# Patient Record
Sex: FEMALE | Race: WHITE | Employment: OTHER | URBAN - METROPOLITAN AREA
[De-identification: names, ages, dates, MRNs, and addresses within clinical notes are randomized per-mention and may not be internally consistent; named-entity substitution may affect disease eponyms.]

---

## 2017-02-21 ENCOUNTER — GENERIC CONVERSION - ENCOUNTER (OUTPATIENT)
Dept: OTHER | Facility: OTHER | Age: 57
End: 2017-02-21

## 2017-03-31 DIAGNOSIS — Z12.31 ENCOUNTER FOR SCREENING MAMMOGRAM FOR MALIGNANT NEOPLASM OF BREAST: ICD-10-CM

## 2017-03-31 DIAGNOSIS — R92.8 OTHER ABNORMAL AND INCONCLUSIVE FINDINGS ON DIAGNOSTIC IMAGING OF BREAST: ICD-10-CM

## 2017-04-06 ENCOUNTER — GENERIC CONVERSION - ENCOUNTER (OUTPATIENT)
Dept: OTHER | Facility: OTHER | Age: 57
End: 2017-04-06

## 2017-04-14 ENCOUNTER — HOSPITAL ENCOUNTER (OUTPATIENT)
Dept: RADIOLOGY | Facility: HOSPITAL | Age: 57
Discharge: HOME | End: 2017-04-14
Payer: MEDICARE

## 2017-04-14 DIAGNOSIS — R92.8 OTHER ABNORMAL AND INCONCLUSIVE FINDINGS ON DIAGNOSTIC IMAGING OF BREAST: ICD-10-CM

## 2017-04-14 PROCEDURE — G0206 DX MAMMO INCL CAD UNI: HCPCS

## 2017-04-14 PROCEDURE — 76642 ULTRASOUND BREAST LIMITED: CPT

## 2017-04-20 ENCOUNTER — HOSPITAL ENCOUNTER (OUTPATIENT)
Dept: RADIOLOGY | Facility: HOSPITAL | Age: 57
Discharge: HOME | End: 2017-04-20
Admitting: RADIOLOGY
Payer: MEDICARE

## 2017-04-20 ENCOUNTER — HOSPITAL ENCOUNTER (OUTPATIENT)
Dept: RADIOLOGY | Facility: HOSPITAL | Age: 57
Discharge: HOME | End: 2017-04-20
Payer: MEDICARE

## 2017-04-20 VITALS
OXYGEN SATURATION: 98 % | HEART RATE: 83 BPM | SYSTOLIC BLOOD PRESSURE: 112 MMHG | RESPIRATION RATE: 16 BRPM | DIASTOLIC BLOOD PRESSURE: 57 MMHG

## 2017-04-20 DIAGNOSIS — N63.10 BREAST MASS, RIGHT: ICD-10-CM

## 2017-04-20 PROCEDURE — 19084 BX BREAST ADD LESION US IMAG: CPT

## 2017-04-20 PROCEDURE — 88341 IMHCHEM/IMCYTCHM EA ADD ANTB: CPT | Performed by: RADIOLOGY

## 2017-04-20 PROCEDURE — 88342 IMHCHEM/IMCYTCHM 1ST ANTB: CPT | Performed by: RADIOLOGY

## 2017-04-20 PROCEDURE — 19083 BX BREAST 1ST LESION US IMAG: CPT

## 2017-04-20 PROCEDURE — 88361 TUMOR IMMUNOHISTOCHEM/COMPUT: CPT | Performed by: RADIOLOGY

## 2017-04-20 PROCEDURE — 88305 TISSUE EXAM BY PATHOLOGIST: CPT | Performed by: RADIOLOGY

## 2017-04-20 RX ORDER — LEVOTHYROXINE SODIUM 0.1 MG/1
100 TABLET ORAL EVERY MORNING
COMMUNITY
End: 2019-01-07 | Stop reason: SDUPTHER

## 2017-04-20 RX ORDER — CELECOXIB 200 MG/1
200 CAPSULE ORAL DAILY PRN
COMMUNITY
End: 2017-05-19

## 2017-04-20 RX ORDER — TRAMADOL HYDROCHLORIDE 50 MG/1
50 TABLET ORAL EVERY 6 HOURS PRN
COMMUNITY
End: 2018-03-12 | Stop reason: ALTCHOICE

## 2017-04-20 RX ORDER — ROPINIROLE 0.25 MG/1
0.25 TABLET, FILM COATED ORAL
COMMUNITY
End: 2017-05-19

## 2017-04-20 RX ORDER — DULOXETIN HYDROCHLORIDE 60 MG/1
60 CAPSULE, DELAYED RELEASE ORAL EVERY MORNING
COMMUNITY
End: 2018-07-13 | Stop reason: SDUPTHER

## 2017-04-20 RX ORDER — ZOLPIDEM TARTRATE 6.25 MG/1
5 TABLET, FILM COATED, EXTENDED RELEASE ORAL
COMMUNITY
End: 2018-09-06 | Stop reason: SDUPTHER

## 2017-04-25 ENCOUNTER — GENERIC CONVERSION - ENCOUNTER (OUTPATIENT)
Dept: OTHER | Facility: OTHER | Age: 57
End: 2017-04-25

## 2017-05-09 ENCOUNTER — ALLSCRIPTS OFFICE VISIT (OUTPATIENT)
Dept: OTHER | Facility: OTHER | Age: 57
End: 2017-05-09

## 2017-05-09 DIAGNOSIS — C50.911 MALIGNANT NEOPLASM OF RIGHT FEMALE BREAST (HCC): ICD-10-CM

## 2017-05-15 ENCOUNTER — GENERIC CONVERSION - ENCOUNTER (OUTPATIENT)
Dept: OTHER | Facility: OTHER | Age: 57
End: 2017-05-15

## 2017-05-15 ENCOUNTER — ALLSCRIPTS OFFICE VISIT (OUTPATIENT)
Dept: OTHER | Facility: OTHER | Age: 57
End: 2017-05-15

## 2017-05-16 ENCOUNTER — GENERIC CONVERSION - ENCOUNTER (OUTPATIENT)
Dept: OTHER | Facility: OTHER | Age: 57
End: 2017-05-16

## 2017-05-16 ENCOUNTER — APPOINTMENT (OUTPATIENT)
Dept: RADIATION ONCOLOGY | Facility: HOSPITAL | Age: 57
End: 2017-05-16
Attending: RADIOLOGY
Payer: MEDICARE

## 2017-05-16 PROCEDURE — 99215 OFFICE O/P EST HI 40 MIN: CPT | Performed by: RADIOLOGY

## 2017-05-19 ENCOUNTER — HOSPITAL ENCOUNTER (OUTPATIENT)
Dept: RADIOLOGY | Facility: HOSPITAL | Age: 57
Discharge: HOME | End: 2017-05-19
Attending: SURGERY
Payer: MEDICARE

## 2017-05-19 DIAGNOSIS — C50.911 MALIGNANT NEOPLASM OF RIGHT FEMALE BREAST (HCC): ICD-10-CM

## 2017-05-19 PROCEDURE — C8908 MRI W/O FOL W/CONT, BREAST,: HCPCS

## 2017-05-19 PROCEDURE — A9585 GADOBUTROL INJECTION: HCPCS | Performed by: SURGERY

## 2017-05-19 RX ORDER — GABAPENTIN 100 MG/1
100 CAPSULE ORAL 3 TIMES DAILY
COMMUNITY
End: 2018-03-12 | Stop reason: ALTCHOICE

## 2017-05-19 RX ADMIN — GADOBUTROL 7 ML: 604.72 INJECTION INTRAVENOUS at 09:57

## 2017-05-23 ENCOUNTER — ALLSCRIPTS OFFICE VISIT (OUTPATIENT)
Dept: OTHER | Facility: OTHER | Age: 57
End: 2017-05-23

## 2017-05-23 ENCOUNTER — ANESTHESIA EVENT (OUTPATIENT)
Dept: PERIOP | Facility: HOSPITAL | Age: 57
End: 2017-05-23
Payer: MEDICARE

## 2017-05-24 ENCOUNTER — HOSPITAL ENCOUNTER (OUTPATIENT)
Dept: RADIOLOGY | Facility: HOSPITAL | Age: 57
Discharge: HOME | End: 2017-05-24
Payer: MEDICARE

## 2017-05-24 ENCOUNTER — CONVERSION ENCOUNTER (OUTPATIENT)
Dept: MAMMOGRAPHY | Facility: HOSPITAL | Age: 57
End: 2017-05-24

## 2017-05-24 ENCOUNTER — ANESTHESIA (OUTPATIENT)
Dept: PERIOP | Facility: HOSPITAL | Age: 57
End: 2017-05-24
Payer: MEDICARE

## 2017-05-24 ENCOUNTER — HOSPITAL ENCOUNTER (OUTPATIENT)
Facility: HOSPITAL | Age: 57
Setting detail: OUTPATIENT SURGERY
Discharge: HOME | End: 2017-05-24
Attending: SURGERY | Admitting: SURGERY
Payer: MEDICARE

## 2017-05-24 ENCOUNTER — HOSPITAL ENCOUNTER (OUTPATIENT)
Dept: RADIOLOGY | Facility: HOSPITAL | Age: 57
Discharge: HOME | End: 2017-05-24
Attending: SURGERY | Admitting: SURGERY
Payer: MEDICARE

## 2017-05-24 ENCOUNTER — APPOINTMENT (OUTPATIENT)
Dept: RADIOLOGY | Facility: HOSPITAL | Age: 57
End: 2017-05-24
Payer: MEDICARE

## 2017-05-24 VITALS
HEIGHT: 68 IN | WEIGHT: 150 LBS | OXYGEN SATURATION: 93 % | SYSTOLIC BLOOD PRESSURE: 102 MMHG | DIASTOLIC BLOOD PRESSURE: 52 MMHG | BODY MASS INDEX: 22.73 KG/M2 | RESPIRATION RATE: 18 BRPM | TEMPERATURE: 97 F | HEART RATE: 86 BPM

## 2017-05-24 DIAGNOSIS — N63.10 BREAST MASS, RIGHT: ICD-10-CM

## 2017-05-24 DIAGNOSIS — C50.911 MALIGNANT NEOPLASM OF RIGHT FEMALE BREAST (HCC): ICD-10-CM

## 2017-05-24 DIAGNOSIS — Z85.3 HISTORY OF RIGHT BREAST CANCER: ICD-10-CM

## 2017-05-24 PROBLEM — D05.11 INTRADUCTAL CARCINOMA OF RIGHT BREAST: Status: ACTIVE | Noted: 2017-05-24

## 2017-05-24 PROCEDURE — 76098 X-RAY EXAM SURGICAL SPECIMEN: CPT

## 2017-05-24 PROCEDURE — 78195 LYMPH SYSTEM IMAGING: CPT

## 2017-05-24 PROCEDURE — 19285 PERQ DEV BREAST 1ST US IMAG: CPT

## 2017-05-24 PROCEDURE — 88307 TISSUE EXAM BY PATHOLOGIST: CPT | Performed by: SURGERY

## 2017-05-24 PROCEDURE — A9541 TC99M SULFUR COLLOID: HCPCS

## 2017-05-24 RX ORDER — FENTANYL CITRATE 50 UG/ML
INJECTION, SOLUTION INTRAMUSCULAR; INTRAVENOUS AS NEEDED
Status: DISCONTINUED | OUTPATIENT
Start: 2017-05-24 | End: 2017-05-24 | Stop reason: SURG

## 2017-05-24 RX ORDER — ONDANSETRON 2 MG/ML
4 INJECTION INTRAMUSCULAR; INTRAVENOUS ONCE
Status: DISCONTINUED | OUTPATIENT
Start: 2017-05-24 | End: 2017-05-24 | Stop reason: HOSPADM

## 2017-05-24 RX ORDER — OXYCODONE HYDROCHLORIDE AND ACETAMINOPHEN 5; 325 MG/1; MG/1
1 TABLET ORAL EVERY 4 HOURS PRN
Qty: 30 TABLET | Refills: 0 | Status: SHIPPED | OUTPATIENT
Start: 2017-05-24 | End: 2017-06-03

## 2017-05-24 RX ORDER — ONDANSETRON 2 MG/ML
INJECTION INTRAMUSCULAR; INTRAVENOUS AS NEEDED
Status: DISCONTINUED | OUTPATIENT
Start: 2017-05-24 | End: 2017-05-24 | Stop reason: SURG

## 2017-05-24 RX ORDER — ISOSULFAN BLUE 50 MG/5ML
INJECTION, SOLUTION SUBCUTANEOUS AS NEEDED
Status: DISCONTINUED | OUTPATIENT
Start: 2017-05-24 | End: 2017-05-24 | Stop reason: HOSPADM

## 2017-05-24 RX ORDER — KETOROLAC TROMETHAMINE 30 MG/ML
INJECTION, SOLUTION INTRAMUSCULAR; INTRAVENOUS AS NEEDED
Status: DISCONTINUED | OUTPATIENT
Start: 2017-05-24 | End: 2017-05-24 | Stop reason: SURG

## 2017-05-24 RX ORDER — OXYCODONE HYDROCHLORIDE AND ACETAMINOPHEN 5; 325 MG/1; MG/1
1 TABLET ORAL EVERY 4 HOURS PRN
Qty: 30 TABLET | Refills: 0 | Status: SHIPPED | OUTPATIENT
Start: 2017-05-24 | End: 2017-05-24

## 2017-05-24 RX ORDER — MAGNESIUM HYDROXIDE 1200 MG/15ML
LIQUID ORAL AS NEEDED
Status: DISCONTINUED | OUTPATIENT
Start: 2017-05-24 | End: 2017-05-24 | Stop reason: HOSPADM

## 2017-05-24 RX ORDER — PROPOFOL 10 MG/ML
INJECTION, EMULSION INTRAVENOUS AS NEEDED
Status: DISCONTINUED | OUTPATIENT
Start: 2017-05-24 | End: 2017-05-24 | Stop reason: SURG

## 2017-05-24 RX ORDER — GLYCOPYRROLATE 0.2 MG/ML
INJECTION INTRAMUSCULAR; INTRAVENOUS AS NEEDED
Status: DISCONTINUED | OUTPATIENT
Start: 2017-05-24 | End: 2017-05-24 | Stop reason: SURG

## 2017-05-24 RX ORDER — FENTANYL CITRATE/PF 50 MCG/ML
25 SYRINGE (ML) INJECTION
Status: DISCONTINUED | OUTPATIENT
Start: 2017-05-24 | End: 2017-05-24 | Stop reason: HOSPADM

## 2017-05-24 RX ORDER — SODIUM CHLORIDE, SODIUM LACTATE, POTASSIUM CHLORIDE, CALCIUM CHLORIDE 600; 310; 30; 20 MG/100ML; MG/100ML; MG/100ML; MG/100ML
100 INJECTION, SOLUTION INTRAVENOUS CONTINUOUS
Status: DISCONTINUED | OUTPATIENT
Start: 2017-05-24 | End: 2017-05-24 | Stop reason: HOSPADM

## 2017-05-24 RX ORDER — MIDAZOLAM HYDROCHLORIDE 1 MG/ML
INJECTION INTRAMUSCULAR; INTRAVENOUS AS NEEDED
Status: DISCONTINUED | OUTPATIENT
Start: 2017-05-24 | End: 2017-05-24 | Stop reason: SURG

## 2017-05-24 RX ORDER — EPHEDRINE SULFATE 50 MG/ML
INJECTION, SOLUTION INTRAVENOUS AS NEEDED
Status: DISCONTINUED | OUTPATIENT
Start: 2017-05-24 | End: 2017-05-24 | Stop reason: SURG

## 2017-05-24 RX ORDER — LIDOCAINE HYDROCHLORIDE 10 MG/ML
INJECTION, SOLUTION INFILTRATION; PERINEURAL CODE/TRAUMA/SEDATION MEDICATION
Status: COMPLETED | OUTPATIENT
Start: 2017-05-24 | End: 2017-05-24

## 2017-05-24 RX ADMIN — CEFAZOLIN SODIUM 2000 MG: 2 SOLUTION INTRAVENOUS at 09:52

## 2017-05-24 RX ADMIN — KETOROLAC TROMETHAMINE 30 MG: 30 INJECTION, SOLUTION INTRAMUSCULAR at 11:16

## 2017-05-24 RX ADMIN — MIDAZOLAM HYDROCHLORIDE 1 MG: 1 INJECTION, SOLUTION INTRAMUSCULAR; INTRAVENOUS at 09:57

## 2017-05-24 RX ADMIN — SODIUM CHLORIDE, SODIUM LACTATE, POTASSIUM CHLORIDE, AND CALCIUM CHLORIDE 100 ML/HR: .6; .31; .03; .02 INJECTION, SOLUTION INTRAVENOUS at 09:26

## 2017-05-24 RX ADMIN — GLYCOPYRROLATE 0.2 MG: 0.2 INJECTION, SOLUTION INTRAMUSCULAR; INTRAVENOUS at 10:23

## 2017-05-24 RX ADMIN — EPHEDRINE SULFATE 10 MG: 50 INJECTION, SOLUTION INTRAMUSCULAR; INTRAVENOUS; SUBCUTANEOUS at 10:26

## 2017-05-24 RX ADMIN — DEXAMETHASONE SODIUM PHOSPHATE 4 MG: 10 INJECTION INTRAMUSCULAR; INTRAVENOUS at 11:13

## 2017-05-24 RX ADMIN — SODIUM CHLORIDE, SODIUM LACTATE, POTASSIUM CHLORIDE, AND CALCIUM CHLORIDE: .6; .31; .03; .02 INJECTION, SOLUTION INTRAVENOUS at 09:49

## 2017-05-24 RX ADMIN — EPHEDRINE SULFATE 10 MG: 50 INJECTION, SOLUTION INTRAMUSCULAR; INTRAVENOUS; SUBCUTANEOUS at 10:25

## 2017-05-24 RX ADMIN — FENTANYL CITRATE 50 MCG: 50 INJECTION, SOLUTION INTRAMUSCULAR; INTRAVENOUS at 10:01

## 2017-05-24 RX ADMIN — PROPOFOL 150 MG: 10 INJECTION, EMULSION INTRAVENOUS at 09:55

## 2017-05-24 RX ADMIN — LIDOCAINE HYDROCHLORIDE 7 ML: 10 INJECTION, SOLUTION INFILTRATION; PERINEURAL at 09:12

## 2017-05-24 RX ADMIN — MIDAZOLAM HYDROCHLORIDE 1 MG: 1 INJECTION, SOLUTION INTRAMUSCULAR; INTRAVENOUS at 09:52

## 2017-05-24 RX ADMIN — FENTANYL CITRATE 50 MCG: 50 INJECTION, SOLUTION INTRAMUSCULAR; INTRAVENOUS at 09:57

## 2017-05-24 RX ADMIN — ONDANSETRON 4 MG: 2 INJECTION INTRAMUSCULAR; INTRAVENOUS at 11:13

## 2017-06-06 ENCOUNTER — ALLSCRIPTS OFFICE VISIT (OUTPATIENT)
Dept: OTHER | Facility: OTHER | Age: 57
End: 2017-06-06

## 2017-06-13 ENCOUNTER — ALLSCRIPTS OFFICE VISIT (OUTPATIENT)
Dept: OTHER | Facility: OTHER | Age: 57
End: 2017-06-13

## 2017-07-05 ENCOUNTER — ALLSCRIPTS OFFICE VISIT (OUTPATIENT)
Dept: OTHER | Facility: OTHER | Age: 57
End: 2017-07-05

## 2017-07-05 LAB — SCAN RESULT: NORMAL

## 2017-07-06 ENCOUNTER — APPOINTMENT (OUTPATIENT)
Dept: RADIATION ONCOLOGY | Facility: HOSPITAL | Age: 57
End: 2017-07-06
Attending: RADIOLOGY
Payer: MEDICARE

## 2017-07-06 ENCOUNTER — GENERIC CONVERSION - ENCOUNTER (OUTPATIENT)
Dept: OTHER | Facility: OTHER | Age: 57
End: 2017-07-06

## 2017-07-06 PROCEDURE — 99215 OFFICE O/P EST HI 40 MIN: CPT | Performed by: RADIOLOGY

## 2017-07-06 PROCEDURE — 77332 RADIATION TREATMENT AID(S): CPT | Performed by: RADIOLOGY

## 2017-07-06 PROCEDURE — 77290 THER RAD SIMULAJ FIELD CPLX: CPT | Performed by: RADIOLOGY

## 2017-07-12 PROCEDURE — 77300 RADIATION THERAPY DOSE PLAN: CPT | Performed by: RADIOLOGY

## 2017-07-12 PROCEDURE — 77295 3-D RADIOTHERAPY PLAN: CPT | Performed by: RADIOLOGY

## 2017-07-12 PROCEDURE — 77334 RADIATION TREATMENT AID(S): CPT | Performed by: RADIOLOGY

## 2017-07-18 PROCEDURE — 77280 THER RAD SIMULAJ FIELD SMPL: CPT | Performed by: RADIOLOGY

## 2017-07-20 PROCEDURE — 77331 SPECIAL RADIATION DOSIMETRY: CPT | Performed by: RADIOLOGY

## 2017-07-20 PROCEDURE — 77412 RADIATION TX DELIVERY LVL 3: CPT | Performed by: RADIOLOGY

## 2017-07-21 PROCEDURE — 77412 RADIATION TX DELIVERY LVL 3: CPT | Performed by: RADIOLOGY

## 2017-07-24 PROCEDURE — 77412 RADIATION TX DELIVERY LVL 3: CPT | Performed by: RADIOLOGY

## 2017-07-25 LAB — SCAN RESULT: NORMAL

## 2017-07-25 PROCEDURE — 77412 RADIATION TX DELIVERY LVL 3: CPT | Performed by: RADIOLOGY

## 2017-07-26 PROCEDURE — 77412 RADIATION TX DELIVERY LVL 3: CPT | Performed by: RADIOLOGY

## 2017-07-26 PROCEDURE — 77336 RADIATION PHYSICS CONSULT: CPT | Performed by: RADIOLOGY

## 2017-07-26 PROCEDURE — 77417 THER RADIOLOGY PORT IMAGE(S): CPT | Performed by: RADIOLOGY

## 2017-07-27 DIAGNOSIS — C50.911 MALIGNANT NEOPLASM OF RIGHT FEMALE BREAST (HCC): ICD-10-CM

## 2017-07-27 PROCEDURE — 77412 RADIATION TX DELIVERY LVL 3: CPT | Performed by: RADIOLOGY

## 2017-07-28 PROCEDURE — 77412 RADIATION TX DELIVERY LVL 3: CPT | Performed by: RADIOLOGY

## 2017-07-31 PROCEDURE — 77412 RADIATION TX DELIVERY LVL 3: CPT | Performed by: RADIOLOGY

## 2017-08-01 ENCOUNTER — APPOINTMENT (OUTPATIENT)
Dept: RADIATION ONCOLOGY | Facility: HOSPITAL | Age: 57
End: 2017-08-01
Attending: RADIOLOGY
Payer: MEDICARE

## 2017-08-01 PROCEDURE — 77412 RADIATION TX DELIVERY LVL 3: CPT | Performed by: RADIOLOGY

## 2017-08-02 PROCEDURE — 77336 RADIATION PHYSICS CONSULT: CPT | Performed by: RADIOLOGY

## 2017-08-02 PROCEDURE — 77412 RADIATION TX DELIVERY LVL 3: CPT | Performed by: RADIOLOGY

## 2017-08-02 PROCEDURE — 77321 SPECIAL TELETX PORT PLAN: CPT | Performed by: RADIOLOGY

## 2017-08-02 PROCEDURE — 77334 RADIATION TREATMENT AID(S): CPT | Performed by: RADIOLOGY

## 2017-08-02 PROCEDURE — 77417 THER RADIOLOGY PORT IMAGE(S): CPT | Performed by: RADIOLOGY

## 2017-08-03 PROCEDURE — 77412 RADIATION TX DELIVERY LVL 3: CPT | Performed by: RADIOLOGY

## 2017-08-04 PROCEDURE — 77412 RADIATION TX DELIVERY LVL 3: CPT | Performed by: RADIOLOGY

## 2017-08-07 PROCEDURE — 77412 RADIATION TX DELIVERY LVL 3: CPT | Performed by: RADIOLOGY

## 2017-08-08 PROCEDURE — 77412 RADIATION TX DELIVERY LVL 3: CPT | Performed by: RADIOLOGY

## 2017-08-09 ENCOUNTER — ALLSCRIPTS OFFICE VISIT (OUTPATIENT)
Dept: OTHER | Facility: OTHER | Age: 57
End: 2017-08-09

## 2017-08-09 PROCEDURE — 77417 THER RADIOLOGY PORT IMAGE(S): CPT | Performed by: RADIOLOGY

## 2017-08-09 PROCEDURE — 77336 RADIATION PHYSICS CONSULT: CPT | Performed by: RADIOLOGY

## 2017-08-09 PROCEDURE — 77412 RADIATION TX DELIVERY LVL 3: CPT | Performed by: RADIOLOGY

## 2017-08-10 PROCEDURE — 77412 RADIATION TX DELIVERY LVL 3: CPT | Performed by: RADIOLOGY

## 2017-08-11 PROCEDURE — 77412 RADIATION TX DELIVERY LVL 3: CPT | Performed by: RADIOLOGY

## 2017-08-14 PROCEDURE — 77412 RADIATION TX DELIVERY LVL 3: CPT | Performed by: RADIOLOGY

## 2017-08-15 PROCEDURE — 77412 RADIATION TX DELIVERY LVL 3: CPT | Performed by: RADIOLOGY

## 2017-08-16 PROCEDURE — 77336 RADIATION PHYSICS CONSULT: CPT | Performed by: RADIOLOGY

## 2017-08-16 PROCEDURE — 77412 RADIATION TX DELIVERY LVL 3: CPT | Performed by: RADIOLOGY

## 2017-08-17 PROCEDURE — 77412 RADIATION TX DELIVERY LVL 3: CPT | Performed by: RADIOLOGY

## 2017-08-17 PROCEDURE — 77417 THER RADIOLOGY PORT IMAGE(S): CPT | Performed by: RADIOLOGY

## 2017-08-18 PROCEDURE — 77412 RADIATION TX DELIVERY LVL 3: CPT | Performed by: RADIOLOGY

## 2017-08-21 PROCEDURE — 77412 RADIATION TX DELIVERY LVL 3: CPT | Performed by: RADIOLOGY

## 2017-08-22 PROCEDURE — 77412 RADIATION TX DELIVERY LVL 3: CPT | Performed by: RADIOLOGY

## 2017-08-23 PROCEDURE — 77412 RADIATION TX DELIVERY LVL 3: CPT | Performed by: RADIOLOGY

## 2017-08-23 PROCEDURE — 77336 RADIATION PHYSICS CONSULT: CPT | Performed by: RADIOLOGY

## 2017-08-24 PROCEDURE — 77417 THER RADIOLOGY PORT IMAGE(S): CPT | Performed by: RADIOLOGY

## 2017-08-24 PROCEDURE — 77412 RADIATION TX DELIVERY LVL 3: CPT | Performed by: RADIOLOGY

## 2017-08-25 PROCEDURE — 77412 RADIATION TX DELIVERY LVL 3: CPT | Performed by: RADIOLOGY

## 2017-08-28 PROCEDURE — 77412 RADIATION TX DELIVERY LVL 3: CPT | Performed by: RADIOLOGY

## 2017-08-29 PROCEDURE — 77412 RADIATION TX DELIVERY LVL 3: CPT | Performed by: RADIOLOGY

## 2017-08-29 PROCEDURE — 77331 SPECIAL RADIATION DOSIMETRY: CPT | Performed by: RADIOLOGY

## 2017-08-29 PROCEDURE — 77280 THER RAD SIMULAJ FIELD SMPL: CPT | Performed by: RADIOLOGY

## 2017-08-30 PROCEDURE — 77412 RADIATION TX DELIVERY LVL 3: CPT | Performed by: RADIOLOGY

## 2017-08-30 PROCEDURE — 77336 RADIATION PHYSICS CONSULT: CPT | Performed by: RADIOLOGY

## 2017-08-31 PROCEDURE — 77412 RADIATION TX DELIVERY LVL 3: CPT | Performed by: RADIOLOGY

## 2017-09-01 ENCOUNTER — APPOINTMENT (OUTPATIENT)
Dept: RADIATION ONCOLOGY | Facility: HOSPITAL | Age: 57
End: 2017-09-01
Attending: RADIOLOGY
Payer: MEDICARE

## 2017-09-01 PROCEDURE — 77412 RADIATION TX DELIVERY LVL 3: CPT | Performed by: RADIOLOGY

## 2017-09-05 ENCOUNTER — ALLSCRIPTS OFFICE VISIT (OUTPATIENT)
Dept: OTHER | Facility: OTHER | Age: 57
End: 2017-09-05

## 2017-09-05 PROCEDURE — 77336 RADIATION PHYSICS CONSULT: CPT | Performed by: RADIOLOGY

## 2017-09-05 PROCEDURE — 77412 RADIATION TX DELIVERY LVL 3: CPT | Performed by: RADIOLOGY

## 2017-10-03 ENCOUNTER — APPOINTMENT (OUTPATIENT)
Dept: RADIATION ONCOLOGY | Facility: HOSPITAL | Age: 57
End: 2017-10-03
Attending: RADIOLOGY
Payer: MEDICARE

## 2017-10-03 PROCEDURE — 99214 OFFICE O/P EST MOD 30 MIN: CPT | Performed by: RADIOLOGY

## 2017-10-09 ENCOUNTER — ALLSCRIPTS OFFICE VISIT (OUTPATIENT)
Dept: OTHER | Facility: OTHER | Age: 57
End: 2017-10-09

## 2017-11-08 ENCOUNTER — APPOINTMENT (OUTPATIENT)
Dept: LAB | Facility: CLINIC | Age: 57
End: 2017-11-08
Payer: MEDICARE

## 2017-11-08 ENCOUNTER — ALLSCRIPTS OFFICE VISIT (OUTPATIENT)
Dept: OTHER | Facility: OTHER | Age: 57
End: 2017-11-08

## 2017-11-08 ENCOUNTER — TRANSCRIBE ORDERS (OUTPATIENT)
Dept: MAMMOGRAPHY | Facility: CLINIC | Age: 57
End: 2017-11-08

## 2017-11-08 DIAGNOSIS — D47.2 MONOCLONAL GAMMOPATHY: ICD-10-CM

## 2017-11-08 DIAGNOSIS — F32.9 MAJOR DEPRESSIVE DISORDER, SINGLE EPISODE: ICD-10-CM

## 2017-11-08 LAB
ALBUMIN SERPL BCP-MCNC: 3.4 G/DL (ref 3.5–5)
ALP SERPL-CCNC: 50 U/L (ref 46–116)
ALT SERPL W P-5'-P-CCNC: 16 U/L (ref 12–78)
ANION GAP SERPL CALCULATED.3IONS-SCNC: 8 MMOL/L (ref 4–13)
AST SERPL W P-5'-P-CCNC: 14 U/L (ref 5–45)
BASOPHILS # BLD AUTO: 0.03 THOUSANDS/ΜL (ref 0–0.1)
BASOPHILS NFR BLD AUTO: 1 % (ref 0–1)
BILIRUB SERPL-MCNC: 0.3 MG/DL (ref 0.2–1)
BUN SERPL-MCNC: 15 MG/DL (ref 5–25)
CALCIUM SERPL-MCNC: 8.3 MG/DL (ref 8.3–10.1)
CHLORIDE SERPL-SCNC: 109 MMOL/L (ref 100–108)
CO2 SERPL-SCNC: 26 MMOL/L (ref 21–32)
CORTIS SERPL-MCNC: 10.2 UG/DL
CREAT SERPL-MCNC: 0.71 MG/DL (ref 0.6–1.3)
EOSINOPHIL # BLD AUTO: 0.14 THOUSAND/ΜL (ref 0–0.61)
EOSINOPHIL NFR BLD AUTO: 3 % (ref 0–6)
ERYTHROCYTE [DISTWIDTH] IN BLOOD BY AUTOMATED COUNT: 12.4 % (ref 11.6–15.1)
ERYTHROCYTE [SEDIMENTATION RATE] IN BLOOD: 18 MM/HOUR (ref 0–20)
GFR SERPL CREATININE-BSD FRML MDRD: 94 ML/MIN/1.73SQ M
GLUCOSE SERPL-MCNC: 90 MG/DL (ref 65–140)
HCT VFR BLD AUTO: 39 % (ref 34.8–46.1)
HGB BLD-MCNC: 13.6 G/DL (ref 11.5–15.4)
IGA SERPL-MCNC: 1000 MG/DL (ref 70–400)
IGG SERPL-MCNC: 1260 MG/DL (ref 700–1600)
IGM SERPL-MCNC: 36 MG/DL (ref 40–230)
LYMPHOCYTES # BLD AUTO: 1.15 THOUSANDS/ΜL (ref 0.6–4.47)
LYMPHOCYTES NFR BLD AUTO: 21 % (ref 14–44)
MCH RBC QN AUTO: 31.3 PG (ref 26.8–34.3)
MCHC RBC AUTO-ENTMCNC: 34.9 G/DL (ref 31.4–37.4)
MCV RBC AUTO: 90 FL (ref 82–98)
MONOCYTES # BLD AUTO: 0.56 THOUSAND/ΜL (ref 0.17–1.22)
MONOCYTES NFR BLD AUTO: 10 % (ref 4–12)
NEUTROPHILS # BLD AUTO: 3.57 THOUSANDS/ΜL (ref 1.85–7.62)
NEUTS SEG NFR BLD AUTO: 65 % (ref 43–75)
NRBC BLD AUTO-RTO: 0 /100 WBCS
PLATELET # BLD AUTO: 246 THOUSANDS/UL (ref 149–390)
PMV BLD AUTO: 9.7 FL (ref 8.9–12.7)
POTASSIUM SERPL-SCNC: 4 MMOL/L (ref 3.5–5.3)
PROT SERPL-MCNC: 7.6 G/DL (ref 6.4–8.2)
RBC # BLD AUTO: 4.35 MILLION/UL (ref 3.81–5.12)
SODIUM SERPL-SCNC: 143 MMOL/L (ref 136–145)
T4 FREE SERPL-MCNC: 1.24 NG/DL (ref 0.76–1.46)
TSH SERPL DL<=0.05 MIU/L-ACNC: 0.46 UIU/ML (ref 0.36–3.74)
WBC # BLD AUTO: 5.46 THOUSAND/UL (ref 4.31–10.16)

## 2017-11-08 PROCEDURE — 85025 COMPLETE CBC W/AUTO DIFF WBC: CPT

## 2017-11-08 PROCEDURE — 85652 RBC SED RATE AUTOMATED: CPT

## 2017-11-08 PROCEDURE — 80053 COMPREHEN METABOLIC PANEL: CPT

## 2017-11-08 PROCEDURE — 82533 TOTAL CORTISOL: CPT

## 2017-11-08 PROCEDURE — 84443 ASSAY THYROID STIM HORMONE: CPT

## 2017-11-08 PROCEDURE — 84439 ASSAY OF FREE THYROXINE: CPT

## 2017-11-08 PROCEDURE — 86146 BETA-2 GLYCOPROTEIN ANTIBODY: CPT

## 2017-11-08 PROCEDURE — 36415 COLL VENOUS BLD VENIPUNCTURE: CPT

## 2017-11-08 PROCEDURE — 82784 ASSAY IGA/IGD/IGG/IGM EACH: CPT

## 2017-11-10 LAB
B2 GLYCOPROT1 IGA SER-ACNC: <9 GPI IGA UNITS (ref 0–25)
B2 GLYCOPROT1 IGG SER-ACNC: <9 GPI IGG UNITS (ref 0–20)
B2 GLYCOPROT1 IGM SER-ACNC: <9 GPI IGM UNITS (ref 0–32)

## 2017-11-10 NOTE — PROGRESS NOTES
Assessment    1  Hypothyroidism (244 9) (E03 9)   2  Inappropriate sinus tachycardia (427 89) (R00 0)   3  Monoclonal gammopathy of undetermined significance (273 1) (D47 2)   4  Fibromyalgia (729 1) (M79 7)   5  Hypotension, unspecified hypotension type (458 9) (I95 9)    Plan  Depressed mood, Monoclonal gammopathy of undetermined significance    · (1) T4, FREE; Status:Active; Requested for:08Nov2017;    · (1) TSH; Status:Active; Requested for:08Nov2017;   Monoclonal gammopathy of undetermined significance    · (1) BETA-2 GLYCPRO I AB, IGG,IGA,IGM; Status:Active; Requested for:08Nov2017;    · (1) CBC/PLT/DIFF; Status:Active; Requested for:08Nov2017;    · (1) COMPREHENSIVE METABOLIC PANEL; Status:Active; Requested for:08Nov2017;    · (1) CORTISOL RANDOM; Status:Active; Requested for:08Nov2017;    · (1) IgG,IgA,IgM QUANTITATIVE, BLOOD; Status:Active; Requested for:08Nov2017;    · (1) SED RATE; Status:Active; Requested MYT:04XJV6767;     Discussion/Summary  Discussion Summary:   As we have discussed, you have this illness of unknown cause which has persisted for 20 years  Unfortunately, I do not think we will find an answer  blood pressure is too low  Cut metoprolol in half some laboratory testing today  should be here in about 6 weeks to review blood pressure and labs  However, you should consider a consultation with a top level tertiary care center such as [facility]  History of Present Illness  HPI: A 58-year-old woman greater than 21year-old history of an illness who is principal symptom is chronic pain involving her entire body  As has been noted in the past, she has seen numerous physicians without a firm diagnosis  She has been given numerous treatment programs which have failed completely these include nonsteroidal anti-inflammatory drugs, and also neuroleptics such as Cymbalta, and Lyrica  They do not work    year ago, as part of a lab screen, she was found to have MGUS with no evidence of myeloma  April of this year she went for screening mammogram  Right breast abnormality was noted which led to a diagnosis of breast cancer  She has had treatment with lumpectomy and radiation therapy  all of this, her symptoms of chronic pain fatigue weakness and all the ancillary symptoms have persisted unchanged  was given metoprolol 25 twice a day for sinus tachycardia of unknown cause  her complaints are exactly the same  Additionally, she complains of multiple syncopal episodes which sound orthostatic in nature  pressure 90/60 with pulse of about 80  Review of Systems  Complete-Female:  Constitutional: feeling poorly-- and-- feeling tired  Cardiovascular: as noted in HPI  Genitourinary: no dysuria  Integumentary: no rashes  Neurological: dizziness-- and-- fainting, but-- as noted in HPI  Psychiatric: anxiety-- and-- depression  Endocrine: muscle weakness  Hematologic/Lymphatic: no tendency for easy bleeding-- and-- no tendency for easy bruising  Active Problems  1  Abnormal blood sugar (790 29) (R73 09)   2  Anxiety (300 00) (F41 9)   3  Depressed mood (311) (F32 9)   4  Edema of right foot (782 3) (R60 0)   5  Esophageal reflux (530 81) (K21 9)   6  Fatigue (780 79) (R53 83)   7  Fibromyalgia (729 1) (M79 7)   8  Hypothyroidism (244 9) (E03 9)   9  Inappropriate sinus tachycardia (427 89) (R00 0)   10  Insomnia (780 52) (G47 00)   11  Malignant neoplasm of unspecified site of right female breast (174 9) (C50 911)   12  Monoclonal gammopathy of undetermined significance (273 1) (D47 2)   13  Neck Stiffness (719 58)   14  Otalgia, unspecified laterality (388 70) (H92 09)   15  Pain syndrome, chronic (338 4) (G89 4)   16  Palpitations (785 1) (R00 2)   17  Paresthesia (782 0) (R20 2)   18  Peripheral neuropathy (356 9) (G62 9)   19  Preoperative examination (V72 84) (Z01 818)   20  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)   21  RLS (restless legs syndrome) (333 94) (G25 81)   22   Shortness of breath (786 05) (R06 02)   23  Visit for screening mammogram (V76 12) (Z12 31)   24  Vitamin D deficiency (268 9) (E55 9)    Past Medical History  1  History of  (637 90)   2  History of Cervical radiculopathy (723 4) (M54 12)   3  History of Cervicalgia (723 1) (M54 2)   4  History of Cognitive impairment, mild, so stated (331 83) (G31 84)   5  History of Depression (311) (F32 9)   6  History of Disc degeneration, lumbar (722 52) (M51 36)   7  History of Disorders Of The Cervical Region (723 9)   8  History of Encounter for screening mammogram for malignant neoplasm of breast (V76 12) (Z12 31)   9  History Of 3  Previous Pregnancies (V61 5)   10  History of diplopia (V12 49) (Z86 69)   11  History of fibromyalgia (V13 59) (Z87 39)   12  History of low back pain (V13 59) (Z87 39)   13  History of polyneuropathy (V12 49) (Z86 69)   14  History of radiation therapy (V15 3) (Z92 3)   15  History of radiculopathy (V12 49) (Z86 69)   16  History of systemic lupus erythematosus (SLE) (V12 29) (Z87 39)   17  History of viral warts (V12 09) (Z86 19)   18  History of Major depression, recurrent (296 30) (F33 9)   19  History of Memory Lapses Or Loss (780 93)   20  History of Multiple joint pain (719 49) (M25 50)   21  History of Myalgia and myositis (729 1)   22  History of Neuralgia (729 2) (M79 2)   23  History of Neuritis (729 2) (M79 2)   24  History of Rectal/anal hemorrhage (569 3) (K62 5)   25  History of Sacroiliitis (720 2) (M46 1)   26  History of Screening for malignant neoplasm of breast (V76 10) (Z12 31)   27  History of Shoulder bursitis (726 10) (M75 50)   28  History of Tendinitis of shoulder, unspecified laterality (726 10) (M75 80)   29  History of Thoracic neuritis (724 4) (M54 14)   30  History of Vitamin B deficiency (266 9) (E53 9)   31  History of Vitamin D deficiency (268 9) (E55 9)    Surgical History  1  History of Bladder Surgery   2  History of Breast Surgery Lumpectomy   3   History of Colonoscopy (Fiberoptic) Screening   4  History of Hysterectomy   5  History of Near-Total Thyroidectomy   6  History of Rectocele Repair  Surgical History Reviewed: The surgical history was reviewed and updated today  Family History  Mother    1  Family history of Congestive Heart Failure   2  Family history of Mother  At Age ___  Father    1  Family history of Alcoholic (Laennec's) Cirrhosis   4  Family history of Family Health Status Of Father -   Family History Reviewed: The family history was reviewed and updated today  Social History     · Denied: History of Alcohol Use (History)   · Disabled   · Denied: History of Drug Use   · Former smoker (V15 82) (R86 189)   · Denied: History of Marijuana   · Marital History - Currently    ·    · Occupation:  Social History Reviewed: The social history was reviewed and updated today  Current Meds   1  DULoxetine HCl - 60 MG Oral Capsule Delayed Release Particles; take 1 capsule daily; Therapy: 92JEX3850 to ([facility])  Requested for: 2017 Ordered   2  Levothyroxine Sodium 100 MCG Oral Tablet; Take 1 tablet daily; Therapy: 92CAH9049 to (Last AA:24NPO7165)  Requested for: 08WWH7734 Ordered   3  Metoprolol Succinate ER 25 MG Oral Tablet Extended Release 24 Hour; TAKE 1 TABLET DAILY; Therapy: 64YKE1728 to (Evaluate:04Brs5281)  Requested for: 18Czw5256; Last Rx:35Ypa6247 Ordered   4  Tamoxifen Citrate 20 MG Oral Tablet; TAKE 1 TABLET DAILY; Therapy: 24HSR1212 to (Evaluate:81Jcd8571)  Requested for: 41Ikg5675; Last Rx:69Khv0662; Status: ACTIVE - Retrospective By Protocol Authorization Ordered   5  Zolpidem Tartrate 5 MG Oral Tablet; 1 tab qhs prn; Therapy: 08CXZ7824 to (Evaluate:2018); Last Rx:45Ufi0090 Ordered  Medication List Reviewed: The medication list was reviewed and updated today  Allergies  1  No Known Drug Allergies  2  No Known Environmental Allergies   3   No Known Food Allergies    Vitals  Vital Signs    Recorded: 85HBC3453 03:22PM Recorded: 87GFH2132 02:59PM   Heart Rate  85   Systolic 90    Diastolic 60    Weight  573 lb 8 oz   BMI Calculated  23 42   BSA Calculated  1 79   O2 Saturation  95       Physical Exam   Constitutional  General appearance: Abnormal   chronically ill,-- malnourished,-- underweight-- and-- appearance reflects stated age  Pulmonary  Respiratory effort: No increased work of breathing or signs of respiratory distress  Cardiovascular  Auscultation of heart: Normal rate and rhythm, normal S1 and S2, without murmurs  Examination of extremities for edema and/or varicosities: Normal    Musculoskeletal  Gait and station: Normal    Neurologic  Reflexes: 2+ and symmetric  Psychiatric  Orientation to person, place, and time: Normal    Mood and affect: Normal          Health Management  Health Maintenance   * MAMMO SCREENING BILATERAL W CAD; every 1 year; Last 81GCT1563; Next Due: 25IHT4278; Active  Digital Bilateral Screening Mammogram With CAD; every 1 year; Last 25Oct2013; Next UGE:78XZS9930;  Overdue    Future Appointments    Date/Time Provider Specialty Site   12/21/2017 10:40 AM Maxi Alvarado MD Neurology [facility] [address]   12/07/2017 11:00 AM Nilda Douglass MD General Surgery [facility]   01/08/2018 10:45 AM Waldemar Brown MD Hematology [facility]       Signatures   Electronically signed by : MICHELLE Alva ; Nov 8 2017  3:36PM EST                       (Author)

## 2017-12-07 ENCOUNTER — GENERIC CONVERSION - ENCOUNTER (OUTPATIENT)
Dept: OTHER | Facility: OTHER | Age: 57
End: 2017-12-07

## 2017-12-21 ENCOUNTER — ALLSCRIPTS OFFICE VISIT (OUTPATIENT)
Dept: OTHER | Facility: OTHER | Age: 57
End: 2017-12-21

## 2017-12-22 NOTE — PROGRESS NOTES
Assessment   1  RLS (restless legs syndrome) (333 94) (G25 81)   2  Depressed mood (311) (F32 9)   3  Fibromyalgia (729 1) (M79 7)    Plan   Anxiety    · DULoxetine HCl - 60 MG Oral Capsule Delayed Release Particles (Cymbalta);    take 1 capsule daily   Rx By: Yadira Gaitan; Dispense: 90 Days ; #:90 Capsule Delayed Release Particles; Refill: 2;For: Anxiety; VIVI = N; Verified Transmission to [facility]; Last Updated By: System, Secure64; 12/21/2017 11:08:23 AM  Fibromyalgia    · Continue with our present treatment plan ; Status:Complete;   Done: 86AKY8398   Ordered; For:Fibromyalgia; Ordered By:Piotr Garza;   · Follow-up visit in 4 Months Evaluation and Treatment  Follow-up  Status: Complete     Done: 80NFI2336   Ordered; For: Fibromyalgia; Ordered By: Yadira Gaitan Performed:  Due: 83GDQ4102; Last Updated By: Paz Lemus; 12/21/2017 11:10:06 AM  RLS (restless legs syndrome)    · TraMADol HCl - 50 MG Oral Tablet; TAKE 1 TABLET Bedtime   Rx By: Yadira Gaitan; Dispense: 30 Days ; #:30 Tablet; Refill: 3;For: RLS (restless legs syndrome); VIVI = N; Print Rx    Discussion/Summary   Discussion Summary:    Patient with a history of chronic fibromyalgia, restless leg syndrome has been experiencing worsening symptoms of restless legs since tramadol was discontinued  She has been taking Ambien with no significant relief  Patient was advised to take tramadol only at bedtime for the restless leg syndrome and was given a prescription for 30 tablets with 3 refills  Show we will continue with Cymbalta 60 mg at bedtime is advised to return back to see me in 3-4 months  Chief Complaint   Chief Complaint Free Text Note Form: Patient is here for a follow up visit for her history of fatigue, fibromyalgia, and RLS        History of Present Illness   HPI: Patient is here for a follow-up visit with a history of restless leg syndrome, chronic fibromyalgia, chronic fatigue and recently underwent treatment for breast cancer, surgery followed by radiation and has been on tamoxifen at this time  She has been experiencing episodes of dizziness which has improved ever since the metoprolol dosage as been reduced to half a tablet and has not had any further falls  Patient continues to have restless leg symptoms since her tramadol was discontinued recently and also has been experiencing neck pain  Patient remains on Cymbalta 60 mg at bedtime for the fibromyalgia symptoms  Review of Systems   Neurological ROS:      Constitutional: fatigue  HEENT: blurred vision  Cardiovascular: rapid or irregular heart rate  Respiratory:  no unusual or persistant cough, no shortness of breath with or without exertion  Gastrointestinal:  no nausea, no vomiting, no diarrhea, no abdominal pain, no changes in bowel habits, no melena, no loss of bowel control  Genitourinary: urinary hesitancy  Musculoskeletal: arthralgias,-- myalgias,-- head/neck/back pain-- and-- pain while walking  Integumentary  no masses, no rash, no skin lesions, no livedo reticularis  Psychiatric: depression  Endocrine loss of sexual ability or drive   Hematologic/Lymphatic:  no unusual bleeding, no tendency for easy bruising, no clotting skin or lumps  Neurological General: headache-- and-- trouble falling asleep  Neurological Mental Status: alteration or loss of consciousness  Neurological Cranial Nerves: blurry or double vision,-- vertigo or dizziness-- and-- slurred speech  Neurological Motor findings include: tremor,-- twitching-- and-- cramping(pre/post exercise)  Neurological Coordination: balance difficulties  Neurological Sensory: numbness-- and-- tingling  Neurological Gait: difficulty walking-- and-- has had falls  ROS Reviewed:    ROS reviewed  Active Problems   1  Abnormal blood sugar (790 29) (R73 09)   2  Anxiety (300 00) (F41 9)   3   Depressed mood (311) (F32 9)   4  Edema of right foot (782 3) (R60 0)   5  Esophageal reflux (530 81) (K21 9)   6  Fatigue (780 79) (R53 83)   7  Fibromyalgia (729 1) (M79 7)   8  Flu vaccine need (V04 81) (Z23)   9  Hypotension, unspecified hypotension type (458 9) (I95 9)   10  Hypothyroidism (244 9) (E03 9)   11  Inappropriate sinus tachycardia (427 89) (R00 0)   12  Insomnia (780 52) (G47 00)   13  Malignant neoplasm of unspecified site of right female breast (174 9) (C50 911)   14  Monoclonal gammopathy of undetermined significance (273 1) (D47 2)   15  Neck Stiffness (719 58)   16  Otalgia, unspecified laterality (388 70) (H92 09)   17  Pain syndrome, chronic (338 4) (G89 4)   18  Palpitations (785 1) (R00 2)   19  Paresthesia (782 0) (R20 2)   20  Peripheral neuropathy (356 9) (G62 9)   21  Preoperative examination (V72 84) (Z01 818)   22  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)   23  RLS (restless legs syndrome) (333 94) (G25 81)   24  Shortness of breath (786 05) (R06 02)   25  Visit for screening mammogram (V76 12) (Z12 31)   26  Vitamin D deficiency (268 9) (E55 9)    Past Medical History   1  History of  (637 90)   2  History of Cervical radiculopathy (723 4) (M54 12)   3  History of Cervicalgia (723 1) (M54 2)   4  History of Cognitive impairment, mild, so stated (331 83) (G31 84)   5  History of Depression (311) (F32 9)   6  History of Disc degeneration, lumbar (722 52) (M51 36)   7  History of Disorders Of The Cervical Region (723 9)   8  History of Encounter for screening mammogram for malignant neoplasm of breast     (V76 12) (Z12 31)   9  History Of 3  Previous Pregnancies (V61 5)   10  History of diplopia (V12 49) (Z86 69)   11  History of fibromyalgia (V13 59) (Z87 39)   12  History of low back pain (V13 59) (Z87 39)   13  History of polyneuropathy (V12 49) (Z86 69)   14  History of radiation therapy (V15 3) (Z92 3)   15  History of radiculopathy (V12 49) (Z86 69)   16   History of systemic lupus erythematosus (SLE) (V12 29) (Z87 39)   17  History of viral warts (V12 09) (Z86 19)   18  History of Major depression, recurrent (296 30) (F33 9)   19  History of Memory Lapses Or Loss (780 93)   20  History of Multiple joint pain (719 49) (M25 50)   21  History of Myalgia and myositis (729 1)   22  History of Neuralgia (729 2) (M79 2)   23  History of Neuritis (729 2) (M79 2)   24  History of Rectal/anal hemorrhage (569 3) (K62 5)   25  History of Sacroiliitis (720 2) (M46 1)   26  History of Screening for malignant neoplasm of breast (V76 10) (Z12 31)   27  History of Shoulder bursitis (726 10) (M75 50)   28  History of Tendinitis of shoulder, unspecified laterality (726 10) (M75 80)   29  History of Thoracic neuritis (724 4) (M54 14)   30  History of Vitamin B deficiency (266 9) (E53 9)   31  History of Vitamin D deficiency (268 9) (E55 9)    Surgical History   1  History of Bladder Surgery   2  History of Breast Surgery Lumpectomy   3  History of Colonoscopy (Fiberoptic) Screening   4  History of Hysterectomy   5  History of Near-Total Thyroidectomy   6  History of Rectocele Repair    Family History   Mother    1  Family history of Congestive Heart Failure   2  Family history of Mother  At Age ___  Father    1  Family history of Alcoholic (Laennec's) Cirrhosis   4  Family history of Family Health Status Of Father -     Social History    · Denied: History of Alcohol Use (History)   · Disabled   · Denied: History of Drug Use   · Former smoker (V15 82) (N26 163)   · Denied: History of Marijuana   · Marital History - Currently    ·    · Occupation:  Social History Reviewed: The social history was reviewed and updated today  Current Meds    1  DULoxetine HCl - 60 MG Oral Capsule Delayed Release Particles; take 1 capsule daily; Therapy: 87WOO2729 to (Last Jesus Chavarria)  Requested for: 2017 Ordered   2  Levothyroxine Sodium 100 MCG Oral Tablet; Take 1 tablet daily;      Therapy: 69Bje0182 to (Last GH:18QAU4734)  Requested for: 43YNF6375 Ordered   3  Metoprolol Succinate ER 25 MG Oral Tablet Extended Release 24 Hour; TAKE 1 TABLET     DAILY; Therapy: 56OOJ9187 to (Evaluate:93Qhy8067)  Requested for: 29Loj0779; Last     Rx:56Gtx3898 Ordered   4  Tamoxifen Citrate 20 MG Oral Tablet; TAKE 1 TABLET DAILY; Therapy: 90YRS0889 to (Evaluate:20Daz6766)  Requested for: 07Asy2534; Last     Rx:14Gmj4567; Status: ACTIVE - Retrospective By Protocol Authorization Ordered   5  Zolpidem Tartrate 5 MG Oral Tablet; 1 tab qhs prn; Therapy: 88MBN2151 to (Evaluate:04Mar2018); Last Rx:27Ifs5394 Ordered  Medication List Reviewed: The medication list was reviewed and updated today  Allergies   1  No Known Drug Allergies  2  No Known Environmental Allergies   3  No Known Food Allergies    Vitals   Signs   Recorded: 62Dtk2735 10:44AM   Heart Rate: 88  Systolic: 96  Diastolic: 70  Height: 5 ft 8 in  Weight: 148 lb   BMI Calculated: 22 5  BSA Calculated: 1 8    Physical Exam        Constitutional      General appearance: No acute distress, well appearing and well nourished  Musculoskeletal      Gait and station: Normal gait, stance and balance  Muscle strength: Normal strength throughout  Muscle tone: No atrophy, abnormal movements, flaccidity, cogwheeling or spasticity  Neurologic      Orientation to person, place, and time: Normal        Language: Names objects, able to repeat phrases and speaks spontaneously  7th cranial nerve: Normal        Sensation: Normal        Reflexes: Normal        Coordination: Normal   Patient has diffuse trigger point tenderness throughout the spine  She also has tenderness in the joints predominantly in knees bilaterally        Future Appointments      Date/Time Provider Specialty Site   01/08/2018 10:45 AM Juan F Singer MD Hematology [facility]cology [facility]   03/05/2018 10:00 AM Tameka Bashir MD General Surgery [facility]     Signatures    Electronically signed by :  Bernie Reyna MD; Dec 21 2017 11:15AM EST                       (Author)

## 2018-01-08 ENCOUNTER — ALLSCRIPTS OFFICE VISIT (OUTPATIENT)
Dept: OTHER | Facility: OTHER | Age: 58
End: 2018-01-08

## 2018-01-10 NOTE — PROGRESS NOTES
Assessment    1  Fibromyalgia (729 1) (M79 7)   2  Fatigue (780 79) (R53 83)    Plan  Fatigue    · Cyclobenzaprine HCl - 10 MG Oral Tablet; TAKE 1 TABLET EVERY DAY AT BEDTIME   Rx By: Fatmata De La Rosa; Dispense: 30 Days ; #:30 Tablet; Refill: 1; For: Fatigue; VIVI = N; Verified Transmission to [facility]; Last Updated By: System, [employer]; 2/3/2016 11:27:58 AM   · Follow-up visit in 3 months Evaluation and Treatment  Follow-up  Status: Complete   Done: [id number]   Ordered; For: Fatigue; Ordered By: Fatmata De La Rosa Performed:  Due: 81ZYA7910; Last Updated By: Itzel La; 2/3/2016 11:32:44 AM  Fibromyalgia    · TiZANidine HCl - 4 MG Oral Tablet   Rx By: Fatmata De La Rosa; Dispense: 30 Days ; #:30 Tablet; Refill: 4; For: Fibromyalgia; VIVI = N; [facility]  Insomnia    · Zolpidem Tartrate 5 MG Oral Tablet   Rx By: Tad Palma; Dispense: 0 Days ; #:30 Tablet; Refill: 5; For: Insomnia; VIVI = N; Print Rx; Last Updated By: Fatmata De La Rosa; 2/3/2016 11:27:09 AM  PMH: Multiple joint pain    · Hydroxychloroquine Sulfate 200 MG Oral Tablet   Rx By: Warden Cagle; Dispense: 0 Days ; #:180 Tablet; Refill: 2; For: PMH: Multiple joint pain; VIVI = N; Sent To: [facility]; Last Updated By: Fatmata De La Rosa; 2/3/2016 11:27:09 AM    Discussion/Summary  Discussion Summary:   Patient with a history of chronic cervical strain and fibromyalgia with chronic fatigue is now advised to discontinue Zanaflex and will give her a trial of Flexeril 10 mg at bedtime  She will also follow up with orthopedics in the near future and will continue with Cymbalta at this time  Home exercise program and is encouraged  She will follow-up with us in 2-3 months ,    Medication Side Effects Reviewed: Possible side effects of new medications were reviewed with the patient/guardian today  Patient Guardian understands agrees: The treatment plan was reviewed with the patient/guardian   The patient/guardian understands and agrees with the treatment plan      Chief Complaint  Chief Complaint Free Text Note Form: Terese Lundberg is a right handed 63year old lady who returns today in f/u with hx of fibromyalgia and peripheral neuropathy  History of Present Illness  HPI: Patient is here for a follow-up visit with a history of chronic neck pain, fibromyalgia and continues to experience pain throughout her body, paresthesias, fatigue, has been having difficulty sleeping in spite of using Zanaflex 4 mg at bedtime  She complains of intermittent symptoms of restless legs and uses Requip for the same  In the past she has tried several medications and the patient has been on celebrex, Zanaflex Cymbalta, with no significant relief of symptoms  She also describes symptoms of tendinitis in the right hand and the elbow and has been referred by Dr Caity Goodman to an orthopedist in the near future  Patient was seen by pain management and no cervical injections were offered  Her past test results were all reviewed again she has had EMG studies MRI of the cervical spine as well as blood work repeatedly which has been unremarkable  Review of Systems  Neurological ROS:   Constitutional: no fever, no chills, no recent weight gain, no recent weight loss, no complaints of feeling tired, no changes in appetite  HEENT:  no sinus problems, not feeling congested, no blurred vision, no dryness of the eyes, no eye pain, no hearing loss, no tinnitus, no mouth sores, no sore throat, no hoarseness, no dysphagia, no masses, no bleeding  Cardiovascular:  no chest pain or pressure, no palpitations present, the heart rate was not rapid or irregular, no swelling in the arms or legs, no poor circulation  Respiratory:  no unusual or persistant cough, no shortness of breath with or without exertion  Gastrointestinal:  no nausea, no vomiting, no diarrhea, no abdominal pain, no changes in bowel habits, no melena, no loss of bowel control  Genitourinary:  no incontinence, no feelings of urinary urgency, no increase in frequency, no urinary hesitancy, no dysuria, no hematuria  Musculoskeletal: arthralgias, myalgias, head/neck/back pain and pain while walking  Integumentary  no masses, no rash, no skin lesions, no livedo reticularis  Psychiatric: sleep problems  Endocrine hot or cold intolerance  Hematologic/Lymphatic: a tendency for easy bruising  Neurological General: waking up at night  Neurological Mental Status:  no confusion, no mood swings, no alteration or loss of consciousness, no difficulty expressing/understanding speech, no memory problems  Neurological Cranial Nerves:  no blurry or double vision, no loss of vision, no face drooping, no facial numbness or weakness, no taste or smell loss/changes, no hearing loss or ringing, no vertigo or dizziness, no dysphagia, no slurred speech  Neurological Motor findings include: twitching  Neurological Coordination:  no unsteadiness, no vertigo or dizziness, no clumsiness, no problems reaching for objects  Neurological Sensory: numbness, tingling and hands and fingers  Neurological Gait:  no difficulty walking, not falling to one side, no sensation of being pushed, has not had falls  ROS Reviewed:   ROS reviewed  Active Problems    1  Anxiety (300 00) (F41 9)   2  Breast pain (611 71) (N64 4)   3  Chronic pain syndrome (338 4) (G89 4)   4  Esophageal reflux (530 81) (K21 9)   5  Fatigue (780 79) (R53 83)   6  Fibromyalgia (729 1) (M79 7)   7  Hypothyroidism (244 9) (E03 9)   8  Insomnia (780 52) (G47 00)   9  Myalgia And Myositis (729 1)   10  Neck Stiffness (719 58)   11  Otalgia, unspecified laterality (388 70) (H92 09)   12  Palpitations (785 1) (R00 2)   13  Paresthesia (782 0) (R20 2)   14  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)   15  Shortness of breath (786 05) (R06 02)   16  Visit for screening mammogram (V76 12) (Z12 31)   17   Vitamin D deficiency (268 9) (E55 9)    Past Medical History    1  History of  (637 90)   2  History of Cervical radiculopathy (723 4) (M54 12)   3  History of Cervicalgia (723 1) (M54 2)   4  History of Cognitive impairment, mild, so stated (331 83) (G31 84)   5  History of Depression (311) (F32 9)   6  History of Disc degeneration, lumbar (722 52) (M51 36)   7  History of Disorders Of The Cervical Region (723 9)   8  History of Encounter for screening mammogram for malignant neoplasm of breast   (V76 12) (Z12 31)   9  History Of 3  Previous Pregnancies (V61 5)   10  History of diplopia (V12 49) (Z86 69)   11  History of fibromyalgia (V13 59) (Z87 39)   12  History of low back pain (V13 59) (Z87 39)   13  History of polyneuropathy (V12 49) (Z86 69)   14  History of radiculopathy (V12 49) (Z86 69)   15  History of systemic lupus erythematosus (SLE) (V12 29) (Z87 39)   16  History of viral warts (V12 09) (Z86 19)   17  History of Major depression, recurrent (296 30) (F33 9)   18  History of Memory Lapses Or Loss (780 93)   19  History of Multiple joint pain (719 49) (M25 50)   20  History of Neuralgia (729 2) (M79 2)   21  History of Neuritis (729 2) (M79 2)   22  History of Rectal/anal hemorrhage (569 3) (K62 5)   23  History of Sacroiliitis (720 2) (M46 1)   24  History of Screening for malignant neoplasm of breast (V76 10) (Z12 39)   25  History of Shoulder bursitis (726 10) (M75 50)   26  History of Tendinitis of shoulder, unspecified laterality (726 10) (M75 80)   27  History of Thoracic neuritis (724 4) (M54 14)   28  History of Vitamin B deficiency (266 9) (E53 9)   29  History of Vitamin D deficiency (268 9) (E55 9)    Surgical History    1  History of Bladder Surgery   2  History of Colonoscopy (Fiberoptic) Screening   3  History of Near-Total Thyroidectomy   4  History of Rectocele Repair    Family History    1  Family history of Congestive Heart Failure   2  Family history of Mother  At Age ___    3   Family history of Alcoholic (Laennec's) Cirrhosis   4  Family history of Family Health Status Of Father -     Social History    · Denied: History of Alcohol Use (History)   · Disabled   · Denied: History of Drug Use   · Former smoker (V15 82) (R04 550)   · Denied: History of Marijuana   · Marital History - Currently    ·    · Occupation:  Social History Reviewed: The social history was reviewed and updated today  Current Meds   1  Calcium 600+D 600-400 MG-UNIT Oral Tablet; TAB BY MOUTH TWICE A DAY; Therapy: 14Dux1445 to Recorded   2  Celecoxib 200 MG Oral Capsule; take 1 capsule by mouth once daily if needed; Therapy: 77PRB8956 to (Evaluate:27Bkz5987)  Requested for: 66OWB2796; Last   Rx:2016 Ordered   3  DULoxetine HCl - 60 MG Oral Capsule Delayed Release Particles; TAKE 1 CAPSULE   DAILY; Therapy: 97URH2890 to (Evaluate:11Whi4442)  Requested for: 97IXX3012; Last   Rx:90Ocm5847 Ordered   4  Hydroxychloroquine Sulfate 200 MG Oral Tablet; take 1 tablet twice a day; Therapy: 73SFC4892 to (Last Rx:82Lzg1036)  Requested for: 02Vbx3052 Ordered   5  Levothyroxine Sodium 100 MCG Oral Tablet; Take 1 tablet daily; Therapy: 77Cem5581 to (Last Cecily Greenhouse)  Requested for: 80Anv1873; Status:   ACTIVE - Renewal Denied Ordered   6  TiZANidine HCl - 4 MG Oral Tablet; Take 1 tablet daily  Requested for: 71CTG9846; Last   Rx:36Pdk9074 Ordered   7  Zolpidem Tartrate 5 MG Oral Tablet; 1 tab qhs prn; Therapy: 19TSK9560 to (Last JM:75GSR7701) Ordered  Medication List Reviewed: The medication list was reviewed and updated today  Allergies    1   No Known Drug Allergies    Vitals  Signs [Data Includes: Current Encounter]   Recorded: 69ZDN4098 11:05AM   Heart Rate: 87  Systolic: 94, LUE, Sitting  Diastolic: 65, LUE, Sitting  Height: 5 ft 8 in  Weight: 150 lb   BMI Calculated: 22 81  BSA Calculated: 1 81    Physical Exam   Patient has diffuse trigger point tenderness throughout the spine with no new deficits noted on motor and sensory exam         Future Appointments    Date/Time Provider Specialty Site   05/03/2016 11:45 AM Marina Sheets MD [facility] [address] [place] [address]     Signatures   Electronically signed by :  Jerone Sandhoff, MD; Feb  3 2016 12:02PM EST                       (Author)

## 2018-01-12 VITALS
BODY MASS INDEX: 23.54 KG/M2 | HEIGHT: 67 IN | TEMPERATURE: 99.1 F | SYSTOLIC BLOOD PRESSURE: 106 MMHG | WEIGHT: 150 LBS | DIASTOLIC BLOOD PRESSURE: 78 MMHG

## 2018-01-13 VITALS
WEIGHT: 152 LBS | DIASTOLIC BLOOD PRESSURE: 80 MMHG | RESPIRATION RATE: 16 BRPM | OXYGEN SATURATION: 98 % | HEART RATE: 72 BPM | BODY MASS INDEX: 23.86 KG/M2 | SYSTOLIC BLOOD PRESSURE: 124 MMHG | HEIGHT: 67 IN | TEMPERATURE: 98 F

## 2018-01-13 VITALS
RESPIRATION RATE: 18 BRPM | BODY MASS INDEX: 23.86 KG/M2 | WEIGHT: 152 LBS | TEMPERATURE: 99 F | DIASTOLIC BLOOD PRESSURE: 56 MMHG | SYSTOLIC BLOOD PRESSURE: 96 MMHG | HEIGHT: 67 IN

## 2018-01-13 VITALS
RESPIRATION RATE: 12 BRPM | SYSTOLIC BLOOD PRESSURE: 116 MMHG | HEART RATE: 80 BPM | TEMPERATURE: 98.5 F | WEIGHT: 154 LBS | BODY MASS INDEX: 24.17 KG/M2 | HEIGHT: 67 IN | DIASTOLIC BLOOD PRESSURE: 64 MMHG

## 2018-01-13 VITALS
DIASTOLIC BLOOD PRESSURE: 60 MMHG | TEMPERATURE: 98.5 F | RESPIRATION RATE: 16 BRPM | HEART RATE: 81 BPM | OXYGEN SATURATION: 95 % | HEIGHT: 67 IN | SYSTOLIC BLOOD PRESSURE: 90 MMHG | BODY MASS INDEX: 23.76 KG/M2 | WEIGHT: 151.38 LBS

## 2018-01-14 VITALS
TEMPERATURE: 98.5 F | WEIGHT: 150.38 LBS | OXYGEN SATURATION: 95 % | RESPIRATION RATE: 20 BRPM | SYSTOLIC BLOOD PRESSURE: 122 MMHG | BODY MASS INDEX: 23.6 KG/M2 | DIASTOLIC BLOOD PRESSURE: 68 MMHG | HEART RATE: 74 BPM | HEIGHT: 67 IN

## 2018-01-14 VITALS
WEIGHT: 149.5 LBS | HEART RATE: 83 BPM | DIASTOLIC BLOOD PRESSURE: 60 MMHG | HEIGHT: 67 IN | OXYGEN SATURATION: 98 % | TEMPERATURE: 98.9 F | RESPIRATION RATE: 18 BRPM | SYSTOLIC BLOOD PRESSURE: 110 MMHG | BODY MASS INDEX: 23.46 KG/M2

## 2018-01-14 VITALS
WEIGHT: 150 LBS | HEART RATE: 92 BPM | RESPIRATION RATE: 16 BRPM | DIASTOLIC BLOOD PRESSURE: 76 MMHG | HEIGHT: 67 IN | SYSTOLIC BLOOD PRESSURE: 102 MMHG | BODY MASS INDEX: 23.54 KG/M2

## 2018-01-14 VITALS
SYSTOLIC BLOOD PRESSURE: 92 MMHG | WEIGHT: 152 LBS | HEIGHT: 67 IN | TEMPERATURE: 99.1 F | BODY MASS INDEX: 23.86 KG/M2 | DIASTOLIC BLOOD PRESSURE: 70 MMHG

## 2018-01-14 VITALS
DIASTOLIC BLOOD PRESSURE: 60 MMHG | HEART RATE: 85 BPM | SYSTOLIC BLOOD PRESSURE: 90 MMHG | OXYGEN SATURATION: 95 % | BODY MASS INDEX: 23.41 KG/M2 | WEIGHT: 149.5 LBS

## 2018-01-15 VITALS
RESPIRATION RATE: 16 BRPM | BODY MASS INDEX: 23.72 KG/M2 | HEART RATE: 72 BPM | DIASTOLIC BLOOD PRESSURE: 80 MMHG | TEMPERATURE: 98.9 F | OXYGEN SATURATION: 98 % | WEIGHT: 151.13 LBS | SYSTOLIC BLOOD PRESSURE: 128 MMHG | HEIGHT: 67 IN

## 2018-01-18 NOTE — CONSULTS
I had the pleasure of evaluating your patient, Kelli Sherwood  My full evaluation follows:      Chief Complaint  Chief Complaint:   The patient presents to the office today with Breast cancer, follow-up  History of Present Illness  54-year-old female recently referred for the above  Routine mammogram demonstrated a right-sided abnormality  Patient was sent for a biopsy which demonstrated carcinoma  Patient was subsequently referred to Dr Alexis Taylor and underwent lumpectomy  Patient was also seen by Dr Marisa Linder and completed RT  Presently Mrs Dos Santos's biggest complaint is fibromyalgia/body aches  No specific problems with the tamoxifen  No specific breast complaints  Appetite is good, weight is stable  Activities are baseline  Review of Systems    Constitutional: feeling tired, but as noted in HPI  Eyes: No complaints of eye pain, no red eyes, no eyesight problems, no discharge, no dry eyes, no itching of eyes  ENT: no complaints of earache, no loss of hearing, no nose bleeds, no nasal discharge, no sore throat, no hoarseness  Cardiovascular: No complaints of slow heart rate, no fast heart rate, no chest pain, no palpitations, no leg claudication, no lower extremity edema  Respiratory: No complaints of shortness of breath, no wheezing, no cough, no SOB on exertion, no orthopnea, no PND  Gastrointestinal: No complaints of abdominal pain, no constipation, no nausea or vomiting, no diarrhea, no bloody stools  Genitourinary: No complaints of dysuria, no incontinence, no pelvic pain, no dysmenorrhea, no vaginal discharge or bleeding  Musculoskeletal: arthralgias and myalgias, but as noted in HPI and no limb pain  Integumentary: No complaints of skin rash or lesions, no itching, no skin wounds, no breast pain or lump  Neurological: No complaints of headache, no confusion, no convulsions, no numbness, no dizziness or fainting, no tingling, no limb weakness, no difficulty walking  Psychiatric: Not suicidal, no sleep disturbance, no anxiety or depression, no change in personality, no emotional problems  Endocrine: No complaints of proptosis, no hot flashes, no muscle weakness, no deepening of the voice, no feelings of weakness  Hematologic/Lymphatic: No complaints of swollen glands, no swollen glands in the neck, does not bleed easily, does not bruise easily  Active Problems    1  Abnormal blood sugar (790 29) (R73 09)   2  Abnormal mammogram of right breast (793 80) (R92 8)   3  Anxiety (300 00) (F41 9)   4  Depressed mood (311) (F32 9)   5  Edema of right foot (782 3) (R60 0)   6  Esophageal reflux (530 81) (K21 9)   7  Fatigue (780 79) (R53 83)   8  Fibromyalgia (729 1) (M79 7)   9  Hypothyroidism (244 9) (E03 9)   10  Inappropriate sinus tachycardia (427 89) (R00 0)   11  Insomnia (780 52) (G47 00)   12  Malignant neoplasm of unspecified site of right female breast (174 9) (C50 911)   13  Monoclonal gammopathy of undetermined significance (273 1) (D47 2)   14  Myalgia and myositis (729 1)   15  Neck Stiffness (719 58)   16  Otalgia, unspecified laterality (388 70) (H92 09)   17  Pain syndrome, chronic (338 4) (G89 4)   18  Palpitations (785 1) (R00 2)   19  Paresthesia (782 0) (R20 2)   20  Peripheral neuropathy (356 9) (G62 9)   21  Preoperative examination (V72 84) (Z01 818)   22  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)   23  RLS (restless legs syndrome) (333 94) (G25 81)   24  Shortness of breath (786 05) (R06 02)   25  Visit for screening mammogram (V76 12) (Z12 31)   26   Vitamin D deficiency (268 9) (E55 9)    Past Medical History    · History of  (637 90)   · History of Cervical radiculopathy (723 4) (M54 12)   · History of Cervicalgia (723 1) (M54 2)   · History of Cognitive impairment, mild, so stated (331 83) (G31 84)   · History of Depression (311) (F32 9)   · History of Disc degeneration, lumbar (562 52) (M51 36)   · History of Disorders Of The Cervical Region (723 9)   · History of Encounter for screening mammogram for malignant neoplasm of breast  (V76 12) (Z12 31)   · History Of 3  Previous Pregnancies (V61 5)   · History of diplopia (V12 49) (Z86 69)   · History of fibromyalgia (V13 59) (Z87 39)   · History of low back pain (V13 59) (Z87 39)   · History of polyneuropathy (V12 49) (Z86 69)   · History of radiation therapy (V15 3) (Z92 3)   · History of radiculopathy (V12 49) (Z86 69)   · History of systemic lupus erythematosus (SLE) (V12 29) (Z87 39)   · History of viral warts (V12 09) (Z86 19)   · History of Major depression, recurrent (296 30) (F33 9)   · History of Memory Lapses Or Loss (780 93)   · History of Multiple joint pain (719 49) (M25 50)   · History of Neuralgia (729 2) (M79 2)   · History of Neuritis (729 2) (M79 2)   · History of Rectal/anal hemorrhage (569 3) (K62 5)   · History of Sacroiliitis (720 2) (M46 1)   · History of Screening for malignant neoplasm of breast (V76 10) (Z12 31)   · History of Shoulder bursitis (726 10) (M75 50)   · History of Tendinitis of shoulder, unspecified laterality (726 10) (M75 80)   · History of Thoracic neuritis (724 4) (M54 14)   · History of Vitamin B deficiency (266 9) (E53 9)   · History of Vitamin D deficiency (268 9) (E55 9)    Surgical History    · History of Bladder Surgery   · History of Breast Surgery Lumpectomy   · History of Colonoscopy (Fiberoptic) Screening   · History of Hysterectomy   · History of Near-Total Thyroidectomy   · History of Rectocele Repair    Family History    · Family history of Congestive Heart Failure   · Family history of Mother  At Age ___    · Family history of Alcoholic (Laennec's) Cirrhosis   · Family history of Family Health Status Of Father -     Social History    · Denied: History of Alcohol Use (History)   · Disabled   · Denied: History of Drug Use   · Former smoker (V15 82) (M90 122)   · Denied: History of Marijuana   · Marital History - Currently    ·    · Occupation:    Current Meds   1  Celecoxib 200 MG Oral Capsule; take 1 capsule daily; Therapy: 36EAO2082 to (Last Rx:10Aug2017)  Requested for: 10Aug2017 Ordered   2  DULoxetine HCl - 60 MG Oral Capsule Delayed Release Particles; take 1 capsule daily; Therapy: 10ELD9691 to (Last Viniciuslissymaximus Sophia)  Requested for: 21Jun2017 Ordered   3  Levothyroxine Sodium 100 MCG Oral Tablet; Take 1 tablet daily; Therapy: 17WZJ6123 to (Last Rx:17Mln5092)  Requested for: 81Kow9264 Ordered   4  Metoprolol Succinate ER 25 MG Oral Tablet Extended Release 24 Hour; TAKE 1 TABLET   DAILY; Therapy: 65MGM9145 to (Evaluate:47Nae6200)  Requested for: 59Lpp7547; Last   Rx:67Uqu5916 Ordered   5  Tamoxifen Citrate 20 MG Oral Tablet; TAKE 1 TABLET DAILY; Therapy: 56YIL1963 to (Evaluate:00Gjz8982)  Requested for: 39Ruy9898; Last   Rx:48Stv6859; Status: ACTIVE - Retrospective By Protocol Authorization Ordered   6  Zolpidem Tartrate 5 MG Oral Tablet; 1 tab qhs prn; Therapy: 37QFB3296 to (Evaluate:04Mar2018); Last Rx:48Zvb1542 Ordered    Allergies    1  No Known Drug Allergies    2  No Known Environmental Allergies   3  No Known Food Allergies    Vitals   Recorded: 09Oct2017 11:09AM   Temperature 98 1 F   Heart Rate 81   Respiration 18   Systolic 90   Diastolic 60   Height 5 ft 7 in   Weight 154 lb 2 oz   BMI Calculated 24 14   BSA Calculated 1 81   O2 Saturation 96     Physical Exam    Constitutional Well-nourished middle-aged female, no apparent distress  Eyes   Conjunctiva and lids: No swelling, erythema or discharge  Pupils and irises: Equal, round and reactive to light  Ears, Nose, Mouth, and Throat   External inspection of ears and nose: Normal     Nasal mucosa, septum, and turbinates: Normal without edema or erythema  Oropharynx: Normal with no erythema, edema, exudate or lesions  Pulmonary   Respiratory effort: No increased work of breathing or signs of respiratory distress      Auscultation of lungs: Clear to auscultation  Cardiovascular   Palpation of heart: Normal PMI, no thrills  Auscultation of heart: Normal rate and rhythm, normal S1 and S2, without murmurs  Examination of extremities for edema and/or varicosities: Normal     Carotid pulses: Normal     Abdomen + Bowel sounds, nontender, no hepatosplenomegaly, no rigidity or rebound  Lymphatic No adenopathy in the neck, supraclavicular region, axilla and groin bilaterally  Musculoskeletal   Gait and station: Normal     Digits and nails: Normal without clubbing or cyanosis  Inspection/palpation of joints, bones, and muscles: Normal     Skin Warm, moist, good color, no petechiae or ecchymoses  Neurologic   Cranial nerves: Cranial nerves 2-12 intact  Reflexes: 2+ and symmetric  Sensation: No sensory loss  Psychiatric   Orientation to person, place, and time: Normal     Mood and affect: Normal     Additional Exam:  Breasts ( present) left breast without masses, retraction, redness or different, right breast with well-healed suture line, resolving radiation changes, no masses, no axillary adenopathy bilaterally  ECOG 1       Results/Data    Results   Pathology 7/12/17 BCI prognostic = 3 2 risk of late recurrence = low risk BCI predictive = low likelihood of benefit    6/28/17 recurrence score = 7  10 year risk of distant recurrence with tamoxifen alone was 6%  RT-PCR ER and MT were positive; HER-2/mike was negative  5/24/17 right lumpectomy + sentinel lymph node  Location = 3 o'clock position, histologic type = invasive mammary carcinoma of type,size=6 4mm (prior biopsy)grade=G1,unifocal Lymphovascular invasion was not identified, dermal lymphovascular invasion not identified, DCIS was present but not an extensive component, LCIS was identified  All margins were uninvolved by invasive and in situ carcinoma  4 lymph nodes were negative for metastases      Prior biopsy demonstrated ER/MT = 90-95% = positive and HER-2/mike by IHC = 1+ = negative (4/20/17)    AJCC = pT1b pN0 (4 LN -) Mx G1 R0 ER/NE+ HER-2/mike negative = IA    Radiology 5/19/17 bilateral breast MRI  Impression stated biopsy-proven carcinoma the right breast at 3 o'clock position measuring 1 1 cm in greatest dimension  There was a non-mass enhancement extending anteriorly and medially from the biopsy-proven carcinoma for approximately 1 1 cm corresponding in distance, orientation and location 2 calcifications  Radiologist felt this area should be included in the resection specimen  There was no evidence of adenopathy or contralateral left breast cancer  4/14/17 diagnostic right mammogram was category 4, suspicious  4 cm from the nipple, there was a 1 4 x 0 6 x 0 7 lesion at the 3 o'clock position  Lab Results 8/2/17 WBC = 4 98 hemoglobin = 13 1 hematocrit = 30 9 platelet = 048  4/72/69 LH = 58 74 FSH = 98 7  7/8/16 BUN = 11 creatinine = 0 99 alkaline phosphatase = 84 AST = 17 ALT = 21 total protein = 9 1 = high albumin = 3 9 calcium = 8 8 WBC = 5 37 hemoglobin = 15 hematocrit = 43 4 platelet = 149  Assessment    1  Anxiety (300 00) (F41 9)   2  Fibromyalgia (729 1) (M79 7)   3  Malignant neoplasm of unspecified site of right female breast (174 9) (C50 911)    Plan  Malignant neoplasm of unspecified site of right female breast    · Follow-up visit in 3 months Evaluation and Treatment  Follow-up  Status: Hold For -  Scheduling  Requested for: 08EQY4353   Ordered; For: Malignant neoplasm of unspecified site of right female breast; Ordered By: Judith Castillo Performed:  Due: 48UTT5213    Discussion/Summary    60-year-old female recently diagnosed with pT1b pN0 (4 LN -) Mx G1 R0 ER/NE+ HER-2/mike negative RS = 7 = low = IA right breast cancer status post lumpectomy  Issues:    1 Postop  Patient has healed well and will follow-up with Dr Gianluca Shelton as directed  2 Stage IA right breast cancer status post lumpectomy   As discussed previously, the tumor size was >0 5 cm - Oncotype RS = 7 = low  NCCN Guidelines 2 2017 state that for patients with pT1 pN0 invasive carcinoma (no special type), tumors > 0 5 cm should be sent for the 21 gene RT PCR assay  Patients with a low recurrence score (<18) should receive adjuvant endocrine therapy  Patient had a hysterectomy a few years ago but still has both ovaries  LH and FSH are consistent with patient being in a postmenopausal state  Patient is to continue with the tamoxifen - Mrs Becky Foss is tolerating this medication fairly well -not clear if the tamoxifen is exacerbating the fibromyalgia  Patient will follow up with Radiation Oncology as directed  Patient was also encouraged to return to her rheumatologist  Patient is to return to this office in 3 months  BCI demonstrated a low risk for late recurrence and low benefit for extended treatment  Patient will be treated for 5 years  3 Fibromyalgia  Patient will follow-up with her PCP and rheumatologist as advised  Carefully review your medication list and verify that the list is accurate and up-to-date  Please call the hematology/oncology office if there are medications missing from the list, medications on the list that you are not currently taking or if there is a dosage or instruction that is different from how you're taking a medication  Health Management   * MAMMO SCREENING BILATERAL W CAD; every 1 year; Last 91BUS2737; Next Due: 59LUE9567; Active  Digital Bilateral Screening Mammogram With CAD; every 1 year; Last 25Oct2013; Next Due:  92MSW0681; Overdue    Thank you very much for allowing me to participate in the care of this patient  If you have any questions, please do not hesitate to contact me        Signatures   Electronically signed by : Sobeida Kamara MD; Oct  9 2017 11:39AM EST                       (Author)

## 2018-01-22 VITALS
RESPIRATION RATE: 16 BRPM | DIASTOLIC BLOOD PRESSURE: 62 MMHG | WEIGHT: 150.38 LBS | HEART RATE: 81 BPM | SYSTOLIC BLOOD PRESSURE: 102 MMHG | OXYGEN SATURATION: 98 % | TEMPERATURE: 98.8 F | BODY MASS INDEX: 22.79 KG/M2 | HEIGHT: 68 IN

## 2018-01-22 VITALS
BODY MASS INDEX: 23.39 KG/M2 | DIASTOLIC BLOOD PRESSURE: 70 MMHG | WEIGHT: 149 LBS | OXYGEN SATURATION: 98 % | SYSTOLIC BLOOD PRESSURE: 110 MMHG | HEIGHT: 67 IN | TEMPERATURE: 98.5 F | HEART RATE: 83 BPM | RESPIRATION RATE: 18 BRPM

## 2018-01-22 VITALS
TEMPERATURE: 98.1 F | DIASTOLIC BLOOD PRESSURE: 60 MMHG | BODY MASS INDEX: 24.19 KG/M2 | HEART RATE: 81 BPM | RESPIRATION RATE: 18 BRPM | OXYGEN SATURATION: 96 % | SYSTOLIC BLOOD PRESSURE: 90 MMHG | HEIGHT: 67 IN | WEIGHT: 154.13 LBS

## 2018-01-23 VITALS
WEIGHT: 148 LBS | HEART RATE: 88 BPM | BODY MASS INDEX: 22.43 KG/M2 | DIASTOLIC BLOOD PRESSURE: 70 MMHG | HEIGHT: 68 IN | SYSTOLIC BLOOD PRESSURE: 96 MMHG

## 2018-01-23 NOTE — CONSULTS
I had the pleasure of evaluating your patient, Meryle Croft  My full evaluation follows:      Chief Complaint  Chief Complaint:   The patient presents to the office today with Breast cancer, follow-up  History of Present Illness  69-year-old female recently referred for the above  Routine mammogram demonstrated a right-sided abnormality  Patient was sent for a biopsy which demonstrated carcinoma  Patient was subsequently referred to Dr Tao Orr and underwent lumpectomy  Patient was also seen by Dr Renu Gibbs and completed RT  Mrs Damaris Ramires is on tamoxifen  Presently Mrs Dos Santos's biggest complaint is fibromyalgia/body aches - similar to before  Patient has neck pain, right shoulder pain and right axillary pain  Activities are otherwise very close to normal  No GI,  or GYN issues  Appetite is good, weight is stable  No specific problems with the tamoxifen  No specific breast complaints  Review of Systems    Constitutional: feeling tired, but as noted in HPI  Eyes: No complaints of eye pain, no red eyes, no eyesight problems, no discharge, no dry eyes, no itching of eyes  ENT: no complaints of earache, no loss of hearing, no nose bleeds, no nasal discharge, no sore throat, no hoarseness  Cardiovascular: No complaints of slow heart rate, no fast heart rate, no chest pain, no palpitations, no leg claudication, no lower extremity edema  Respiratory: No complaints of shortness of breath, no wheezing, no cough, no SOB on exertion, no orthopnea, no PND  Gastrointestinal: No complaints of abdominal pain, no constipation, no nausea or vomiting, no diarrhea, no bloody stools  Genitourinary: No complaints of dysuria, no incontinence, no pelvic pain, no dysmenorrhea, no vaginal discharge or bleeding  Musculoskeletal: arthralgias and myalgias, but as noted in HPI and no limb pain  Integumentary: No complaints of skin rash or lesions, no itching, no skin wounds, no breast pain or lump  Neurological: No complaints of headache, no confusion, no convulsions, no numbness, no dizziness or fainting, no tingling, no limb weakness, no difficulty walking  Psychiatric: Not suicidal, no sleep disturbance, no anxiety or depression, no change in personality, no emotional problems  Endocrine: No complaints of proptosis, no hot flashes, no muscle weakness, no deepening of the voice, no feelings of weakness  Hematologic/Lymphatic: No complaints of swollen glands, no swollen glands in the neck, does not bleed easily, does not bruise easily  Active Problems    1  Abnormal blood sugar (790 29) (R73 09)   2  Anxiety (300 00) (F41 9)   3  Depressed mood (311) (F32 9)   4  Edema of right foot (782 3) (R60 0)   5  Esophageal reflux (530 81) (K21 9)   6  Fatigue (780 79) (R53 83)   7  Fibromyalgia (729 1) (M79 7)   8  Flu vaccine need (V04 81) (Z23)   9  Hypotension, unspecified hypotension type (458 9) (I95 9)   10  Hypothyroidism (244 9) (E03 9)   11  Inappropriate sinus tachycardia (427 89) (R00 0)   12  Insomnia (780 52) (G47 00)   13  Malignant neoplasm of unspecified site of right female breast (174 9) (C50 911)   14  Monoclonal gammopathy of undetermined significance (273 1) (D47 2)   15  Neck Stiffness (719 58)   16  Otalgia, unspecified laterality (388 70) (H92 09)   17  Pain syndrome, chronic (338 4) (G89 4)   18  Palpitations (785 1) (R00 2)   19  Paresthesia (782 0) (R20 2)   20  Peripheral neuropathy (356 9) (G62 9)   21  Preoperative examination (V72 84) (Z01 818)   22  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)   23  RLS (restless legs syndrome) (333 94) (G25 81)   24  Shortness of breath (786 05) (R06 02)   25  Visit for screening mammogram (V76 12) (Z12 31)   26   Vitamin D deficiency (268 9) (E55 9)    Past Medical History    · History of  (637 90)   · History of Cervical radiculopathy (723 4) (M54 12)   · History of Cervicalgia (723 1) (M54 2)   · History of Cognitive impairment, mild, so stated (331 83) (G31 84)   · History of Depression (311) (F32 9)   · History of Disc degeneration, lumbar (722 52) (M51 36)   · History of Disorders Of The Cervical Region (723 9)   · History of Encounter for screening mammogram for malignant neoplasm of breast  (V76 12) (Z12 31)   · History Of 3  Previous Pregnancies (V61 5)   · History of diplopia (V12 49) (Z86 69)   · History of fibromyalgia (V13 59) (Z87 39)   · History of low back pain (V13 59) (Z87 39)   · History of polyneuropathy (V12 49) (Z86 69)   · History of radiation therapy (V15 3) (Z92 3)   · History of radiculopathy (V12 49) (Z86 69)   · History of systemic lupus erythematosus (SLE) (V12 29) (Z87 39)   · History of viral warts (V12 09) (Z86 19)   · History of Major depression, recurrent (296 30) (F33 9)   · History of Memory Lapses Or Loss (780 93)   · History of Multiple joint pain (719 49) (M25 50)   · History of Myalgia and myositis (729 1)   · History of Neuralgia (729 2) (M79 2)   · History of Neuritis (729 2) (M79 2)   · History of Rectal/anal hemorrhage (569 3) (K62 5)   · History of Sacroiliitis (720 2) (M46 1)   · History of Screening for malignant neoplasm of breast (V76 10) (Z12 31)   · History of Shoulder bursitis (726 10) (M75 50)   · History of Tendinitis of shoulder, unspecified laterality (726 10) (M75 80)   · History of Thoracic neuritis (724 4) (M54 14)   · History of Vitamin B deficiency (266 9) (E53 9)   · History of Vitamin D deficiency (268 9) (E55 9)    Surgical History    · History of Bladder Surgery   · History of Breast Surgery Lumpectomy   · History of Colonoscopy (Fiberoptic) Screening   · History of Hysterectomy   · History of Near-Total Thyroidectomy   · History of Rectocele Repair    Family History    · Family history of Congestive Heart Failure   · Family history of Mother  At Age ___    · Family history of Alcoholic (Danielaec's) Cirrhosis   · Family history of Family Health Status Of Father -     Social History    · Denied: History of Alcohol Use (History)   · Disabled   · Denied: History of Drug Use   · Former smoker (I70 63) (S38 843)   · Denied: History of Marijuana   · Marital History - Currently    ·    · Occupation:    Current Meds   1  DULoxetine HCl - 60 MG Oral Capsule Delayed Release Particles; take 1 capsule daily; Therapy: 42Cyh6036 to (Evaluate:93Hxh4953)  Requested for: 58Zft1501; Last   Rx:37Qcn7848 Ordered   2  Levothyroxine Sodium 100 MCG Oral Tablet; Take 1 tablet daily; Therapy: 55TXL6363 to (Last GN:69ENK5037)  Requested for: 21TWN3475 Ordered   3  Metoprolol Tartrate 25 MG Oral Tablet; take 0 5 tablet daily; Therapy: (([phone]) to Recorded   4  Tamoxifen Citrate 20 MG Oral Tablet; TAKE 1 TABLET DAILY; Therapy: 32UEU7546 to (Evaluate:20Jhy4342)  Requested for: 45Cpf7543; Last   Rx:37Dpl9958; Status: ACTIVE - Retrospective By Protocol Authorization Ordered   5  TraMADol HCl - 50 MG Oral Tablet; TAKE 1 TABLET Bedtime; Therapy: 52Fdu9978 to (Evaluate:78Mui3672); Last Rx:23Bmv2643 Ordered   6  Zolpidem Tartrate 5 MG Oral Tablet; 1 tab qhs prn; Therapy: 22XBU5693 to (Evaluate:04Mar2018); Last Rx:16Cel0646 Ordered    Allergies    1  No Known Drug Allergies    2  No Known Environmental Allergies   3  No Known Food Allergies    Vitals   Recorded: 62OMP0889 11:00AM   Temperature 98 8 F   Heart Rate 81   Respiration 16   Systolic 962   Diastolic 62   Height 5 ft 8 in   Weight 150 lb 6 oz   BMI Calculated 22 86   BSA Calculated 1 81   O2 Saturation 98   Pain Scale 5     Physical Exam    Constitutional Well-nourished middle-aged female, no apparent distress  Eyes   Conjunctiva and lids: No swelling, erythema or discharge  Pupils and irises: Equal, round and reactive to light  Ears, Nose, Mouth, and Throat   External inspection of ears and nose: Normal     Nasal mucosa, septum, and turbinates: Normal without edema or erythema      Oropharynx: Normal with no erythema, edema, exudate or lesions  Pulmonary   Respiratory effort: No increased work of breathing or signs of respiratory distress  Auscultation of lungs: Clear to auscultation  Cardiovascular   Palpation of heart: Normal PMI, no thrills  Auscultation of heart: Normal rate and rhythm, normal S1 and S2, without murmurs  Examination of extremities for edema and/or varicosities: Normal     Carotid pulses: Normal     Abdomen + Bowel sounds, nontender, no hepatosplenomegaly, no rigidity or rebound  Lymphatic No adenopathy in the neck, supraclavicular region, axilla and groin bilaterally  Musculoskeletal   Gait and station: Normal     Digits and nails: Normal without clubbing or cyanosis  Inspection/palpation of joints, bones, and muscles: Normal     Skin Warm, moist, good color, no petechiae or ecchymoses  Neurologic   Cranial nerves: Cranial nerves 2-12 intact  Reflexes: 2+ and symmetric  Sensation: No sensory loss  Psychiatric   Orientation to person, place, and time: Normal     Mood and affect: Normal     Additional Exam:  Breasts ( present) left breast deferred, right breast with well-healed suture line, resolving radiation changes virtually gone, no masses, no axillary adenopathy bilaterally  ECOG 1       Results/Data    Results   Pathology 7/12/17 BCI prognostic = 3 2 risk of late recurrence = low risk BCI predictive = low likelihood of benefit    6/28/17 recurrence score = 7  10 year risk of distant recurrence with tamoxifen alone was 6%  RT-PCR ER and ME were positive; HER-2/mike was negative  5/24/17 right lumpectomy + sentinel lymph node  Location = 3 o'clock position, histologic type = invasive mammary carcinoma of type,size=6 4mm (prior biopsy)grade=G1,unifocal Lymphovascular invasion was not identified, dermal lymphovascular invasion not identified, DCIS was present but not an extensive component, LCIS was identified   All margins were uninvolved by invasive and in situ carcinoma  4 lymph nodes were negative for metastases  Prior biopsy demonstrated ER/KS = 90-95% = positive and HER-2/mike by IHC = 1+ = negative (4/20/17)    AJCC = pT1b pN0 (4 LN -) Mx G1 R0 ER/KS+ HER-2/mike negative = IA    Radiology 5/19/17 bilateral breast MRI  Impression stated biopsy-proven carcinoma the right breast at 3 o'clock position measuring 1 1 cm in greatest dimension  There was a non-mass enhancement extending anteriorly and medially from the biopsy-proven carcinoma for approximately 1 1 cm corresponding in distance, orientation and location 2 calcifications  Radiologist felt this area should be included in the resection specimen  There was no evidence of adenopathy or contralateral left breast cancer  4/14/17 diagnostic right mammogram was category 4, suspicious  4 cm from the nipple, there was a 1 4 x 0 6 x 0 7 lesion at the 3 o'clock position  Lab Results 11/8/17 IgA = 1000 = 9 IgG = 1260 IgM = 36 = low BUN = 15 creatinine = 0 71 calcium = 8 3 LFTs WNL WBC = 5 46 hemoglobin = 13 6 hematocrit = 39 platelet = 584    3/1/65 WBC = 4 98 hemoglobin = 13 1 hematocrit = 30 9 platelet = 443  8/16/18 LH = 58 74 FSH = 98 7  7/8/16 BUN = 11 creatinine = 0 99 alkaline phosphatase = 84 AST = 17 ALT = 21 total protein = 9 1 = high albumin = 3 9 calcium = 8 8 WBC = 5 37 hemoglobin = 15 hematocrit = 43 4 platelet = 022  Assessment    1  Anxiety (300 00) (F41 9)   2  Fibromyalgia (729 1) (M79 7)   3  Malignant neoplasm of unspecified site of right female breast (174 9) (C50 911)    Plan  Malignant neoplasm of unspecified site of right female breast    · Follow-up visit in 3 months Evaluation and Treatment  Follow-up  Status: Hold For -  Scheduling  Requested for: 08Apr2018   Ordered;  For: Malignant neoplasm of unspecified site of right female breast; Ordered By: Yolanda Tobias Performed:  Due: 58YPM6763    Discussion/Summary    59-year-old female recently diagnosed with pT1b pN0 (4 LN -) Mx G1 R0 ER/GA+ HER-2/mike negative RS = 7 = low = IA right breast cancer status post lumpectomy  Issues:    1 Stage IA right breast cancer status post lumpectomy  As discussed previously, the tumor size was >0 5 cm - Oncotype RS = 7 = low  NCCN Guidelines 2 2017 state that for patients with pT1 pN0 invasive carcinoma (no special type), tumors > 0 5 cm should be sent for the 21 gene RT PCR assay  Patients with a low recurrence score (<18) should receive adjuvant endocrine therapy  Patient had a hysterectomy a few years ago but still has both ovaries  LH and FSH are consistent with patient being in a postmenopausal state  Patient is to continue with the tamoxifen - Mrs Tremaine Burgos is tolerating this medication fairly well (tamoxifen was picked over AI because of the chronic arthritic issues)  Patient will follow up with Radiation Oncology as directed  BCI demonstrated a low risk for late recurrence and low benefit for extended treatment  Patient will be treated for 5 years  Previously Mrs Tremaine Burgos inquired about changing her care to the [address]  Patient will be referred to Dr Elena morgan at the [facility]  3 Fibromyalgia  Patient will follow-up with her PCP and rheumatologist as advised  4 Elevated IgA level  Patient was not clear why this was drawn  Patient may have MGUS  The CBC, renal function and liver function tests are within normal limits  There is no B2 microglobulin Level to comment on  This will need to be monitored  Additional hematology workup such as a bone marrow biopsy is not presently indicated  Carefully review your medication list and verify that the list is accurate and up-to-date  Please call the hematology/oncology office if there are medications missing from the list, medications on the list that you are not currently taking or if there is a dosage or instruction that is different from how you're taking a medication         Health Management   * MAMMO SCREENING BILATERAL W CAD; every 1 year; Last 13EXZ2446; Next Due: 11QME4704; Active  Digital Bilateral Screening Mammogram With CAD; every 1 year; Last 25Oct2013; Next Due:  54VJR2088; Overdue    Thank you very much for allowing me to participate in the care of this patient  If you have any questions, please do not hesitate to contact me        Signatures   Electronically signed by : Luann Adhikari MD; Jan 8 2018 11:41AM EST                       (Author)

## 2018-01-24 VITALS
SYSTOLIC BLOOD PRESSURE: 118 MMHG | HEART RATE: 80 BPM | BODY MASS INDEX: 24.04 KG/M2 | DIASTOLIC BLOOD PRESSURE: 72 MMHG | WEIGHT: 153.2 LBS | TEMPERATURE: 98.9 F | HEIGHT: 67 IN | RESPIRATION RATE: 16 BRPM

## 2018-02-06 DIAGNOSIS — M79.7 FIBROMYALGIA: Primary | ICD-10-CM

## 2018-02-06 RX ORDER — CELECOXIB 200 MG/1
CAPSULE ORAL
Qty: 90 CAPSULE | Refills: 1 | Status: SHIPPED | OUTPATIENT
Start: 2018-02-06 | End: 2018-03-12 | Stop reason: ALTCHOICE

## 2018-03-05 ENCOUNTER — OFFICE VISIT (OUTPATIENT)
Dept: SURGERY | Facility: CLINIC | Age: 58
End: 2018-03-05
Payer: MEDICARE

## 2018-03-05 VITALS
HEIGHT: 68 IN | SYSTOLIC BLOOD PRESSURE: 100 MMHG | HEART RATE: 88 BPM | WEIGHT: 150.2 LBS | DIASTOLIC BLOOD PRESSURE: 70 MMHG | BODY MASS INDEX: 22.76 KG/M2

## 2018-03-05 DIAGNOSIS — Z85.3 HISTORY OF RIGHT BREAST CANCER: Primary | ICD-10-CM

## 2018-03-05 PROCEDURE — 99214 OFFICE O/P EST MOD 30 MIN: CPT | Performed by: SURGERY

## 2018-03-05 NOTE — PROGRESS NOTES
[facility] History and Physical Note:    Assessment:  Routine follow-up for right breast cancer treated with breast conservation therapy  No evidence of disease    Plan:  Mammogram in 3 months  Follow up after mammogram    Chief Complaint:  Here for routine follow-up    HPI  Patient is a 53-year-old woman status post breast conservation therapy in May 2017 for her ER + NE + HER2 - T1B N0 (0/4) G1 IDC; STAGE IA  She completed radiation therapy and is taking tamoxifen and following up with Dr Isai Harley of Oncology  Patient continues to complain of some heaviness to the right breast and some skin irritation the axilla    This is largely unchanged from her last visit      PMH:  Past Medical History:   Diagnosis Date    Cancer [facility])     right breast  dx 04/2017    Depression     Disease of thyroid gland     hx of goiter, surgery done 1978    Hypertension     Insomnia     Restless leg syndrome     Tachycardia     Wears glasses        PSH:  Past Surgical History:   Procedure Laterality Date    BREAST SURGERY Right     breast bx    HYSTERECTOMY  2009    vaginal hysterectomy, rectocele repair    NE MASTECTOMY, PARTIAL Right 5/24/2017    Procedure:  (NUCLEAR MED  8 AM , NEEDLE LOCAL TO FOLLOW 8:30 AM ) SEGMENTAL MASTECTOMY BREAST NEEDLE PLACEMENT,  SENTINAL LYMPH NODE BIOPSY;  Surgeon: Anne Estrada MD;  Location: [address] [place] [address];  Service: General    THYROID LOBECTOMY Left 1978    TONSILLECTOMY         Home Meds:  Current Outpatient Prescriptions on File Prior to Visit   Medication Sig Dispense Refill    celecoxib (CeleBREX) 200 mg capsule TAKE 1 CAPSULE DAILY 90 capsule 1    DULoxetine (CYMBALTA) 60 mg delayed release capsule Take 60 mg by mouth every morning        gabapentin (NEURONTIN) 100 mg capsule Take 100 mg by mouth 3 (three) times a day      levothyroxine 100 mcg tablet Take 100 mcg by mouth every morning        metoprolol tartrate (LOPRESSOR) 25 mg tablet Take 25 mg by mouth every morning        traMADol (ULTRAM) 50 mg tablet Take 50 mg by mouth every 6 (six) hours as needed for moderate pain      zolpidem (AMBIEN CR) 6 25 MG CR tablet Take 5 mg by mouth daily at bedtime as needed for sleep       No current facility-administered medications on file prior to visit  Allergies:  No Known Allergies    Social Hx:  Social History     Social History    Marital status: /Civil Union     Spouse name: N/A    Number of children: N/A    Years of education: N/A     Occupational History    Not on file  Social History Main Topics    Smoking status: Never Smoker    Smokeless tobacco: Never Used    Alcohol use Yes      Comment: socially    Drug use: No    Sexual activity: Not on file     Other Topics Concern    Not on file     Social History Narrative    No narrative on file        Family Hx:    Family History   Problem Relation Age of Onset    Heart disease Mother      CHF         Review of Systems   Constitutional: Negative  HENT: Negative  Eyes: Negative  Respiratory: Negative  Cardiovascular: Negative  Gastrointestinal: Negative  Endocrine: Negative  Genitourinary: Negative  Musculoskeletal: Negative  Skin: Negative  Allergic/Immunologic: Negative  Neurological: Negative  Hematological: Negative  Psychiatric/Behavioral: Negative  There were no vitals taken for this visit  Physical Exam   Constitutional: She is oriented to person, place, and time  She appears well-developed and well-nourished  HENT:   Head: Normocephalic and atraumatic  Eyes: Pupils are equal, round, and reactive to light  Neck: Normal range of motion  Neck supple  Pulmonary/Chest:   Well-healed scar in right breast and right axilla  No discrete masses in either breast   Axillae negative  No obvious skin changes  Musculoskeletal: Normal range of motion  Neurological: She is alert and oriented to person, place, and time     Skin: Skin is warm and dry  Psychiatric: She has a normal mood and affect         Pertinent labs reviewed    Pertinent images and available reads personally reviewed    Pertinent notes reviewed       Jessica Mukherjee MD [address] [facility]  [phone]

## 2018-03-08 RX ORDER — TAMOXIFEN CITRATE 20 MG/1
1 TABLET ORAL DAILY
COMMUNITY
Start: 2017-07-10 | End: 2018-04-09 | Stop reason: SDUPTHER

## 2018-03-12 ENCOUNTER — RADIATION ONCOLOGY FOLLOW-UP (OUTPATIENT)
Dept: RADIATION ONCOLOGY | Facility: HOSPITAL | Age: 58
End: 2018-03-12

## 2018-03-12 ENCOUNTER — OFFICE VISIT (OUTPATIENT)
Dept: RADIATION ONCOLOGY | Facility: HOSPITAL | Age: 58
End: 2018-03-12
Attending: RADIOLOGY
Payer: MEDICARE

## 2018-03-12 VITALS
SYSTOLIC BLOOD PRESSURE: 110 MMHG | BODY MASS INDEX: 22.67 KG/M2 | WEIGHT: 149.6 LBS | TEMPERATURE: 97.9 F | HEART RATE: 84 BPM | DIASTOLIC BLOOD PRESSURE: 70 MMHG | OXYGEN SATURATION: 98 % | RESPIRATION RATE: 16 BRPM | HEIGHT: 68 IN

## 2018-03-12 DIAGNOSIS — Z85.3 HISTORY OF RIGHT BREAST CANCER: Primary | ICD-10-CM

## 2018-03-12 PROCEDURE — 99214 OFFICE O/P EST MOD 30 MIN: CPT | Performed by: RADIOLOGY

## 2018-03-12 NOTE — PROGRESS NOTES
Follow-Up - Radiation Oncology   Carola Ennis 1960 61 y o  female [id number]      History of Present Illness   No matching staging information was found for the patient  HPI: Carola Ennis returns today for a routine scheduled follow-up visit  Interval History: Last seen in follow up 10/3/17    1/8/18 Saw Dr Andria Leal  Transferring care to Dr Seble Morales in [facility]  Continue Tamoxifen    3/5/18 Follow up with Dr Moreland Picking  Mammogram ordered    Today:  Since completion of treatment of a approximately 6 months ago, the patient has yet to undergo surveillance mammography  She lost the prescription that was given to her by Surgical Oncology  She remains on tamoxifen  She notes discomfort in the right axillary and lateral chest wall region specifically when wearing a bra for a long period of time  She also reports heaviness in the right breast   Otherwise without complaints  No swelling of the upper extremities  Historical Information   Previous Oncology History:      Intraductal carcinoma of right breast    4/20/2017 Biopsy     Right breast biopsy:  Invasive breast carcinoma/invasive ductal carcinoma  Grade 1  ER and OR 90-95% positive, Her2 negative         5/24/2017 Initial Diagnosis     Intraductal carcinoma of right breast        Surgery     Segmental mastectomy with sentinel node biopsy  Invasive mammary  Grade 1           5/24/2017 -  Cancer Staged     Pathologic  Stage IA - pT1b, pN0 (sn), G1         7/20/2017 - 9/5/2017 Radiation     dose of 5040 cGy in 20 daily fractions to the right breast with an additional 1000 cGy to the lumpectomy cavity           7/2017 -  Hormone Therapy     Tamoxifen              Past Medical History:   Diagnosis Date    [facility] [address] )     Cancer ([facility] [address] )     right breast  dx 04/2017    Depression     Disease of thyroid gland     hx of goiter, surgery done 1978    History of radiation therapy     Hypertension     Insomnia     Restless leg syndrome     Tachycardia     Wears glasses      Past Surgical History:   Procedure Laterality Date    BREAST SURGERY Right     breast bx    HYSTERECTOMY  2009    vaginal hysterectomy, rectocele repair    WI MASTECTOMY, PARTIAL Right 5/24/2017    Procedure:  (NUCLEAR MED  8 AM , NEEDLE LOCAL TO FOLLOW 8:30 AM ) SEGMENTAL MASTECTOMY BREAST NEEDLE PLACEMENT,  SENTINAL LYMPH NODE BIOPSY;  Surgeon: Rahat Humphreys MD;  Location: [address] [place] [address];  Service: General    THYROID LOBECTOMY Left 1978    TONSILLECTOMY         Social History   History   Alcohol Use    Yes     Comment: socially     History   Drug Use No     History   Smoking Status    Never Smoker   Smokeless Tobacco    Never Used         Meds/Allergies     Current Outpatient Prescriptions:     DULoxetine (CYMBALTA) 60 mg delayed release capsule, Take 60 mg by mouth every morning  , Disp: , Rfl:     levothyroxine 100 mcg tablet, Take 100 mcg by mouth every morning  , Disp: , Rfl:     metoprolol tartrate (LOPRESSOR) 25 mg tablet, Take 25 mg by mouth every morning  , Disp: , Rfl:     tamoxifen (NOLVADEX) 20 mg tablet, Take 1 tablet by mouth daily, Disp: , Rfl:     zolpidem (AMBIEN CR) 6 25 MG CR tablet, Take 5 mg by mouth daily at bedtime as needed for sleep, Disp: , Rfl:   No Known Allergies      Review of Systems  Review of Systems   Constitutional: Fatigue: 8/10  HENT: Negative  Eyes: Negative  Respiratory: Negative  Cardiovascular: Leg swelling: Swelling in bilateral lower legs at times  Gastrointestinal: Negative  Endocrine: Negative  Genitourinary: Negative  Musculoskeletal:        Generalized pain from fibromyalgia  Right axilla pain  Skin: Negative  Allergic/Immunologic: Negative  Neurological: Dizziness: If rises too fast    Hematological: Negative  Psychiatric/Behavioral: Positive for agitation, dysphoric mood and sleep disturbance  The patient is nervous/anxious          Objective   /70 (BP Location: Left arm, Patient Position: Sitting, Cuff Size: Standard)   Pulse 84   Temp 97 9 °F (36 6 °C) (Temporal)   Resp 16   Ht 5' 8" (1 727 m)   Wt 67 9 kg (149 lb 9 6 oz)   SpO2 98%   BMI 22 75 kg/m²      Karnofsky: 90 - Able to carry on normal activity; minor signs or symptoms of disease      Physical Exam  The patient presents today no apparent distress  Sclera anicteric  No cervical supraclavicular lymphadenopathy  Normal S1-S2 regular rate and rhythm  Lungs clear to auscultation bilaterally  The right breast is noticeably smaller than the left  There is mild dependent skin thickening  No visible or palpable suspicious findings within the right breast   No axillary lymphadenopathy  The left breast is within normal limits  No swelling of the upper extremities  Normal range of motion of the shoulders bilaterally  Normal speech  Normal affect  No results found for this or any previous visit (from the past 672 hour(s))  No results found  Assessment/Plan      Rickey Fagan overall has done well in follow-up but does have some post treatment discomfort in the right axillary and lateral chest wall region likely secondary to treatment related scar tissue  We have recommended physical therapy evaluation and treatment to help with the dependent edema in the breast and axillary/chest wall discomfort  We will also provide her with a another script for surveillance mammography  She was recommended to continue with daily moisturizer application and light breast massage and return to our department in 6 months for routine follow-up

## 2018-03-12 NOTE — PROGRESS NOTES
Tamica Cho  1960   Ms Orestes Reid is a 61 y o  female       Chief Complaint   Patient presents with    Follow-up       No matching staging information was found for the patient  Intraductal carcinoma of right breast    4/20/2017 Biopsy     Right breast biopsy:  Invasive breast carcinoma/invasive ductal carcinoma  Grade 1  ER and [place] 90-95% positive, Her2 negative         5/24/2017 Initial Diagnosis     Intraductal carcinoma of right breast        Surgery     Segmental mastectomy with sentinel node biopsy  Invasive mammary  Grade 1           5/24/2017 -  Cancer Staged     Pathologic  Stage IA - pT1b, pN0 (sn), G1         7/20/2017 - 9/5/2017 Radiation     dose of 5040 cGy in 20 daily fractions to the right breast with an additional 1000 cGy to the lumpectomy cavity  7/2017 -  Hormone Therapy     Tamoxifen            Clinical Trial: no        Interval History: Last seen in follow up 10/3/17    1/8/18 Saw Dr Mak Renae  Transferring care to Dr Garcia Doctor in [facility]  Continue Tamoxifen    3/5/18 Follow up with Dr Bradley Villeda    Mammogram ordered      Health Maintenance   Topic Date Due    HIV SCREENING  1960    Hepatitis C Screening  1960    PNEUMOCOCCAL POLYSACCHARIDE VACCINE X 2 AGE 11-64 YEARS IMMUNOCOMPROMISED (1) 08/23/1970    Depression Screening PHQ-9  08/23/1971    MAMMOGRAM  04/14/2018    DTaP,Tdap,and Td Vaccines (2 - Td) 09/10/2018    INFLUENZA VACCINE  Completed       Patient Active Problem List   Diagnosis    Intraductal carcinoma of right breast    History of right breast cancer     Past Medical History:   Diagnosis Date    [facility] [address]     Cancer [facility] [address]     right breast  dx 04/2017    Depression     Disease of thyroid gland     hx of goiter, surgery done 1978    History of radiation therapy     Hypertension     Insomnia     Restless leg syndrome     Tachycardia     Wears glasses      Past Surgical History:   Procedure Laterality Date    BREAST SURGERY Right breast bx    HYSTERECTOMY  2009    vaginal hysterectomy, rectocele repair    [place] MASTECTOMY, PARTIAL Right 5/24/2017    Procedure:  (NUCLEAR MED  8 AM , NEEDLE LOCAL TO FOLLOW 8:30 AM ) SEGMENTAL MASTECTOMY BREAST NEEDLE PLACEMENT,  SENTINAL LYMPH NODE BIOPSY;  Surgeon: Anne Estrada MD;  Location: [facility];  Service: General    THYROID LOBECTOMY Left 1978    TONSILLECTOMY       Family History   Problem Relation Age of Onset    Heart disease Mother      CHF    Cancer Maternal Aunt 72     unknown type    Cancer Cousin      bladder     Social History     Social History    Marital status: /Civil Union     Spouse name: N/A    Number of children: N/A    Years of education: N/A     Occupational History    Not on file  Social History Main Topics    Smoking status: Never Smoker    Smokeless tobacco: Never Used    Alcohol use Yes      Comment: socially    Drug use: No    Sexual activity: Not on file     Other Topics Concern    Not on file     Social History Narrative    No narrative on file       Current Outpatient Prescriptions:     DULoxetine (CYMBALTA) 60 mg delayed release capsule, Take 60 mg by mouth every morning  , Disp: , Rfl:     levothyroxine 100 mcg tablet, Take 100 mcg by mouth every morning  , Disp: , Rfl:     metoprolol tartrate (LOPRESSOR) 25 mg tablet, Take 25 mg by mouth every morning  , Disp: , Rfl:     tamoxifen (NOLVADEX) 20 mg tablet, Take 1 tablet by mouth daily, Disp: , Rfl:     zolpidem (AMBIEN CR) 6 25 MG CR tablet, Take 5 mg by mouth daily at bedtime as needed for sleep, Disp: , Rfl:   No Known Allergies    Review of Systems:  Review of Systems   Constitutional: Fatigue: 8/10  HENT: Negative  Eyes: Negative  Respiratory: Negative  Cardiovascular: Leg swelling: Swelling in bilateral lower legs at times  Gastrointestinal: Negative  Endocrine: Negative  Genitourinary: Negative  Musculoskeletal:        Generalized pain from fibromyalgia  Right axilla pain  Skin: Negative  Allergic/Immunologic: Negative  Neurological: Dizziness: If rises too fast    Hematological: Negative  Psychiatric/Behavioral: Positive for agitation, dysphoric mood and sleep disturbance  The patient is nervous/anxious  Vitals:    03/12/18 1334   BP: 110/70   BP Location: Left arm   Patient Position: Sitting   Cuff Size: Standard   Pulse: 84   Resp: 16   Temp: 97 9 °F (36 6 °C)   TempSrc: Temporal   SpO2: 98%   Weight: 67 9 kg (149 lb 9 6 oz)   Height: 5' 8" (1 727 m)       Imaging:No results found

## 2018-04-05 ENCOUNTER — TELEPHONE (OUTPATIENT)
Dept: SURGERY | Facility: CLINIC | Age: 58
End: 2018-04-05

## 2018-04-05 ENCOUNTER — TELEPHONE (OUTPATIENT)
Dept: INTERNAL MEDICINE CLINIC | Facility: CLINIC | Age: 58
End: 2018-04-05

## 2018-04-05 NOTE — TELEPHONE ENCOUNTER
Patient had mammogram at [facility] on 3/30/18  She received a letter from the facility stating that the mammogram "showed something" and she needs to have another one in 6 months  Obtained mammogram results through Care Everywhere  Please review the results and call patient with recommendations  Patient is aware that response may take 1-2 business days

## 2018-04-06 NOTE — TELEPHONE ENCOUNTER
I called and left message with her , Cassy Coelho, regarding mammogram and reassured that this is likely not of great concern  Mammo should be repeated in about 6 months just to make sure

## 2018-04-09 ENCOUNTER — OFFICE VISIT (OUTPATIENT)
Dept: HEMATOLOGY ONCOLOGY | Facility: CLINIC | Age: 58
End: 2018-04-09
Payer: MEDICARE

## 2018-04-09 VITALS
DIASTOLIC BLOOD PRESSURE: 72 MMHG | SYSTOLIC BLOOD PRESSURE: 100 MMHG | TEMPERATURE: 98.3 F | WEIGHT: 150 LBS | BODY MASS INDEX: 22.73 KG/M2 | HEIGHT: 68 IN | HEART RATE: 77 BPM | OXYGEN SATURATION: 99 %

## 2018-04-09 DIAGNOSIS — R76.8 HIGH TOTAL SERUM IGA: ICD-10-CM

## 2018-04-09 DIAGNOSIS — D05.11 INTRADUCTAL CARCINOMA OF RIGHT BREAST: Primary | ICD-10-CM

## 2018-04-09 DIAGNOSIS — G62.9 NEUROPATHY: ICD-10-CM

## 2018-04-09 PROCEDURE — 99214 OFFICE O/P EST MOD 30 MIN: CPT | Performed by: INTERNAL MEDICINE

## 2018-04-09 RX ORDER — TAMOXIFEN CITRATE 20 MG/1
20 TABLET ORAL DAILY
Qty: 90 TABLET | Refills: 3 | Status: SHIPPED | OUTPATIENT
Start: 2018-04-09 | End: 2018-04-16 | Stop reason: SDUPTHER

## 2018-04-09 RX ORDER — TRAMADOL HYDROCHLORIDE 50 MG/1
50 TABLET ORAL ONCE AS NEEDED
COMMUNITY
End: 2018-08-12 | Stop reason: SDUPTHER

## 2018-04-09 NOTE — PROGRESS NOTES
HEMATOLOGY / ONCOLOGY CLINIC NOTE    Primary Care Provider: Tonia Briones MD  Referring Provider:    MRN: [id number]  : 1960    Reason for Encounter:  Chief Complaint   Patient presents with    Follow-up     breast cancer follow up from Dr Ronnie Del Real)         History of Hematology / Oncology Illness:     Neal Escobedo is a 61 y o  female who came in for follow up  1  stage Ia  Right breast Cancer,    Postmenopausal;  pT1b pN0 (4 LN -) Mx G1 R0 ER/TN+ HER-2/mike negative RS = 7 = low     D/X: 2017:  Diagnosed on mammogram   - Status post lumpectomy;  Breast radiation, no indication for chemotherapy, on tamoxifen (  Tamoxifen over AI, Due to underlying fibromyalgia and arthritis )  -  Previously under the care of my colleagues, now transferring care to me  -   Status post hysterectomy  2,   Postop Neuropathy     3,   Elevated Ig A  -  Unclear etiology    Interval History:     :  Reported overall doing well  No lumps bumps  No constitutional symptoms  Following surgeon, for breast examination and mammogram, per patient no new findings  Tolerating tamoxifen well, no hot flash symptoms, no new bone pains, status post hysterectomy  Problem list:     Patient Active Problem List   Diagnosis    Intraductal carcinoma of right breast    History of right breast cancer       Assessment / Plan:       1  Intraductal carcinoma of right breast  -  Postmenopausal, stage I breast cancer, status post lumpectomy, radiation, no indication for chemotherapy, currently taking tamoxifen ongoing for a year  Overall doing well, no evidence for cancer recurrence, no major side effects from tamoxifen  - She will continue to follow with breast surgeon, following physical therapy for postop neuropathy     - Reviewed the CT allowing for breast cancer follow-up, there is no indication for CT scans    She will continue have CBC CMP, yearly mammogram, will see patient in 6 months       - CBC and differential; Future  - Comprehensive metabolic panel; Future  - TSH baseline; Future  - tamoxifen (NOLVADEX) 20 mg tablet; Take 1 tablet (20 mg total) by mouth daily  Dispense: 90 tablet; Refill: 3    2  High total serum IgA  -   Need SPEP, immunofixation in 6 months  - Protein electrophoresis, serum; Future               25   minutes were spent on this visit  All questions answered to satisfaction; Advised pt to call if there is any further questions  PHYSICIAL EXAMINATION:     Vital Signs:   [unfilled]  Body mass index is 22 81 kg/m²  Body surface area is 1 81 meters squared  GEN: Alert, awake oriented x3, in no acute distress  HEENT- No pallor, icterus, cyanosis, no oral mucosal lesions,   LAD - no palpable cervical, clavicle, axillary, inguinal LAD  Heart- normal S1 S2, regular rate and rhythm, No murmur, rubs  Lungs- decreased breathing sound bilateral    Abdomen- soft, Non tender, bowel sounds present  Extremities- No cyanosis, clubbing, edema  Neuro- No focal neurological deficit           PAST MEDICAL HISTORY:   has a past medical history of Breast cancer ([facility] [address] ); Cancer ([facility] [address] ); Depression; Disease of thyroid gland; History of radiation therapy; Hypertension; Insomnia; Restless leg syndrome; Tachycardia; and Wears glasses  PAST SURGICAL HISTORY:   has a past surgical history that includes Thyroid lobectomy (Left, 1978); Hysterectomy (2009); Tonsillectomy; Breast surgery (Right); and pr mastectomy, partial (Right, 5/24/2017)      CURRENT MEDICATIONS:   Current Outpatient Prescriptions   Medication Sig Dispense Refill    DULoxetine (CYMBALTA) 60 mg delayed release capsule Take 60 mg by mouth every morning        levothyroxine 100 mcg tablet Take 100 mcg by mouth every morning        metoprolol tartrate (LOPRESSOR) 25 mg tablet Take 25 mg by mouth every morning        tamoxifen (NOLVADEX) 20 mg tablet Take 1 tablet (20 mg total) by mouth daily 90 tablet 3    traMADol (ULTRAM) 50 mg tablet Take 50 mg by mouth once as needed for moderate pain      zolpidem (AMBIEN CR) 6 25 MG CR tablet Take 5 mg by mouth daily at bedtime as needed for sleep       No current facility-administered medications for this visit  [unfilled]    SOCIAL HISTORY:   reports that she has never smoked  She has never used smokeless tobacco  She reports that she drinks alcohol  She reports that she does not use drugs  FAMILY HISTORY:  family history includes Cancer in her cousin; Cancer (age of onset: 72) in her maternal aunt; Heart disease in her mother  ALLERGIES:  has No Known Allergies  REVIEW OF SYSTEMS:  Please note that a 14-point review of systems was performed to include Constitutional, HEENT, Respiratory, CVS, GI, , Musculoskeletal, Integumentary, Neurologic, Rheumatologic, Endocrinologic, Psychiatric, Lymphatic, and Hematologic/Oncologic systems were reviewed and are negative unless otherwise stated in HPI  Positive and negative findings pertinent to this evaluation are incorporated into the history of present illness  LAB:  Lab Results   Component Value Date    WBC 5 46 11/08/2017    HGB 13 6 11/08/2017    HCT 39 0 11/08/2017    MCV 90 11/08/2017     11/08/2017     Lab Results   Component Value Date     11/08/2017    K 4 0 11/08/2017     (H) 11/08/2017    CO2 26 11/08/2017    ANIONGAP 8 11/08/2017    BUN 15 11/08/2017    CREATININE 0 71 11/08/2017    GLUCOSE 90 11/08/2017    CALCIUM 8 3 11/08/2017    AST 14 11/08/2017    ALT 16 11/08/2017    ALKPHOS 50 11/08/2017    PROT 7 6 11/08/2017    BILITOT 0 30 11/08/2017    EGFR 94 11/08/2017       IMAGING:  No orders to display     No results found

## 2018-04-16 DIAGNOSIS — D05.11 INTRADUCTAL CARCINOMA OF RIGHT BREAST: ICD-10-CM

## 2018-04-16 RX ORDER — TAMOXIFEN CITRATE 20 MG/1
20 TABLET ORAL DAILY
Qty: 90 TABLET | Refills: 0 | Status: SHIPPED | OUTPATIENT
Start: 2018-04-16 | End: 2019-05-21 | Stop reason: SDUPTHER

## 2018-07-09 RX ORDER — ERGOCALCIFEROL 1.25 MG/1
CAPSULE ORAL
COMMUNITY
Start: 2011-05-27 | End: 2018-07-13

## 2018-07-13 ENCOUNTER — HOSPITAL ENCOUNTER (OUTPATIENT)
Dept: MRI IMAGING | Facility: CLINIC | Age: 58
Discharge: HOME | End: 2018-07-13
Payer: MEDICARE

## 2018-07-13 ENCOUNTER — APPOINTMENT (OUTPATIENT)
Dept: LAB | Facility: CLINIC | Age: 58
End: 2018-07-13
Payer: MEDICARE

## 2018-07-13 ENCOUNTER — OFFICE VISIT (OUTPATIENT)
Dept: NEUROLOGY | Facility: CLINIC | Age: 58
End: 2018-07-13
Payer: MEDICARE

## 2018-07-13 VITALS
SYSTOLIC BLOOD PRESSURE: 90 MMHG | HEIGHT: 69 IN | BODY MASS INDEX: 22.07 KG/M2 | HEART RATE: 67 BPM | DIASTOLIC BLOOD PRESSURE: 68 MMHG | WEIGHT: 149 LBS

## 2018-07-13 DIAGNOSIS — G25.81 RESTLESS LEG SYNDROME: Primary | ICD-10-CM

## 2018-07-13 DIAGNOSIS — M79.7 FIBROMYALGIA: ICD-10-CM

## 2018-07-13 DIAGNOSIS — G45.0 TIA INVOLVING BASILAR ARTERY: ICD-10-CM

## 2018-07-13 LAB
BUN SERPL-MCNC: 13 MG/DL (ref 5–25)
CREAT SERPL-MCNC: 0.82 MG/DL (ref 0.6–1.3)
GFR SERPL CREATININE-BSD FRML MDRD: 79 ML/MIN/1.73SQ M

## 2018-07-13 PROCEDURE — 36415 COLL VENOUS BLD VENIPUNCTURE: CPT

## 2018-07-13 PROCEDURE — 82565 ASSAY OF CREATININE: CPT

## 2018-07-13 PROCEDURE — 99214 OFFICE O/P EST MOD 30 MIN: CPT | Performed by: PSYCHIATRY & NEUROLOGY

## 2018-07-13 PROCEDURE — 84520 ASSAY OF UREA NITROGEN: CPT

## 2018-07-13 PROCEDURE — A9585 GADOBUTROL INJECTION: HCPCS | Performed by: PSYCHIATRY & NEUROLOGY

## 2018-07-13 PROCEDURE — 70553 MRI BRAIN STEM W/O & W/DYE: CPT

## 2018-07-13 RX ORDER — DULOXETIN HYDROCHLORIDE 60 MG/1
60 CAPSULE, DELAYED RELEASE ORAL EVERY MORNING
Qty: 90 CAPSULE | Refills: 3 | Status: SHIPPED | OUTPATIENT
Start: 2018-07-13 | End: 2019-04-15 | Stop reason: SDUPTHER

## 2018-07-13 RX ADMIN — GADOBUTROL 7 ML: 604.72 INJECTION INTRAVENOUS at 12:40

## 2018-07-13 NOTE — PROGRESS NOTES
Progress Note - Neurology   Charlette Garza 61 y o  female MRN: [id number]  Unit/Bed#:  Encounter: [id number]      Subjective:   Patient is here for a follow-up visit with a history of chronic fibromyalgia, depression and anxiety, restless leg syndrome for which she has been using tramadol on a p r n  basis as well as Cymbalta on a regular basis which has helped with the fibromyalgia  She was recently detected to have breast cancer and underwent lumpectomy followed by radiation   patient also has been on tamoxifen  Patient describes episode of intense dizziness and vertigo, confusion, speech difficulty, associated with tingling numbness in her hands and her feet occurring yesterday lasted for a couple of hours and spontaneously resolved  She has not had any recurrent episodes and did not go to the emergency room  She also describes episodes of visual hallucinations at times, and complains of chronic fatigue  ROS:   Review of Systems   Constitutional: Negative  HENT: Negative  Eyes: Negative  Respiratory: Negative  Cardiovascular: Negative  Gastrointestinal: Negative  Endocrine: Negative  Genitourinary: Negative  Musculoskeletal: Negative  Skin: Negative  Allergic/Immunologic: Negative  Neurological: Positive for dizziness, speech difficulty and numbness  Hematological: Bruises/bleeds easily  Psychiatric/Behavioral: Positive for hallucinations  Vitals:   Vitals:    07/13/18 0914   BP: 90/68   Pulse: 67   ,Body mass index is 22 33 kg/m²      MEDS:      Current Outpatient Prescriptions:     DULoxetine (CYMBALTA) 60 mg delayed release capsule, Take 60 mg by mouth every morning  , Disp: , Rfl:     levothyroxine 100 mcg tablet, Take 100 mcg by mouth every morning  , Disp: , Rfl:     metoprolol tartrate (LOPRESSOR) 25 mg tablet, Take 25 mg by mouth every morning  , Disp: , Rfl:     tamoxifen (NOLVADEX) 20 mg tablet, Take 1 tablet (20 mg total) by mouth daily, Disp: 90 tablet, Rfl: 0    traMADol (ULTRAM) 50 mg tablet, Take 50 mg by mouth once as needed for moderate pain, Disp: , Rfl:     zolpidem (AMBIEN CR) 6 25 MG CR tablet, Take 5 mg by mouth daily at bedtime as needed for sleep, Disp: , Rfl:   :    Physical Exam:  General appearance: alert, appears stated age and cooperative  Head: Normocephalic, without obvious abnormality, atraumatic    Neurologic:  Patient is alert awake oriented, high functions are intact, speech is fluent  No evidence of any aphasia or dysarthria  Cranial nerve examination reveals visual fields are full to threat, pupils equal and reactive, extraocular movements intact, fundi showed sharp disc margins, sensation in the V1 V2 V3 distribution is symmetric, no obvious facial asymmetry noted,Hearing is preserved, tongue is midline and gag is adequate, shoulder shrug is symmetric bilaterally  Motor examination reveals normal tone and bulk, no evidence of any drift to the outstretched extremities, strength is 5/5 preserved bilaterally in both upper and lower extremities, deep tendon reflexes are intact, toes are downgoing  Sensory examination to pinprick light touch proprioception and vibration is preserved bilaterally, patient does not extinguish double simultaneous stimuli  Coordination no evidence of any finger-to-nose dysmetria  Gait is normal based Romberg sign is negative  No bruits were appreciable in the neck  Lab Results: I have personally reviewed pertinent reports  Imaging Studies: I have personally reviewed pertinent reports  Assessment:  1  Posterior circulation TIA  2  Chronic fibromyalgia  3  Restless leg syndrome      Plan:  Patient is advised if she experiences any recurrent symptoms similar to the type she had yesterday to go to the nearest emergency room she is also advised to take a baby aspirin daily, patient was noted to be hypotensive is advised to discuss with her primary physician since she has been on beta blockers, an MRI of the brain will be useful to rule out the possibility of any other etiology such as a mass lesion especially with a history of recent breast cancer and patient will continue with Cymbalta and tramadol  She will return back to see me in 2-3 months unless the MRI shows abnormalities  7/13/2018,9:21 AM    Dictation voice to text software has been used in the creation of this document  Please consider this in light of any contextual or grammatical errors

## 2018-08-05 DIAGNOSIS — M79.7 FIBROMYALGIA: ICD-10-CM

## 2018-08-06 RX ORDER — CELECOXIB 200 MG/1
CAPSULE ORAL
Qty: 90 CAPSULE | Refills: 1 | OUTPATIENT
Start: 2018-08-06

## 2018-08-06 RX ORDER — CELECOXIB 200 MG/1
200 CAPSULE ORAL DAILY
Qty: 30 CAPSULE | Refills: 2 | Status: SHIPPED | OUTPATIENT
Start: 2018-08-06 | End: 2018-09-11 | Stop reason: ALTCHOICE

## 2018-08-12 DIAGNOSIS — M79.7 FIBROMYALGIA: Primary | ICD-10-CM

## 2018-08-13 RX ORDER — TRAMADOL HYDROCHLORIDE 50 MG/1
TABLET ORAL
Qty: 30 TABLET | Refills: 0 | Status: SHIPPED | OUTPATIENT
Start: 2018-08-13 | End: 2019-04-26

## 2018-09-06 RX ORDER — ROPINIROLE 0.25 MG/1
TABLET, FILM COATED ORAL
COMMUNITY
End: 2018-09-11 | Stop reason: ALTCHOICE

## 2018-09-06 RX ORDER — ERGOCALCIFEROL 1.25 MG/1
CAPSULE ORAL
COMMUNITY
End: 2018-09-11 | Stop reason: ALTCHOICE

## 2018-09-06 RX ORDER — FLUTICASONE PROPIONATE 50 MCG
SPRAY, SUSPENSION (ML) NASAL
COMMUNITY
End: 2018-09-11 | Stop reason: ALTCHOICE

## 2018-09-06 RX ORDER — MELOXICAM 15 MG/1
TABLET ORAL
COMMUNITY
End: 2018-09-11 | Stop reason: ALTCHOICE

## 2018-09-06 RX ORDER — LEVOCETIRIZINE DIHYDROCHLORIDE 5 MG/1
TABLET, FILM COATED ORAL
COMMUNITY
End: 2018-09-11 | Stop reason: ALTCHOICE

## 2018-09-06 RX ORDER — TIZANIDINE 4 MG/1
TABLET ORAL
COMMUNITY
End: 2018-09-11 | Stop reason: ALTCHOICE

## 2018-09-06 RX ORDER — ZOLPIDEM TARTRATE 5 MG/1
1 TABLET ORAL AS NEEDED
COMMUNITY
End: 2018-12-12 | Stop reason: SDUPTHER

## 2018-09-11 ENCOUNTER — CLINICAL SUPPORT (OUTPATIENT)
Dept: RADIATION ONCOLOGY | Facility: CLINIC | Age: 58
End: 2018-09-11
Payer: MEDICARE

## 2018-09-11 ENCOUNTER — RADIATION ONCOLOGY FOLLOW-UP (OUTPATIENT)
Dept: RADIATION ONCOLOGY | Facility: CLINIC | Age: 58
End: 2018-09-11
Attending: RADIOLOGY
Payer: MEDICARE

## 2018-09-11 VITALS
HEIGHT: 69 IN | RESPIRATION RATE: 16 BRPM | HEART RATE: 83 BPM | WEIGHT: 150.5 LBS | BODY MASS INDEX: 22.29 KG/M2 | SYSTOLIC BLOOD PRESSURE: 104 MMHG | DIASTOLIC BLOOD PRESSURE: 70 MMHG

## 2018-09-11 DIAGNOSIS — D05.11 INTRADUCTAL CARCINOMA OF RIGHT BREAST: Primary | ICD-10-CM

## 2018-09-11 PROCEDURE — 99214 OFFICE O/P EST MOD 30 MIN: CPT | Performed by: RADIOLOGY

## 2018-09-11 NOTE — PROGRESS NOTES
Follow-up - Radiation Oncology   Lucero Armijo 1960 60 y o  female [id number]      History of Present Illness   Cancer Staging  No matching staging information was found for the patient  Lucero Armijo returns today for routine scheduled follow-up visit  Interval History Last seen in follow up on 3/12/18    3/5/18 Saw surgeon, Dr Joao Uribe  No FU seen in [facility]    3/30/18 Mammogram at [address]:  RIGHT BREAST:  S/P previous radiation therapy and lumpectomy  Skin thickening  Punctate and round calcifications  Findings are probably benign  A short interval follow-up is recommended in 6 months  A diagnostic right mammogram in 6 months is recommended  LEFT BREAST:  Lucent-centered calcifications  Benign, no evidence of malignancy  Normal interval follow-up is   recommended in 12 months  The patient was informed of the findings and recommendations at the conclusion of the examination  OVERALL ASSESSMENT - CATEGORY 3 - PROBABLY BENIGN    4/9/18 Dr Dean Beach and returns 10/8/18  Continue Tamoxifen    Today:  The patient presents today overall feeling well  She does continue to experience various arthralgias and myalgias likely related to fibromyalgia  She is tolerating tamoxifen well  She is following closely with Neurology for issues with dizziness  She denies any persistent headache  Historical Information      Intraductal carcinoma of right breast    4/20/2017 Biopsy     Right breast biopsy:  Invasive breast carcinoma/invasive ductal carcinoma  Grade 1  ER and MI 90-95% positive, Her2 negative         5/24/2017 Initial Diagnosis     Intraductal carcinoma of right breast        Surgery     Segmental mastectomy with sentinel node biopsy  Invasive mammary    Grade 1           5/24/2017 -  Cancer Staged     Pathologic  Stage IA - pT1b, pN0 (sn), G1         7/20/2017 - 9/5/2017 Radiation     dose of 5040 cGy in 28 daily fractions to the right breast with an additional 1000 cGy to the lumpectomy cavity           7/2017 -  Hormone Therapy     Tamoxifen            Past Medical History:   Diagnosis Date    Anxiety     [facility] [address]     Cancer ([facility] [address]     right breast  dx 04/2017    Depression     Disease of thyroid gland     hx of goiter, surgery done 1978    Edema of foot     Esophageal reflux     Fatigue     Fibromyalgia     History of radiation therapy     Hypertension     Hypotension     Hypothyroidism     Insomnia     Monoclonal gammopathy of undetermined significance     Neck stiffness     Pain syndrome, chronic     Palpitations     Paresthesia     Peripheral neuropathy     Pseudodementia     Restless leg syndrome     Tachycardia     Vitamin D deficiency     Wears glasses      Past Surgical History:   Procedure Laterality Date    BLADDER SURGERY      BREAST SURGERY Right     breast bx    COLONOSCOPY      HYSTERECTOMY  2009    vaginal hysterectomy, rectocele repair    [place] MASTECTOMY, PARTIAL Right 5/24/2017    Procedure:  (NUCLEAR MED  8 AM , NEEDLE LOCAL TO FOLLOW 8:30 AM ) SEGMENTAL MASTECTOMY BREAST NEEDLE PLACEMENT,  SENTINAL LYMPH NODE BIOPSY;  Surgeon: Radha David MD;  [facility];  Service: General    THYROID LOBECTOMY Left 1978    TONSILLECTOMY         Social History   History   Alcohol Use    Yes     Comment: socially     History   Drug Use No     History   Smoking Status    Never Smoker   Smokeless Tobacco    Never Used         Meds/Allergies     Current Outpatient Prescriptions:     DULoxetine (CYMBALTA) 60 mg delayed release capsule, Take 1 capsule (60 mg total) by mouth every morning, Disp: 90 capsule, Rfl: 3    levothyroxine 100 mcg tablet, Take 100 mcg by mouth every morning  , Disp: , Rfl:     metoprolol tartrate (LOPRESSOR) 25 mg tablet, Take 25 mg by mouth every morning  , Disp: , Rfl:     tamoxifen (NOLVADEX) 20 mg tablet, Take 1 tablet (20 mg total) by mouth daily, Disp: 90 tablet, Rfl: 0    traMADol (ULTRAM) 50 mg tablet, take 1 tablet by mouth at bedtime, Disp: 30 tablet, Rfl: 0    zolpidem (AMBIEN) 5 mg tablet, Take 1 tablet by mouth as needed, Disp: , Rfl:   No Known Allergies      Review of Systems   Review of Systems   Constitutional: Positive for fatigue  HENT: Negative  Eyes: Negative  Respiratory: Positive for shortness of breath  CORTES   Cardiovascular: Negative  Gastrointestinal: Positive for abdominal pain (for a month) and nausea  Bloating   Endocrine: Negative  Genitourinary: Positive for difficulty urinating (trouble starting stream)  Musculoskeletal: Positive for arthralgias (left leg, toe, shoulders ) and myalgias  Skin: Negative  Neurological: Positive for dizziness and light-headedness  Lopressor dose decreased but did not help the dizziness   Hematological: Negative  Psychiatric/Behavioral: Negative  Screening  Tobacco  Current tobacco user: no  If yes, brief counseling provided: NA    Hypertension  Hypertension screening performed: yes  Normotensive:  yes  If no, referred to PCP: n/a    Depression Screening  Screened for depression using PHQ-2: yes    Screened for depression using PHQ-9:  no  Screening positive or negative:  negative  If score >4, was any of the following actions taken? Additional evaluation for depression, suicide risk assesment, referral to PCP or psychiatry, medication started:  no    Advanced Care Planning for Patients >65 years  Advanced Care Planning Discussed:  n/a  Patient named surrogate decision maker or care plan in chart: n/a          OBJECTIVE:   /70 (BP Location: Right arm, Patient Position: Sitting, Cuff Size: Standard)   Pulse 83   Resp 16   Ht 5' 8 5" (1 74 m)   Wt 68 3 kg (150 lb 8 oz)   BMI 22 55 kg/m²   Pain Assessment:  0  Karnofsky: 90 - Able to carry on normal activity; minor signs or symptoms of disease     Physical Exam   Patient presents today no apparent distress  Sclera anicteric    No cervical supraclavicular lymphadenopathy  Lungs clear to auscultation bilaterally  Normal S1-S2 regular rate and rhythm  Left breast is within normal limits  Right breast is soft nontender without suspicious visible or palpable findings  No axillary lymphadenopathy  No swelling of the upper extremities  Normal speech  Normal affect  RESULTS    Lab Results: No results found for this or any previous visit (from the past 672 hour(s))  Imaging Studies:No results found  Assessment/Plan:  No orders of the defined types were placed in this encounter  Lucero Armijo has done well in follow-up, now approximately 1 year status post completion of treatment  She underwent her 1st surveillance mammography in March and a six-month follow-up was recommended for the treated right breast   This has yet to be scheduled so we will schedule this for her today  She will continue to follow with both Medical and Surgical Oncology and will return to our department in 1 year for routine follow-up  Arianna Davies MD  3/57/0812,38:25 PM    Portions of the record may have been created with voice recognition software   Occasional wrong word or "sound a like" substitutions may have occurred due to the inherent limitations of voice recognition software   Read the chart carefully and recognize, using context, where substitutions have occurred

## 2018-09-11 NOTE — PROGRESS NOTES
Darien Crisostomo  1960   Ms Thelma Castrejon is a 60 y o  female       Chief Complaint   Patient presents with    Breast Cancer    Follow-up       Cancer Staging  No matching staging information was found for the patient  Oncology History    Last seen in follow up on 3/12/18    3/5/18 Saw surgeon, Dr Lisa Zambrano  No FU seen in [facility]    3/30/18 Mammogram at [address]:  RIGHT BREAST:  S/P previous radiation therapy and lumpectomy  Skin thickening  Punctate and round calcifications  Findings are probably benign  A short interval follow-up is recommended in 6 months  A diagnostic right mammogram in 6 months is recommended  LEFT BREAST:  Lucent-centered calcifications  Benign, no evidence of malignancy  Normal interval follow-up is   recommended in 12 months  The patient was informed of the findings and recommendations at the conclusion of the examination  OVERALL ASSESSMENT - CATEGORY 3 - PROBABLY BENIGN    4/9/18 Dr Denney Reveal and returns 10/8/18  Continue Tamoxifen          Intraductal carcinoma of right breast    4/20/2017 Biopsy     Right breast biopsy:  Invasive breast carcinoma/invasive ductal carcinoma  Grade 1  ER and ME 90-95% positive, Her2 negative         5/24/2017 Initial Diagnosis     Intraductal carcinoma of right breast        Surgery     Segmental mastectomy with sentinel node biopsy  Invasive mammary  Grade 1           5/24/2017 -  Cancer Staged     Pathologic  Stage IA - pT1b, pN0 (sn), G1         7/20/2017 - 9/5/2017 Radiation     dose of 5040 cGy in 20 daily fractions to the right breast with an additional 1000 cGy to the lumpectomy cavity  7/2017 -  Hormone Therapy     Tamoxifen            Clinical Trial: no        Interval History Last seen in follow up on 3/12/18    3/5/18 Saw surgeon, Dr Lisa Zambrano  No FU seen in [facility]    3/30/18 Mammogram at [address]:  RIGHT BREAST:  S/P previous radiation therapy and lumpectomy  Skin thickening  Punctate and round calcifications  Findings are probably benign  A short interval follow-up is recommended in 6 months  A diagnostic right mammogram in 6 months is recommended  LEFT BREAST:  Lucent-centered calcifications  Benign, no evidence of malignancy  Normal interval follow-up is   recommended in 12 months  The patient was informed of the findings and recommendations at the conclusion of the examination  OVERALL ASSESSMENT - CATEGORY 3 - PROBABLY BENIGN    4/9/18 Dr Mary Vazquez and returns 10/8/18  Continue Tamoxifen        Screening  Tobacco  Current tobacco user: no  If yes, brief counseling provided: NA    Hypertension  Hypertension screening performed: yes  Normotensive:  yes  If no, referred to PCP: n/a    Depression Screening  Screened for depression using PHQ-2: yes    Screened for depression using PHQ-9:  no  Screening positive or negative:  negative  If score >4, was any of the following actions taken?    Additional evaluation for depression, suicide risk assesment, referral to PCP or psychiatry, medication started:  no    Advanced Care Planning for Patients >65 years  Advanced Care Planning Discussed:  n/a  Patient named surrogate decision maker or care plan in chart: n/a      Health Maintenance   Topic Date Due    Medicare Annual Wellness Visit (AWV)  1960    CRC Screening: Colonoscopy  1960    Pneumococcal PPSV23 Highest Risk Adult (1 of 3 - PCV13) 08/23/1978    MAMMOGRAM  04/14/2018    INFLUENZA VACCINE  09/01/2018    DTaP,Tdap,and Td Vaccines (2 - Td) 09/10/2018       Patient Active Problem List   Diagnosis    Intraductal carcinoma of right breast    History of right breast cancer    Restless leg syndrome    Fibromyalgia    TIA involving basilar artery     Past Medical History:   Diagnosis Date    Anxiety     [facility] [address] [facility]     Cancer ([facility] [address] [facility]     right breast  dx 04/2017    Depression     Disease of thyroid gland     hx of goiter, surgery done 1978    Edema of foot     Esophageal reflux     Fatigue  Fibromyalgia     History of radiation therapy     Hypertension     Hypotension     Hypothyroidism     Insomnia     Monoclonal gammopathy of undetermined significance     Neck stiffness     Pain syndrome, chronic     Palpitations     Paresthesia     Peripheral neuropathy     Pseudodementia     Restless leg syndrome     Tachycardia     Vitamin D deficiency     Wears glasses      Past Surgical History:   Procedure Laterality Date    BLADDER SURGERY      BREAST SURGERY Right     breast bx    COLONOSCOPY      HYSTERECTOMY  2009    vaginal hysterectomy, rectocele repair    IA MASTECTOMY, PARTIAL Right 5/24/2017    Procedure:  (NUCLEAR MED  8 AM , NEEDLE LOCAL TO FOLLOW 8:30 AM ) SEGMENTAL MASTECTOMY BREAST NEEDLE PLACEMENT,  SENTINAL LYMPH NODE BIOPSY;  Surgeon: Sona Carreon MD;  Location: [facility];  Service: General    THYROID LOBECTOMY Left 1978    TONSILLECTOMY       Family History   Problem Relation Age of Onset    Heart disease Mother         CHF    Cirrhosis Father     Cancer Maternal Aunt 72        unknown type    Cancer Cousin         bladder     Social History     Social History    Marital status: /Civil Union     Spouse name: N/A    Number of children: N/A    Years of education: N/A     Occupational History    Not on file       Social History Main Topics    Smoking status: Never Smoker    Smokeless tobacco: Never Used    Alcohol use Yes      Comment: socially    Drug use: No    Sexual activity: Not on file     Other Topics Concern    Not on file     Social History Narrative    No narrative on file       Current Outpatient Prescriptions:     DULoxetine (CYMBALTA) 60 mg delayed release capsule, Take 1 capsule (60 mg total) by mouth every morning, Disp: 90 capsule, Rfl: 3    levothyroxine 100 mcg tablet, Take 100 mcg by mouth every morning  , Disp: , Rfl:     metoprolol tartrate (LOPRESSOR) 25 mg tablet, Take 25 mg by mouth every morning  , Disp: , Rfl:    tamoxifen (NOLVADEX) 20 mg tablet, Take 1 tablet (20 mg total) by mouth daily, Disp: 90 tablet, Rfl: 0    traMADol (ULTRAM) 50 mg tablet, take 1 tablet by mouth at bedtime, Disp: 30 tablet, Rfl: 0    zolpidem (AMBIEN) 5 mg tablet, Take 1 tablet by mouth as needed, Disp: , Rfl:   No Known Allergies    Review of Systems:  Review of Systems   Constitutional: Positive for fatigue  HENT: Negative  Eyes: Negative  Respiratory: Positive for shortness of breath  CORTES   Cardiovascular: Negative  Gastrointestinal: Positive for abdominal pain (for a month) and nausea  Bloating   Endocrine: Negative  Genitourinary: Positive for difficulty urinating (trouble starting stream)  Musculoskeletal: Positive for arthralgias (left leg, toe, shoulders ) and myalgias  Skin: Negative  Neurological: Positive for dizziness and light-headedness  Lopressor dose decreased but did not help the dizziness   Hematological: Negative  Psychiatric/Behavioral: Negative  Vitals:    09/11/18 1105   BP: 104/70   BP Location: Right arm   Patient Position: Sitting   Cuff Size: Standard   Pulse: 83   Resp: 16   Weight: 68 3 kg (150 lb 8 oz)   Height: 5' 8 5" (1 74 m)   Oxygen 99%    Pain Score:   2    Imaging:No results found

## 2018-09-13 ENCOUNTER — HOSPITAL ENCOUNTER (OUTPATIENT)
Dept: MAMMOGRAPHY | Facility: CLINIC | Age: 58
Discharge: HOME | End: 2018-09-13
Payer: MEDICARE

## 2018-09-13 DIAGNOSIS — Z85.3 HISTORY OF RIGHT BREAST CANCER: ICD-10-CM

## 2018-09-13 PROCEDURE — G0279 TOMOSYNTHESIS, MAMMO: HCPCS

## 2018-09-13 PROCEDURE — 77066 DX MAMMO INCL CAD BI: CPT

## 2018-10-08 ENCOUNTER — OFFICE VISIT (OUTPATIENT)
Dept: HEMATOLOGY ONCOLOGY | Facility: CLINIC | Age: 58
End: 2018-10-08
Payer: MEDICARE

## 2018-10-08 VITALS
WEIGHT: 151 LBS | DIASTOLIC BLOOD PRESSURE: 66 MMHG | BODY MASS INDEX: 22.36 KG/M2 | OXYGEN SATURATION: 96 % | TEMPERATURE: 97.6 F | RESPIRATION RATE: 16 BRPM | SYSTOLIC BLOOD PRESSURE: 88 MMHG | HEART RATE: 77 BPM | HEIGHT: 69 IN

## 2018-10-08 DIAGNOSIS — Z85.3 HISTORY OF RIGHT BREAST CANCER: Primary | ICD-10-CM

## 2018-10-08 PROCEDURE — 99214 OFFICE O/P EST MOD 30 MIN: CPT | Performed by: INTERNAL MEDICINE

## 2018-10-08 NOTE — PROGRESS NOTES
HEMATOLOGY / ONCOLOGY CLINIC NOTE    Primary Care Provider: Severo Sidhu MD  Referring Provider:    MRN: [id number]  : 1960    Reason for Encounter:    Chief Complaint   Patient presents with    Follow-up         History of Hematology / Oncology Illness:     José Luis Weiss is a 60 y o  female who came in for follow up  1  stage Ia  Right breast Cancer,    Postmenopausal;  pT1b pN0 (4 LN -) Mx G1 R0 ER/WI+ HER-2/mike negative RS = 7 = low     D/X: 2017:  Diagnosed on mammogram   - Status post lumpectomy;  Breast radiation, no indication for chemotherapy, on tamoxifen (  Tamoxifen over AI, Due to underlying fibromyalgia and arthritis )  -  Previously under the care of my colleagues, now transferring care to me  -   Status post hysterectomy  2,   Postop Neuropathy     3,   Elevated Ig A  -  Unclear etiology    Interval History:     :  Reported overall doing well  No lumps bumps  No constitutional symptoms  Following surgeon, for breast examination and mammogram, per patient no new findings  Tolerating tamoxifen well, no hot flash symptoms, no new bone pains, status post hysterectomy  10/2018 : Came in for follow-up, doing well  Reported still having breast pain, otherwise no new lumps bumps  No bone pain  No other constitutional symptoms  Problem list:       Patient Active Problem List   Diagnosis    Intraductal carcinoma of right breast    History of right breast cancer    Restless leg syndrome    Fibromyalgia    TIA involving basilar artery       Assessment / Plan:       1  Intraductal carcinoma of right breast  -  Postmenopausal, stage I breast cancer, status post lumpectomy, radiation, no indication for chemotherapy, currently taking tamoxifen ongoing for  1 5 years now  -    Overall doing well, no evidence for cancer recurrence, no major side effects from tamoxifen  -     Last mammogram in 2018, within normal limit    Repeat in 1 year,  Usually being ordered by surgeon  plan  -  Continue  Tamoxifen, follow-up in 1 year        2  High total serum IgA  -   Previous lab was not done  Need SPEP, immunofixation in  1 year           25   minutes were spent on this visit  All questions answered to satisfaction; Advised pt to call if there is any further questions  PHYSICIAL EXAMINATION:     Vital Signs:   [unfilled]  Body mass index is 22 63 kg/m²  Body surface area is 1 82 meters squared  GEN: Alert, awake oriented x3, in no acute distress  HEENT- No pallor, icterus, cyanosis, no oral mucosal lesions,   LAD - no palpable cervical, clavicle, axillary, inguinal LAD  Heart- normal S1 S2, regular rate and rhythm, No murmur, rubs  Lungs- decreased breathing sound bilateral    Abdomen- soft, Non tender, bowel sounds present  Extremities- No cyanosis, clubbing, edema  Neuro- No focal neurological deficit           PAST MEDICAL HISTORY:   has a past medical history of Anxiety; Breast cancer ([facility] [address] ); Cancer ([facility] [address] ); Depression; Disease of thyroid gland; Edema of foot; Esophageal reflux; Fatigue; Fibromyalgia; History of radiation therapy; Hypertension; Hypotension; Hypothyroidism; Insomnia; Monoclonal gammopathy of undetermined significance; Neck stiffness; Pain syndrome, chronic; Palpitations; Paresthesia; Peripheral neuropathy; Pseudodementia; Restless leg syndrome; Tachycardia; Vitamin D deficiency; and Wears glasses  PAST SURGICAL HISTORY:   has a past surgical history that includes Thyroid lobectomy (Left, 1978); Hysterectomy (2009); Tonsillectomy; Breast surgery (Right); pr mastectomy, partial (Right, 5/24/2017); Bladder surgery; and Colonoscopy      CURRENT MEDICATIONS:     Current Outpatient Prescriptions   Medication Sig Dispense Refill    DULoxetine (CYMBALTA) 60 mg delayed release capsule Take 1 capsule (60 mg total) by mouth every morning 90 capsule 3    levothyroxine 100 mcg tablet Take 100 mcg by mouth every morning        metoprolol tartrate (LOPRESSOR) 25 mg tablet Take 25 mg by mouth every morning        tamoxifen (NOLVADEX) 20 mg tablet Take 1 tablet (20 mg total) by mouth daily 90 tablet 0    traMADol (ULTRAM) 50 mg tablet take 1 tablet by mouth at bedtime 30 tablet 0    zolpidem (AMBIEN) 5 mg tablet Take 1 tablet by mouth as needed       No current facility-administered medications for this visit  [unfilled]    SOCIAL HISTORY:   reports that she has never smoked  She has never used smokeless tobacco  She reports that she drinks alcohol  She reports that she does not use drugs  FAMILY HISTORY:  family history includes Cancer in her cousin; Cancer (age of onset: 72) in her maternal aunt; Cirrhosis in her father; Heart disease in her mother  ALLERGIES:  has No Known Allergies  REVIEW OF SYSTEMS:  Please note that a 14-point review of systems was performed to include Constitutional, HEENT, Respiratory, CVS, GI, , Musculoskeletal, Integumentary, Neurologic, Rheumatologic, Endocrinologic, Psychiatric, Lymphatic, and Hematologic/Oncologic systems were reviewed and are negative unless otherwise stated in HPI  Positive and negative findings pertinent to this evaluation are incorporated into the history of present illness  LAB:    Lab Results   Component Value Date    WBC 5 46 11/08/2017    HGB 13 6 11/08/2017    HCT 39 0 11/08/2017    MCV 90 11/08/2017     11/08/2017       Lab Results   Component Value Date     11/08/2017    K 4 0 11/08/2017     (H) 11/08/2017    CO2 26 11/08/2017    ANIONGAP 6 6 (L) 02/12/2014    BUN 13 07/13/2018    CREATININE 0 82 07/13/2018    GLUCOSE 82 02/12/2014    CALCIUM 8 3 11/08/2017    AST 14 11/08/2017    ALT 16 11/08/2017    ALKPHOS 50 11/08/2017    PROT 7 5 02/12/2014    BILITOT 0 4 02/12/2014    EGFR 79 07/13/2018       IMAGING:    No orders to display     No results found

## 2018-10-15 ENCOUNTER — OFFICE VISIT (OUTPATIENT)
Dept: NEUROLOGY | Facility: CLINIC | Age: 58
End: 2018-10-15
Payer: MEDICARE

## 2018-10-15 VITALS
HEIGHT: 69 IN | SYSTOLIC BLOOD PRESSURE: 94 MMHG | WEIGHT: 149 LBS | HEART RATE: 70 BPM | DIASTOLIC BLOOD PRESSURE: 76 MMHG | BODY MASS INDEX: 22.07 KG/M2

## 2018-10-15 DIAGNOSIS — M79.7 FIBROMYALGIA: Primary | ICD-10-CM

## 2018-10-15 DIAGNOSIS — G25.81 RESTLESS LEG SYNDROME: ICD-10-CM

## 2018-10-15 DIAGNOSIS — M70.52 POPLITEAL BURSITIS OF LEFT KNEE: ICD-10-CM

## 2018-10-15 PROCEDURE — 99214 OFFICE O/P EST MOD 30 MIN: CPT | Performed by: PSYCHIATRY & NEUROLOGY

## 2018-10-15 NOTE — PROGRESS NOTES
Progress Note - Neurology   Ritchie Setrling 60 y o  female MRN: [id number]  Unit/Bed#:  Encounter: [id number]      Subjective:   Patient is here for follow-up visit with a history of chronic fibromyalgia, restless leg syndrome, and in the recent past has been experiencing worsening pain in the posterior aspect of the knee as well as anteriorly with difficulty ambulating  She was seen by her physiatrist and had an x-ray done and has been using tramadol and another pain medication which she is not familiar with the name, and sees minimal relief  She denies any traumatic injuries to the knee  Patient also recently has been started on tamoxifen for breast cancer and remains on Cymbalta for the fibromyalgia symptoms which are under better control  ROS:   Review of Systems   Constitutional: Positive for fatigue  Negative for appetite change and fever  HENT: Negative  Negative for hearing loss, tinnitus, trouble swallowing and voice change  Eyes: Positive for pain and visual disturbance  Negative for photophobia  Respiratory: Negative  Negative for shortness of breath  Cardiovascular: Negative  Negative for palpitations  Gastrointestinal: Negative  Negative for nausea and vomiting  Endocrine: Negative  Negative for cold intolerance and heat intolerance  Genitourinary: Positive for enuresis  Negative for dysuria, frequency and urgency  Musculoskeletal: Positive for back pain, gait problem and neck pain  Negative for myalgias  Left knee pain  Left leg cramping   Skin: Negative  Negative for rash  Neurological: Positive for tremors and numbness  Negative for dizziness, seizures, syncope, facial asymmetry, speech difficulty, weakness, light-headedness and headaches  Hematological: Negative  Does not bruise/bleed easily  Psychiatric/Behavioral: Positive for sleep disturbance  Negative for confusion and hallucinations  The patient is nervous/anxious        Vitals:    10/15/18 5819 BP: 94/76   BP Location: Left arm   Patient Position: Sitting   Cuff Size: Adult   Pulse: 70   Weight: 67 6 kg (149 lb)   Height: 5' 8 5" (1 74 m)     MEDS:      Current Outpatient Prescriptions:     DULoxetine (CYMBALTA) 60 mg delayed release capsule, Take 1 capsule (60 mg total) by mouth every morning, Disp: 90 capsule, Rfl: 3    levothyroxine 100 mcg tablet, Take 100 mcg by mouth every morning  , Disp: , Rfl:     metoprolol tartrate (LOPRESSOR) 25 mg tablet, Take 25 mg by mouth every morning  , Disp: , Rfl:     tamoxifen (NOLVADEX) 20 mg tablet, Take 1 tablet (20 mg total) by mouth daily, Disp: 90 tablet, Rfl: 0    traMADol (ULTRAM) 50 mg tablet, take 1 tablet by mouth at bedtime (Patient taking differently: take 1 tablet by mouth at bedtime PRN), Disp: 30 tablet, Rfl: 0    zolpidem (AMBIEN) 5 mg tablet, Take 1 tablet by mouth as needed, Disp: , Rfl:   :    Physical Exam:  General appearance: alert, appears stated age and cooperative  Head: Normocephalic, without obvious abnormality, atraumatic    Neurologic:  On examination she has evidence of swelling of the popliteal bursa with tenderness, tenderness along the gamal patellar region, of the left knee there is no evidence of any proximal arm and leg muscle tenderness, no other new focal deficits were noted on motor and sensory exam   Flexion of the left knees associated with discomfort  She ambulates with a slight limp on the left side  Lab Results: I have personally reviewed pertinent reports  Imaging Studies: I have personally reviewed pertinent reports  Assessment:  1  Left popliteal bursitis  2  Fibromyalgia  3  Restless leg syndrome  Plan:  Patient is advised orthopedic evaluation at this time for the popliteal bursitis, she had an x-ray done the reports of which are not available to us, and in the meanwhile for the fibromyalgia she will continue with Cymbalta 60 mg daily    She is also prescribed tramadol and another medication by her physiatrist for pain relief  Patient will return back to see me in 6 months  10/15/2018,9:56 AM    Dictation voice to text software has been used in the creation of this document  Please consider this in light of any contextual or grammatical errors

## 2018-10-31 ENCOUNTER — OFFICE VISIT (OUTPATIENT)
Dept: OBGYN CLINIC | Facility: CLINIC | Age: 58
End: 2018-10-31
Payer: MEDICARE

## 2018-10-31 ENCOUNTER — APPOINTMENT (OUTPATIENT)
Dept: RADIOLOGY | Facility: CLINIC | Age: 58
End: 2018-10-31
Payer: MEDICARE

## 2018-10-31 VITALS
SYSTOLIC BLOOD PRESSURE: 91 MMHG | HEIGHT: 69 IN | WEIGHT: 149.6 LBS | HEART RATE: 86 BPM | RESPIRATION RATE: 22 BRPM | BODY MASS INDEX: 22.16 KG/M2 | DIASTOLIC BLOOD PRESSURE: 86 MMHG

## 2018-10-31 DIAGNOSIS — M65.9 SYNOVITIS OF LEFT KNEE: Primary | ICD-10-CM

## 2018-10-31 DIAGNOSIS — M25.562 LEFT KNEE PAIN, UNSPECIFIED CHRONICITY: ICD-10-CM

## 2018-10-31 PROCEDURE — 20610 DRAIN/INJ JOINT/BURSA W/O US: CPT | Performed by: ORTHOPAEDIC SURGERY

## 2018-10-31 PROCEDURE — 73562 X-RAY EXAM OF KNEE 3: CPT

## 2018-10-31 PROCEDURE — 99203 OFFICE O/P NEW LOW 30 MIN: CPT | Performed by: ORTHOPAEDIC SURGERY

## 2018-10-31 RX ORDER — METHYLPREDNISOLONE ACETATE 40 MG/ML
2 INJECTION, SUSPENSION INTRA-ARTICULAR; INTRALESIONAL; INTRAMUSCULAR; SOFT TISSUE
Status: COMPLETED | OUTPATIENT
Start: 2018-10-31 | End: 2018-10-31

## 2018-10-31 RX ORDER — LIDOCAINE HYDROCHLORIDE 5 MG/ML
2 INJECTION, SOLUTION INFILTRATION; PERINEURAL
Status: COMPLETED | OUTPATIENT
Start: 2018-10-31 | End: 2018-10-31

## 2018-10-31 RX ORDER — BUPIVACAINE HYDROCHLORIDE 2.5 MG/ML
2 INJECTION, SOLUTION INFILTRATION; PERINEURAL
Status: COMPLETED | OUTPATIENT
Start: 2018-10-31 | End: 2018-10-31

## 2018-10-31 RX ADMIN — METHYLPREDNISOLONE ACETATE 2 ML: 40 INJECTION, SUSPENSION INTRA-ARTICULAR; INTRALESIONAL; INTRAMUSCULAR; SOFT TISSUE at 17:43

## 2018-10-31 RX ADMIN — BUPIVACAINE HYDROCHLORIDE 2 ML: 2.5 INJECTION, SOLUTION INFILTRATION; PERINEURAL at 17:43

## 2018-10-31 RX ADMIN — LIDOCAINE HYDROCHLORIDE 2 ML: 5 INJECTION, SOLUTION INFILTRATION; PERINEURAL at 17:43

## 2018-10-31 NOTE — PROGRESS NOTES
HPI:  Patient is a 60y o  year old RHD female  who presents with chief complaint of diffuse left knee pain  Pt states that it hurts over her entire knee bit most of the pain is on the lateral aspect of her knee  Pt states that she has pain that shoots from her knee to her ankle at night   Pt states that she has increased pain when she goes from sitting to standing  Pt states that she had swelling in her thigh that causes pain  Ruben Burke sates that she has has pain when she tries to kneel  Pt states that she takes tramadol as needed for pain  Pt states that she has tried physical therapy  Pt has a hx of breast cancer, fibromyalgia and restless leg syndrome          ROS:   General: No fever, no chills, no weight loss, no weight gain  HEENT:  No loss of hearing, no nose bleeds, no sore throat  Eyes:  No eye pain, no red eyes, no visual disturbance  Respiratory:  No cough, no shortness of breath, no wheezing  Cardiovascular:  No chest pain, no palpitations, +leg swelling  GI: No abdominal pain, no nausea, no vomiting  Endocrine: No frequent urination, no excessive thirst  Urinary:  No dysuria, no hematuria, no incontinence  Musculoskeletal: see HPI and PE  Skin:  No rash, no wounds  Neurological:  + dizziness, no headache, no numbness  Psychiatric:  No difficulty concentrating, + depression, no suicide thoughts, no anxiety  Review of all other systems is negative    PMH:  Past Medical History:   Diagnosis Date    Anxiety     Breast cancer ([facility] )     Cancer ([facility] )     right breast  dx 04/2017    Depression     Disease of thyroid gland     hx of goiter, surgery done 1978    Edema of foot     Esophageal reflux     Fatigue     Fibromyalgia     History of radiation therapy     Hypertension     Hypotension     Hypothyroidism     Insomnia     Monoclonal gammopathy of undetermined significance     Neck stiffness     Pain syndrome, chronic     Palpitations     Paresthesia     Peripheral neuropathy     Pseudodementia  Restless leg syndrome     Tachycardia     Vitamin D deficiency     Wears glasses        PSH:  Past Surgical History:   Procedure Laterality Date    BLADDER SURGERY      BREAST SURGERY Right     breast bx    COLONOSCOPY      HYSTERECTOMY  2009    vaginal hysterectomy, rectocele repair    DC MASTECTOMY, PARTIAL Right 5/24/2017    Procedure:  (NUCLEAR MED  8 AM , NEEDLE LOCAL TO FOLLOW 8:30 AM ) SEGMENTAL MASTECTOMY BREAST NEEDLE PLACEMENT,  SENTINAL LYMPH NODE BIOPSY;  Surgeon: Mortimer Keeling, MD;  Location: [address] [place] [address];  Service: General    THYROID LOBECTOMY Left 1978    TONSILLECTOMY         Medications:  Current Outpatient Prescriptions   Medication Sig Dispense Refill    DULoxetine (CYMBALTA) 60 mg delayed release capsule Take 1 capsule (60 mg total) by mouth every morning 90 capsule 3    levothyroxine 100 mcg tablet Take 100 mcg by mouth every morning        metoprolol tartrate (LOPRESSOR) 25 mg tablet Take 25 mg by mouth every morning        tamoxifen (NOLVADEX) 20 mg tablet Take 1 tablet (20 mg total) by mouth daily 90 tablet 0    traMADol (ULTRAM) 50 mg tablet take 1 tablet by mouth at bedtime (Patient taking differently: take 1 tablet by mouth at bedtime PRN) 30 tablet 0    zolpidem (AMBIEN) 5 mg tablet Take 1 tablet by mouth as needed       No current facility-administered medications for this visit  Allergies:  No Known Allergies    Family History:  Family History   Problem Relation Age of Onset    Heart disease Mother         CHF    Cirrhosis Father     Cancer Maternal Aunt 72        unknown type    Cancer Cousin         bladder       Social History:  Social History     Occupational History    Not on file       Social History Main Topics    Smoking status: Never Smoker    Smokeless tobacco: Never Used    Alcohol use Yes      Comment: socially    Drug use: No    Sexual activity: Yes     Partners: Male       Physical Exam:  General :  Alert, cooperative, no distress, appears stated age  Blood pressure 91/86, pulse 86, resp  rate 22, height 5' 8 5" (1 74 m), weight 67 9 kg (149 lb 9 6 oz), not currently breastfeeding  Head:  Normocephalic, without obvious abnormality, atraumatic   Eyes:  Conjunctiva/corneas clear, EOM's intact,   Ears: Both ears normal appearance, no hearing deficits  Nose: Nares normal, septum midline, no drainage    Neck: Supple,  trachea midline, no adenopathy, no tenderness, no mass   Back:   Symmetric, no curvature, ROM normal, no tenderness   Lungs:   Respirations unlabored   Chest Wall:  No tenderness or deformity   Extremities: Extremities normal, atraumatic, no cyanosis or edema      Pulses: 2+ and symmetric   Skin: Skin color, texture, turgor normal, no rashes or lesions      Neurologic: Normal           Left Knee Exam     Tenderness   None    Range of Motion   Normal left knee ROM    Muscle Strength   Normal left knee strength    Tests   Lachman:  Anterior - Negative    Posterior - Negative  Drawer:       Anterior - Negative     Posterior - Negative  Varus:  Negative  Valgus: Negative    Comments:  No catching or locking   Posterior medial tenderness and fullness (possible Baker's Cyst)          Imaging Studies: The following imaging studies were reviewed in office today  My findings are noted  3v left knee 10/31/2018: No fracture, dislocation or osseus abnormalities      Assessment  Encounter Diagnoses   Name Primary?     Left knee pain, unspecified chronicity Yes    Synovitis of left knee          Plan:  CSI was administered for synovitis without complications  Pt will continue normal activity  Pt will follow up in the office in 1 month    Large joint arthrocentesis  Date/Time: 10/31/2018 5:43 PM  Consent given by: patient  Site marked: site marked  Timeout: Immediately prior to procedure a time out was called to verify the correct patient, procedure, equipment, support staff and site/side marked as required   Supporting Documentation  Indications: pain   Procedure Details  Location: knee - L knee  Medications administered: 2 mL bupivacaine 0 25 %; 2 mL lidocaine 0 5 %; 2 mL methylPREDNISolone acetate 40 mg/mL          Scribe Attestation    I,:   Katherine Escobar am acting as a scribe while in the presence of the attending physician :        I,:   José Morales MD personally performed the services described in this documentation    as scribed in my presence :

## 2018-11-14 ENCOUNTER — TELEPHONE (OUTPATIENT)
Dept: INTERNAL MEDICINE CLINIC | Facility: CLINIC | Age: 58
End: 2018-11-14

## 2018-11-27 ENCOUNTER — OFFICE VISIT (OUTPATIENT)
Dept: INTERNAL MEDICINE CLINIC | Facility: CLINIC | Age: 58
End: 2018-11-27
Payer: MEDICARE

## 2018-11-27 VITALS
DIASTOLIC BLOOD PRESSURE: 74 MMHG | BODY MASS INDEX: 21.77 KG/M2 | SYSTOLIC BLOOD PRESSURE: 100 MMHG | HEIGHT: 69 IN | HEART RATE: 91 BPM | OXYGEN SATURATION: 97 % | WEIGHT: 147 LBS

## 2018-11-27 DIAGNOSIS — E53.9 VITAMIN B DEFICIENCY: ICD-10-CM

## 2018-11-27 DIAGNOSIS — F32.2 CURRENT SEVERE EPISODE OF MAJOR DEPRESSIVE DISORDER WITHOUT PSYCHOTIC FEATURES, UNSPECIFIED WHETHER RECURRENT (HCC): ICD-10-CM

## 2018-11-27 DIAGNOSIS — E55.9 VITAMIN D DEFICIENCY: ICD-10-CM

## 2018-11-27 DIAGNOSIS — M79.7 FIBROMYALGIA: ICD-10-CM

## 2018-11-27 DIAGNOSIS — Z23 ENCOUNTER FOR IMMUNIZATION: ICD-10-CM

## 2018-11-27 DIAGNOSIS — D47.2 MONOCLONAL GAMMOPATHY OF UNDETERMINED SIGNIFICANCE: ICD-10-CM

## 2018-11-27 DIAGNOSIS — I95.0 IDIOPATHIC HYPOTENSION: Primary | ICD-10-CM

## 2018-11-27 DIAGNOSIS — Z85.3 HISTORY OF RIGHT BREAST CANCER: ICD-10-CM

## 2018-11-27 DIAGNOSIS — E04.1 THYROID NODULE: ICD-10-CM

## 2018-11-27 PROBLEM — G31.84 COGNITIVE IMPAIRMENT, MILD, SO STATED: Status: ACTIVE | Noted: 2018-11-27

## 2018-11-27 PROBLEM — G89.4 PAIN SYNDROME, CHRONIC: Status: ACTIVE | Noted: 2018-11-27

## 2018-11-27 PROBLEM — I95.9 HYPOTENSION: Status: ACTIVE | Noted: 2018-11-27

## 2018-11-27 PROBLEM — F32.A DEPRESSION: Status: ACTIVE | Noted: 2018-11-27

## 2018-11-27 PROBLEM — D05.11 INTRADUCTAL CARCINOMA OF RIGHT BREAST: Status: RESOLVED | Noted: 2017-05-24 | Resolved: 2018-11-27

## 2018-11-27 PROBLEM — E07.9 DISEASE OF THYROID GLAND: Status: ACTIVE | Noted: 2018-11-27

## 2018-11-27 PROBLEM — E03.9 HYPOTHYROIDISM: Status: ACTIVE | Noted: 2018-11-27

## 2018-11-27 PROCEDURE — G0008 ADMIN INFLUENZA VIRUS VAC: HCPCS | Performed by: INTERNAL MEDICINE

## 2018-11-27 PROCEDURE — 99213 OFFICE O/P EST LOW 20 MIN: CPT | Performed by: INTERNAL MEDICINE

## 2018-11-27 PROCEDURE — 90682 RIV4 VACC RECOMBINANT DNA IM: CPT | Performed by: INTERNAL MEDICINE

## 2018-11-27 PROCEDURE — G0438 PPPS, INITIAL VISIT: HCPCS | Performed by: INTERNAL MEDICINE

## 2018-11-27 NOTE — PROGRESS NOTES
Assessment and Plan:    Problem List Items Addressed This Visit     None        Health Maintenance Due   Topic Date Due    Hepatitis C Screening  1960   [facility] Medicare Annual Wellness Visit (AWV)  1960    CRC Screening: Colonoscopy  1960    Pneumococcal PPSV23 Highest Risk Adult (1 of 3 - PCV13) 08/23/1978    PAP SMEAR  08/23/1980    INFLUENZA VACCINE  07/01/2018    DTaP,Tdap,and Td Vaccines (2 - Td) 09/10/2018         HPI:  Milla Michaels is a 60 y o  female here for her Subsequent Wellness Visit  Patient Active Problem List   Diagnosis    Intraductal carcinoma of right breast    History of right breast cancer    Restless leg syndrome    Fibromyalgia    TIA involving basilar artery     Past Medical History:   Diagnosis Date    Anxiety     [facility] [address]     Cancer ([facility] [address]     right breast  dx 04/2017    Cervical disc disorder of cervicothoracic region     Cervical radiculopathy     Cervicalgia     Cognitive impairment, mild, so stated     Depression     Major,recurrent    Diplopia     last assessed 12/23/13    Disc degeneration, lumbar     Disease of thyroid gland     hx of goiter, surgery done 1978    Edema of foot     Esophageal reflux     Fatigue     Fibromyalgia     Hemorrhage, anal or rectal     History of radiation therapy     Hx of radiation therapy     Treatments: Course C1, Plan ID Energy Fractions Dose per Fraction (cGy)total Dose delivered(cGy)elapsed days   R Breast 6x 28/28 180 5,040 39, R breast e 12E 5/5 200 1,000 7 Treatment dates: 7/20/17-09/05/17    Hypertension     Hypotension     Hypothyroidism     Insomnia     Memory loss     Monoclonal gammopathy of undetermined significance     Myalgia     last assessed 07/08/16    Myositis     last assessed 07/08/16    Neck stiffness     Neuralgia     Neuritis     Pain syndrome, chronic     Palpitations     Paresthesia     Peripheral neuropathy     Polyneuropathy     Pseudodementia     Radiculopathy     12/23/13    Restless leg syndrome     Sacroiliitis (HCC)     SLE (systemic lupus erythematosus) ([facility] [address]     last assessed 12/23/13    Tachycardia     Viral warts     Vitamin B deficiency     Vitamin D deficiency     Vitamin D deficiency     Wears glasses      Past Surgical History:   Procedure Laterality Date    BLADDER SURGERY      bladder prolapse    BREAST SURGERY Right     breast bx    COLONOSCOPY      age 45 normal    HYSTERECTOMY  2009    vaginal hysterectomy, rectocele repair    UT MASTECTOMY, PARTIAL Right 5/24/2017    Procedure:  (NUCLEAR MED  8 AM , NEEDLE LOCAL TO FOLLOW 8:30 AM ) SEGMENTAL MASTECTOMY BREAST NEEDLE PLACEMENT,  SENTINAL LYMPH NODE BIOPSY;  Surgeon: Anton Brown MD;  Location: [facility];  Service: General    REPAIR RECTOCELE      THYROID LOBECTOMY Left 1978    TONSILLECTOMY      US BREAST NEEDLE LOC RIGHT Right 5/24/2017    US GUIDED BREAST BIOPSY RIGHT COMPLETE Right 4/20/2017     Family History   Problem Relation Age of Onset    Heart disease Mother         CHF    Heart failure Mother     Cirrhosis Father     Cancer Maternal Aunt 72        unknown type    Cancer Cousin         bladder     History   Smoking Status    Never Smoker   Smokeless Tobacco    Never Used     Comment: Former quit 30 years ago     History   Alcohol Use    Yes     Comment: socially      History   Drug Use No       Current Outpatient Prescriptions   Medication Sig Dispense Refill    DULoxetine (CYMBALTA) 60 mg delayed release capsule Take 1 capsule (60 mg total) by mouth every morning 90 capsule 3    levothyroxine 100 mcg tablet Take 100 mcg by mouth every morning        metoprolol tartrate (LOPRESSOR) 25 mg tablet Take 25 mg by mouth every morning        tamoxifen (NOLVADEX) 20 mg tablet Take 1 tablet (20 mg total) by mouth daily 90 tablet 0    traMADol (ULTRAM) 50 mg tablet take 1 tablet by mouth at bedtime (Patient taking differently: take 1 tablet by mouth at bedtime PRN) 30 tablet 0    zolpidem (AMBIEN) 5 mg tablet Take 1 tablet by mouth as needed       No current facility-administered medications for this visit  No Known Allergies  Immunization History   Administered Date(s) Administered    Influenza Quadrivalent, 6-35 Months IM 2017       Patient Care Team:  Bryce Johnson MD as PCP - Vaishnavi Maldonado MD (Neurology)  Antonio Soria MD (General Surgery)  Corrine Sharif MD (Radiation Oncology)  Shayla Powers MD PhD (Hematology and Oncology)  Chucky Cadena DO (Physical Medicine and Rehabilitation)    Medicare Screening Tests and Risk Assessments:  Rachael Baker is here for her Subsequent Wellness visit  Health Risk Assessment:  Patient rates overall health as poor  Patient feels that their physical health rating is Slightly worse  Hearing was rated as Slightly worse  Patient feels that their emotional and mental health rating is Slightly worse  Pain experienced by patient in the last 7 days has been A lot  Patient's pain rating has been 7/10  Patient states that she has experienced no weight loss or gain in last 6 months  Emotional/Mental Health:  Patient has been feeling nervous/anxious  PHQ-9 Depression Screening:    Frequency of the following problems over the past two weeks:      1  Little interest or pleasure in doing things: 3 - nearly every day      2  Feeling down, depressed, or hopeless: 3 - nearly every day      3  Trouble falling or staying asleep, or sleeping too much: 3 - nearly every day      4  Feeling tired or having little energy: 3 - nearly every day      5  Poor appetite or overeatin - more than half the days      6  Feeling bad about yourself - or that you are a failure or have let yourself or your family down: 3 - nearly every day      7  Trouble concentrating on things, such as reading the newspaper or watching television: 1 - several days      8   Moving or speaking so slowly that other people could have noticed  Or the opposite - being so fidgety or restless that you have been moving around a lot more than usual: 0 - not at all      9  Thoughts that you would be better off dead, or of hurting yourself in some way: 0 - not at all  PHQ-2 Score: 6  PHQ-9 Score: 18    Broken Bones/Falls: Fall Risk Assessment:    In the past year, patient has experienced: No history of falling in past year          Bladder/Bowel:  Patient has not leaked urine accidently in the last six months  Patient reports no loss of bowel control  Immunizations:  Patient has not had a flu vaccination within the last year  Patient has not received a pneumonia shot  Patient has not received a shingles shot  Patient has not received tetanus/diphtheria shot  Home Safety:  Patient has trouble with stairs inside or outside of their home  Patient currently reports that there are no safety hazards present in home, working smoke alarms, no working carbon monoxide detectors  Preventative Screenings:   Breast cancer screening performed, colon cancer screen completed, cholesterol screen completed, glaucoma eye exam completed,     Nutrition:  Current diet: Regular with servings of the following:    Medications:  Patient is not currently taking any over-the-counter supplements  Patient is able to manage medications  Lifestyle Choices:  Patient reports no tobacco use  Patient has smoked or used tobacco in the past   Patient has stopped her tobacco use  Patient reports alcohol use  Patient drives a vehicle  Patient wears seat belt  Activities of Daily Living:  Can get out of bed by his or her self, able to dress self, able to make own meals, able to do own shopping, able to bathe self, can do own laundry/housekeeping, can manage own money, pay bills and track expenses    Previous Hospitalizations:  No hospitalization or ED visit in past 12 months        Advanced Directives:  Patient has decided on a power of     Patient has spoken to designated power of   Patient has not completed advanced directive  Preventative Screening/Counseling:      Cardiovascular:     Due for Labs/Analytes/Optional EKG: Lipid Panel          Diabetes:      Due for labs: Blood Glucose          Breast Cancer:      General: Screening Current          Cervical Cancer:      General: Screening Not Indicated          Osteoporosis:      Counseling: Calcium and Vitamin D Intake          AAA:      General: Screening Not Indicated          Glaucoma:      Referrals: Ophthalmology          HIV:      General: Screening Not Indicated          Hepatitis C:      General: Risks and Benefits Discussed        Advanced Directives:   Patient has no living will for healthcare, does not have durable POA for healthcare, patient does not have an advanced directive  5 wishes given

## 2018-11-27 NOTE — PROGRESS NOTES
Assessment/Plan:       Diagnoses and all orders for this visit:    Idiopathic hypotension  -     CBC and differential; Future  -     Lipid Panel with Direct LDL reflex; Future  -     Comprehensive metabolic panel; Future  -     TSH, 3rd generation with Free T4 reflex; Future  -     Urinalysis with reflex to microscopic  -     Protein electrophoresis, serum; Future  -     IgG, IgA, IgM; Future  -     Vitamin B12; Future  -     Folate; Future  -     Lyme Antibody Profile with reflex to WB; Future  -     Vitamin D 25 hydroxy; Future    Fibromyalgia  -     CBC and differential; Future  -     Lipid Panel with Direct LDL reflex; Future  -     Comprehensive metabolic panel; Future  -     TSH, 3rd generation with Free T4 reflex; Future  -     Urinalysis with reflex to microscopic  -     Protein electrophoresis, serum; Future  -     IgG, IgA, IgM; Future  -     Vitamin B12; Future  -     Folate; Future  -     Lyme Antibody Profile with reflex to WB; Future  -     Vitamin D 25 hydroxy; Future    History of right breast cancer    Current severe episode of major depressive disorder without psychotic features, unspecified whether recurrent (HCC)    Monoclonal gammopathy of undetermined significance  -     CBC and differential; Future  -     Lipid Panel with Direct LDL reflex; Future  -     Comprehensive metabolic panel; Future  -     TSH, 3rd generation with Free T4 reflex; Future  -     Urinalysis with reflex to microscopic  -     Protein electrophoresis, serum; Future  -     IgG, IgA, IgM; Future  -     Vitamin B12; Future  -     Folate; Future  -     Lyme Antibody Profile with reflex to WB; Future  -     Vitamin D 25 hydroxy;  Future    Vitamin B deficiency  -     Vitamin B12; Future    Encounter for immunization  -     Cancel: influenza vaccine, [id number], high-dose, PF 0 5 mL, for patients 65 yr+ (FLUZONE HIGH-DOSE)  -     influenza vaccine, [id number], quadrivalent, recombinant, PF, 0 5 mL, for patients 18 yr+ (FLUBLOK)    Vitamin D deficiency  -     Vitamin D 25 hydroxy; Future    Thyroid nodule  -     US thyroid; Future          Patient Instructions   Chronic diagnoses are noted  Screening laboratory assessments  Follow with primary care yearly  Subjective:      Patient ID: Emy Christian is a 60 y o  female  A 23-year-old female with a near lifelong chronic illness of unknown cause with multiple symptoms was major symptom is chronic musculoskeletal pain  Associated features include neuropathy, radiculopathy, low blood pressure, inappropriate tachycardia,    he has seen numerous physicians without a firm diagnosis  She has been given numerous treatment programs which have failed completely these include nonsteroidal anti-inflammatory drugs, and also neuroleptics such as Cymbalta, and Lyrica  They do not work  MGUS: several years ago, as part of a lab screen, she was found to have MGUS with no evidence of myeloma  April of 2017 she went for screening mammogram  Right breast abnormality was noted which led to a diagnosis of breast cancer  She has had treatment with lumpectomy and radiation therapy  Through all of this, her symptoms of chronic pain fatigue weakness and all the ancillary symptoms have persisted unchanged  Metoprolol 25 twice a day for sinus tachycardia of unknown cause        The following portions of the patient's history were reviewed and updated as appropriate:   She has a past medical history of Anxiety; Breast cancer ([facility] [address] ); Cancer ([facility] [address] ); Cervical disc disorder of cervicothoracic region; Cervical radiculopathy; Cervicalgia; Cognitive impairment, mild, so stated; Depression; Diplopia; Disc degeneration, lumbar; Disease of thyroid gland; Edema of foot; Esophageal reflux; Fatigue; Fibromyalgia; Hemorrhage, anal or rectal; History of radiation therapy; radiation therapy; Hypotension; Hypothyroidism;  Insomnia; Memory loss; Monoclonal gammopathy of undetermined significance; Myalgia; Myositis; Neck stiffness; Neuralgia; Neuritis; Pain syndrome, chronic; Palpitations; Paresthesia; Peripheral neuropathy; Polyneuropathy; Pseudodementia; Radiculopathy; Restless leg syndrome; Sacroiliitis ([facility] [address] SLE (systemic lupus erythematosus) ([facility] [address] ); Tachycardia; Viral warts; Vitamin B deficiency; Vitamin D deficiency; Vitamin D deficiency; and Wears glasses  ,   does not have any pertinent problems on file  ,   has a past surgical history that includes Thyroid lobectomy (Left, 1978); Hysterectomy (2009); Tonsillectomy; Breast surgery (Right); pr mastectomy, partial (Right, 5/24/2017); Bladder surgery; Colonoscopy; US guided breast biopsy right complete (Right, 4/20/2017); US breast needle loc right (Right, 5/24/2017); and Repair rectocele ,  family history includes Cancer in her cousin; Cancer (age of onset: 72) in her maternal aunt; Cirrhosis in her father; Heart disease in her mother; Heart failure in her mother  ,   reports that she has never smoked  She has never used smokeless tobacco  She reports that she drinks alcohol  She reports that she does not use drugs  ,  has No Known Allergies     Current Outpatient Prescriptions   Medication Sig Dispense Refill    DULoxetine (CYMBALTA) 60 mg delayed release capsule Take 1 capsule (60 mg total) by mouth every morning 90 capsule 3    levothyroxine 100 mcg tablet Take 100 mcg by mouth every morning        metoprolol tartrate (LOPRESSOR) 25 mg tablet Take 25 mg by mouth every morning        tamoxifen (NOLVADEX) 20 mg tablet Take 1 tablet (20 mg total) by mouth daily 90 tablet 0    traMADol (ULTRAM) 50 mg tablet take 1 tablet by mouth at bedtime (Patient taking differently: take 1 tablet by mouth at bedtime PRN) 30 tablet 0    zolpidem (AMBIEN) 5 mg tablet Take 1 tablet by mouth as needed       No current facility-administered medications for this visit  Review of Systems   Constitutional: Positive for chills, diaphoresis and fatigue     Respiratory: Negative for shortness of breath  Cardiovascular: Negative for chest pain and leg swelling  Gastrointestinal: Negative for abdominal pain  Endocrine: Positive for cold intolerance and heat intolerance  Genitourinary: Negative for difficulty urinating  Musculoskeletal: Positive for arthralgias, back pain, myalgias, neck pain and neck stiffness  Neurological: Positive for headaches  Psychiatric/Behavioral: Positive for dysphoric mood  Objective:  Vitals:    11/27/18 1407   BP: 100/74   Pulse: 91   SpO2: 97%      Physical Exam   Constitutional:   Thin patient who appears stated age  She looks depressed  HENT:   Head: Normocephalic  Cardiovascular: Normal rate  Pulmonary/Chest: Effort normal and breath sounds normal  No respiratory distress  She has no wheezes  She has no rales  Musculoskeletal: Normal range of motion  She exhibits no deformity  Neurological: She is alert  Psychiatric: Judgment and thought content normal  Her affect is blunt  Cognition and memory are normal  She exhibits a depressed mood

## 2018-11-28 ENCOUNTER — OFFICE VISIT (OUTPATIENT)
Dept: OBGYN CLINIC | Facility: CLINIC | Age: 58
End: 2018-11-28
Payer: MEDICARE

## 2018-11-28 VITALS
HEIGHT: 69 IN | SYSTOLIC BLOOD PRESSURE: 102 MMHG | WEIGHT: 148.6 LBS | DIASTOLIC BLOOD PRESSURE: 72 MMHG | HEART RATE: 111 BPM | BODY MASS INDEX: 22.01 KG/M2

## 2018-11-28 DIAGNOSIS — M25.562 LEFT KNEE PAIN, UNSPECIFIED CHRONICITY: ICD-10-CM

## 2018-11-28 DIAGNOSIS — M70.52 POPLITEAL BURSITIS OF LEFT KNEE: ICD-10-CM

## 2018-11-28 DIAGNOSIS — M65.9 SYNOVITIS OF LEFT KNEE: Primary | ICD-10-CM

## 2018-11-28 PROCEDURE — 99213 OFFICE O/P EST LOW 20 MIN: CPT | Performed by: ORTHOPAEDIC SURGERY

## 2018-11-28 NOTE — PROGRESS NOTES
_____________________________________________________  CHIEF COMPLAINT:  Chief Complaint   Patient presents with    Left Knee - Follow-up         SUBJECTIVE:  Milla Michaels is a 60y o  year old female who presents follow-up left knee pain  Patient states the cortisone injection worked well for her but she still notes occasional pain when going down into the squatting position  When she goes to get she also notes some pain over the top of her patella  She states that she moves her leg at home in a flexion-extension motion and has minimal discomfort but has not had any form formal physical therapy at this point  She denies any numbness or tingling  She denies any constitutional signs or symptoms  PAST MEDICAL HISTORY:  Past Medical History:   Diagnosis Date    Anxiety     Breast cancer ([facility] [address]     Cancer ([facility] [address]     right breast  dx 04/2017    Cervical disc disorder of cervicothoracic region     Cervical radiculopathy     Cervicalgia     Cognitive impairment, mild, so stated     Depression     Major,recurrent    Diplopia     last assessed 12/23/13    Disc degeneration, lumbar     Disease of thyroid gland     hx of goiter, surgery done 1978    Edema of foot     Esophageal reflux     Fatigue     Fibromyalgia     Hemorrhage, anal or rectal     History of radiation therapy     Hx of radiation therapy     Treatments: Course C1, Plan ID Energy Fractions Dose per Fraction (cGy)total Dose delivered(cGy)elapsed days   R Breast 6x 28/28 180 5,040 39, R breast e 12E 5/5 200 1,000 7 Treatment dates: 7/20/17-09/05/17    Hypotension     Hypothyroidism     Insomnia     Memory loss     Monoclonal gammopathy of undetermined significance     Myalgia     last assessed 07/08/16    Myositis     last assessed 07/08/16    Neck stiffness     Neuralgia     Neuritis     Pain syndrome, chronic     Palpitations     Paresthesia     Peripheral neuropathy     Polyneuropathy     Pseudodementia     Radiculopathy     12/23/13    Restless leg syndrome     Sacroiliitis (HCC)     SLE (systemic lupus erythematosus) ([facility] [address]     last assessed 12/23/13    Tachycardia     Viral warts     Vitamin B deficiency     Vitamin D deficiency     Vitamin D deficiency     Wears glasses        PAST SURGICAL HISTORY:  Past Surgical History:   Procedure Laterality Date    BLADDER SURGERY      bladder prolapse    BREAST SURGERY Right     breast bx    COLONOSCOPY      age 45 normal    HYSTERECTOMY  2009    vaginal hysterectomy, rectocele repair    [place] MASTECTOMY, PARTIAL Right 5/24/2017    Procedure:  (NUCLEAR MED  8 AM , NEEDLE LOCAL TO FOLLOW 8:30 AM ) SEGMENTAL MASTECTOMY BREAST NEEDLE PLACEMENT,  SENTINAL LYMPH NODE BIOPSY;  Surgeon: Emerita Hopper MD;  Location: [facility] OR;  Service: General    REPAIR RECTOCELE      THYROID LOBECTOMY Left 1978    TONSILLECTOMY      US BREAST NEEDLE LOC RIGHT Right 5/24/2017    US GUIDED BREAST BIOPSY RIGHT COMPLETE Right 4/20/2017       FAMILY HISTORY:  Family History   Problem Relation Age of Onset    Heart disease Mother         CHF    Heart failure Mother     Cirrhosis Father     Cancer Maternal Aunt 72        unknown type    Cancer Cousin         bladder       SOCIAL HISTORY:  Social History   Substance Use Topics    Smoking status: Never Smoker    Smokeless tobacco: Never Used      Comment: Former quit 30 years ago    Alcohol use Yes      Comment: socially       MEDICATIONS:    Current Outpatient Prescriptions:     DULoxetine (CYMBALTA) 60 mg delayed release capsule, Take 1 capsule (60 mg total) by mouth every morning, Disp: 90 capsule, Rfl: 3    levothyroxine 100 mcg tablet, Take 100 mcg by mouth every morning  , Disp: , Rfl:     metoprolol tartrate (LOPRESSOR) 25 mg tablet, Take 25 mg by mouth every morning  , Disp: , Rfl:     tamoxifen (NOLVADEX) 20 mg tablet, Take 1 tablet (20 mg total) by mouth daily, Disp: 90 tablet, Rfl: 0    traMADol (ULTRAM) 50 mg tablet, take 1 tablet by mouth at bedtime (Patient taking differently: take 1 tablet by mouth at bedtime PRN), Disp: 30 tablet, Rfl: 0    zolpidem (AMBIEN) 5 mg tablet, Take 1 tablet by mouth as needed, Disp: , Rfl:     ALLERGIES:  No Known Allergies    REVIEW OF SYSTEMS:  General: no fever, no chills  HEENT:  No loss of hearing or eyesight problems  Eyes:  No red eyes  Respiratory:  No coughing, shortness of breath or wheezing  Cardiovascular:  No chest pain, no palpitations  GI:  Abdomen soft nontender, denies nausea  Endocrine:  No muscle weakness, no frequent urination, no excessive thirst  Urinary:  No dysuria, no incontinence  Musculoskeletal: see HPI and PE  SKIN:  No skin rash, no dry skin  Neurological:  No headaches, no confusion  Psychiatric:  No suicide thoughts, no anxiety, no depression  Review of all other systems is negative    LABS:  HgA1c:   Lab Results   Component Value Date    HGBA1C 5 2 07/08/2016     BMP:   Lab Results   Component Value Date    GLUCOSE 82 02/12/2014    CALCIUM 8 3 11/08/2017     02/12/2014    K 4 0 11/08/2017    CO2 26 11/08/2017     (H) 11/08/2017    BUN 13 07/13/2018    CREATININE 0 82 07/13/2018       _____________________________________________________  PHYSICAL EXAMINATION:  General: well developed and well nourished, alert, oriented times 3 and appears comfortable  Psychiatric: Normal  HEENT: Trachea Midline, No torticollis  Cardiovascular: No discernable arrhythmia  Pulmonary: No wheezing or stridor  Skin: No masses, erthema, lacerations, fluctation, ulcerations  Neurovascular:   L1-S1 motor and sensory intact, pulses were compared bilaterally and were equal, capillary refill less than 3 sec  MUSCULOSKELETAL EXAMINATION:  Left knee:  No erythema edema or ecchymosis noted  Skin is warm to touch  Tender to palpation over the patella  Range of motion 0-110 degrees  Strength is 5/5 for flexion extension   Negative Lachman test, negative anterior drawer, negative posterior drawer, year negative Caitlin's test, knee is stable to valgus and varus stress  Patient is neurovascularly intact     _____________________________________________________  STUDIES REVIEWED:  No additional studies were obtained at today's visit      ASSESSMENT/PLAN:    Diagnoses and all orders for this visit:    Synovitis of left knee  -     Ambulatory referral to Physical Therapy; Future    Left knee pain, unspecified chronicity    Popliteal bursitis of left knee  -     Ambulatory referral to Orthopedic Surgery        Assessment:   Synovitis left knee    Plan:   Patient states the cortisone injection seem to help her with a lot of her knee pain  She was advised to take some anti-inflammatories to help with pain when she was down to the squatting position  We will get her started on a course of formal physical therapy to work on some range of motion, stretching, strengthening and modalities as needed for pain  We will see her back in 6 weeks to re-evaluate her      Follow Up:  Six weeks    PROCEDURES PERFORMED:  None

## 2018-12-12 DIAGNOSIS — F51.05 INSOMNIA DUE TO OTHER MENTAL DISORDER: Primary | ICD-10-CM

## 2018-12-12 DIAGNOSIS — F99 INSOMNIA DUE TO OTHER MENTAL DISORDER: Primary | ICD-10-CM

## 2018-12-12 RX ORDER — ZOLPIDEM TARTRATE 5 MG/1
5 TABLET ORAL AS NEEDED
Qty: 90 TABLET | Refills: 0 | Status: SHIPPED | OUTPATIENT
Start: 2018-12-12 | End: 2019-06-07 | Stop reason: SDUPTHER

## 2019-01-07 DIAGNOSIS — E03.9 ACQUIRED HYPOTHYROIDISM: Primary | ICD-10-CM

## 2019-01-07 RX ORDER — LEVOTHYROXINE SODIUM 0.1 MG/1
TABLET ORAL
Qty: 90 TABLET | Refills: 3 | Status: SHIPPED | OUTPATIENT
Start: 2019-01-07 | End: 2019-04-02 | Stop reason: CLARIF

## 2019-01-09 ENCOUNTER — APPOINTMENT (OUTPATIENT)
Dept: RADIOLOGY | Facility: CLINIC | Age: 59
End: 2019-01-09
Payer: MEDICARE

## 2019-01-09 ENCOUNTER — OFFICE VISIT (OUTPATIENT)
Dept: OBGYN CLINIC | Facility: CLINIC | Age: 59
End: 2019-01-09
Payer: MEDICARE

## 2019-01-09 VITALS
HEIGHT: 69 IN | SYSTOLIC BLOOD PRESSURE: 100 MMHG | BODY MASS INDEX: 21.06 KG/M2 | WEIGHT: 142.2 LBS | DIASTOLIC BLOOD PRESSURE: 69 MMHG | HEART RATE: 99 BPM

## 2019-01-09 DIAGNOSIS — M65.9 SYNOVITIS OF LEFT KNEE: ICD-10-CM

## 2019-01-09 DIAGNOSIS — M70.52 POPLITEAL BURSITIS OF LEFT KNEE: ICD-10-CM

## 2019-01-09 DIAGNOSIS — M25.562 LEFT KNEE PAIN, UNSPECIFIED CHRONICITY: ICD-10-CM

## 2019-01-09 DIAGNOSIS — M25.561 RIGHT KNEE PAIN, UNSPECIFIED CHRONICITY: Primary | ICD-10-CM

## 2019-01-09 PROCEDURE — 73562 X-RAY EXAM OF KNEE 3: CPT

## 2019-01-09 PROCEDURE — 99213 OFFICE O/P EST LOW 20 MIN: CPT | Performed by: ORTHOPAEDIC SURGERY

## 2019-01-09 PROCEDURE — 20610 DRAIN/INJ JOINT/BURSA W/O US: CPT | Performed by: PHYSICIAN ASSISTANT

## 2019-01-09 RX ORDER — LIDOCAINE HYDROCHLORIDE 10 MG/ML
2 INJECTION, SOLUTION EPIDURAL; INFILTRATION; INTRACAUDAL; PERINEURAL
Status: COMPLETED | OUTPATIENT
Start: 2019-01-09 | End: 2019-01-09

## 2019-01-09 RX ORDER — METHYLPREDNISOLONE ACETATE 40 MG/ML
1 INJECTION, SUSPENSION INTRA-ARTICULAR; INTRALESIONAL; INTRAMUSCULAR; SOFT TISSUE
Status: COMPLETED | OUTPATIENT
Start: 2019-01-09 | End: 2019-01-09

## 2019-01-09 RX ORDER — BUPIVACAINE HYDROCHLORIDE 7.5 MG/ML
2 INJECTION, SOLUTION EPIDURAL; RETROBULBAR
Status: COMPLETED | OUTPATIENT
Start: 2019-01-09 | End: 2019-01-09

## 2019-01-09 RX ORDER — LEVOTHYROXINE SODIUM 0.1 MG/1
TABLET ORAL
COMMUNITY
Start: 2018-10-10 | End: 2020-01-06

## 2019-01-09 RX ADMIN — LIDOCAINE HYDROCHLORIDE 2 ML: 10 INJECTION, SOLUTION EPIDURAL; INFILTRATION; INTRACAUDAL; PERINEURAL at 15:57

## 2019-01-09 RX ADMIN — METHYLPREDNISOLONE ACETATE 1 ML: 40 INJECTION, SUSPENSION INTRA-ARTICULAR; INTRALESIONAL; INTRAMUSCULAR; SOFT TISSUE at 15:57

## 2019-01-09 RX ADMIN — BUPIVACAINE HYDROCHLORIDE 2 ML: 7.5 INJECTION, SOLUTION EPIDURAL; RETROBULBAR at 15:57

## 2019-01-09 NOTE — PROGRESS NOTES
_____________________________________________________  CHIEF COMPLAINT:  Chief Complaint   Patient presents with    Left Knee - Follow-up         SUBJECTIVE:  Andie Carrasco is a 60y o  year old female who presents follow-up on left knee pain  Patient states that after doing some physical therapy and a cortisone injection her left knee is doing much better  She does note some tenderness however over her right knee  She denies any injuries or trauma to the area  She has not had any previous treatment for this  She denies any radiating symptoms  She denies any numbness or tingling  She denies any constitutional signs or symptoms  PAST MEDICAL HISTORY:  Past Medical History:   Diagnosis Date    Anxiety     [facility] [address] [facility]     [facility]     right breast  dx 04/2017    Cervical disc disorder of cervicothoracic region     Cervical radiculopathy     Cervicalgia     Cognitive impairment, mild, so stated     Depression     Major,recurrent    Diplopia     last assessed 12/23/13    Disc degeneration, lumbar     Disease of thyroid gland     hx of goiter, surgery done 1978    Edema of foot     Esophageal reflux     Fatigue     Fibromyalgia     Hemorrhage, anal or rectal     History of radiation therapy     Hx of radiation therapy     Treatments: Course C1, Plan ID Energy Fractions Dose per Fraction (cGy)total Dose delivered(cGy)elapsed days   R Breast 6x 28/28 180 5,040 39, R breast e 12E 5/5 200 1,000 7 Treatment dates: 7/20/17-09/05/17    Hypotension     Hypothyroidism     Insomnia     Memory loss     Monoclonal gammopathy of undetermined significance     Myalgia     last assessed 07/08/16    Myositis     last assessed 07/08/16    Neck stiffness     Neuralgia     Neuritis     Pain syndrome, chronic     Palpitations     Paresthesia     Peripheral neuropathy     Polyneuropathy     Pseudodementia     Radiculopathy     12/23/13    Restless leg syndrome     Sacroiliitis (HCC)     SLE [facility]systemic lupus erythematosus) ([facility] [address] )     last assessed 12/23/13    Tachycardia     Viral warts     Vitamin B deficiency     Vitamin D deficiency     Vitamin D deficiency     Wears glasses        PAST SURGICAL HISTORY:  Past Surgical History:   Procedure Laterality Date    BLADDER SURGERY      bladder prolapse    BREAST SURGERY Right     breast bx    COLONOSCOPY      age 45 normal    HYSTERECTOMY  2009    vaginal hysterectomy, rectocele repair    RI MASTECTOMY, PARTIAL Right 5/24/2017    Procedure:  (NUCLEAR MED  8 AM , NEEDLE LOCAL TO FOLLOW 8:30 AM ) SEGMENTAL MASTECTOMY BREAST NEEDLE PLACEMENT,  SENTINAL LYMPH NODE BIOPSY;  Surgeon: Varghese Mcdonnell MD;  Location: [facility];  Service: General    REPAIR RECTOCELE      THYROID LOBECTOMY Left 1978    TONSILLECTOMY      US BREAST NEEDLE LOC RIGHT Right 5/24/2017    US GUIDED BREAST BIOPSY RIGHT COMPLETE Right 4/20/2017       FAMILY HISTORY:  Family History   Problem Relation Age of Onset    Heart disease Mother         CHF    Heart failure Mother     Cirrhosis Father     Cancer Maternal Aunt 72        unknown type    Cancer Cousin         bladder       SOCIAL HISTORY:  Social History   Substance Use Topics    Smoking status: Never Smoker    Smokeless tobacco: Never Used      Comment: Former quit 30 years ago    Alcohol use Yes      Comment: socially       MEDICATIONS:    Current Outpatient Prescriptions:     Carboxymethylcellulose Sodium (THERATEARS) 0 25 % SOLN, Apply one drop both eyes as needed for dry eye symptoms, Disp: , Rfl:     DULoxetine (CYMBALTA) 60 mg delayed release capsule, Take 1 capsule (60 mg total) by mouth every morning, Disp: 90 capsule, Rfl: 3    levothyroxine (SYNTHROID) 100 mcg tablet, , Disp: , Rfl:     levothyroxine 100 mcg tablet, TAKE 1 TABLET DAILY, Disp: 90 tablet, Rfl: 3    metoprolol tartrate (LOPRESSOR) 25 mg tablet, Take 25 mg by mouth every morning  , Disp: , Rfl:   tamoxifen (NOLVADEX) 20 mg tablet, Take 1 tablet (20 mg total) by mouth daily, Disp: 90 tablet, Rfl: 0    traMADol (ULTRAM) 50 mg tablet, take 1 tablet by mouth at bedtime (Patient taking differently: take 1 tablet by mouth at bedtime PRN), Disp: 30 tablet, Rfl: 0    zolpidem (AMBIEN) 5 mg tablet, Take 1 tablet (5 mg total) by mouth as needed for sleep, Disp: 90 tablet, Rfl: 0    ALLERGIES:  No Known Allergies    REVIEW OF SYSTEMS:  General: no fever, no chills  HEENT:  No loss of hearing or eyesight problems  Eyes:  No red eyes  Respiratory:  No coughing, shortness of breath or wheezing  Cardiovascular:  No chest pain, no palpitations  GI:  Abdomen soft nontender, denies nausea  Endocrine:  No muscle weakness, no frequent urination, no excessive thirst  Urinary:  No dysuria, no incontinence  Musculoskeletal: see HPI and PE  SKIN:  No skin rash, no dry skin  Neurological:  No headaches, no confusion  Psychiatric:  No suicide thoughts, no anxiety, no depression  Review of all other systems is negative    LABS:  HgA1c:   Lab Results   Component Value Date    HGBA1C 5 2 07/08/2016     BMP:   Lab Results   Component Value Date    GLUCOSE 82 02/12/2014    CALCIUM 8 3 11/08/2017     02/12/2014    K 4 0 11/08/2017    CO2 26 11/08/2017     (H) 11/08/2017    BUN 13 07/13/2018    CREATININE 0 82 07/13/2018       _____________________________________________________  PHYSICAL EXAMINATION:  General: well developed and well nourished, alert, oriented times 3 and appears comfortable  Psychiatric: Normal  HEENT: Trachea Midline, No torticollis  Cardiovascular: No discernable arrhythmia  Pulmonary: No wheezing or stridor  Skin: No masses, erthema, lacerations, fluctation, ulcerations  Neurovascular:   L1-S1 motor and sensory intact, pulses were compared bilaterally and were equal, capillary refill less than 3 sec      MUSCULOSKELETAL EXAMINATION:  Left knee examination shows skin intact with no evidence of erythema or ecchymosis noted  Range of motion is 0-120 degrees No tenderness noted  Knee is ligamentous stable to varus and valgus stress  Negative Lachman  Negative anterior and posterior drawer  Sensation intact to light touch L1-S1 distributions  5/5 strength with knee flexion extension, ankle flexion extension and flexion extension of all toes    Right knee examination shows skin intact with no evidence of erythema or ecchymosis noted  Range of motion is 0-120 degrees tenderness is noted over the medial aspect of the knee  Knee is ligamentous stable to varus and valgus stress  Negative Lachman  Negative anterior and posterior drawer  Sensation intact to light touch L1-S1 distributions  5/5 strength with knee flexion extension, ankle flexion extension and flexion extension of all toes    _____________________________________________________  STUDIES REVIEWED:  I personally reviewed the x-rays from 01/09/2019  X-rays demonstrate no acute fractures  There is minimal medial joint space narrowing  ASSESSMENT/PLAN:    Diagnoses and all orders for this visit:    Synovitis of left knee    Left knee pain, unspecified chronicity  -     XR knee 3 vw right non injury; Future    Popliteal bursitis of left knee    Other orders  -     levothyroxine (SYNTHROID) 100 mcg tablet;   -     Carboxymethylcellulose Sodium (THERATEARS) 0 25 % SOLN; Apply one drop both eyes as needed for dry eye symptoms        Assessment:   Right knee pain    Plan:   Patient was given a cortisone injection today  She tolerated it well  She was instructed to continue her home exercises not only for her left knee but also her right knee  We will see her back in 3 months to re-evaluate both of her knees  Follow Up:   Three months    PROCEDURES PERFORMED:  Large joint arthrocentesis  Date/Time: 1/9/2019 3:57 PM  Consent given by: patient  Site marked: site marked  Timeout: Immediately prior to procedure a time out was called to verify the correct patient, procedure, equipment, support staff and site/side marked as required   Supporting Documentation  Indications: pain   Procedure Details  Location: knee - R knee  Preparation: Patient was prepped and draped in the usual sterile fashion  Needle size: 22 G  Approach: anterolateral  Medications administered: 2 mL bupivacaine (PF) 0 75 %; 2 mL lidocaine (PF) 1 %; 1 mL methylPREDNISolone acetate 40 mg/mL    Patient tolerance: patient tolerated the procedure well with no immediate complications  Dressing:  Sterile dressing applied

## 2019-01-20 DIAGNOSIS — I10 ESSENTIAL HYPERTENSION: Primary | ICD-10-CM

## 2019-01-21 RX ORDER — METOPROLOL SUCCINATE 25 MG/1
TABLET, EXTENDED RELEASE ORAL
Qty: 90 TABLET | Refills: 3 | Status: SHIPPED | OUTPATIENT
Start: 2019-01-21 | End: 2019-04-15 | Stop reason: ALTCHOICE

## 2019-04-02 ENCOUNTER — APPOINTMENT (OUTPATIENT)
Dept: LAB | Facility: CLINIC | Age: 59
End: 2019-04-02
Payer: MEDICARE

## 2019-04-02 ENCOUNTER — OFFICE VISIT (OUTPATIENT)
Dept: INTERNAL MEDICINE CLINIC | Facility: CLINIC | Age: 59
End: 2019-04-02
Payer: MEDICARE

## 2019-04-02 VITALS
OXYGEN SATURATION: 99 % | HEART RATE: 96 BPM | WEIGHT: 139.6 LBS | BODY MASS INDEX: 20.92 KG/M2 | DIASTOLIC BLOOD PRESSURE: 60 MMHG | SYSTOLIC BLOOD PRESSURE: 94 MMHG

## 2019-04-02 DIAGNOSIS — E03.9 ACQUIRED HYPOTHYROIDISM: ICD-10-CM

## 2019-04-02 DIAGNOSIS — I95.0 IDIOPATHIC HYPOTENSION: ICD-10-CM

## 2019-04-02 DIAGNOSIS — G31.84 COGNITIVE IMPAIRMENT, MILD, SO STATED: ICD-10-CM

## 2019-04-02 DIAGNOSIS — E07.9 DISEASE OF THYROID GLAND: ICD-10-CM

## 2019-04-02 DIAGNOSIS — R00.0 TACHYCARDIA: Primary | ICD-10-CM

## 2019-04-02 DIAGNOSIS — G45.0 TIA INVOLVING BASILAR ARTERY: ICD-10-CM

## 2019-04-02 DIAGNOSIS — M79.7 FIBROMYALGIA: ICD-10-CM

## 2019-04-02 DIAGNOSIS — E55.9 VITAMIN D DEFICIENCY: ICD-10-CM

## 2019-04-02 DIAGNOSIS — E53.9 VITAMIN B DEFICIENCY: ICD-10-CM

## 2019-04-02 DIAGNOSIS — D47.2 MONOCLONAL GAMMOPATHY OF UNDETERMINED SIGNIFICANCE: ICD-10-CM

## 2019-04-02 PROBLEM — F32.2 CURRENT SEVERE EPISODE OF MAJOR DEPRESSIVE DISORDER WITHOUT PSYCHOTIC FEATURES (HCC): Status: ACTIVE | Noted: 2019-04-02

## 2019-04-02 PROBLEM — K62.5 HEMORRHAGE, ANAL OR RECTAL: Status: RESOLVED | Noted: 2019-04-02 | Resolved: 2019-04-02

## 2019-04-02 LAB
25(OH)D3 SERPL-MCNC: 22 NG/ML (ref 30–100)
ALBUMIN SERPL BCP-MCNC: 3.6 G/DL (ref 3.5–5)
ALP SERPL-CCNC: 47 U/L (ref 46–116)
ALT SERPL W P-5'-P-CCNC: 20 U/L (ref 12–78)
ANION GAP SERPL CALCULATED.3IONS-SCNC: 4 MMOL/L (ref 4–13)
AST SERPL W P-5'-P-CCNC: 17 U/L (ref 5–45)
BASOPHILS # BLD AUTO: 0.03 THOUSANDS/ΜL (ref 0–0.1)
BASOPHILS NFR BLD AUTO: 1 % (ref 0–1)
BILIRUB SERPL-MCNC: 0.32 MG/DL (ref 0.2–1)
BILIRUB UR QL STRIP: NEGATIVE
BUN SERPL-MCNC: 12 MG/DL (ref 5–25)
CALCIUM SERPL-MCNC: 8.1 MG/DL (ref 8.3–10.1)
CHLORIDE SERPL-SCNC: 110 MMOL/L (ref 100–108)
CHOLEST SERPL-MCNC: 182 MG/DL (ref 50–200)
CLARITY UR: NORMAL
CO2 SERPL-SCNC: 27 MMOL/L (ref 21–32)
COLOR UR: YELLOW
CORTIS SERPL-MCNC: 12.4 UG/DL
CREAT SERPL-MCNC: 0.8 MG/DL (ref 0.6–1.3)
EOSINOPHIL # BLD AUTO: 0.11 THOUSAND/ΜL (ref 0–0.61)
EOSINOPHIL NFR BLD AUTO: 2 % (ref 0–6)
ERYTHROCYTE [DISTWIDTH] IN BLOOD BY AUTOMATED COUNT: 12.3 % (ref 11.6–15.1)
FOLATE SERPL-MCNC: 14.8 NG/ML (ref 3.1–17.5)
GFR SERPL CREATININE-BSD FRML MDRD: 81 ML/MIN/1.73SQ M
GLUCOSE SERPL-MCNC: 112 MG/DL (ref 65–140)
GLUCOSE UR STRIP-MCNC: NEGATIVE MG/DL
HCT VFR BLD AUTO: 40.6 % (ref 34.8–46.1)
HDLC SERPL-MCNC: 51 MG/DL (ref 40–60)
HGB BLD-MCNC: 13.5 G/DL (ref 11.5–15.4)
HGB UR QL STRIP.AUTO: NEGATIVE
IGA SERPL-MCNC: 901 MG/DL (ref 70–400)
IGG SERPL-MCNC: 1050 MG/DL (ref 700–1600)
IGM SERPL-MCNC: 23 MG/DL (ref 40–230)
IMM GRANULOCYTES # BLD AUTO: 0.01 THOUSAND/UL (ref 0–0.2)
IMM GRANULOCYTES NFR BLD AUTO: 0 % (ref 0–2)
KETONES UR STRIP-MCNC: NEGATIVE MG/DL
LDLC SERPL CALC-MCNC: 90 MG/DL (ref 0–100)
LEUKOCYTE ESTERASE UR QL STRIP: NEGATIVE
LYMPHOCYTES # BLD AUTO: 0.98 THOUSANDS/ΜL (ref 0.6–4.47)
LYMPHOCYTES NFR BLD AUTO: 21 % (ref 14–44)
MCH RBC QN AUTO: 31.9 PG (ref 26.8–34.3)
MCHC RBC AUTO-ENTMCNC: 33.3 G/DL (ref 31.4–37.4)
MCV RBC AUTO: 96 FL (ref 82–98)
MONOCYTES # BLD AUTO: 0.44 THOUSAND/ΜL (ref 0.17–1.22)
MONOCYTES NFR BLD AUTO: 9 % (ref 4–12)
NEUTROPHILS # BLD AUTO: 3.11 THOUSANDS/ΜL (ref 1.85–7.62)
NEUTS SEG NFR BLD AUTO: 67 % (ref 43–75)
NITRITE UR QL STRIP: NEGATIVE
NRBC BLD AUTO-RTO: 0 /100 WBCS
PH UR STRIP.AUTO: 6 [PH]
PLATELET # BLD AUTO: 278 THOUSANDS/UL (ref 149–390)
PMV BLD AUTO: 9.9 FL (ref 8.9–12.7)
POTASSIUM SERPL-SCNC: 4 MMOL/L (ref 3.5–5.3)
PROT SERPL-MCNC: 7.3 G/DL (ref 6.4–8.2)
PROT UR STRIP-MCNC: NEGATIVE MG/DL
RBC # BLD AUTO: 4.23 MILLION/UL (ref 3.81–5.12)
SODIUM SERPL-SCNC: 141 MMOL/L (ref 136–145)
SP GR UR STRIP.AUTO: 1.03 (ref 1–1.03)
TRIGL SERPL-MCNC: 206 MG/DL
TSH SERPL DL<=0.05 MIU/L-ACNC: 0.38 UIU/ML (ref 0.36–3.74)
UROBILINOGEN UR QL STRIP.AUTO: 1 E.U./DL
VIT B12 SERPL-MCNC: 177 PG/ML (ref 100–900)
WBC # BLD AUTO: 4.68 THOUSAND/UL (ref 4.31–10.16)

## 2019-04-02 PROCEDURE — 86334 IMMUNOFIX E-PHORESIS SERUM: CPT | Performed by: PATHOLOGY

## 2019-04-02 PROCEDURE — 82533 TOTAL CORTISOL: CPT

## 2019-04-02 PROCEDURE — 99213 OFFICE O/P EST LOW 20 MIN: CPT | Performed by: INTERNAL MEDICINE

## 2019-04-02 PROCEDURE — 86618 LYME DISEASE ANTIBODY: CPT

## 2019-04-02 PROCEDURE — 80061 LIPID PANEL: CPT

## 2019-04-02 PROCEDURE — 84165 PROTEIN E-PHORESIS SERUM: CPT

## 2019-04-02 PROCEDURE — 36415 COLL VENOUS BLD VENIPUNCTURE: CPT

## 2019-04-02 PROCEDURE — 82607 VITAMIN B-12: CPT

## 2019-04-02 PROCEDURE — 84165 PROTEIN E-PHORESIS SERUM: CPT | Performed by: PATHOLOGY

## 2019-04-02 PROCEDURE — 82306 VITAMIN D 25 HYDROXY: CPT

## 2019-04-02 PROCEDURE — 82746 ASSAY OF FOLIC ACID SERUM: CPT

## 2019-04-02 PROCEDURE — 85025 COMPLETE CBC W/AUTO DIFF WBC: CPT

## 2019-04-02 PROCEDURE — 81003 URINALYSIS AUTO W/O SCOPE: CPT | Performed by: INTERNAL MEDICINE

## 2019-04-02 PROCEDURE — 82784 ASSAY IGA/IGD/IGG/IGM EACH: CPT

## 2019-04-02 PROCEDURE — 86617 LYME DISEASE ANTIBODY: CPT

## 2019-04-02 PROCEDURE — 80053 COMPREHEN METABOLIC PANEL: CPT

## 2019-04-02 PROCEDURE — 86334 IMMUNOFIX E-PHORESIS SERUM: CPT

## 2019-04-02 PROCEDURE — 84443 ASSAY THYROID STIM HORMONE: CPT

## 2019-04-04 LAB
ALBUMIN SERPL ELPH-MCNC: 3.77 G/DL (ref 3.5–5)
ALBUMIN SERPL ELPH-MCNC: 54.6 % (ref 52–65)
ALPHA1 GLOB SERPL ELPH-MCNC: 0.3 G/DL (ref 0.1–0.4)
ALPHA1 GLOB SERPL ELPH-MCNC: 4.3 % (ref 2.5–5)
ALPHA2 GLOB SERPL ELPH-MCNC: 0.61 G/DL (ref 0.4–1.2)
ALPHA2 GLOB SERPL ELPH-MCNC: 8.8 % (ref 7–13)
BETA GLOB ABNORMAL SERPL ELPH-MCNC: 0.43 G/DL (ref 0.4–0.8)
BETA1 GLOB SERPL ELPH-MCNC: 6.3 % (ref 5–13)
BETA2 GLOB SERPL ELPH-MCNC: 6.7 % (ref 2–8)
BETA2+GAMMA GLOB SERPL ELPH-MCNC: 0.46 G/DL (ref 0.2–0.5)
GAMMA GLOB ABNORMAL SERPL ELPH-MCNC: 1.33 G/DL (ref 0.5–1.6)
GAMMA GLOB SERPL ELPH-MCNC: 19.3 % (ref 12–22)
IGG/ALB SER: 1.2 {RATIO} (ref 1.1–1.8)
INTERPRETATION UR IFE-IMP: NORMAL
M PROTEIN 1 MFR SERPL ELPH: 4.9 %
M PROTEIN 1 SERPL ELPH-MCNC: 0.34 G/DL
PROT PATTERN SERPL ELPH-IMP: NORMAL
PROT SERPL-MCNC: 6.9 G/DL (ref 6.4–8.2)

## 2019-04-05 LAB
B BURGDOR IGG SER IA-ACNC: 5.25
B BURGDOR IGM SER IA-ACNC: 4.19

## 2019-04-07 LAB
B BURGDOR IGG PATRN SER IB-IMP: POSITIVE
B BURGDOR IGM PATRN SER IB-IMP: NEGATIVE
B BURGDOR18KD IGG SER QL IB: PRESENT
B BURGDOR23KD IGG SER QL IB: ABNORMAL
B BURGDOR23KD IGM SER QL IB: ABNORMAL
B BURGDOR28KD IGG SER QL IB: ABNORMAL
B BURGDOR30KD IGG SER QL IB: ABNORMAL
B BURGDOR39KD IGG SER QL IB: PRESENT
B BURGDOR39KD IGM SER QL IB: ABNORMAL
B BURGDOR41KD IGG SER QL IB: PRESENT
B BURGDOR41KD IGM SER QL IB: ABNORMAL
B BURGDOR45KD IGG SER QL IB: ABNORMAL
B BURGDOR58KD IGG SER QL IB: PRESENT
B BURGDOR66KD IGG SER QL IB: ABNORMAL
B BURGDOR93KD IGG SER QL IB: PRESENT

## 2019-04-08 ENCOUNTER — TELEPHONE (OUTPATIENT)
Dept: INTERNAL MEDICINE CLINIC | Facility: CLINIC | Age: 59
End: 2019-04-08

## 2019-04-10 ENCOUNTER — HOSPITAL ENCOUNTER (OUTPATIENT)
Dept: ULTRASOUND IMAGING | Facility: HOSPITAL | Age: 59
Discharge: HOME | End: 2019-04-10
Attending: INTERNAL MEDICINE
Payer: MEDICARE

## 2019-04-10 DIAGNOSIS — E04.1 THYROID NODULE: ICD-10-CM

## 2019-04-10 PROCEDURE — 76536 US EXAM OF HEAD AND NECK: CPT

## 2019-04-11 ENCOUNTER — TELEPHONE (OUTPATIENT)
Dept: INTERNAL MEDICINE CLINIC | Facility: CLINIC | Age: 59
End: 2019-04-11

## 2019-04-11 ENCOUNTER — TRANSCRIBE ORDERS (OUTPATIENT)
Dept: ADMINISTRATIVE | Facility: HOSPITAL | Age: 59
End: 2019-04-11

## 2019-04-11 DIAGNOSIS — E04.1 NONTOXIC UNINODULAR GOITER: Primary | ICD-10-CM

## 2019-04-11 DIAGNOSIS — E07.9 DISEASE OF THYROID GLAND: Primary | ICD-10-CM

## 2019-04-15 ENCOUNTER — OFFICE VISIT (OUTPATIENT)
Dept: NEUROLOGY | Facility: CLINIC | Age: 59
End: 2019-04-15
Payer: MEDICARE

## 2019-04-15 VITALS
HEART RATE: 68 BPM | BODY MASS INDEX: 20.44 KG/M2 | WEIGHT: 138 LBS | HEIGHT: 69 IN | SYSTOLIC BLOOD PRESSURE: 90 MMHG | DIASTOLIC BLOOD PRESSURE: 60 MMHG

## 2019-04-15 DIAGNOSIS — M79.7 FIBROMYALGIA: Primary | ICD-10-CM

## 2019-04-15 DIAGNOSIS — F32.2 CURRENT SEVERE EPISODE OF MAJOR DEPRESSIVE DISORDER WITHOUT PSYCHOTIC FEATURES, UNSPECIFIED WHETHER RECURRENT (HCC): ICD-10-CM

## 2019-04-15 PROCEDURE — 99213 OFFICE O/P EST LOW 20 MIN: CPT | Performed by: PSYCHIATRY & NEUROLOGY

## 2019-04-15 RX ORDER — DULOXETIN HYDROCHLORIDE 60 MG/1
60 CAPSULE, DELAYED RELEASE ORAL
Qty: 90 CAPSULE | Refills: 3 | Status: SHIPPED | OUTPATIENT
Start: 2019-04-15 | End: 2019-10-25 | Stop reason: SDUPTHER

## 2019-04-15 RX ORDER — DULOXETIN HYDROCHLORIDE 30 MG/1
30 CAPSULE, DELAYED RELEASE ORAL DAILY
Qty: 90 CAPSULE | Refills: 1 | Status: SHIPPED | OUTPATIENT
Start: 2019-04-15 | End: 2019-10-25 | Stop reason: SDUPTHER

## 2019-04-25 ENCOUNTER — HOSPITAL ENCOUNTER (OUTPATIENT)
Dept: ULTRASOUND IMAGING | Facility: HOSPITAL | Age: 59
Discharge: HOME | End: 2019-04-25
Attending: INTERNAL MEDICINE
Payer: MEDICARE

## 2019-04-25 DIAGNOSIS — E04.1 NONTOXIC UNINODULAR GOITER: ICD-10-CM

## 2019-04-25 PROCEDURE — 88172 CYTP DX EVAL FNA 1ST EA SITE: CPT | Performed by: PATHOLOGY

## 2019-04-25 PROCEDURE — 10005 FNA BX W/US GDN 1ST LES: CPT

## 2019-04-25 PROCEDURE — 88173 CYTOPATH EVAL FNA REPORT: CPT | Performed by: PATHOLOGY

## 2019-04-25 RX ORDER — LIDOCAINE HYDROCHLORIDE 10 MG/ML
4 INJECTION, SOLUTION EPIDURAL; INFILTRATION; INTRACAUDAL; PERINEURAL ONCE
Status: COMPLETED | OUTPATIENT
Start: 2019-04-25 | End: 2019-04-25

## 2019-04-25 RX ADMIN — LIDOCAINE HYDROCHLORIDE 4 ML: 10 INJECTION, SOLUTION EPIDURAL; INFILTRATION; INTRACAUDAL; PERINEURAL at 13:15

## 2019-04-26 ENCOUNTER — OFFICE VISIT (OUTPATIENT)
Dept: OBGYN CLINIC | Facility: CLINIC | Age: 59
End: 2019-04-26
Payer: MEDICARE

## 2019-04-26 VITALS
HEART RATE: 98 BPM | SYSTOLIC BLOOD PRESSURE: 100 MMHG | BODY MASS INDEX: 20.91 KG/M2 | HEIGHT: 69 IN | WEIGHT: 141.2 LBS | DIASTOLIC BLOOD PRESSURE: 70 MMHG

## 2019-04-26 DIAGNOSIS — M25.561 RIGHT KNEE PAIN, UNSPECIFIED CHRONICITY: Primary | ICD-10-CM

## 2019-04-26 DIAGNOSIS — M22.2X2 PATELLOFEMORAL ARTHRALGIA OF BOTH KNEES: ICD-10-CM

## 2019-04-26 DIAGNOSIS — M22.2X1 PATELLOFEMORAL ARTHRALGIA OF BOTH KNEES: ICD-10-CM

## 2019-04-26 DIAGNOSIS — M17.0 PRIMARY OSTEOARTHRITIS OF BOTH KNEES: ICD-10-CM

## 2019-04-26 PROCEDURE — 99213 OFFICE O/P EST LOW 20 MIN: CPT | Performed by: PHYSICIAN ASSISTANT

## 2019-04-26 PROCEDURE — 20610 DRAIN/INJ JOINT/BURSA W/O US: CPT | Performed by: PHYSICIAN ASSISTANT

## 2019-04-26 RX ORDER — TRIAMCINOLONE ACETONIDE 40 MG/ML
40 INJECTION, SUSPENSION INTRA-ARTICULAR; INTRAMUSCULAR
Status: COMPLETED | OUTPATIENT
Start: 2019-04-26 | End: 2019-04-26

## 2019-04-26 RX ORDER — LIDOCAINE HYDROCHLORIDE 10 MG/ML
4 INJECTION, SOLUTION INFILTRATION; PERINEURAL
Status: COMPLETED | OUTPATIENT
Start: 2019-04-26 | End: 2019-04-26

## 2019-04-26 RX ORDER — BUPIVACAINE HYDROCHLORIDE 2.5 MG/ML
4 INJECTION, SOLUTION INFILTRATION; PERINEURAL
Status: COMPLETED | OUTPATIENT
Start: 2019-04-26 | End: 2019-04-26

## 2019-04-26 RX ADMIN — BUPIVACAINE HYDROCHLORIDE 4 ML: 2.5 INJECTION, SOLUTION INFILTRATION; PERINEURAL at 09:44

## 2019-04-26 RX ADMIN — LIDOCAINE HYDROCHLORIDE 4 ML: 10 INJECTION, SOLUTION INFILTRATION; PERINEURAL at 09:44

## 2019-04-26 RX ADMIN — TRIAMCINOLONE ACETONIDE 40 MG: 40 INJECTION, SUSPENSION INTRA-ARTICULAR; INTRAMUSCULAR at 09:44

## 2019-05-02 ENCOUNTER — TELEPHONE (OUTPATIENT)
Dept: INTERNAL MEDICINE CLINIC | Facility: CLINIC | Age: 59
End: 2019-05-02

## 2019-05-06 ENCOUNTER — TELEPHONE (OUTPATIENT)
Dept: INTERNAL MEDICINE CLINIC | Facility: CLINIC | Age: 59
End: 2019-05-06

## 2019-05-07 ENCOUNTER — OFFICE VISIT (OUTPATIENT)
Dept: INTERNAL MEDICINE CLINIC | Facility: CLINIC | Age: 59
End: 2019-05-07
Payer: MEDICARE

## 2019-05-07 VITALS
SYSTOLIC BLOOD PRESSURE: 98 MMHG | HEIGHT: 69 IN | DIASTOLIC BLOOD PRESSURE: 64 MMHG | WEIGHT: 140 LBS | HEART RATE: 84 BPM | BODY MASS INDEX: 20.73 KG/M2 | OXYGEN SATURATION: 98 %

## 2019-05-07 DIAGNOSIS — K62.5: ICD-10-CM

## 2019-05-07 DIAGNOSIS — E55.9 VITAMIN D DEFICIENCY: ICD-10-CM

## 2019-05-07 DIAGNOSIS — C50.911 MALIGNANT NEOPLASM OF RIGHT FEMALE BREAST, UNSPECIFIED ESTROGEN RECEPTOR STATUS, UNSPECIFIED SITE OF BREAST (HCC): ICD-10-CM

## 2019-05-07 DIAGNOSIS — E03.9 ACQUIRED HYPOTHYROIDISM: ICD-10-CM

## 2019-05-07 DIAGNOSIS — G89.4 PAIN SYNDROME, CHRONIC: ICD-10-CM

## 2019-05-07 DIAGNOSIS — D47.2 MONOCLONAL GAMMOPATHY OF UNDETERMINED SIGNIFICANCE: ICD-10-CM

## 2019-05-07 DIAGNOSIS — G31.84 COGNITIVE IMPAIRMENT, MILD, SO STATED: ICD-10-CM

## 2019-05-07 DIAGNOSIS — K64.5 HEMORRHOID THROMBOSIS: Primary | ICD-10-CM

## 2019-05-07 PROCEDURE — 99214 OFFICE O/P EST MOD 30 MIN: CPT | Performed by: PHYSICIAN ASSISTANT

## 2019-05-13 ENCOUNTER — TELEPHONE (OUTPATIENT)
Dept: INTERNAL MEDICINE CLINIC | Facility: CLINIC | Age: 59
End: 2019-05-13

## 2019-05-13 DIAGNOSIS — K64.5 HEMORRHOID THROMBOSIS: Primary | ICD-10-CM

## 2019-05-21 DIAGNOSIS — D05.11 INTRADUCTAL CARCINOMA OF RIGHT BREAST: ICD-10-CM

## 2019-05-21 RX ORDER — TAMOXIFEN CITRATE 20 MG/1
TABLET ORAL
Qty: 90 TABLET | Refills: 3 | Status: SHIPPED | OUTPATIENT
Start: 2019-05-21 | End: 2020-05-15

## 2019-06-07 DIAGNOSIS — F51.05 INSOMNIA DUE TO OTHER MENTAL DISORDER: ICD-10-CM

## 2019-06-07 DIAGNOSIS — F99 INSOMNIA DUE TO OTHER MENTAL DISORDER: ICD-10-CM

## 2019-06-07 RX ORDER — ZOLPIDEM TARTRATE 5 MG/1
5 TABLET ORAL AS NEEDED
Qty: 90 TABLET | Refills: 0 | Status: SHIPPED | OUTPATIENT
Start: 2019-06-07 | End: 2020-02-04 | Stop reason: SDUPTHER

## 2019-07-12 DIAGNOSIS — R00.0 TACHYCARDIA: Primary | ICD-10-CM

## 2019-07-26 ENCOUNTER — OFFICE VISIT (OUTPATIENT)
Dept: OBGYN CLINIC | Facility: CLINIC | Age: 59
End: 2019-07-26
Payer: MEDICARE

## 2019-07-26 VITALS
HEIGHT: 69 IN | SYSTOLIC BLOOD PRESSURE: 98 MMHG | BODY MASS INDEX: 20.41 KG/M2 | WEIGHT: 137.8 LBS | DIASTOLIC BLOOD PRESSURE: 62 MMHG | HEART RATE: 75 BPM

## 2019-07-26 DIAGNOSIS — M17.0 PRIMARY OSTEOARTHRITIS OF BOTH KNEES: Primary | ICD-10-CM

## 2019-07-26 PROCEDURE — 99213 OFFICE O/P EST LOW 20 MIN: CPT | Performed by: ORTHOPAEDIC SURGERY

## 2019-07-26 NOTE — PROGRESS NOTES
Chief Complaint   Patient presents with    Left Knee - Follow-up    Right Knee - Follow-up           Subjective  patient here follow-up bilateral knee pain  She had cortisone injection left knee April 26, 2019 with improvement  She states some pain and returned but it is intermittent and manageable  Her right knee was injected back in January of 2019 and has been doing well since  Left knee seems to be worse than right knee  Most of the pain is on the medial anterior aspect of both knees  Pain is worse getting up from a seated position  Denies any numbness tingling lower extremity  Patient also has a history of fibromyalgia      ROS:  Review of systems form reviewed July 26, 2019  General: no fever  Respiratory:  No coughing, no shortness of breath   Cardiovascular:  No chest pain, no palpitations  Musculoskeletal: see HPI and PE  SKIN:  No skin rash, no dry skin  Neurological:  No headaches, no confusion  Psychiatric:  No suicide thoughts, no anxiety, positive for depression  Review of all other systems is negative    Past Medical History:   Diagnosis Date    Anxiety     [facility]     Cancer ([facility] )     right breast  dx 04/2017    Cervical disc disorder of cervicothoracic region     Cervical radiculopathy     Cervicalgia     Cognitive impairment, mild, so stated     Depression     Major,recurrent    Diplopia     last assessed 12/23/13    Disc degeneration, lumbar     Disease of thyroid gland     hx of goiter, surgery done 1978    Edema of foot     Esophageal reflux     Fatigue     Fibromyalgia     Hemorrhage, anal or rectal     History of radiation therapy     Hx of radiation therapy     Treatments: Course C1, Plan ID Energy Fractions Dose per Fraction (cGy)total Dose delivered(cGy)elapsed days   R Breast 6x 28/28 180 5,040 39, R breast e 12E 5/5 200 1,000 7 Treatment dates: 7/20/17-09/05/17    Hypotension     Hypothyroidism     Insomnia     Memory loss     Monoclonal gammopathy of undetermined significance     Myalgia     last assessed 07/08/16    Myositis     last assessed 07/08/16    Neck stiffness     Neuralgia     Neuritis     Pain syndrome, chronic     Palpitations     Paresthesia     Peripheral neuropathy     Polyneuropathy     Pseudodementia     Radiculopathy     12/23/13    Restless leg syndrome     Sacroiliitis (HCC)     SLE [facility]systemic lupus erythematosus) ([facility] [address] )     last assessed 12/23/13    Tachycardia     Viral warts     Vitamin B deficiency     Vitamin D deficiency     Vitamin D deficiency     Wears glasses        Current Outpatient Medications on File Prior to Visit   Medication Sig Dispense Refill    Carboxymethylcellulose Sodium (THERATEARS) 0 25 % SOLN Apply one drop both eyes as needed for dry eye symptoms      DULoxetine (CYMBALTA) 30 mg delayed release capsule Take 1 capsule (30 mg total) by mouth daily 90 capsule 1    DULoxetine (CYMBALTA) 60 mg delayed release capsule Take 1 capsule (60 mg total) by mouth daily at bedtime 90 capsule 3    levothyroxine (SYNTHROID) 100 mcg tablet       metoprolol tartrate (LOPRESSOR) 25 mg tablet Take 1 tablet (25 mg total) by mouth every morning 90 tablet 1    tamoxifen (NOLVADEX) 20 mg tablet TAKE 1 TABLET DAILY 90 tablet 3    zolpidem (AMBIEN) 5 mg tablet Take 1 tablet (5 mg total) by mouth as needed for sleep 90 tablet 0     No current facility-administered medications on file prior to visit          No Known Allergies      Physical Exam:    Vitals:    07/26/19 0838   BP: 98/62   Pulse: 75   Weight: 62 5 kg (137 lb 12 8 oz)   Height: 5' 8 5" (1 74 m)       General Appearance:  Alert, cooperative, no distress, appears stated age   Lungs:   respirations unlabored   Heart:  Normal heart rate noted   Abdomen:   Soft, non-tender,  no masses   Extremities: Extremities normal, atraumatic, no cyanosis or edema   Pulses: 2+ and symmetric   Skin: Skin color, texture, turgor normal, no rashes or lesions Neurologic: Normal         Ortho Exam   Left knee exam is intact no erythema noted  Active range of motion 0-120 degrees with no effusion  Mild tenderness over the medial joint line and anterior aspect  Positive crepitus with active flexion  Sensation is intact light touch L1-S1 distributions  Right knee exam with skin intact no erythema  Active range of motion is pain-free and 0-120 degrees with no effusion  Mild tenderness over the medial joint line  Positive crepitus with active flexion  Sensation intact light touch L1-S1 distributions    ASSESSMENT:    Moisés White was seen today for follow-up and follow-up  Diagnoses and all orders for this visit:    Primary osteoarthritis of both knees          PLAN:  Bilateral knee mild osteoarthritis and patellofemoral syndrome  Patient also has history of fibromyalgia and often has multiple joint pain  Patient did have improvement in both knees since cortisone injections  She understands she can repeat these every 3-4 months if needed  She does not feel she needs any cortisone injections today as pain is manageable  Patient will continue with her home exercise program working on strengthening  She will follow up with us as needed    At this point patient can repeat the cortisone injections in both knees if she needs

## 2019-08-06 ENCOUNTER — OFFICE VISIT (OUTPATIENT)
Dept: INTERNAL MEDICINE CLINIC | Facility: CLINIC | Age: 59
End: 2019-08-06
Payer: MEDICARE

## 2019-08-06 VITALS
TEMPERATURE: 99.6 F | SYSTOLIC BLOOD PRESSURE: 100 MMHG | DIASTOLIC BLOOD PRESSURE: 60 MMHG | WEIGHT: 136.6 LBS | HEIGHT: 69 IN | BODY MASS INDEX: 20.23 KG/M2 | HEART RATE: 95 BPM | OXYGEN SATURATION: 96 %

## 2019-08-06 DIAGNOSIS — J45.901 EXACERBATION OF ASTHMA, UNSPECIFIED ASTHMA SEVERITY, UNSPECIFIED WHETHER PERSISTENT: ICD-10-CM

## 2019-08-06 DIAGNOSIS — J40 BRONCHITIS: ICD-10-CM

## 2019-08-06 DIAGNOSIS — F32.2 CURRENT SEVERE EPISODE OF MAJOR DEPRESSIVE DISORDER WITHOUT PSYCHOTIC FEATURES, UNSPECIFIED WHETHER RECURRENT (HCC): ICD-10-CM

## 2019-08-06 DIAGNOSIS — G31.84 COGNITIVE IMPAIRMENT, MILD, SO STATED: Primary | ICD-10-CM

## 2019-08-06 DIAGNOSIS — C50.911 MALIGNANT NEOPLASM OF RIGHT FEMALE BREAST, UNSPECIFIED ESTROGEN RECEPTOR STATUS, UNSPECIFIED SITE OF BREAST (HCC): ICD-10-CM

## 2019-08-06 PROCEDURE — 99214 OFFICE O/P EST MOD 30 MIN: CPT | Performed by: PHYSICIAN ASSISTANT

## 2019-08-06 RX ORDER — PREDNISONE 10 MG/1
TABLET ORAL
Qty: 20 TABLET | Refills: 0 | Status: SHIPPED | OUTPATIENT
Start: 2019-08-06 | End: 2019-09-26 | Stop reason: ALTCHOICE

## 2019-08-06 RX ORDER — AZITHROMYCIN 250 MG/1
TABLET, FILM COATED ORAL
Qty: 6 TABLET | Refills: 0 | Status: SHIPPED | OUTPATIENT
Start: 2019-08-06 | End: 2019-08-10

## 2019-08-06 NOTE — PROGRESS NOTES
Assessment/Plan: antibiotic steroids for asthmatic bronchitis also Tylenol return if she worsens       Diagnoses and all orders for this visit:    Cognitive impairment, mild, so stated    Current severe episode of major depressive disorder without psychotic features, unspecified whether recurrent ([facility] )    Malignant neoplasm of right female breast, unspecified estrogen receptor status, unspecified site of breast ([place] [id number] )    Bronchitis  -     azithromycin (ZITHROMAX) 250 mg tablet; Take 2 tablets day 1 then  1 a day for 4 days  -     predniSONE 10 mg tablet; Take 40 milligrams for 2 days, 30 milligrams for 2 days, 20 milligrams for 2 days, 10 milligrams for 2 days, then stop    Exacerbation of asthma, unspecified asthma severity, unspecified whether persistent  -     azithromycin (ZITHROMAX) 250 mg tablet; Take 2 tablets day 1 then  1 a day for 4 days  -     predniSONE 10 mg tablet; Take 40 milligrams for 2 days, 30 milligrams for 2 days, 20 milligrams for 2 days, 10 milligrams for 2 days, then stop        No problem-specific Assessment & Plan notes found for this encounter  Subjective:      Patient ID: Sherice Pope is a 60 y o  female  She has had fatigue achiness coughing and wheezing for 2 days some carotidynia on the right no chest pain or hemoptysis no orthopnea PND not a smoker  History of previous breast cancer cognitive impairment systemic lupus chronic back pain and anxiety fairly stable on medication    She had her hemorrhoids surgically managed by Dr Priscille Seip last month      The following portions of the patient's history were reviewed and updated as appropriate:   She has a past medical history of Anxiety, Breast cancer ([facility] ), Cancer ([facility] ), Cervical disc disorder of cervicothoracic region, Cervical radiculopathy, Cervicalgia, Cognitive impairment, mild, so stated, Depression, Diplopia, Disc degeneration, lumbar, Disease of thyroid gland, Edema of foot, Esophageal reflux, Fatigue, Fibromyalgia, Hemorrhage, anal or rectal, History of radiation therapy, radiation therapy, Hypotension, Hypothyroidism, Insomnia, Memory loss, Monoclonal gammopathy of undetermined significance, Myalgia, Myositis, Neck stiffness, Neuralgia, Neuritis, Pain syndrome, chronic, Palpitations, Paresthesia, Peripheral neuropathy, Polyneuropathy, Pseudodementia, Radiculopathy, Restless leg syndrome, Sacroiliitis (HCC), SLE (systemic lupus erythematosus) (HCC), Tachycardia, Viral warts, Vitamin B deficiency, Vitamin D deficiency, Vitamin D deficiency, and Wears glasses  ,  does not have any pertinent problems on file  ,   has a past surgical history that includes Thyroid lobectomy (Left, 1978); Hysterectomy (2009); Tonsillectomy; Breast surgery (Right); pr mastectomy, partial (Right, 5/24/2017); Bladder surgery; Colonoscopy; US guided breast biopsy right complete (Right, 4/20/2017); US breast needle loc right (Right, 5/24/2017); Repair rectocele; and US guided thyroid biopsy (4/25/2019)  ,  family history includes Cancer in her cousin; Cancer (age of onset: 72) in her maternal aunt; Cirrhosis in her father; Heart disease in her mother; Heart failure in her mother  ,   reports that she quit smoking about 44 years ago  Her smoking use included cigarettes  She has a 7 50 pack-year smoking history  She has never used smokeless tobacco  She reports that she drank alcohol  She reports that she does not use drugs  ,  has No Known Allergies     Current Outpatient Medications   Medication Sig Dispense Refill    DULoxetine (CYMBALTA) 30 mg delayed release capsule Take 1 capsule (30 mg total) by mouth daily 90 capsule 1    DULoxetine (CYMBALTA) 60 mg delayed release capsule Take 1 capsule (60 mg total) by mouth daily at bedtime 90 capsule 3    levothyroxine (SYNTHROID) 100 mcg tablet       metoprolol tartrate (LOPRESSOR) 25 mg tablet Take 1 tablet (25 mg total) by mouth every morning 90 tablet 1    tamoxifen (NOLVADEX) 20 mg tablet TAKE 1 TABLET DAILY 90 tablet 3    zolpidem (AMBIEN) 5 mg tablet Take 1 tablet (5 mg total) by mouth as needed for sleep 90 tablet 0    azithromycin (ZITHROMAX) 250 mg tablet Take 2 tablets day 1 then  1 a day for 4 days 6 tablet 0    Carboxymethylcellulose Sodium (THERATEARS) 0 25 % SOLN Apply one drop both eyes as needed for dry eye symptoms      predniSONE 10 mg tablet Take 40 milligrams for 2 days, 30 milligrams for 2 days, 20 milligrams for 2 days, 10 milligrams for 2 days, then stop 20 tablet 0     No current facility-administered medications for this visit  Review of Systems   Constitutional: Negative for activity change, appetite change, chills, diaphoresis, fatigue, fever and unexpected weight change  HENT: Positive for postnasal drip and rhinorrhea  Negative for congestion, dental problem, drooling, ear discharge, ear pain, facial swelling, hearing loss, nosebleeds, sinus pressure, sinus pain, sneezing, sore throat, tinnitus, trouble swallowing and voice change  Eyes: Negative for photophobia, pain, discharge, redness, itching and visual disturbance  Respiratory: Positive for cough, chest tightness, shortness of breath and wheezing  Negative for apnea, choking and stridor  Cardiovascular: Positive for chest pain, palpitations and leg swelling  Gastrointestinal: Negative for abdominal distention, abdominal pain, anal bleeding, blood in stool, constipation, diarrhea, nausea, rectal pain and vomiting  Endocrine: Negative for cold intolerance, heat intolerance, polydipsia, polyphagia and polyuria  Genitourinary: Negative for decreased urine volume, difficulty urinating, dysuria, enuresis, flank pain, frequency, genital sores, hematuria and urgency  Musculoskeletal: Negative for arthralgias, back pain, gait problem, joint swelling, myalgias, neck pain and neck stiffness  Skin: Negative for color change, pallor, rash and wound     Neurological: Negative for dizziness, tremors, seizures, syncope, facial asymmetry, speech difficulty, weakness, light-headedness, numbness and headaches  Hematological: Negative for adenopathy  Does not bruise/bleed easily  Psychiatric/Behavioral: Negative for agitation, behavioral problems, confusion, decreased concentration, dysphoric mood, hallucinations, self-injury, sleep disturbance and suicidal ideas  The patient is nervous/anxious  The patient is not hyperactive  Objective:  Vitals:    08/06/19 1636   BP: 100/60   BP Location: Left arm   Patient Position: Sitting   Pulse: 95   Temp: 99 6 °F (37 6 °C)   SpO2: 96%   Weight: 62 kg (136 lb 9 6 oz)   Height: 5' 8 5" (1 74 m)     Body mass index is 20 47 kg/m²  Physical Exam   Constitutional: She is oriented to person, place, and time  She appears well-developed  HENT:   Head: Normocephalic  Right Ear: External ear normal    Left Ear: External ear normal    Mouth/Throat: Oropharynx is clear and moist  No oropharyngeal exudate  Eyes: Pupils are equal, round, and reactive to light  Conjunctivae are normal    Neck: Neck supple  No JVD present  No thyromegaly present  Cardiovascular: Normal rate, regular rhythm, normal heart sounds and intact distal pulses  Exam reveals no gallop and no friction rub  No murmur heard  Pulmonary/Chest: Effort normal  No stridor  She has no decreased breath sounds  She has wheezes  She has rhonchi  Abdominal: Soft  Bowel sounds are normal    Musculoskeletal: Normal range of motion  She exhibits no edema  Lymphadenopathy:     She has no cervical adenopathy  Neurological: She is alert and oriented to person, place, and time  She has normal reflexes  Skin: Skin is warm and dry  Psychiatric: Her speech is normal and behavior is normal  Judgment and thought content normal  Her mood appears anxious  Her affect is blunt  Cognition and memory are impaired

## 2019-08-19 ENCOUNTER — TRANSCRIBE ORDERS (OUTPATIENT)
Dept: ADMINISTRATIVE | Facility: HOSPITAL | Age: 59
End: 2019-08-19

## 2019-08-19 DIAGNOSIS — Z12.31 VISIT FOR SCREENING MAMMOGRAM: Primary | ICD-10-CM

## 2019-08-19 DIAGNOSIS — Z85.3 PERSONAL HISTORY OF BREAST CANCER: ICD-10-CM

## 2019-09-16 ENCOUNTER — HOSPITAL ENCOUNTER (OUTPATIENT)
Dept: MAMMOGRAPHY | Facility: CLINIC | Age: 59
Discharge: HOME | End: 2019-09-16
Attending: RADIOLOGY
Payer: MEDICARE

## 2019-09-16 VITALS — BODY MASS INDEX: 20.92 KG/M2 | WEIGHT: 138 LBS | HEIGHT: 68 IN

## 2019-09-16 DIAGNOSIS — Z85.3 PERSONAL HISTORY OF BREAST CANCER: ICD-10-CM

## 2019-09-16 PROCEDURE — G0279 TOMOSYNTHESIS, MAMMO: HCPCS

## 2019-09-16 PROCEDURE — 77066 DX MAMMO INCL CAD BI: CPT

## 2019-09-26 ENCOUNTER — RADIATION ONCOLOGY FOLLOW-UP (OUTPATIENT)
Dept: RADIATION ONCOLOGY | Facility: CLINIC | Age: 59
End: 2019-09-26
Attending: RADIOLOGY
Payer: MEDICARE

## 2019-09-26 VITALS
DIASTOLIC BLOOD PRESSURE: 60 MMHG | BODY MASS INDEX: 20.92 KG/M2 | WEIGHT: 138 LBS | SYSTOLIC BLOOD PRESSURE: 100 MMHG | HEIGHT: 68 IN | RESPIRATION RATE: 18 BRPM | HEART RATE: 82 BPM

## 2019-09-26 DIAGNOSIS — C50.911 MALIGNANT NEOPLASM OF RIGHT FEMALE BREAST, UNSPECIFIED ESTROGEN RECEPTOR STATUS, UNSPECIFIED SITE OF BREAST (HCC): Primary | ICD-10-CM

## 2019-09-26 PROCEDURE — 99211 OFF/OP EST MAY X REQ PHY/QHP: CPT | Performed by: RADIOLOGY

## 2019-09-26 NOTE — PROGRESS NOTES
Cristy Marte 1960 is a 60 y o  female     Follow up visit     Oncology History    Returns for follow up post right breast radiation completed on 9/5/17  She was last seen in follow up on 9/11/18    10/8/18 Dr Barrett Morris  Tamoxifen, follow-up in 1 year    9/16/19 Bilateral diagnostic mammogram  FINDINGS: Bilateral  There are no suspicious masses, grouped microcalcifications or unexplained areas of architectural distortion  The skin and nipple areolar complex are unremarkable  Right breast postsurgical scarring distortion is unchanged       IMPRESSION: Benign findings as above  ASSESSMENT/BI-RADS CATEGORY:Overall: 2 - Benign    10/17/19 Dr Mariely Syed No seeing        Intraductal carcinoma of right breast (Resolved)    4/20/2017 Biopsy     Right breast biopsy:  Invasive breast carcinoma/invasive ductal carcinoma  Grade 1  ER and UT 90-95% positive, Her2 negative      5/24/2017 Initial Diagnosis     Intraductal carcinoma of right breast       Surgery     Segmental mastectomy with sentinel node biopsy  Invasive mammary  Grade 1        5/24/2017 -  Cancer Staged     Pathologic  Stage IA - pT1b, pN0 (sn), G1      7/20/2017 - 9/5/2017 Radiation     dose of 5040 cGy in 20 daily fractions to the right breast with an additional 1000 cGy to the lumpectomy cavity        7/2017 -  Hormone Therapy     Tamoxifen           Health Maintenance   Topic Date Due    Hepatitis C Screening  1960    CRC Screening: Colonoscopy  1960    Pneumococcal Vaccine: Pediatrics (0 to 5 Years) and At-Risk Patients (6 to 59 Years) (1 of 3 - PCV13) 08/23/1965    PAP SMEAR  08/23/1980    DTaP,Tdap,and Td Vaccines (2 - Td) 09/10/2018    INFLUENZA VACCINE  07/01/2019    Medicare Annual Wellness Visit (AWV)  11/27/2019    BMI: Adult  09/16/2020    MAMMOGRAM  09/16/2020    Pneumococcal Vaccine: 65+ Years (1 of 2 - PCV13) 08/23/2024    HEPATITIS B VACCINES  Aged Out       Patient Active Problem List   Diagnosis  History of right breast cancer    Restless leg syndrome    Fibromyalgia    TIA involving basilar artery    Disease of thyroid gland    Cognitive impairment, mild, so stated    Hypotension    Depression    Vitamin B deficiency    Monoclonal gammopathy of undetermined significance    Pain syndrome, chronic    Vitamin D deficiency    Tachycardia    Current severe episode of major depressive disorder without psychotic features ([facility] [address] )    Hypothyroidism    Malignant neoplasm of right female breast ([facility] [address] )     Past Medical History:   Diagnosis Date    Anxiety     Breast cancer ([facility] [address] ) 2017    right    Cancer [facility])     right breast  dx 04/2017    Cervical disc disorder of cervicothoracic region     Cervical radiculopathy     Cervicalgia     Cognitive impairment, mild, so stated     Depression     Major,recurrent    Diplopia     last assessed 12/23/13    Disc degeneration, lumbar     Disease of thyroid gland     hx of goiter, surgery done 1978    Edema of foot     Esophageal reflux     Fatigue     Fibromyalgia     Hemorrhage, anal or rectal     History of radiation therapy     Hx of radiation therapy     Treatments: Course C1, Plan ID Energy Fractions Dose per Fraction (cGy)total Dose delivered(cGy)elapsed days   R Breast 6x 28/28 180 5,040 39, R breast e 12E 5/5 200 1,000 7 Treatment dates: 7/20/17-09/05/17    Hypotension     Hypothyroidism     Insomnia     Memory loss     Monoclonal gammopathy of undetermined significance     Myalgia     last assessed 07/08/16    Myositis     last assessed 07/08/16    Neck stiffness     Neuralgia     Neuritis     Pain syndrome, chronic     Palpitations     Paresthesia     Peripheral neuropathy     Polyneuropathy     Pseudodementia     Radiculopathy     12/23/13    Restless leg syndrome     Sacroiliitis (HCC)     SLE (systemic lupus erythematosus) ([facility] [address] )     last assessed 12/23/13    Tachycardia     Viral warts     Vitamin B deficiency  Vitamin D deficiency     Vitamin D deficiency     Wears glasses      Past Surgical History:   Procedure Laterality Date    BLADDER SURGERY      bladder prolapse    BREAST SURGERY Right     breast bx    COLONOSCOPY      age 45 normal    HYSTERECTOMY  2009    vaginal hysterectomy, rectocele repair    RI MASTECTOMY, PARTIAL Right 2017    Procedure:  (NUCLEAR MED  8 AM , NEEDLE LOCAL TO FOLLOW 8:30 AM ) SEGMENTAL MASTECTOMY BREAST NEEDLE PLACEMENT,  SENTINAL LYMPH NODE BIOPSY;  Surgeon: Leticia Stewart MD;  Location: [facility];  Service: General    REPAIR RECTOCELE      THYROID LOBECTOMY Left 1978    TONSILLECTOMY      US BREAST NEEDLE LOC RIGHT Right 2017    US GUIDED BREAST BIOPSY RIGHT COMPLETE Right 2017    US GUIDED THYROID BIOPSY  2019     Family History   Problem Relation Age of Onset    Heart disease Mother         CHF    Heart failure Mother     Cirrhosis Father      Social History     Socioeconomic History    Marital status: /Civil Union     Spouse name: Not on file    Number of children: Not on file    Years of education: Not on file    Highest education level: Not on file   Occupational History    Occupation: Disabled due to Fibromyalgia    Occupation: Unemployed   Social Needs    Financial resource strain: Not on file    Food insecurity:     Worry: Not on file     Inability: Not on file   [employer] needs:     Medical: Not on file     Non-medical: Not on file   Tobacco Use    Smoking status: Former Smoker     Packs/day: 1 50     Years: 5 00     Pack years: 7 50     Types: Cigarettes     Last attempt to quit: 1975     Years since quittin 7    Smokeless tobacco: Never Used    Tobacco comment: Former quit 30 years ago   Substance and Sexual Activity    Alcohol use: Not Currently     Comment: socially    Drug use: No    Sexual activity: Yes     Partners: Male   Lifestyle    Physical activity:     Days per week: 5 days     Minutes per session: 30 min    Stress: Rather much   Relationships    Social connections:     Talks on phone: Not on file     Gets together: Not on file     Attends [employer] service: Not on file     Active member of club or organization: Not on file     Attends meetings of clubs or organizations: Not on file     Relationship status: Not on file    Intimate partner violence:     Fear of current or ex partner: Not on file     Emotionally abused: Not on file     Physically abused: Not on file     Forced sexual activity: Not on file   Other Topics Concern    Not on file   Social History Narrative    Not on file       Current Outpatient Medications:     DULoxetine (CYMBALTA) 30 mg delayed release capsule, Take 1 capsule (30 mg total) by mouth daily, Disp: 90 capsule, Rfl: 1    DULoxetine (CYMBALTA) 60 mg delayed release capsule, Take 1 capsule (60 mg total) by mouth daily at bedtime, Disp: 90 capsule, Rfl: 3    levothyroxine (SYNTHROID) 100 mcg tablet, , Disp: , Rfl:     metoprolol tartrate (LOPRESSOR) 25 mg tablet, Take 1 tablet (25 mg total) by mouth every morning, Disp: 90 tablet, Rfl: 1    tamoxifen (NOLVADEX) 20 mg tablet, TAKE 1 TABLET DAILY, Disp: 90 tablet, Rfl: 3    zolpidem (AMBIEN) 5 mg tablet, Take 1 tablet (5 mg total) by mouth as needed for sleep, Disp: 90 tablet, Rfl: 0  No Known Allergies    Review of Systems:  Review of Systems   Constitutional: Positive for fatigue  HENT: Negative  Eyes: Negative  Respiratory: Negative  Cardiovascular: Negative  Gastrointestinal: Negative  Endocrine: Negative  Genitourinary: Negative  Musculoskeletal: Positive for arthralgias (lexa knees)  Skin: Negative  Allergic/Immunologic: Negative  Neurological: Positive for dizziness (Frequent and "no one knows why it happens")  Neuropathy in feet   Hematological: Negative  Psychiatric/Behavioral: Negative          Vitals:    09/26/19 0723   BP: 100/60   BP Location: Left arm   Patient Position: Sitting   Cuff Size: Standard   Pulse: 82   Resp: 18   Weight: 62 6 kg (138 lb)   Height: 5' 8" (1 727 m)        Pain assessment:0    Pain Score: 0-No pain    No results found for: PSA:    Imaging:Mammo Diagnostic Bilateral W 3d & Cad    Result Date: 9/16/2019  Narrative: DIAGNOSIS: Personal history of breast cancer RELEVANT HISTORY: Family Breast Cancer History: No known family history of breast cancer  Family Medical History: No known relevant family medical history  Personal History: Hormone history includes tamoxifen  Surgical history includes lumpectomy and hysterectomy  Medical history includes breast cancer  COMPARISONS: Prior breast imaging dated: 09/13/2018, 05/24/2017, 05/24/2017, 05/19/2017, 04/20/2017, 04/20/2017, 04/20/2017, 04/14/2017, and 04/14/2017 INDICATION: Madhav Lozano is a 60 y o  female presenting for annual mammography  History of right breast conservation therapy in 2017 TECHNIQUE: The current study was evaluated with Computer Aided Detection  TISSUE DENSITY: The breasts are heterogeneously dense, which may obscure small masses  FINDINGS: Bilateral There are no suspicious masses, grouped microcalcifications or unexplained areas of architectural distortion  The skin and nipple areolar complex are unremarkable  Right breast postsurgical scarring distortion is unchanged  Impression:  Benign findings as above  I personally discussed these findings and recommendations with the patient  ASSESSMENT/BI-RADS CATEGORY:  Overall: 2 - Benign RECOMMENDATION:      - Diagnostic mammogram in 1 year for both breasts   Workstation ID: [id number]

## 2019-09-26 NOTE — PROGRESS NOTES
Follow-up - Radiation Oncology   Ariela Brandon 1960 60 y o  female [id number]      History of Present Illness   Cancer Staging  No matching staging information was found for the patient  Ariela Brandon returns today for routine scheduled follow-up visit overall feeling quite well  She does have some residual discomfort in the right axillary region but range of motion is within normal limits  Otherwise without complaints  Returns for follow up post right breast radiation completed on 9/5/17  She was last seen in follow up on 9/11/18    10/8/18 Dr Brody Benoit  Tamoxifen, follow-up in 1 year    9/16/19 Bilateral diagnostic mammogram  FINDINGS: Bilateral  There are no suspicious masses, grouped microcalcifications or unexplained areas of architectural distortion  The skin and nipple areolar complex are unremarkable  Right breast postsurgical scarring distortion is unchanged       IMPRESSION: Benign findings as above  ASSESSMENT/BI-RADS CATEGORY:Overall: 2 - Benign    10/17/19 Dr Sreedhar Khalil Post No seeing        Historical Information   Oncology History    Returns for follow up post right breast radiation completed on 9/5/17  She was last seen in follow up on 9/11/18    10/8/18 Dr Brody Benoit  Tamoxifen, follow-up in 1 year    9/16/19 Bilateral diagnostic mammogram  FINDINGS: Bilateral  There are no suspicious masses, grouped microcalcifications or unexplained areas of architectural distortion  The skin and nipple areolar complex are unremarkable  Right breast postsurgical scarring distortion is unchanged       IMPRESSION: Benign findings as above  ASSESSMENT/BI-RADS CATEGORY:Overall: 2 - Benign    10/17/19 Dr Sreedhar Khalil Post No seeing        Intraductal carcinoma of right breast (Resolved)    4/20/2017 Biopsy     Right breast biopsy:  Invasive breast carcinoma/invasive ductal carcinoma  Grade 1    ER and AR 90-95% positive, Her2 negative      5/24/2017 Initial Diagnosis     Intraductal carcinoma of right breast       Surgery     Segmental mastectomy with sentinel node biopsy  Invasive mammary  Grade 1        5/24/2017 -  Cancer Staged     Pathologic  Stage IA - pT1b, pN0 (sn), G1      7/20/2017 - 9/5/2017 Radiation     dose of 5040 cGy in 20 daily fractions to the right breast with an additional 1000 cGy to the lumpectomy cavity  7/2017 -  Hormone Therapy     Tamoxifen         Past Medical History:   Diagnosis Date    Anxiety     Breast cancer ([facility] [address] ) 2017    right    Cancer [facility])     right breast  dx 04/2017    Cervical disc disorder of cervicothoracic region     Cervical radiculopathy     Cervicalgia     Cognitive impairment, mild, so stated     Depression     Major,recurrent    Diplopia     last assessed 12/23/13    Disc degeneration, lumbar     Disease of thyroid gland     hx of goiter, surgery done 1978    Edema of foot     Esophageal reflux     Fatigue     Fibromyalgia     Hemorrhage, anal or rectal     History of radiation therapy     Hx of radiation therapy     Treatments: Course C1, Plan ID Energy Fractions Dose per Fraction (cGy)total Dose delivered(cGy)elapsed days   R Breast 6x 28/28 180 5,040 39, R breast e 12E 5/5 200 1,000 7 Treatment dates: 7/20/17-09/05/17    Hypotension     Hypothyroidism     Insomnia     Memory loss     Monoclonal gammopathy of undetermined significance     Myalgia     last assessed 07/08/16    Myositis     last assessed 07/08/16    Neck stiffness     Neuralgia     Neuritis     Pain syndrome, chronic     Palpitations     Paresthesia     Peripheral neuropathy     Polyneuropathy     Pseudodementia     Radiculopathy     12/23/13    Restless leg syndrome     Sacroiliitis (HCC)     SLE (systemic lupus erythematosus) ([facility] [address] )     last assessed 12/23/13    Tachycardia     Viral warts     Vitamin B deficiency     Vitamin D deficiency     Vitamin D deficiency     Wears glasses      Past Surgical History:   Procedure Laterality Date    BLADDER SURGERY      bladder prolapse    BREAST SURGERY Right     breast bx    COLONOSCOPY      age 45 normal    HYSTERECTOMY  2009    vaginal hysterectomy, rectocele repair    AL MASTECTOMY, PARTIAL Right 2017    Procedure:  (NUCLEAR MED  8 AM , NEEDLE LOCAL TO FOLLOW 8:30 AM ) SEGMENTAL MASTECTOMY BREAST NEEDLE PLACEMENT,  SENTINAL LYMPH NODE BIOPSY;  Surgeon: Benji Kennedy MD;  Location: [facility];  Service: General    REPAIR RECTOCELE      THYROID LOBECTOMY Left     TONSILLECTOMY      US BREAST NEEDLE LOC RIGHT Right 2017    US GUIDED BREAST BIOPSY RIGHT COMPLETE Right 2017    US GUIDED THYROID BIOPSY  2019       Social History   Social History     Substance and Sexual Activity   Alcohol Use Not Currently    Comment: socially     Social History     Substance and Sexual Activity   Drug Use No     Social History     Tobacco Use   Smoking Status Former Smoker    Packs/day: 1 50    Years: 5 00    Pack years: 7 50    Types: Cigarettes    Last attempt to quit: 1975    Years since quittin 7   Smokeless Tobacco Never Used   Tobacco Comment    Former quit 30 years ago         Meds/Allergies     Current Outpatient Medications:     DULoxetine (CYMBALTA) 30 mg delayed release capsule, Take 1 capsule (30 mg total) by mouth daily, Disp: 90 capsule, Rfl: 1    DULoxetine (CYMBALTA) 60 mg delayed release capsule, Take 1 capsule (60 mg total) by mouth daily at bedtime, Disp: 90 capsule, Rfl: 3    levothyroxine (SYNTHROID) 100 mcg tablet, , Disp: , Rfl:     metoprolol tartrate (LOPRESSOR) 25 mg tablet, Take 1 tablet (25 mg total) by mouth every morning, Disp: 90 tablet, Rfl: 1    tamoxifen (NOLVADEX) 20 mg tablet, TAKE 1 TABLET DAILY, Disp: 90 tablet, Rfl: 3    zolpidem (AMBIEN) 5 mg tablet, Take 1 tablet (5 mg total) by mouth as needed for sleep, Disp: 90 tablet, Rfl: 0  No Known Allergies      Review of Systems   Review of Systems   Constitutional: Positive for fatigue  HENT: Negative  Eyes: Negative  Respiratory: Negative  Cardiovascular: Negative  Gastrointestinal: Negative  Endocrine: Negative  Genitourinary: Negative  Musculoskeletal: Positive for arthralgias (lexa knees)  Skin: Negative  Allergic/Immunologic: Negative  Neurological: Positive for dizziness (Frequent and "no one knows why it happens")  Neuropathy in feet   Hematological: Negative  Psychiatric/Behavioral: Negative  OBJECTIVE:   /60 (BP Location: Left arm, Patient Position: Sitting, Cuff Size: Standard)   Pulse 82   Resp 18   Ht 5' 8" (1 727 m)   Wt 62 6 kg (138 lb)   BMI 20 98 kg/m²   Pain Assessment:  0  Karnofsky: 90 - Able to carry on normal activity; minor signs or symptoms of disease     Physical Exam   Patient presents today no apparent distress  Sclera anicteric  No palpable cervical or supraclavicular lymphadenopathy  Lungs clear to auscultation bilaterally  Normal S1-S2 regular rate and rhythm  The left breast within normal limits  The right breast is soft, nontender, without visible or palpable suspicious findings  No axillary lymphadenopathy  No lymphedema  Normal range of motion of the shoulders bilaterally  Normal speech  Normal affect  RESULTS    Lab Results: No results found for this or any previous visit (from the past 672 hour(s))  Imaging Studies:Mammo Diagnostic Bilateral W 3d & Cad    Result Date: 9/16/2019  Narrative: DIAGNOSIS: Personal history of breast cancer RELEVANT HISTORY: Family Breast Cancer History: No known family history of breast cancer  Family Medical History: No known relevant family medical history  Personal History: Hormone history includes tamoxifen  Surgical history includes lumpectomy and hysterectomy  Medical history includes breast cancer   COMPARISONS: Prior breast imaging dated: 09/13/2018, 05/24/2017, 05/24/2017, 05/19/2017, 04/20/2017, 04/20/2017, 04/20/2017, 04/14/2017, and 04/14/2017 INDICATION: Tobi Li is a 60 y o  female presenting for annual mammography  History of right breast conservation therapy in 2017 TECHNIQUE: The current study was evaluated with Computer Aided Detection  TISSUE DENSITY: The breasts are heterogeneously dense, which may obscure small masses  FINDINGS: Bilateral There are no suspicious masses, grouped microcalcifications or unexplained areas of architectural distortion  The skin and nipple areolar complex are unremarkable  Right breast postsurgical scarring distortion is unchanged  Impression:  Benign findings as above  I personally discussed these findings and recommendations with the patient  ASSESSMENT/BI-RADS CATEGORY:  Overall: 2 - Benign RECOMMENDATION:      - Diagnostic mammogram in 1 year for both breasts  Workstation ID: [id number]          Assessment/Plan:  Orders Placed This Encounter   Procedures    Mammo diagnostic bilateral w cad        Tobi Li has done well in follow-up and has no clinical evidence of recurrent disease at this time  She will continue to follow with Medical Oncology and will return to our department in 1 year with surveillance mammography prior  Abelino Kessler MD  9/26/2019,7:57 AM    Portions of the record may have been created with voice recognition software   Occasional wrong word or "sound a like" substitutions may have occurred due to the inherent limitations of voice recognition software   Read the chart carefully and recognize, using context, where substitutions have occurred

## 2019-10-17 ENCOUNTER — OFFICE VISIT (OUTPATIENT)
Dept: HEMATOLOGY ONCOLOGY | Facility: CLINIC | Age: 59
End: 2019-10-17
Payer: MEDICARE

## 2019-10-17 DIAGNOSIS — D47.2 MGUS (MONOCLONAL GAMMOPATHY OF UNKNOWN SIGNIFICANCE): ICD-10-CM

## 2019-10-17 DIAGNOSIS — C50.911 MALIGNANT NEOPLASM OF RIGHT FEMALE BREAST, UNSPECIFIED ESTROGEN RECEPTOR STATUS, UNSPECIFIED SITE OF BREAST (HCC): Primary | ICD-10-CM

## 2019-10-17 PROCEDURE — 99214 OFFICE O/P EST MOD 30 MIN: CPT | Performed by: INTERNAL MEDICINE

## 2019-10-17 NOTE — PROGRESS NOTES
HEMATOLOGY / ONCOLOGY CLINIC NOTE    Primary Care Provider: Shaniqua Gastelum MD  Referring Provider:    MRN: [id number]  : 1960    Reason for Encounter:    No chief complaint on file  History of Hematology / Oncology Illness:     Payal Nieves is a 60 y o  female who came in for follow up  1  stage Ia  Right breast Cancer,    Postmenopausal;  pT1b pN0 (4 LN -) Mx G1 R0 ER/DE+ HER-2/mike negative RS = 7 = low       -  2017:  Diagnosed on mammogram   - 2017 :  Status post lumpectomy;  Breast radiation, no indication for chemotherapy, on tamoxifen (  Tamoxifen over AI, Due to underlying fibromyalgia and arthritis )  -  Previously under the care of my colleagues, now transferring care to me  -   Status post hysterectomy  2,   Postop Neuropathy     3,   Elevated Ig A  -  Unclear etiology    Interval History:     :  Reported overall doing well  No lumps bumps  No constitutional symptoms  Following surgeon, for breast examination and mammogram, per patient no new findings  Tolerating tamoxifen well, no hot flash symptoms, no new bone pains, status post hysterectomy  10/2018 : Came in for follow-up, doing well  Reported still having breast pain, otherwise no new lumps bumps  No bone pain  No other constitutional symptoms  10/17/2019 : came in for follow up  Has been doing well  No leg swelling, no vaginal bleeding  No new lumps / bumps         Problem list:       Patient Active Problem List   Diagnosis    History of right breast cancer    Restless leg syndrome    Fibromyalgia    TIA involving basilar artery    Disease of thyroid gland    Cognitive impairment, mild, so stated    Hypotension    Depression    Vitamin B deficiency    Monoclonal gammopathy of undetermined significance    Pain syndrome, chronic    Vitamin D deficiency    Tachycardia    Current severe episode of major depressive disorder without psychotic features ([facility] [address] )    Hypothyroidism    Malignant neoplasm of right female breast ([facility] [address] )       Assessment / Plan:       1  Intraductal carcinoma of right breast  -  Postmenopausal, stage I breast cancer, status post lumpectomy, radiation, no indication for chemotherapy, currently taking tamoxifen ongoing for  2 5 years now  -    overall doing well no major issues  Will continue current treatment, follow patient in 1 year  plan  -  Continue  Tamoxifen, follow-up in 1 year        2  IgA MGUS  -    continue follow SPEP  25   minutes were spent on this visit  All questions answered to satisfaction; Advised pt to call if there is any further questions  PHYSICIAL EXAMINATION:     Vital Signs:   [unfilled]  There is no height or weight on file to calculate BMI  There is no height or weight on file to calculate BSA  No major findings on physical examination  GEN: Alert, awake oriented x3, in no acute distress  HEENT- No pallor, icterus, cyanosis, no oral mucosal lesions,   LAD - no palpable cervical, clavicle, axillary, inguinal LAD  Heart- normal S1 S2, regular rate and rhythm, No murmur, rubs     Lungs- decreased breathing sound bilateral    Abdomen- soft, Non tender, bowel sounds present  Extremities- No cyanosis, clubbing, edema  Neuro- No focal neurological deficit           PAST MEDICAL HISTORY:   has a past medical history of Anxiety, Breast cancer ([facility] [address] ) (2017), Cancer ([facility] [address] ), Cervical disc disorder of cervicothoracic region, Cervical radiculopathy, Cervicalgia, Cognitive impairment, mild, so stated, Depression, Diplopia, Disc degeneration, lumbar, Disease of thyroid gland, Edema of foot, Esophageal reflux, Fatigue, Fibromyalgia, Hemorrhage, anal or rectal, History of radiation therapy, radiation therapy, Hypotension, Hypothyroidism, Insomnia, Memory loss, Monoclonal gammopathy of undetermined significance, Myalgia, Myositis, Neck stiffness, Neuralgia, Neuritis, Pain syndrome, chronic, Palpitations, Paresthesia, Peripheral neuropathy, Polyneuropathy, Pseudodementia, Radiculopathy, Restless leg syndrome, Sacroiliitis (HCC), SLE (systemic lupus erythematosus) ([facility] [address] ), Tachycardia, Viral warts, Vitamin B deficiency, Vitamin D deficiency, Vitamin D deficiency, and Wears glasses  PAST SURGICAL HISTORY:   has a past surgical history that includes Thyroid lobectomy (Left, 1978); Hysterectomy (2009); Tonsillectomy; Breast surgery (Right); pr mastectomy, partial (Right, 5/24/2017); Bladder surgery; Colonoscopy; US guided breast biopsy right complete (Right, 4/20/2017); US breast needle loc right (Right, 5/24/2017); Repair rectocele; and US guided thyroid biopsy (4/25/2019)  CURRENT MEDICATIONS:     Current Outpatient Medications   Medication Sig Dispense Refill    DULoxetine (CYMBALTA) 30 mg delayed release capsule Take 1 capsule (30 mg total) by mouth daily 90 capsule 1    DULoxetine (CYMBALTA) 60 mg delayed release capsule Take 1 capsule (60 mg total) by mouth daily at bedtime 90 capsule 3    levothyroxine (SYNTHROID) 100 mcg tablet       metoprolol tartrate (LOPRESSOR) 25 mg tablet Take 1 tablet (25 mg total) by mouth every morning 90 tablet 1    tamoxifen (NOLVADEX) 20 mg tablet TAKE 1 TABLET DAILY 90 tablet 3    zolpidem (AMBIEN) 5 mg tablet Take 1 tablet (5 mg total) by mouth as needed for sleep 90 tablet 0     No current facility-administered medications for this visit  [unfilled]    SOCIAL HISTORY:   reports that she quit smoking about 44 years ago  Her smoking use included cigarettes  She has a 7 50 pack-year smoking history  She has never used smokeless tobacco  She reports that she drank alcohol  She reports that she does not use drugs  FAMILY HISTORY:  family history includes Cirrhosis in her father; Heart disease in her mother; Heart failure in her mother  ALLERGIES:  has No Known Allergies      REVIEW OF SYSTEMS:  Please note that a 14-point review of systems was performed to include Constitutional, HEENT, Respiratory, CVS, GI, , Musculoskeletal, Integumentary, Neurologic, Rheumatologic, Endocrinologic, Psychiatric, Lymphatic, and Hematologic/Oncologic systems were reviewed and are negative unless otherwise stated in HPI  Positive and negative findings pertinent to this evaluation are incorporated into the history of present illness  LAB:    Lab Results   Component Value Date    WBC 4 68 04/02/2019    HGB 13 5 04/02/2019    HCT 40 6 04/02/2019    MCV 96 04/02/2019     04/02/2019       Lab Results   Component Value Date     02/12/2014    K 4 0 04/02/2019     (H) 04/02/2019    CO2 27 04/02/2019    ANIONGAP 6 6 (L) 02/12/2014    BUN 12 04/02/2019    CREATININE 0 80 04/02/2019    GLUCOSE 82 02/12/2014    CALCIUM 8 1 (L) 04/02/2019    AST 17 04/02/2019    ALT 20 04/02/2019    ALKPHOS 47 04/02/2019    PROT 7 5 02/12/2014    BILITOT 0 4 02/12/2014    EGFR 81 04/02/2019       IMAGING:    No orders to display     No results found

## 2019-10-25 ENCOUNTER — APPOINTMENT (OUTPATIENT)
Dept: LAB | Facility: CLINIC | Age: 59
End: 2019-10-25
Payer: MEDICARE

## 2019-10-25 ENCOUNTER — OFFICE VISIT (OUTPATIENT)
Dept: NEUROLOGY | Facility: CLINIC | Age: 59
End: 2019-10-25
Payer: MEDICARE

## 2019-10-25 VITALS
WEIGHT: 139 LBS | BODY MASS INDEX: 21.07 KG/M2 | HEIGHT: 68 IN | HEART RATE: 80 BPM | DIASTOLIC BLOOD PRESSURE: 70 MMHG | SYSTOLIC BLOOD PRESSURE: 88 MMHG

## 2019-10-25 DIAGNOSIS — M79.7 FIBROMYALGIA: Primary | ICD-10-CM

## 2019-10-25 DIAGNOSIS — R53.82 CHRONIC FATIGUE: ICD-10-CM

## 2019-10-25 DIAGNOSIS — F32.2 CURRENT SEVERE EPISODE OF MAJOR DEPRESSIVE DISORDER WITHOUT PSYCHOTIC FEATURES, UNSPECIFIED WHETHER RECURRENT (HCC): ICD-10-CM

## 2019-10-25 PROBLEM — G45.0 TIA INVOLVING BASILAR ARTERY: Status: RESOLVED | Noted: 2018-07-13 | Resolved: 2019-10-25

## 2019-10-25 PROCEDURE — 86618 LYME DISEASE ANTIBODY: CPT

## 2019-10-25 PROCEDURE — 36415 COLL VENOUS BLD VENIPUNCTURE: CPT

## 2019-10-25 PROCEDURE — 86617 LYME DISEASE ANTIBODY: CPT

## 2019-10-25 PROCEDURE — 99214 OFFICE O/P EST MOD 30 MIN: CPT | Performed by: PSYCHIATRY & NEUROLOGY

## 2019-10-25 RX ORDER — DULOXETIN HYDROCHLORIDE 60 MG/1
60 CAPSULE, DELAYED RELEASE ORAL
Qty: 90 CAPSULE | Refills: 3 | Status: SHIPPED | OUTPATIENT
Start: 2019-10-25 | End: 2020-01-30 | Stop reason: SDUPTHER

## 2019-10-25 RX ORDER — DULOXETIN HYDROCHLORIDE 30 MG/1
30 CAPSULE, DELAYED RELEASE ORAL EVERY MORNING
Qty: 90 CAPSULE | Refills: 3 | Status: SHIPPED | OUTPATIENT
Start: 2019-10-25 | End: 2019-11-18 | Stop reason: SDUPTHER

## 2019-10-25 NOTE — PROGRESS NOTES
Progress Note - Neurology   Shalom Sales 60 y o  female MRN: [id number]  Unit/Bed#:  Encounter: [id number]      Subjective:   Patient is here for a follow-up visit with chronic pain affecting the neck back upper and lower extremities proximally and has been detected to have fibromyalgia in the past   Ever since her last visit Cymbalta dosage was increased 30 mg in the morning and 60 mg at bedtime which has provided her with moderate relief of symptoms  She continues to experience severe fatigue on a daily basis as well as has several sleep interruptions  Has persistent neck pain, back pain as well as bilateral knee pain  Patient also had blood work done 6 months ago at the request of her PCP which showed a positive Lyme IgG IgM and the Western blot assay showed a positive IgG and patient has never been treated for Lyme disease in the past   She denies any rashes or history of tick bites  ROS:   Review of Systems   Constitutional: Positive for fatigue  Negative for appetite change and fever  HENT: Negative  Negative for hearing loss, tinnitus, trouble swallowing and voice change  Eyes: Negative  Negative for photophobia and pain  Respiratory: Negative  Negative for shortness of breath  Cardiovascular: Negative  Negative for palpitations  Gastrointestinal: Negative  Negative for nausea and vomiting  Endocrine: Negative  Negative for cold intolerance and heat intolerance  Genitourinary: Negative  Negative for dysuria, frequency and urgency  Musculoskeletal: Positive for back pain, myalgias and neck pain  Left knee pain   Skin: Negative  Negative for rash  Neurological: Positive for dizziness and tremors  Negative for seizures, syncope, facial asymmetry, speech difficulty, weakness, light-headedness, numbness and headaches  Hematological: Bruises/bleeds easily  Psychiatric/Behavioral: Positive for sleep disturbance  Negative for confusion and hallucinations  Vitals: There were no vitals filed for this visit  ,There is no height or weight on file to calculate BMI  MEDS:      Current Outpatient Medications:     DULoxetine (CYMBALTA) 30 mg delayed release capsule, Take 1 capsule (30 mg total) by mouth daily, Disp: 90 capsule, Rfl: 1    DULoxetine (CYMBALTA) 60 mg delayed release capsule, Take 1 capsule (60 mg total) by mouth daily at bedtime, Disp: 90 capsule, Rfl: 3    levothyroxine (SYNTHROID) 100 mcg tablet, , Disp: , Rfl:     metoprolol tartrate (LOPRESSOR) 25 mg tablet, Take 1 tablet (25 mg total) by mouth every morning, Disp: 90 tablet, Rfl: 1    tamoxifen (NOLVADEX) 20 mg tablet, TAKE 1 TABLET DAILY, Disp: 90 tablet, Rfl: 3    zolpidem (AMBIEN) 5 mg tablet, Take 1 tablet (5 mg total) by mouth as needed for sleep, Disp: 90 tablet, Rfl: 0  :    Physical Exam:  General appearance: alert, appears stated age and cooperative  Head: Normocephalic, without obvious abnormality, atraumatic    On examination patient has evidence of cervical and lumbosacral tenderness, trigger point tenderness throughout the spine, and no new cranial nerve deficit was noted, on motor and sensory exam her strength is preserved bilaterally in the upper and lower extremities, deep tendon reflexes are 1+ bilaterally, no new sensory loss was noted to pinprick and light touch, no evidence of any dysmetria and her gait is normal based  Lab Results: I have personally reviewed pertinent reports  Imaging Studies: I have personally reviewed pertinent reports  Assessment:  1  Chronic fibromyalgia  2  Chronic fatigue syndrome with a recently positive Lyme titer suspect Lyme disease  Plan: Will repeat Lyme antibody assay at this time, continue Cymbalta at the present dosage, patient uses Ambien 5 mg only on a p r n  Basis, is encouraged home exercise program and will return back to see me in 6 months        10/25/2019,10:50 AM    Dictation voice to text software has been used in the creation of this document  Please consider this in light of any contextual or grammatical errors

## 2019-10-29 DIAGNOSIS — R53.82 CHRONIC FATIGUE: Primary | ICD-10-CM

## 2019-10-29 DIAGNOSIS — A69.20 LYME DISEASE: ICD-10-CM

## 2019-10-29 LAB
B BURGDOR IGG PATRN SER IB-IMP: POSITIVE
B BURGDOR IGG+IGM SER-ACNC: 2.32 ISR (ref 0–0.9)
B BURGDOR IGM PATRN SER IB-IMP: NEGATIVE
B BURGDOR18KD IGG SER QL IB: PRESENT
B BURGDOR23KD IGG SER QL IB: ABNORMAL
B BURGDOR23KD IGM SER QL IB: ABNORMAL
B BURGDOR28KD IGG SER QL IB: ABNORMAL
B BURGDOR30KD IGG SER QL IB: ABNORMAL
B BURGDOR39KD IGG SER QL IB: PRESENT
B BURGDOR39KD IGM SER QL IB: ABNORMAL
B BURGDOR41KD IGG SER QL IB: PRESENT
B BURGDOR41KD IGM SER QL IB: ABNORMAL
B BURGDOR45KD IGG SER QL IB: ABNORMAL
B BURGDOR58KD IGG SER QL IB: PRESENT
B BURGDOR66KD IGG SER QL IB: ABNORMAL
B BURGDOR93KD IGG SER QL IB: PRESENT

## 2019-10-29 RX ORDER — DOXYCYCLINE HYCLATE 100 MG/1
100 CAPSULE ORAL EVERY 12 HOURS SCHEDULED
Qty: 42 CAPSULE | Refills: 0 | Status: SHIPPED | OUTPATIENT
Start: 2019-10-29 | End: 2019-11-19

## 2019-11-11 ENCOUNTER — TELEPHONE (OUTPATIENT)
Dept: INTERNAL MEDICINE CLINIC | Facility: CLINIC | Age: 59
End: 2019-11-11

## 2019-11-11 DIAGNOSIS — K59.04 CHRONIC IDIOPATHIC CONSTIPATION: Primary | ICD-10-CM

## 2019-11-11 RX ORDER — MAGNESIUM CARB/ALUMINUM HYDROX 105-160MG
296 TABLET,CHEWABLE ORAL ONCE
Qty: 296 ML | Refills: 0 | Status: SHIPPED | OUTPATIENT
Start: 2019-11-11 | End: 2020-11-06 | Stop reason: ALTCHOICE

## 2019-11-11 NOTE — TELEPHONE ENCOUNTER
If she is having any abdominal distention or bloating or abdominal pain she needs to go to the ER  Otherwise, 1 bottle of citrate of magnesium by mouth  If no result but tomorrow, call back and I will have to see her , or refer her to GI

## 2019-11-11 NOTE — TELEPHONE ENCOUNTER
Patient is constipated, started yesterday    She took ducolax & miralax    Drinking prune juice    She said today    Still constipated    can't get it out    Hard like a brick & is painful     Is asking what  recommends   Annika Fairchildole  Should she come in to see  ???

## 2019-11-18 DIAGNOSIS — F32.2 CURRENT SEVERE EPISODE OF MAJOR DEPRESSIVE DISORDER WITHOUT PSYCHOTIC FEATURES, UNSPECIFIED WHETHER RECURRENT (HCC): ICD-10-CM

## 2019-11-18 DIAGNOSIS — M79.7 FIBROMYALGIA: ICD-10-CM

## 2019-11-18 RX ORDER — DULOXETIN HYDROCHLORIDE 30 MG/1
30 CAPSULE, DELAYED RELEASE ORAL EVERY MORNING
Qty: 90 CAPSULE | Refills: 3 | Status: SHIPPED | OUTPATIENT
Start: 2019-11-18 | End: 2020-06-15 | Stop reason: SDUPTHER

## 2019-12-19 ENCOUNTER — HOSPITAL ENCOUNTER (OUTPATIENT)
Dept: RADIOLOGY | Facility: HOSPITAL | Age: 59
Discharge: HOME | End: 2019-12-19
Payer: MEDICARE

## 2019-12-19 ENCOUNTER — OFFICE VISIT (OUTPATIENT)
Dept: INTERNAL MEDICINE CLINIC | Facility: CLINIC | Age: 59
End: 2019-12-19
Payer: MEDICARE

## 2019-12-19 ENCOUNTER — TELEPHONE (OUTPATIENT)
Dept: INTERNAL MEDICINE CLINIC | Facility: CLINIC | Age: 59
End: 2019-12-19

## 2019-12-19 VITALS
BODY MASS INDEX: 21.73 KG/M2 | RESPIRATION RATE: 18 BRPM | TEMPERATURE: 98.1 F | HEART RATE: 80 BPM | DIASTOLIC BLOOD PRESSURE: 62 MMHG | SYSTOLIC BLOOD PRESSURE: 102 MMHG | HEIGHT: 68 IN | WEIGHT: 143.4 LBS
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
## 2019-12-19 DIAGNOSIS — R07.89 STERNAL PAIN: ICD-10-CM

## 2019-12-19 DIAGNOSIS — R07.89 STERNAL PAIN: Primary | ICD-10-CM

## 2019-12-19 PROBLEM — R07.81 RIB PAIN: Status: ACTIVE | Noted: 2019-12-19

## 2019-12-19 PROCEDURE — 99213 OFFICE O/P EST LOW 20 MIN: CPT | Performed by: NURSE PRACTITIONER

## 2019-12-19 PROCEDURE — 71046 X-RAY EXAM CHEST 2 VIEWS: CPT

## 2019-12-19 RX ORDER — TRAMADOL HYDROCHLORIDE 50 MG/1
TABLET ORAL
COMMUNITY
Start: 2016-08-03 | End: 2020-11-06 | Stop reason: ALTCHOICE

## 2019-12-19 NOTE — PATIENT INSTRUCTIONS
May take over the counter ibuprofen 600 mg every 6 hours for pain    Ice or heat   Chest x ray today

## 2019-12-19 NOTE — TELEPHONE ENCOUNTER
Tuesday she fell down her icy out side stairs    Something banged her chest  Probably the railing, is getting more painful    Been taking tramadol & muscle relaxer @ night, not helping   Naidamaximus Yvonne When she turns it is very painful    Thinks she needs an x ray    Will  give the order ? ??  pls call asap    So she knows what to do

## 2019-12-19 NOTE — PROGRESS NOTES
INTERNAL MEDICINE FOLLOW-UP OFFICE VISIT  [facility]    NAME: Shamir Guzmán  AGE: 60 y o  SEX: female    DATE OF ENCOUNTER: 12/19/2019   Assessment and Plan:     Problem List Items Addressed This Visit     None      Visit Diagnoses     Sternal pain    -  Primary      Recent fall on ice 2 days ago  Patient with midsternal chest pain at point of impact  Chest x-ray ordered  Relevant Orders    XR chest pa & lateral    Fall, initial encounter          Recent fall on ice and patient fell and hit her sternum on impact  Chest x-ray ordered  Relevant Orders    XR chest pa & lateral          Return if symptoms worsen or fail to improve  Counseling:     · Medication Side Effects - Adverse side effects of medications were reviewed with the patient/guardian today: Yes  · Counseling was given regarding: Intructions for management  · Barriers to treatment include: No identified barriers      Chief Complaint:     Chief Complaint   Patient presents with    Follow-up     fell on ice , rib pain wants xray  History of Present Illness:     FIDENCIO    Bailey Baltazar presents to the office today for a sick visit  The patient states that 2 days ago she fell  Forward on the ice and hit her upper chest and sternum directly on the pavement  Since that time the patient has been experiencing sternal pain  The patient denies any shortness of breath, hemoptysis, or true chest pain  On physical exam there is no ecchymosis to the chest wall  She does have tenderness to the sternum on palpation  A chest x-ray was ordered for the patient to be performed today at the [facility]   I will contact the patient with those results  I did recommend to the patient that she take ibuprofen 600 mg every 6 hours while awake for the pain    The following portions of the patient's history were reviewed and updated as appropriate: allergies, current medications, past family history, past medical history, past social history, past surgical history and problem list      Review of Systems:     Review of Systems   Constitutional: Negative for activity change, fatigue and fever  HENT: Negative for congestion, hearing loss, rhinorrhea, trouble swallowing and voice change  Eyes: Negative for photophobia, pain, discharge and visual disturbance  Respiratory: Negative for cough, chest tightness and shortness of breath  Cardiovascular: Negative for chest pain, palpitations and leg swelling  Gastrointestinal: Negative for abdominal pain, blood in stool, constipation, nausea and vomiting  Endocrine: Negative for cold intolerance and heat intolerance  Genitourinary: Negative for difficulty urinating, frequency, hematuria, urgency, vaginal bleeding and vaginal discharge  Musculoskeletal: Positive for arthralgias (sternal pain)  Negative for myalgias  Skin: Negative  Neurological: Negative for dizziness, weakness, numbness and headaches  Psychiatric/Behavioral: Negative for decreased concentration  The patient is not nervous/anxious           Problem List:     Patient Active Problem List   Diagnosis    History of right breast cancer    Restless leg syndrome    Fibromyalgia    Disease of thyroid gland    Cognitive impairment, mild, so stated    Hypotension    Depression    Vitamin B deficiency    Monoclonal gammopathy of undetermined significance    Pain syndrome, chronic    Vitamin D deficiency    Tachycardia    Current severe episode of major depressive disorder without psychotic features ([address] )    Hypothyroidism    Malignant neoplasm of right female breast ([address] )    Rib pain        Objective:     /62 (BP Location: Left arm, Patient Position: Sitting, Cuff Size: Standard)   Pulse 80   Temp 98 1 °F (36 7 °C) (Oral)   Resp 18   Ht 5' 8" (1 727 m)   Wt 65 kg (143 lb 6 4 oz)   BMI 21 80 kg/m²     Physical Exam   Constitutional: She is oriented to person, place, and time  She appears well-developed and well-nourished  No distress  HENT:   Head: Normocephalic and atraumatic  Eyes: Pupils are equal, round, and reactive to light  EOM are normal    Neck: Normal range of motion  Neck supple  Cardiovascular: Normal rate, regular rhythm and normal heart sounds  Pulmonary/Chest: Effort normal and breath sounds normal  She has no wheezes  She has no rales  Musculoskeletal: She exhibits tenderness (mid sternal tenderness)  Lymphadenopathy:     She has no cervical adenopathy  Neurological: She is alert and oriented to person, place, and time  Skin: Skin is warm and dry  Psychiatric: She has a normal mood and affect  Her behavior is normal  Judgment and thought content normal               Current Medications:     Current Outpatient Medications   Medication Sig Dispense Refill    DULoxetine (CYMBALTA) 30 mg delayed release capsule Take 1 capsule (30 mg total) by mouth every morning 90 capsule 3    DULoxetine (CYMBALTA) 60 mg delayed release capsule Take 1 capsule (60 mg total) by mouth daily at bedtime 90 capsule 3    levothyroxine (SYNTHROID) 100 mcg tablet       magnesium citrate (CITROMA) 1 745 g/30 mL oral solution Take 296 mL by mouth once for 1 dose 296 mL 0    metoprolol tartrate (LOPRESSOR) 25 mg tablet Take 1 tablet (25 mg total) by mouth every morning 90 tablet 1    tamoxifen (NOLVADEX) 20 mg tablet TAKE 1 TABLET DAILY 90 tablet 3    traMADol (ULTRAM) 50 mg tablet Take by mouth      zolpidem (AMBIEN) 5 mg tablet Take 1 tablet (5 mg total) by mouth as needed for sleep 90 tablet 0     No current facility-administered medications for this visit  Patient Instructions   May take over the counter ibuprofen 600 mg every 6 hours for pain    Ice or heat   Chest x ray today      Karan Bourgeois, [facility]

## 2019-12-20 ENCOUNTER — APPOINTMENT (EMERGENCY)
Dept: CT IMAGING | Facility: HOSPITAL | Age: 59
End: 2019-12-20
Payer: MEDICARE

## 2019-12-20 ENCOUNTER — HOSPITAL ENCOUNTER (EMERGENCY)
Facility: HOSPITAL | Age: 59
Discharge: HOME | End: 2019-12-20
Attending: EMERGENCY MEDICINE | Admitting: EMERGENCY MEDICINE
Payer: MEDICARE

## 2019-12-20 VITALS
OXYGEN SATURATION: 98 % | SYSTOLIC BLOOD PRESSURE: 106 MMHG | TEMPERATURE: 98.5 F | HEART RATE: 62 BPM | DIASTOLIC BLOOD PRESSURE: 52 MMHG | WEIGHT: 125.66 LBS | RESPIRATION RATE: 18 BRPM | BODY MASS INDEX: 20.2 KG/M2 | HEIGHT: 66 IN

## 2019-12-20 DIAGNOSIS — S22.20XA STERNAL FRACTURE: Primary | ICD-10-CM

## 2019-12-20 DIAGNOSIS — S22.20XA CLOSED FRACTURE OF STERNUM, UNSPECIFIED PORTION OF STERNUM, INITIAL ENCOUNTER: Primary | ICD-10-CM

## 2019-12-20 LAB
ANION GAP SERPL CALCULATED.3IONS-SCNC: 6 MMOL/L (ref 4–13)
BASOPHILS # BLD AUTO: 0.03 THOUSANDS/ΜL (ref 0–0.1)
BASOPHILS NFR BLD AUTO: 1 % (ref 0–1)
BUN SERPL-MCNC: 16 MG/DL (ref 5–25)
CALCIUM SERPL-MCNC: 8 MG/DL (ref 8.3–10.1)
CHLORIDE SERPL-SCNC: 104 MMOL/L (ref 100–108)
CO2 SERPL-SCNC: 30 MMOL/L (ref 21–32)
CREAT SERPL-MCNC: 0.77 MG/DL (ref 0.6–1.3)
EOSINOPHIL # BLD AUTO: 0.14 THOUSAND/ΜL (ref 0–0.61)
EOSINOPHIL NFR BLD AUTO: 3 % (ref 0–6)
ERYTHROCYTE [DISTWIDTH] IN BLOOD BY AUTOMATED COUNT: 12.1 % (ref 11.6–15.1)
GFR SERPL CREATININE-BSD FRML MDRD: 84 ML/MIN/1.73SQ M
GLUCOSE SERPL-MCNC: 93 MG/DL (ref 65–140)
HCT VFR BLD AUTO: 39.4 % (ref 34.8–46.1)
HGB BLD-MCNC: 12.9 G/DL (ref 11.5–15.4)
IMM GRANULOCYTES # BLD AUTO: 0.02 THOUSAND/UL (ref 0–0.2)
IMM GRANULOCYTES NFR BLD AUTO: 0 % (ref 0–2)
LYMPHOCYTES # BLD AUTO: 1.24 THOUSANDS/ΜL (ref 0.6–4.47)
LYMPHOCYTES NFR BLD AUTO: 22 % (ref 14–44)
MCH RBC QN AUTO: 31 PG (ref 26.8–34.3)
MCHC RBC AUTO-ENTMCNC: 32.7 G/DL (ref 31.4–37.4)
MCV RBC AUTO: 95 FL (ref 82–98)
MONOCYTES # BLD AUTO: 0.6 THOUSAND/ΜL (ref 0.17–1.22)
MONOCYTES NFR BLD AUTO: 11 % (ref 4–12)
NEUTROPHILS # BLD AUTO: 3.67 THOUSANDS/ΜL (ref 1.85–7.62)
NEUTS SEG NFR BLD AUTO: 63 % (ref 43–75)
NRBC BLD AUTO-RTO: 0 /100 WBCS
PLATELET # BLD AUTO: 194 THOUSANDS/UL (ref 149–390)
PMV BLD AUTO: 9.1 FL (ref 8.9–12.7)
POTASSIUM SERPL-SCNC: 4.1 MMOL/L (ref 3.5–5.3)
RBC # BLD AUTO: 4.16 MILLION/UL (ref 3.81–5.12)
SODIUM SERPL-SCNC: 140 MMOL/L (ref 136–145)
WBC # BLD AUTO: 5.7 THOUSAND/UL (ref 4.31–10.16)

## 2019-12-20 PROCEDURE — 80048 BASIC METABOLIC PNL TOTAL CA: CPT | Performed by: EMERGENCY MEDICINE

## 2019-12-20 PROCEDURE — 99284 EMERGENCY DEPT VISIT MOD MDM: CPT | Performed by: EMERGENCY MEDICINE

## 2019-12-20 PROCEDURE — 99284 EMERGENCY DEPT VISIT MOD MDM: CPT

## 2019-12-20 PROCEDURE — 85025 COMPLETE CBC W/AUTO DIFF WBC: CPT | Performed by: EMERGENCY MEDICINE

## 2019-12-20 PROCEDURE — 71260 CT THORAX DX C+: CPT

## 2019-12-20 PROCEDURE — 36415 COLL VENOUS BLD VENIPUNCTURE: CPT | Performed by: EMERGENCY MEDICINE

## 2019-12-20 RX ORDER — IBUPROFEN 800 MG/1
800 TABLET ORAL 3 TIMES DAILY
Qty: 21 TABLET | Refills: 0 | Status: SHIPPED | OUTPATIENT
Start: 2019-12-20 | End: 2019-12-20 | Stop reason: SDUPTHER

## 2019-12-20 RX ORDER — METHOCARBAMOL 500 MG/1
500 TABLET, FILM COATED ORAL 2 TIMES DAILY
Qty: 20 TABLET | Refills: 0 | Status: SHIPPED | OUTPATIENT
Start: 2019-12-20 | End: 2019-12-20 | Stop reason: SDUPTHER

## 2019-12-20 RX ORDER — IBUPROFEN 800 MG/1
800 TABLET ORAL 3 TIMES DAILY
Qty: 21 TABLET | Refills: 0 | Status: SHIPPED | OUTPATIENT
Start: 2019-12-20 | End: 2020-11-06 | Stop reason: ALTCHOICE

## 2019-12-20 RX ORDER — METHOCARBAMOL 500 MG/1
500 TABLET, FILM COATED ORAL 2 TIMES DAILY
Qty: 20 TABLET | Refills: 0 | Status: SHIPPED | OUTPATIENT
Start: 2019-12-20 | End: 2020-11-06 | Stop reason: ALTCHOICE

## 2019-12-20 RX ADMIN — IOHEXOL 85 ML: 350 INJECTION, SOLUTION INTRAVENOUS at 12:00

## 2019-12-20 NOTE — PROGRESS NOTES
Call patient with the results of her chest x-ray which was performed last night  The patient does have a midsternal fracture and a CT scan was performed  Upon questioning the patient she still is exhibiting shortness of breath and inability to take a deep breath  She is also having quite amount of pain  I did recommend the patient be evaluated at [facility] [place] today for further evaluation, CT scan and pain management

## 2019-12-20 NOTE — ED PROVIDER NOTES
History  Chief Complaint   Patient presents with    Chest Injury     Pt presents with known sternal fx after falling down the stairs, pt states she "went unde the banister and when i came up it went into my chest" pt c/o severe intermittent SOB  Sent by PCP  79-year-old female presenting to the emergency department for evaluation of chest wall injury  Patient states that 2 days ago she, she fell down a staircase, got wrap-around a banister and struck her anterior chest wall  She has been having a several chest pain since then  She went to her family doctor with a chest x-ray which suggested sternal fracture  Was referred to the emergency department for further evaluation and management as needed  What she initially described as shortness of breath she upon further questioning flu describes as pain with breathing or moving  She says that at rest she is able to take deep breaths, does not feel that she cannot catch her breath, only that it causes some pain with breathing  It aching sternal pain, without radiation  There is no cough, there is no fever or chills  She denies any other injury  Prior to Admission Medications   Prescriptions Last Dose Informant Patient Reported? Taking?    DULoxetine (CYMBALTA) 30 mg delayed release capsule   No No   Sig: Take 1 capsule (30 mg total) by mouth every morning   DULoxetine (CYMBALTA) 60 mg delayed release capsule   No No   Sig: Take 1 capsule (60 mg total) by mouth daily at bedtime   levothyroxine (SYNTHROID) 100 mcg tablet  Self Yes No   magnesium citrate (CITROMA) 1 745 g/30 mL oral solution   No No   Sig: Take 296 mL by mouth once for 1 dose   metoprolol tartrate (LOPRESSOR) 25 mg tablet  Self No No   Sig: Take 1 tablet (25 mg total) by mouth every morning   tamoxifen (NOLVADEX) 20 mg tablet  Self No No   Sig: TAKE 1 TABLET DAILY   traMADol (ULTRAM) 50 mg tablet   Yes No   Sig: Take by mouth   zolpidem (AMBIEN) 5 mg tablet  Self No No   Sig: Take 1 tablet (5 mg total) by mouth as needed for sleep      Facility-Administered Medications: None       Past Medical History:   Diagnosis Date    Anxiety     Breast cancer ([facility] [address] ) 2017    right    Cancer [facility])     right breast  dx 04/2017    Cervical disc disorder of cervicothoracic region     Cervical radiculopathy     Cervicalgia     Cognitive impairment, mild, so stated     Depression     Major,recurrent    Diplopia     last assessed 12/23/13    Disc degeneration, lumbar     Disease of thyroid gland     hx of goiter, surgery done 1978    Edema of foot     Esophageal reflux     Fatigue     Fibromyalgia     Hemorrhage, anal or rectal     History of radiation therapy     Hx of radiation therapy     Treatments: Course C1, Plan ID Energy Fractions Dose per Fraction (cGy)total Dose delivered(cGy)elapsed days  R Breast 6x 28/28 180 5,040 39, R breast e 12E 5/5 200 1,000 7 Treatment dates: 7/20/17-09/05/17    Hx of radiation therapy     33 treatments    Hypotension     Hypothyroidism     Insomnia     Memory loss     Monoclonal gammopathy of undetermined significance     Myalgia     last assessed 07/08/16    Myositis     last assessed 07/08/16    Neck stiffness     Neuralgia     Neuritis     Pain syndrome, chronic     Palpitations     Paresthesia     Peripheral neuropathy     Polyneuropathy     Pseudodementia     Radiculopathy     12/23/13    Restless leg syndrome     Sacroiliitis (HCC)     SLE (systemic lupus erythematosus) ([facility] [address] )     last assessed 12/23/13    Tachycardia     Viral warts     Vitamin B deficiency     Vitamin D deficiency     Vitamin D deficiency     Wears glasses        Past Surgical History:   Procedure Laterality Date    BLADDER SURGERY      bladder prolapse    BREAST SURGERY Right     breast bx    COLONOSCOPY      age 45 normal    HYSTERECTOMY  2009    vaginal hysterectomy, rectocele repair    NE MASTECTOMY, PARTIAL Right 5/24/2017    Procedure:  (NUCLEAR MED  8 AM , NEEDLE LOCAL TO FOLLOW 8:30 AM ) SEGMENTAL MASTECTOMY BREAST NEEDLE PLACEMENT,  SENTINAL LYMPH NODE BIOPSY;  Surgeon: Susi Zambrano MD;  Location: [address] [place] [address];  Service: General    REPAIR RECTOCELE      THYROID LOBECTOMY Left 1978    TONSILLECTOMY      US BREAST NEEDLE LOC RIGHT Right 2017    US GUIDED BREAST BIOPSY RIGHT COMPLETE Right 2017    US GUIDED THYROID BIOPSY  2019       Family History   Problem Relation Age of Onset    Heart disease Mother         CHF    Heart failure Mother     Cirrhosis Father     No Known Problems Brother     No Known Problems Brother     No Known Problems Brother     No Known Problems Brother      I have reviewed and agree with the history as documented  Social History     Tobacco Use    Smoking status: Former Smoker     Packs/day: 1 50     Years: 5 00     Pack years: 7 50     Types: Cigarettes     Last attempt to quit: 1975     Years since quittin 0    Smokeless tobacco: Never Used    Tobacco comment: Former quit 30 years ago   Substance Use Topics    Alcohol use: Not Currently     Comment: socially    Drug use: No        Review of Systems   Constitutional: Negative for appetite change, chills, fatigue and fever  HENT: Negative for sneezing and sore throat  Eyes: Negative for visual disturbance  Respiratory: Negative for cough, choking, chest tightness, shortness of breath and wheezing  Cardiovascular: Negative for chest pain and palpitations  Gastrointestinal: Negative for abdominal pain, constipation, diarrhea, nausea and vomiting  Genitourinary: Negative for difficulty urinating and dysuria  Neurological: Negative for dizziness, weakness, light-headedness, numbness and headaches  All other systems reviewed and are negative  Physical Exam  Physical Exam   Constitutional: She is oriented to person, place, and time  She appears well-developed and well-nourished  No distress     HENT:   Head: Normocephalic and atraumatic  Mouth/Throat: Oropharynx is clear and moist    Eyes: Pupils are equal, round, and reactive to light  EOM are normal    Neck: No JVD present  No tracheal deviation present  Cardiovascular: Normal rate, regular rhythm, normal heart sounds and intact distal pulses  Exam reveals no gallop and no friction rub  No murmur heard  Pulmonary/Chest: Effort normal and breath sounds normal  No respiratory distress  She has no wheezes  She has no rales  Abdominal: Soft  Bowel sounds are normal  She exhibits no distension  There is no tenderness  There is no rebound and no guarding  Neurological: She is alert and oriented to person, place, and time  No cranial nerve deficit  She exhibits normal muscle tone  Skin: Skin is warm and dry  She is not diaphoretic  No pallor  Psychiatric: She has a normal mood and affect  Her behavior is normal    Nursing note and vitals reviewed        Vital Signs  ED Triage Vitals [12/20/19 1132]   Temperature Pulse Respirations Blood Pressure SpO2   97 8 °F (36 6 °C) 79 18 115/57 96 %      Temp Source Heart Rate Source Patient Position - Orthostatic VS BP Location FiO2 (%)   Oral Monitor Lying Right arm --      Pain Score       Worst Possible Pain           Vitals:    12/20/19 1255 12/20/19 1400 12/20/19 1404 12/20/19 1539   BP: 102/52 108/56 102/56 106/52   Pulse: 67 61 65 62   Patient Position - Orthostatic VS: Lying Sitting Lying Sitting         Visual Acuity  Visual Acuity      Most Recent Value   L Pupil Size (mm)  3   R Pupil Size (mm)  3          ED Medications  Medications   iohexol (OMNIPAQUE) 350 MG/ML injection (MULTI-DOSE) 85 mL (85 mL Intravenous Given 12/20/19 1200)       Diagnostic Studies  Results Reviewed     Procedure Component Value Units Date/Time    Basic metabolic panel [[id number]]  (Abnormal) Collected:  12/20/19 1142    Lab Status:  Final result Specimen:  Blood from Arm, Left Updated:  12/20/19 1159     Sodium 140 mmol/L      Potassium 4 1 mmol/L Chloride 104 mmol/L      CO2 30 mmol/L      ANION GAP 6 mmol/L      BUN 16 mg/dL      Creatinine 0 77 mg/dL      Glucose 93 mg/dL      Calcium 8 0 mg/dL      eGFR 84 ml/min/1 73sq m     Narrative:       [place] guidelines for Chronic Kidney Disease (CKD):     Stage 1 with normal or high GFR (GFR > 90 mL/min/1 73 square meters)    Stage 2 Mild CKD (GFR = 60-89 mL/min/1 73 square meters)    Stage 3A Moderate CKD (GFR = 45-59 mL/min/1 73 square meters)    Stage 3B Moderate CKD (GFR = 30-44 mL/min/1 73 square meters)    Stage 4 Severe CKD (GFR = 15-29 mL/min/1 73 square meters)    Stage 5 End Stage CKD (GFR <15 mL/min/1 73 square meters)  Note: GFR calculation is accurate only with a steady state creatinine    CBC and differential [[id number]] Collected:  12/20/19 1142    Lab Status:  Final result Specimen:  Blood from Arm, Left Updated:  12/20/19 1150     WBC 5 70 Thousand/uL      RBC 4 16 Million/uL      Hemoglobin 12 9 g/dL      Hematocrit 39 4 %      MCV 95 fL      MCH 31 0 pg      MCHC 32 7 g/dL      RDW 12 1 %      MPV 9 1 fL      Platelets 790 Thousands/uL      nRBC 0 /100 WBCs      Neutrophils Relative 63 %      Immat GRANS % 0 %      Lymphocytes Relative 22 %      Monocytes Relative 11 %      Eosinophils Relative 3 %      Basophils Relative 1 %      Neutrophils Absolute 3 67 Thousands/µL      Immature Grans Absolute 0 02 Thousand/uL      Lymphocytes Absolute 1 24 Thousands/µL      Monocytes Absolute 0 60 Thousand/µL      Eosinophils Absolute 0 14 Thousand/µL      Basophils Absolute 0 03 Thousands/µL                  CT chest with contrast   Final Result by Karen Loaiza MD (12/20 1220)   1  Oblique fracture of the upper to mid sternum as seen on x-ray  The upper sternum is displaced 4 to 5 mm posteriorly  Slight associated soft tissue swelling but no retrosternal hematoma              Workstation performed: [id number]                    Procedures  Procedures         ED Course                               MDM  Number of Diagnoses or Management Options  Sternal fracture:   Diagnosis management comments: 26-year-old female sternal fracture  Will check CT, discussed with trauma,  CT reveals no hematoma, pneumothorax, atelectasis or pneumonia  Trauma source of pain is controlled and she is doing well with the incentive spirometer may be discharged home, she has been able to easily draw over 2 L with incentive spirometer, will discharge home with NSAIDs and Robaxin  Disposition  Final diagnoses:   Sternal fracture     Time reflects when diagnosis was documented in both MDM as applicable and the Disposition within this note     Time User Action Codes Description Comment    12/20/2019  2:55 PM Jerry Cedillo Add [S22 20XA] Sternal fracture       ED Disposition     ED Disposition Condition Date/Time Comment    Discharge Stable Fri Dec 20, 2019  2:55 PM Juanetta Dance discharge to home/self care  Follow-up Information     Follow up With Specialties Details Why Marah Herrera MD Internal Medicine   [address] [address]  [phone]            Discharge Medication List as of 12/20/2019  3:00 PM      START taking these medications    Details   ibuprofen (MOTRIN) 800 mg tablet Take 1 tablet (800 mg total) by mouth 3 (three) times a day, Starting Fri 12/20/2019, Normal      methocarbamol (ROBAXIN) 500 mg tablet Take 1 tablet (500 mg total) by mouth 2 (two) times a day, Starting Fri 12/20/2019, Normal         CONTINUE these medications which have NOT CHANGED    Details   !! DULoxetine (CYMBALTA) 30 mg delayed release capsule Take 1 capsule (30 mg total) by mouth every morning, Starting Mon 11/18/2019, Normal      !!  DULoxetine (CYMBALTA) 60 mg delayed release capsule Take 1 capsule (60 mg total) by mouth daily at bedtime, Starting Fri 10/25/2019, Normal      levothyroxine (SYNTHROID) 100 mcg tablet Starting Wed 10/10/2018, Historical Med      magnesium citrate (CITROMA) 1 745 g/30 mL oral solution Take 296 mL by mouth once for 1 dose, Starting Mon 11/11/2019, Normal      metoprolol tartrate (LOPRESSOR) 25 mg tablet Take 1 tablet (25 mg total) by mouth every morning, Starting Fri 7/12/2019, Normal      tamoxifen (NOLVADEX) 20 mg tablet TAKE 1 TABLET DAILY, Normal      traMADol (ULTRAM) 50 mg tablet Take by mouth, Starting Wed 8/3/2016, Historical Med      zolpidem (AMBIEN) 5 mg tablet Take 1 tablet (5 mg total) by mouth as needed for sleep, Starting Fri 6/7/2019, Normal       !! - Potential duplicate medications found  Please discuss with provider  No discharge procedures on file      ED Provider  Electronically Signed by           Niya Medina MD  12/21/19 2019

## 2020-01-05 DIAGNOSIS — E03.9 ACQUIRED HYPOTHYROIDISM: Primary | ICD-10-CM

## 2020-01-06 RX ORDER — LEVOTHYROXINE SODIUM 0.1 MG/1
TABLET ORAL
Qty: 90 TABLET | Refills: 3 | Status: SHIPPED | OUTPATIENT
Start: 2020-01-06 | End: 2020-09-18 | Stop reason: SDUPTHER

## 2020-01-30 DIAGNOSIS — M79.7 FIBROMYALGIA: ICD-10-CM

## 2020-01-30 RX ORDER — DULOXETIN HYDROCHLORIDE 60 MG/1
60 CAPSULE, DELAYED RELEASE ORAL
Qty: 90 CAPSULE | Refills: 3 | Status: SHIPPED | OUTPATIENT
Start: 2020-01-30 | End: 2020-09-09 | Stop reason: SDUPTHER

## 2020-01-30 NOTE — TELEPHONE ENCOUNTER
Patient called the script line asking for a new script - refill  They would like a 90 day supply to be sent to [facility]  Last script was filled on 10/25/19 with 3 refills, but was sent to the [facility]  Patient is scheduled for a follow up on 5/11/20 and was last seen 10/25/19  Please authorize script if appropriate

## 2020-02-04 DIAGNOSIS — F99 INSOMNIA DUE TO OTHER MENTAL DISORDER: ICD-10-CM

## 2020-02-04 DIAGNOSIS — F51.05 INSOMNIA DUE TO OTHER MENTAL DISORDER: ICD-10-CM

## 2020-02-04 RX ORDER — ZOLPIDEM TARTRATE 5 MG/1
5 TABLET ORAL AS NEEDED
Qty: 90 TABLET | Refills: 0 | Status: SHIPPED | OUTPATIENT
Start: 2020-02-04 | End: 2021-05-19 | Stop reason: ALTCHOICE

## 2020-05-11 ENCOUNTER — OFFICE VISIT (OUTPATIENT)
Dept: NEUROLOGY | Facility: CLINIC | Age: 60
End: 2020-05-11
Payer: COMMERCIAL

## 2020-05-11 VITALS
SYSTOLIC BLOOD PRESSURE: 92 MMHG | DIASTOLIC BLOOD PRESSURE: 64 MMHG | HEIGHT: 68 IN | BODY MASS INDEX: 22.28 KG/M2 | WEIGHT: 147 LBS | HEART RATE: 70 BPM

## 2020-05-11 DIAGNOSIS — R56.9 FOCAL SEIZURE (HCC): ICD-10-CM

## 2020-05-11 DIAGNOSIS — M79.7 FIBROMYALGIA: Primary | ICD-10-CM

## 2020-05-11 PROCEDURE — 99214 OFFICE O/P EST MOD 30 MIN: CPT | Performed by: PSYCHIATRY & NEUROLOGY

## 2020-05-11 PROCEDURE — 3074F SYST BP LT 130 MM HG: CPT | Performed by: PSYCHIATRY & NEUROLOGY

## 2020-05-11 PROCEDURE — 3008F BODY MASS INDEX DOCD: CPT | Performed by: PSYCHIATRY & NEUROLOGY

## 2020-05-11 PROCEDURE — 1036F TOBACCO NON-USER: CPT | Performed by: PSYCHIATRY & NEUROLOGY

## 2020-05-11 PROCEDURE — 3078F DIAST BP <80 MM HG: CPT | Performed by: PSYCHIATRY & NEUROLOGY

## 2020-05-15 DIAGNOSIS — D05.11 INTRADUCTAL CARCINOMA OF RIGHT BREAST: ICD-10-CM

## 2020-05-15 RX ORDER — TAMOXIFEN CITRATE 20 MG/1
TABLET ORAL
Qty: 90 TABLET | Refills: 3 | Status: SHIPPED | OUTPATIENT
Start: 2020-05-15 | End: 2020-11-03 | Stop reason: SDUPTHER

## 2020-06-08 ENCOUNTER — HOSPITAL ENCOUNTER (OUTPATIENT)
Dept: MRI IMAGING | Facility: HOSPITAL | Age: 60
Discharge: HOME | End: 2020-06-08
Attending: PSYCHIATRY & NEUROLOGY
Payer: COMMERCIAL

## 2020-06-08 ENCOUNTER — HOSPITAL ENCOUNTER (OUTPATIENT)
Dept: NEUROLOGY | Facility: HOSPITAL | Age: 60
Discharge: HOME | End: 2020-06-08
Attending: PSYCHIATRY & NEUROLOGY
Payer: COMMERCIAL

## 2020-06-08 DIAGNOSIS — R56.9 FOCAL SEIZURE (HCC): ICD-10-CM

## 2020-06-08 PROCEDURE — 95816 EEG AWAKE AND DROWSY: CPT | Performed by: PSYCHIATRY & NEUROLOGY

## 2020-06-08 PROCEDURE — 95816 EEG AWAKE AND DROWSY: CPT

## 2020-06-08 PROCEDURE — 70551 MRI BRAIN STEM W/O DYE: CPT

## 2020-06-11 DIAGNOSIS — I10 ESSENTIAL HYPERTENSION: ICD-10-CM

## 2020-06-12 RX ORDER — METOPROLOL SUCCINATE 25 MG/1
TABLET, EXTENDED RELEASE ORAL
Qty: 90 TABLET | Refills: 3 | OUTPATIENT
Start: 2020-06-12

## 2020-06-15 DIAGNOSIS — F32.2 CURRENT SEVERE EPISODE OF MAJOR DEPRESSIVE DISORDER WITHOUT PSYCHOTIC FEATURES, UNSPECIFIED WHETHER RECURRENT (HCC): ICD-10-CM

## 2020-06-15 DIAGNOSIS — M79.7 FIBROMYALGIA: ICD-10-CM

## 2020-06-15 RX ORDER — DULOXETIN HYDROCHLORIDE 30 MG/1
30 CAPSULE, DELAYED RELEASE ORAL EVERY MORNING
Qty: 90 CAPSULE | Refills: 3 | Status: SHIPPED | OUTPATIENT
Start: 2020-06-15 | End: 2020-11-06 | Stop reason: SDUPTHER

## 2020-07-22 ENCOUNTER — HOSPITAL ENCOUNTER (OUTPATIENT)
Dept: RADIOLOGY | Facility: HOSPITAL | Age: 60
Discharge: HOME | End: 2020-07-22
Attending: PHYSICAL MEDICINE & REHABILITATION
Payer: COMMERCIAL

## 2020-07-22 ENCOUNTER — TRANSCRIBE ORDERS (OUTPATIENT)
Dept: ADMINISTRATIVE | Facility: HOSPITAL | Age: 60
End: 2020-07-22

## 2020-07-22 DIAGNOSIS — M79.7 FIBROMYALGIA: Primary | ICD-10-CM

## 2020-07-22 DIAGNOSIS — M79.7 FIBROMYALGIA: ICD-10-CM

## 2020-07-22 PROCEDURE — 71101 X-RAY EXAM UNILAT RIBS/CHEST: CPT

## 2020-09-09 DIAGNOSIS — M79.7 FIBROMYALGIA: ICD-10-CM

## 2020-09-09 RX ORDER — DULOXETIN HYDROCHLORIDE 60 MG/1
60 CAPSULE, DELAYED RELEASE ORAL
Qty: 90 CAPSULE | Refills: 3 | Status: SHIPPED | OUTPATIENT
Start: 2020-09-09 | End: 2020-11-06

## 2020-09-09 NOTE — TELEPHONE ENCOUNTER
Medication refill check list    Correct patient? yes   Correct medication name, dose, and pill size? yes   Correct provider? yes   Last and Next appt  scheduled? Yes, last date 05/11/20 & next date 11/6/20   Right pharmacy listed? yes   Correct quantity for 30 or 90 days? yes   Is the patient out of refills? When was it last prescribed? Yes, last date 01/30/20   Directions match what the patient says they are taking? yes   Enough refills? (none for controlled substances, 1 year for routine medications) yes     Patient now has a new pharmacy mail away and different insurance   Kristina Pinto

## 2020-09-17 DIAGNOSIS — E03.9 ACQUIRED HYPOTHYROIDISM: ICD-10-CM

## 2020-09-17 DIAGNOSIS — D05.11 INTRADUCTAL CARCINOMA OF RIGHT BREAST: ICD-10-CM

## 2020-09-17 DIAGNOSIS — R00.0 TACHYCARDIA: ICD-10-CM

## 2020-09-17 NOTE — TELEPHONE ENCOUNTER
PLEASE REMOVE [facility]    metoprolol tartrate (LOPRESSOR) 25 mg tablet    CALL BACK # [phone]    ADD PHARMACY AND SEND TO [facility] # [phone]

## 2020-09-18 ENCOUNTER — TELEPHONE (OUTPATIENT)
Dept: INTERNAL MEDICINE CLINIC | Facility: CLINIC | Age: 60
End: 2020-09-18

## 2020-09-18 DIAGNOSIS — E03.9 ACQUIRED HYPOTHYROIDISM: ICD-10-CM

## 2020-09-18 RX ORDER — LEVOTHYROXINE SODIUM 0.1 MG/1
100 TABLET ORAL DAILY
Qty: 30 TABLET | Refills: 0 | Status: SHIPPED | OUTPATIENT
Start: 2020-09-18 | End: 2020-09-18 | Stop reason: SDUPTHER

## 2020-09-18 NOTE — TELEPHONE ENCOUNTER
[facility] pharmacy called in regards to pt's prescription for levothyroxine 100mcg  They would need a 90 day supply sent  Only a 30 day was sent in

## 2020-09-18 NOTE — TELEPHONE ENCOUNTER
The patient is extremely overdue for follow-up visit with Dr Shanae Nieves  She needs to make an appointment to be seen in the next month  At 30 day supply of this medication was sent to her pharmacy

## 2020-09-18 NOTE — TELEPHONE ENCOUNTER
levothyroxine 100 mcg tablet    Patient also needs this med sent to [facility]    she only has 1 pill left

## 2020-09-21 ENCOUNTER — HOSPITAL ENCOUNTER (OUTPATIENT)
Dept: MAMMOGRAPHY | Facility: CLINIC | Age: 60
Discharge: HOME | End: 2020-09-21
Attending: RADIOLOGY
Payer: COMMERCIAL

## 2020-09-21 ENCOUNTER — TRANSCRIBE ORDERS (OUTPATIENT)
Dept: MAMMOGRAPHY | Facility: CLINIC | Age: 60
End: 2020-09-21

## 2020-09-21 VITALS — HEIGHT: 68 IN | WEIGHT: 147 LBS | BODY MASS INDEX: 22.28 KG/M2

## 2020-09-21 DIAGNOSIS — R92.8 ABNORMAL MAMMOGRAPHY: Primary | ICD-10-CM

## 2020-09-21 DIAGNOSIS — C50.911 MALIGNANT NEOPLASM OF RIGHT FEMALE BREAST, UNSPECIFIED ESTROGEN RECEPTOR STATUS, UNSPECIFIED SITE OF BREAST (HCC): ICD-10-CM

## 2020-09-21 PROCEDURE — 77066 DX MAMMO INCL CAD BI: CPT

## 2020-09-21 PROCEDURE — G0279 TOMOSYNTHESIS, MAMMO: HCPCS

## 2020-09-21 RX ORDER — LEVOTHYROXINE SODIUM 0.1 MG/1
100 TABLET ORAL DAILY
Qty: 90 TABLET | Refills: 0 | Status: SHIPPED | OUTPATIENT
Start: 2020-09-21 | End: 2020-11-03

## 2020-10-14 ENCOUNTER — TELEPHONE (OUTPATIENT)
Dept: HEMATOLOGY ONCOLOGY | Facility: CLINIC | Age: 60
End: 2020-10-14

## 2020-10-15 ENCOUNTER — OFFICE VISIT (OUTPATIENT)
Dept: HEMATOLOGY ONCOLOGY | Facility: CLINIC | Age: 60
End: 2020-10-15
Payer: COMMERCIAL

## 2020-10-15 ENCOUNTER — CLINICAL SUPPORT (OUTPATIENT)
Dept: RADIATION ONCOLOGY | Facility: CLINIC | Age: 60
End: 2020-10-15
Attending: RADIOLOGY
Payer: COMMERCIAL

## 2020-10-15 ENCOUNTER — LAB (OUTPATIENT)
Dept: LAB | Facility: HOSPITAL | Age: 60
End: 2020-10-15
Attending: INTERNAL MEDICINE
Payer: COMMERCIAL

## 2020-10-15 VITALS
RESPIRATION RATE: 14 BRPM | BODY MASS INDEX: 22.73 KG/M2 | HEART RATE: 72 BPM | SYSTOLIC BLOOD PRESSURE: 72 MMHG | WEIGHT: 150 LBS | DIASTOLIC BLOOD PRESSURE: 50 MMHG | TEMPERATURE: 98.1 F | OXYGEN SATURATION: 96 % | HEIGHT: 68 IN

## 2020-10-15 VITALS
OXYGEN SATURATION: 98 % | HEART RATE: 67 BPM | TEMPERATURE: 97.9 F | HEIGHT: 68 IN | WEIGHT: 150 LBS | RESPIRATION RATE: 18 BRPM | BODY MASS INDEX: 22.73 KG/M2 | DIASTOLIC BLOOD PRESSURE: 74 MMHG | SYSTOLIC BLOOD PRESSURE: 110 MMHG

## 2020-10-15 DIAGNOSIS — D47.2 MGUS (MONOCLONAL GAMMOPATHY OF UNKNOWN SIGNIFICANCE): ICD-10-CM

## 2020-10-15 DIAGNOSIS — Z17.0 MALIGNANT NEOPLASM OF RIGHT BREAST IN FEMALE, ESTROGEN RECEPTOR POSITIVE, UNSPECIFIED SITE OF BREAST (HCC): Primary | ICD-10-CM

## 2020-10-15 DIAGNOSIS — Z12.11 SCREENING FOR COLON CANCER: ICD-10-CM

## 2020-10-15 DIAGNOSIS — D47.2 MGUS (MONOCLONAL GAMMOPATHY OF UNKNOWN SIGNIFICANCE): Primary | ICD-10-CM

## 2020-10-15 DIAGNOSIS — C50.911 MALIGNANT NEOPLASM OF RIGHT BREAST IN FEMALE, ESTROGEN RECEPTOR POSITIVE, UNSPECIFIED SITE OF BREAST (HCC): Primary | ICD-10-CM

## 2020-10-15 DIAGNOSIS — C50.911 MALIGNANT NEOPLASM OF RIGHT FEMALE BREAST, UNSPECIFIED ESTROGEN RECEPTOR STATUS, UNSPECIFIED SITE OF BREAST (HCC): Primary | ICD-10-CM

## 2020-10-15 LAB
ALBUMIN SERPL BCP-MCNC: 3.4 G/DL (ref 3.5–5)
ALP SERPL-CCNC: 47 U/L (ref 46–116)
ALT SERPL W P-5'-P-CCNC: 20 U/L (ref 12–78)
ANION GAP SERPL CALCULATED.3IONS-SCNC: 7 MMOL/L (ref 4–13)
AST SERPL W P-5'-P-CCNC: 19 U/L (ref 5–45)
BASOPHILS # BLD AUTO: 0.04 THOUSANDS/ΜL (ref 0–0.1)
BASOPHILS NFR BLD AUTO: 1 % (ref 0–1)
BILIRUB SERPL-MCNC: 0.3 MG/DL (ref 0.2–1)
BUN SERPL-MCNC: 13 MG/DL (ref 5–25)
CALCIUM ALBUM COR SERPL-MCNC: 8.8 MG/DL (ref 8.3–10.1)
CALCIUM SERPL-MCNC: 8.3 MG/DL (ref 8.3–10.1)
CHLORIDE SERPL-SCNC: 104 MMOL/L (ref 100–108)
CO2 SERPL-SCNC: 31 MMOL/L (ref 21–32)
CREAT SERPL-MCNC: 0.87 MG/DL (ref 0.6–1.3)
EOSINOPHIL # BLD AUTO: 0.14 THOUSAND/ΜL (ref 0–0.61)
EOSINOPHIL NFR BLD AUTO: 3 % (ref 0–6)
ERYTHROCYTE [DISTWIDTH] IN BLOOD BY AUTOMATED COUNT: 12.7 % (ref 11.6–15.1)
GFR SERPL CREATININE-BSD FRML MDRD: 72 ML/MIN/1.73SQ M
GLUCOSE SERPL-MCNC: 61 MG/DL (ref 65–140)
HCT VFR BLD AUTO: 39.9 % (ref 34.8–46.1)
HGB BLD-MCNC: 13 G/DL (ref 11.5–15.4)
IGA SERPL-MCNC: 942 MG/DL (ref 70–400)
IGG SERPL-MCNC: 1080 MG/DL (ref 700–1600)
IGM SERPL-MCNC: 31 MG/DL (ref 40–230)
IMM GRANULOCYTES # BLD AUTO: 0.02 THOUSAND/UL (ref 0–0.2)
IMM GRANULOCYTES NFR BLD AUTO: 0 % (ref 0–2)
LYMPHOCYTES # BLD AUTO: 1.07 THOUSANDS/ΜL (ref 0.6–4.47)
LYMPHOCYTES NFR BLD AUTO: 21 % (ref 14–44)
MCH RBC QN AUTO: 30.9 PG (ref 26.8–34.3)
MCHC RBC AUTO-ENTMCNC: 32.6 G/DL (ref 31.4–37.4)
MCV RBC AUTO: 95 FL (ref 82–98)
MONOCYTES # BLD AUTO: 0.6 THOUSAND/ΜL (ref 0.17–1.22)
MONOCYTES NFR BLD AUTO: 12 % (ref 4–12)
NEUTROPHILS # BLD AUTO: 3.18 THOUSANDS/ΜL (ref 1.85–7.62)
NEUTS SEG NFR BLD AUTO: 63 % (ref 43–75)
NRBC BLD AUTO-RTO: 0 /100 WBCS
PLATELET # BLD AUTO: 234 THOUSANDS/UL (ref 149–390)
PMV BLD AUTO: 9.6 FL (ref 8.9–12.7)
POTASSIUM SERPL-SCNC: 4.2 MMOL/L (ref 3.5–5.3)
PROT SERPL-MCNC: 7.4 G/DL (ref 6.4–8.2)
RBC # BLD AUTO: 4.21 MILLION/UL (ref 3.81–5.12)
SODIUM SERPL-SCNC: 142 MMOL/L (ref 136–145)
WBC # BLD AUTO: 5.05 THOUSAND/UL (ref 4.31–10.16)

## 2020-10-15 PROCEDURE — 99211 OFF/OP EST MAY X REQ PHY/QHP: CPT | Performed by: RADIOLOGY

## 2020-10-15 PROCEDURE — 84165 PROTEIN E-PHORESIS SERUM: CPT | Performed by: PATHOLOGY

## 2020-10-15 PROCEDURE — 82784 ASSAY IGA/IGD/IGG/IGM EACH: CPT

## 2020-10-15 PROCEDURE — 1036F TOBACCO NON-USER: CPT | Performed by: PHYSICIAN ASSISTANT

## 2020-10-15 PROCEDURE — 80053 COMPREHEN METABOLIC PANEL: CPT

## 2020-10-15 PROCEDURE — 83883 ASSAY NEPHELOMETRY NOT SPEC: CPT

## 2020-10-15 PROCEDURE — 86334 IMMUNOFIX E-PHORESIS SERUM: CPT

## 2020-10-15 PROCEDURE — 86334 IMMUNOFIX E-PHORESIS SERUM: CPT | Performed by: PATHOLOGY

## 2020-10-15 PROCEDURE — 85025 COMPLETE CBC W/AUTO DIFF WBC: CPT

## 2020-10-15 PROCEDURE — 99215 OFFICE O/P EST HI 40 MIN: CPT | Performed by: PHYSICIAN ASSISTANT

## 2020-10-15 PROCEDURE — 36415 COLL VENOUS BLD VENIPUNCTURE: CPT

## 2020-10-15 PROCEDURE — 84165 PROTEIN E-PHORESIS SERUM: CPT

## 2020-10-16 LAB
ALBUMIN SERPL ELPH-MCNC: 3.94 G/DL (ref 3.5–5)
ALBUMIN SERPL ELPH-MCNC: 54.7 % (ref 52–65)
ALPHA1 GLOB SERPL ELPH-MCNC: 0.3 G/DL (ref 0.1–0.4)
ALPHA1 GLOB SERPL ELPH-MCNC: 4.2 % (ref 2.5–5)
ALPHA2 GLOB SERPL ELPH-MCNC: 0.66 G/DL (ref 0.4–1.2)
ALPHA2 GLOB SERPL ELPH-MCNC: 9.1 % (ref 7–13)
BETA GLOB ABNORMAL SERPL ELPH-MCNC: 0.42 G/DL (ref 0.4–0.8)
BETA1 GLOB SERPL ELPH-MCNC: 5.9 % (ref 5–13)
BETA2 GLOB SERPL ELPH-MCNC: 7 % (ref 2–8)
BETA2+GAMMA GLOB SERPL ELPH-MCNC: 0.5 G/DL (ref 0.2–0.5)
GAMMA GLOB ABNORMAL SERPL ELPH-MCNC: 1.38 G/DL (ref 0.5–1.6)
GAMMA GLOB SERPL ELPH-MCNC: 19.1 % (ref 12–22)
IGG/ALB SER: 1.21 {RATIO} (ref 1.1–1.8)
INTERPRETATION UR IFE-IMP: NORMAL
KAPPA LC FREE SER-MCNC: 25.3 MG/L (ref 3.3–19.4)
KAPPA LC FREE/LAMBDA FREE SER: 1.71 {RATIO} (ref 0.26–1.65)
LAMBDA LC FREE SERPL-MCNC: 14.8 MG/L (ref 5.7–26.3)
M PROTEIN 1 MFR SERPL ELPH: 3.9 %
M PROTEIN 1 SERPL ELPH-MCNC: 0.28 G/DL
PROT PATTERN SERPL ELPH-IMP: NORMAL
PROT SERPL-MCNC: 7.2 G/DL (ref 6.4–8.2)

## 2020-10-19 ENCOUNTER — TELEPHONE (OUTPATIENT)
Dept: HEMATOLOGY ONCOLOGY | Facility: CLINIC | Age: 60
End: 2020-10-19

## 2020-11-03 ENCOUNTER — TELEPHONE (OUTPATIENT)
Dept: HEMATOLOGY ONCOLOGY | Facility: CLINIC | Age: 60
End: 2020-11-03

## 2020-11-03 DIAGNOSIS — D05.11 INTRADUCTAL CARCINOMA OF RIGHT BREAST: ICD-10-CM

## 2020-11-03 DIAGNOSIS — E03.9 ACQUIRED HYPOTHYROIDISM: ICD-10-CM

## 2020-11-03 RX ORDER — LEVOTHYROXINE SODIUM 0.1 MG/1
TABLET ORAL
Qty: 90 TABLET | Refills: 0 | Status: SHIPPED | OUTPATIENT
Start: 2020-11-03 | End: 2020-12-24 | Stop reason: SDUPTHER

## 2020-11-04 RX ORDER — TAMOXIFEN CITRATE 20 MG/1
20 TABLET ORAL DAILY
Qty: 90 TABLET | Refills: 3 | Status: SHIPPED | OUTPATIENT
Start: 2020-11-04 | End: 2022-01-17 | Stop reason: SDUPTHER

## 2020-11-06 ENCOUNTER — OFFICE VISIT (OUTPATIENT)
Dept: NEUROLOGY | Facility: CLINIC | Age: 60
End: 2020-11-06
Payer: COMMERCIAL

## 2020-11-06 VITALS
WEIGHT: 149 LBS | DIASTOLIC BLOOD PRESSURE: 70 MMHG | BODY MASS INDEX: 22.58 KG/M2 | SYSTOLIC BLOOD PRESSURE: 100 MMHG | HEIGHT: 68 IN | HEART RATE: 84 BPM

## 2020-11-06 DIAGNOSIS — M79.7 FIBROMYALGIA: ICD-10-CM

## 2020-11-06 PROCEDURE — 3074F SYST BP LT 130 MM HG: CPT | Performed by: PSYCHIATRY & NEUROLOGY

## 2020-11-06 PROCEDURE — 3078F DIAST BP <80 MM HG: CPT | Performed by: PSYCHIATRY & NEUROLOGY

## 2020-11-06 PROCEDURE — 3008F BODY MASS INDEX DOCD: CPT | Performed by: PSYCHIATRY & NEUROLOGY

## 2020-11-06 PROCEDURE — 1036F TOBACCO NON-USER: CPT | Performed by: PSYCHIATRY & NEUROLOGY

## 2020-11-06 PROCEDURE — 99213 OFFICE O/P EST LOW 20 MIN: CPT | Performed by: PSYCHIATRY & NEUROLOGY

## 2020-11-06 RX ORDER — DULOXETIN HYDROCHLORIDE 60 MG/1
60 CAPSULE, DELAYED RELEASE ORAL
Qty: 90 CAPSULE | Refills: 3 | Status: SHIPPED | OUTPATIENT
Start: 2020-11-06 | End: 2021-05-19 | Stop reason: SDUPTHER

## 2020-11-06 RX ORDER — DULOXETIN HYDROCHLORIDE 30 MG/1
30 CAPSULE, DELAYED RELEASE ORAL EVERY MORNING
Qty: 90 CAPSULE | Refills: 3 | Status: SHIPPED | OUTPATIENT
Start: 2020-11-06 | End: 2021-05-19 | Stop reason: SDUPTHER

## 2020-12-24 DIAGNOSIS — E03.9 ACQUIRED HYPOTHYROIDISM: ICD-10-CM

## 2020-12-27 RX ORDER — LEVOTHYROXINE SODIUM 0.1 MG/1
100 TABLET ORAL DAILY
Qty: 90 TABLET | Refills: 1 | Status: SHIPPED | OUTPATIENT
Start: 2020-12-27

## 2021-04-08 DIAGNOSIS — R00.0 TACHYCARDIA: ICD-10-CM

## 2021-04-14 ENCOUNTER — IMMUNIZATIONS (OUTPATIENT)
Dept: FAMILY MEDICINE CLINIC | Facility: HOSPITAL | Age: 61
End: 2021-04-14

## 2021-04-14 DIAGNOSIS — Z23 ENCOUNTER FOR IMMUNIZATION: Primary | ICD-10-CM

## 2021-04-14 PROCEDURE — 91300 SARS-COV-2 / COVID-19 MRNA VACCINE (PFIZER-BIONTECH) 30 MCG: CPT

## 2021-04-14 PROCEDURE — 0001A SARS-COV-2 / COVID-19 MRNA VACCINE (PFIZER-BIONTECH) 30 MCG: CPT

## 2021-05-06 ENCOUNTER — IMMUNIZATIONS (OUTPATIENT)
Dept: FAMILY MEDICINE CLINIC | Facility: HOSPITAL | Age: 61
End: 2021-05-06

## 2021-05-06 DIAGNOSIS — Z23 ENCOUNTER FOR IMMUNIZATION: Primary | ICD-10-CM

## 2021-05-06 PROCEDURE — 0002A SARS-COV-2 / COVID-19 MRNA VACCINE (PFIZER-BIONTECH) 30 MCG: CPT | Performed by: INTERNAL MEDICINE

## 2021-05-06 PROCEDURE — 91300 SARS-COV-2 / COVID-19 MRNA VACCINE (PFIZER-BIONTECH) 30 MCG: CPT | Performed by: INTERNAL MEDICINE

## 2021-05-19 ENCOUNTER — OFFICE VISIT (OUTPATIENT)
Dept: NEUROLOGY | Facility: CLINIC | Age: 61
End: 2021-05-19
Payer: COMMERCIAL

## 2021-05-19 VITALS
DIASTOLIC BLOOD PRESSURE: 64 MMHG | SYSTOLIC BLOOD PRESSURE: 94 MMHG | BODY MASS INDEX: 22.28 KG/M2 | HEART RATE: 82 BPM | HEIGHT: 68 IN | WEIGHT: 147 LBS | RESPIRATION RATE: 14 BRPM

## 2021-05-19 DIAGNOSIS — G89.4 PAIN SYNDROME, CHRONIC: ICD-10-CM

## 2021-05-19 DIAGNOSIS — M79.7 FIBROMYALGIA: Primary | ICD-10-CM

## 2021-05-19 PROBLEM — R56.9 FOCAL SEIZURE (HCC): Status: RESOLVED | Noted: 2020-05-11 | Resolved: 2021-05-19

## 2021-05-19 PROCEDURE — 3008F BODY MASS INDEX DOCD: CPT | Performed by: PSYCHIATRY & NEUROLOGY

## 2021-05-19 PROCEDURE — 1036F TOBACCO NON-USER: CPT | Performed by: PSYCHIATRY & NEUROLOGY

## 2021-05-19 PROCEDURE — 99213 OFFICE O/P EST LOW 20 MIN: CPT | Performed by: PSYCHIATRY & NEUROLOGY

## 2021-05-19 RX ORDER — DULOXETIN HYDROCHLORIDE 60 MG/1
60 CAPSULE, DELAYED RELEASE ORAL
Qty: 90 CAPSULE | Refills: 3 | Status: SHIPPED | OUTPATIENT
Start: 2021-05-19 | End: 2021-11-26 | Stop reason: SDUPTHER

## 2021-05-19 RX ORDER — DULOXETIN HYDROCHLORIDE 30 MG/1
30 CAPSULE, DELAYED RELEASE ORAL EVERY MORNING
Qty: 90 CAPSULE | Refills: 3 | Status: SHIPPED | OUTPATIENT
Start: 2021-05-19 | End: 2021-11-26 | Stop reason: SDUPTHER

## 2021-05-19 RX ORDER — CHOLECALCIFEROL (VITAMIN D3) 125 MCG
CAPSULE ORAL
COMMUNITY
End: 2021-08-19 | Stop reason: ALTCHOICE

## 2021-05-19 RX ORDER — TIZANIDINE 4 MG/1
4 TABLET ORAL
Qty: 90 TABLET | Refills: 1 | Status: SHIPPED | OUTPATIENT
Start: 2021-05-19 | End: 2021-11-26 | Stop reason: ALTCHOICE

## 2021-05-19 NOTE — PROGRESS NOTES
Progress Note - Neurology   Sondra Richards 63 y o  female MRN: [id number]  Unit/Bed#:  Encounter: [id number]      Subjective:     Patient is here for a follow-up visit for chronic fibromyalgia, chronic pain syndrome, remains on Cymbalta 30 mg in a m  60 mg at bedtime which does help but still continues to experience fatigue and pain  She describes neck pain back pain and most recently has been experiencing frequent episodes of left knee pain and is also followed up with her physiatrist on a regular basis  Patient uses melatonin on a regular basis to sleep, and denies any significant symptoms of restless legs at this time  ROS:   Review of Systems   Constitutional: Positive for fatigue  Negative for appetite change and fever  HENT: Negative  Negative for hearing loss, tinnitus, trouble swallowing and voice change  Eyes: Negative  Negative for photophobia and pain  Respiratory: Negative  Negative for shortness of breath  Cardiovascular: Negative  Negative for palpitations  Gastrointestinal: Negative  Negative for nausea and vomiting  Endocrine: Negative  Negative for cold intolerance  Genitourinary: Negative  Negative for dysuria, frequency and urgency  Musculoskeletal: Positive for back pain, gait problem, myalgias and neck pain  Skin: Negative  Negative for rash  Neurological: Negative for dizziness, tremors, seizures, syncope, facial asymmetry, speech difficulty, weakness, light-headedness, numbness and headaches  Hematological: Negative  Does not bruise/bleed easily  Psychiatric/Behavioral: Positive for sleep disturbance  Negative for confusion and hallucinations  The patient is nervous/anxious  MA review of systems was reviewed by myself      Vitals:   Vitals:    05/19/21 1502   BP: 94/64   BP Location: Left arm   Patient Position: Sitting   Cuff Size: Standard   Pulse: 82   Resp: 14   Weight: 66 7 kg (147 lb)   Height: 5' 8" (1 727 m)   ,Body mass index is 22 35 kg/m²     MEDS:      Current Outpatient Medications:     DULoxetine (CYMBALTA) 30 mg delayed release capsule, Take 1 capsule (30 mg total) by mouth every morning, Disp: 90 capsule, Rfl: 3    DULoxetine (CYMBALTA) 60 mg delayed release capsule, Take 1 capsule (60 mg total) by mouth daily at bedtime, Disp: 90 capsule, Rfl: 3    levothyroxine 100 mcg tablet, Take 1 tablet (100 mcg total) by mouth daily, Disp: 90 tablet, Rfl: 1    Melatonin 5 MG TABS, Take by mouth daily at bedtime, Disp: , Rfl:     tamoxifen (NOLVADEX) 20 mg tablet, Take 1 tablet (20 mg total) by mouth daily, Disp: 90 tablet, Rfl: 3  :    Physical Exam:  General appearance: alert, appears stated age and cooperative  Head: Normocephalic, without obvious abnormality, atraumatic      On neurological examination patient is alert awake oriented, speech is fluent, cranial nerves 2-12 intact, and on motor and sensory exam there is no evidence of any focal weakness, she has evidence of diffuse trigger point tenderness throughout the spine, deep tendon reflexes are diminished, no sensory loss was noted to pinprick and light touch, no evidence of any dysmetria was noted and her gait is normal based  Lab Results: I have personally reviewed pertinent reports  Imaging Studies: I have personally reviewed pertinent reports  Assessment:  1  Chronic fibromyalgia  With chronic pain syndrome  Plan:    Patient is advised to continue Cymbalta at the present dosage, will also add tizanidine 4 mg at bedtime and is advised to discontinue melatonin at this time  Home exercise program is encouraged and she will return back to see me in 6 months       5/19/2021,3:09 PM    Dictation voice to text software has been used in the creation of this document  Please consider this in light of any contextual or grammatical errors

## 2021-08-19 ENCOUNTER — OFFICE VISIT (OUTPATIENT)
Dept: INTERNAL MEDICINE CLINIC | Facility: CLINIC | Age: 61
End: 2021-08-19
Payer: COMMERCIAL

## 2021-08-19 VITALS
DIASTOLIC BLOOD PRESSURE: 64 MMHG | WEIGHT: 143 LBS | TEMPERATURE: 97.4 F | SYSTOLIC BLOOD PRESSURE: 116 MMHG | HEIGHT: 68 IN | BODY MASS INDEX: 21.67 KG/M2 | HEART RATE: 97 BPM | OXYGEN SATURATION: 97 %

## 2021-08-19 DIAGNOSIS — F32.2 CURRENT SEVERE EPISODE OF MAJOR DEPRESSIVE DISORDER WITHOUT PSYCHOTIC FEATURES, UNSPECIFIED WHETHER RECURRENT (HCC): ICD-10-CM

## 2021-08-19 DIAGNOSIS — Z17.0 MALIGNANT NEOPLASM OF RIGHT BREAST IN FEMALE, ESTROGEN RECEPTOR POSITIVE, UNSPECIFIED SITE OF BREAST (HCC): ICD-10-CM

## 2021-08-19 DIAGNOSIS — Z00.00 MEDICARE ANNUAL WELLNESS VISIT, SUBSEQUENT: ICD-10-CM

## 2021-08-19 DIAGNOSIS — C50.911 MALIGNANT NEOPLASM OF RIGHT BREAST IN FEMALE, ESTROGEN RECEPTOR POSITIVE, UNSPECIFIED SITE OF BREAST (HCC): ICD-10-CM

## 2021-08-19 DIAGNOSIS — R21 RASH AND NONSPECIFIC SKIN ERUPTION: Primary | ICD-10-CM

## 2021-08-19 PROCEDURE — 1036F TOBACCO NON-USER: CPT | Performed by: NURSE PRACTITIONER

## 2021-08-19 PROCEDURE — 3725F SCREEN DEPRESSION PERFORMED: CPT | Performed by: NURSE PRACTITIONER

## 2021-08-19 PROCEDURE — 3008F BODY MASS INDEX DOCD: CPT | Performed by: NURSE PRACTITIONER

## 2021-08-19 PROCEDURE — G0439 PPPS, SUBSEQ VISIT: HCPCS | Performed by: NURSE PRACTITIONER

## 2021-08-19 PROCEDURE — 99212 OFFICE O/P EST SF 10 MIN: CPT | Performed by: NURSE PRACTITIONER

## 2021-08-19 RX ORDER — PREDNISONE 20 MG/1
TABLET ORAL
Qty: 18 TABLET | Refills: 0 | Status: SHIPPED | OUTPATIENT
Start: 2021-08-19 | End: 2022-05-27 | Stop reason: ALTCHOICE

## 2021-08-19 NOTE — PROGRESS NOTES
INTERNAL MEDICINE FOLLOW-UP OFFICE VISIT  [facility] - [facility]    NAME: Frankie January  AGE: 63 y o  SEX: female    DATE OF ENCOUNTER: 8/19/2021   Assessment and Plan:   Take benadryl today for itch, can continue with hydrocortisone ointment  Cold compresses and cool showers  Will start prednisone taper tomorrow for contact dermatitis  Problem List Items Addressed This Visit        Other    Current severe episode of major depressive disorder without psychotic features ([facility] [address] )    Malignant neoplasm of right female breast ([facility] [address] )      Other Visit Diagnoses     Rash and nonspecific skin eruption    -  Primary    Relevant Medications    predniSONE 20 mg tablet    Medicare annual wellness visit, subsequent              Return if symptoms worsen or fail to improve, for Next scheduled follow up  Counseling:     · Medication Side Effects - Adverse side effects of medications were reviewed with the patient/guardian today: Yes  · Counseling was given regarding: Prognosis, Risks and benefits of tx options, Intructions for management, Patient and family education, Importance of tx compliance, Risk factor reductions and Impressions  · Barriers to treatment include: No identified barriers      Chief Complaint:     Chief Complaint   Patient presents with    Insect Bite        History of Present Illness:     Rash  This is a new problem  The current episode started in the past 7 days  The problem is unchanged  The affected locations include the torso, left arm, left upper leg, left lower leg, right arm, right upper leg and right lower leg  The rash is characterized by itchiness and blistering  It is unknown if there was an exposure to a precipitant  Pertinent negatives include no congestion, facial edema, fatigue, fever, joint pain, rhinorrhea, shortness of breath, sore throat or vomiting  Past treatments include anti-itch cream  The treatment provided moderate relief         The following portions of the patient's history were reviewed and updated as appropriate: allergies, current medications, past family history, past medical history, past social history, past surgical history and problem list      Review of Systems:     Review of Systems   Constitutional: Negative for fatigue and fever  HENT: Negative for congestion, rhinorrhea and sore throat  Respiratory: Negative for shortness of breath  Gastrointestinal: Negative for vomiting  Musculoskeletal: Negative for joint pain  Skin: Positive for rash  Neurological: Negative for dizziness and light-headedness  Problem List:     Patient Active Problem List   Diagnosis    History of right breast cancer    Restless leg syndrome    Fibromyalgia    Disease of thyroid gland    Cognitive impairment, mild, so stated    Hypotension    Depression    Vitamin B deficiency    Monoclonal gammopathy of undetermined significance    Pain syndrome, chronic    Vitamin D deficiency    Tachycardia    Current severe episode of major depressive disorder without psychotic features ([facility] [address]    Hypothyroidism    Malignant neoplasm of right female breast (HCC)    Rib pain        Objective:     /64   Pulse 97   Temp (!) 97 4 °F (36 3 °C)   Ht 5' 8" (1 727 m)   Wt 64 9 kg (143 lb)   SpO2 97%   BMI 21 74 kg/m²     Physical Exam  Vitals reviewed  Constitutional:       General: She is not in acute distress  Appearance: Normal appearance  She is well-developed, well-groomed and normal weight  She is not ill-appearing  HENT:      Head: Normocephalic and atraumatic  Skin:     General: Skin is warm and dry  Capillary Refill: Capillary refill takes less than 2 seconds  Findings: Erythema and rash present  Rash is vesicular  Neurological:      Mental Status: She is alert and oriented to person, place, and time  Motor: Motor function is intact     Psychiatric:         Attention and Perception: Attention normal          Mood and Affect: Mood normal          Speech: Speech normal          Behavior: Behavior normal  Behavior is cooperative  Thought Content: Thought content normal          Pertinent Laboratory/Diagnostic Studies:    Laboratory Results: I have personally reviewed the pertinent laboratory results/reports   Radiology/Other Diagnostic Testing Results: I have personally reviewed pertinent reports  Current Medications:     Current Outpatient Medications   Medication Sig Dispense Refill    DULoxetine (CYMBALTA) 30 mg delayed release capsule Take 1 capsule (30 mg total) by mouth every morning 90 capsule 3    DULoxetine (CYMBALTA) 60 mg delayed release capsule Take 1 capsule (60 mg total) by mouth daily at bedtime 90 capsule 3    levothyroxine 100 mcg tablet Take 1 tablet (100 mcg total) by mouth daily 90 tablet 1    tamoxifen (NOLVADEX) 20 mg tablet Take 1 tablet (20 mg total) by mouth daily 90 tablet 3    tiZANidine (ZANAFLEX) 4 mg tablet Take 1 tablet (4 mg total) by mouth daily at bedtime 90 tablet 1    predniSONE 20 mg tablet Take 3 tabs for three days, take 2 tabs for three days, take 1 tab for three days  18 tablet 0     No current facility-administered medications for this visit  Patient Instructions     Urticaria   AMBULATORY CARE:   Urticaria  is also called hives  Hives can change size and shape, and appear anywhere on your skin  They can be mild or severe and last from a few minutes to a few days  Hives may be a sign of a severe allergic reaction called anaphylaxis that needs immediate treatment  Urticaria that lasts longer than 6 weeks may be a chronic condition that needs long-term treatment  Call 911 for signs or symptoms of anaphylaxis,  such as trouble breathing, swelling in your mouth or throat, or wheezing  You may also have itching, a rash, or feel like you are going to faint    Seek care immediately if:   · Your heart is beating faster than it normally does     · You have cramping or severe pain in your abdomen  Contact your healthcare provider if:   · You have a fever  · Your skin still itches 24 hours after you take your medicine  · You still have hives after 7 days  · Your joints are painful and swollen  · You have questions or concerns about your condition or care  Steps to take for signs or symptoms of anaphylaxis:   · Immediately  give 1 shot of epinephrine only into the outer thigh muscle  · Leave the shot in place  as directed  Your healthcare provider may recommend you leave it in place for up to 10 seconds before you remove it  This helps make sure all of the epinephrine is delivered  · Call 911 and go to the emergency department,  even if the shot improved symptoms  Do not drive yourself  Bring the used epinephrine shot with you  Treatment for mild urticaria  may not be needed  Chronic urticaria may need to be treated with more than one medicine, or other medicines than listed below  The following are common medicines used to treat urticaria:  · Antihistamines  decrease mild symptoms such as itching or a rash  · Steroids  decrease redness, pain, and swelling  · Epinephrine  is used to treat severe allergic reactions such as anaphylaxis  Safety precautions to take if you are at risk for anaphylaxis:   · Keep 2 shots of epinephrine with you at all times  You may need a second shot, because epinephrine only works for about 20 minutes and symptoms may return  Your healthcare provider can show you and family members how to give the shot  Check the expiration date every month and replace it before it expires  · Create an action plan  Your healthcare provider can help you create a written plan that explains the allergy and an emergency plan to treat a reaction   The plan explains when to give a second epinephrine shot if symptoms return or do not improve after the first  Give copies of the action plan and emergency instructions to family members, work and school staff, and  providers  Show them how to give a shot of epinephrine  · Be careful when you exercise  If you have had exercise-induced anaphylaxis, do not exercise right after you eat  Stop exercising right away if you start to develop any signs or symptoms of anaphylaxis  You may first feel tired, warm, or have itchy skin  Hives, swelling, and severe breathing problems may develop if you continue to exercise  · Carry medical alert identification  Wear medical alert jewelry or carry a card that explains the allergy  Ask your healthcare provider where to get these items  · Keep a record of triggers and symptoms  Record everything you eat, drink, or apply to your skin for 3 weeks  Include stressful events and what you were doing right before your hives started  Bring the record with you to follow-up visits with your healthcare provider  Manage urticaria:   · Cool your skin  This may help decrease itching  Apply a cool pack to your hives  Dip a hand towel in cool water, wring it out, and place it on your hives  You may also soak your skin in a cool oatmeal bath  · Do not rub your hives  This can irritate your skin and cause more hives  · Wear loose clothing  Tight clothes may irritate your skin and cause more hives  · Manage stress  Stress may trigger hives, or make them worse  Learn new ways to relax, such as deep breathing  Follow up with your healthcare provider as directed:  Write down your questions so you remember to ask them during your visits  © [employer] 2021 Information is for End User's use only and may not be sold, redistributed or otherwise used for commercial purposes  All illustrations and images included in CareNotes® are the copyrighted property of [employer]  or [facility] Chika Guadarrama   The above information is an  only   It is not intended as medical advice for individual conditions or treatments  Talk to your doctor, nurse or pharmacist before following any medical regimen to see if it is safe and effective for you          RAFAT Garcia  [facility]

## 2021-08-19 NOTE — PATIENT INSTRUCTIONS
Urticaria   AMBULATORY CARE:   Urticaria  is also called hives  Hives can change size and shape, and appear anywhere on your skin  They can be mild or severe and last from a few minutes to a few days  Hives may be a sign of a severe allergic reaction called anaphylaxis that needs immediate treatment  Urticaria that lasts longer than 6 weeks may be a chronic condition that needs long-term treatment  Call 911 for signs or symptoms of anaphylaxis,  such as trouble breathing, swelling in your mouth or throat, or wheezing  You may also have itching, a rash, or feel like you are going to faint  Seek care immediately if:   · Your heart is beating faster than it normally does  · You have cramping or severe pain in your abdomen  Contact your healthcare provider if:   · You have a fever  · Your skin still itches 24 hours after you take your medicine  · You still have hives after 7 days  · Your joints are painful and swollen  · You have questions or concerns about your condition or care  Steps to take for signs or symptoms of anaphylaxis:   · Immediately  give 1 shot of epinephrine only into the outer thigh muscle  · Leave the shot in place  as directed  Your healthcare provider may recommend you leave it in place for up to 10 seconds before you remove it  This helps make sure all of the epinephrine is delivered  · Call 911 and go to the emergency department,  even if the shot improved symptoms  Do not drive yourself  Bring the used epinephrine shot with you  Treatment for mild urticaria  may not be needed  Chronic urticaria may need to be treated with more than one medicine, or other medicines than listed below  The following are common medicines used to treat urticaria:  · Antihistamines  decrease mild symptoms such as itching or a rash  · Steroids  decrease redness, pain, and swelling  · Epinephrine  is used to treat severe allergic reactions such as anaphylaxis      Safety precautions to take if you are at risk for anaphylaxis:   · Keep 2 shots of epinephrine with you at all times  You may need a second shot, because epinephrine only works for about 20 minutes and symptoms may return  Your healthcare provider can show you and family members how to give the shot  Check the expiration date every month and replace it before it expires  · Create an action plan  Your healthcare provider can help you create a written plan that explains the allergy and an emergency plan to treat a reaction  The plan explains when to give a second epinephrine shot if symptoms return or do not improve after the first  Give copies of the action plan and emergency instructions to family members, work and school staff, and  providers  Show them how to give a shot of epinephrine  · Be careful when you exercise  If you have had exercise-induced anaphylaxis, do not exercise right after you eat  Stop exercising right away if you start to develop any signs or symptoms of anaphylaxis  You may first feel tired, warm, or have itchy skin  Hives, swelling, and severe breathing problems may develop if you continue to exercise  · Carry medical alert identification  Wear medical alert jewelry or carry a card that explains the allergy  Ask your healthcare provider where to get these items  · Keep a record of triggers and symptoms  Record everything you eat, drink, or apply to your skin for 3 weeks  Include stressful events and what you were doing right before your hives started  Bring the record with you to follow-up visits with your healthcare provider  Manage urticaria:   · Cool your skin  This may help decrease itching  Apply a cool pack to your hives  Dip a hand towel in cool water, wring it out, and place it on your hives  You may also soak your skin in a cool oatmeal bath  · Do not rub your hives  This can irritate your skin and cause more hives  · Wear loose clothing    Tight clothes may irritate your skin and cause more hives  · Manage stress  Stress may trigger hives, or make them worse  Learn new ways to relax, such as deep breathing  Follow up with your healthcare provider as directed:  Write down your questions so you remember to ask them during your visits  © Copyright [employer] 2021 Information is for End User's use only and may not be sold, redistributed or otherwise used for commercial purposes  All illustrations and images included in CareNotes® are the copyrighted property of [employer]  or [facility] Chika Guadarrama   The above information is an  only  It is not intended as medical advice for individual conditions or treatments  Talk to your doctor, nurse or pharmacist before following any medical regimen to see if it is safe and effective for you

## 2021-08-19 NOTE — PROGRESS NOTES
Assessment and Plan:     Problem List Items Addressed This Visit     None           Preventive health issues were discussed with patient, and age appropriate screening tests were ordered as noted in patient's After Visit Summary  Personalized health advice and appropriate referrals for health education or preventive services given if needed, as noted in patient's After Visit Summary       History of Present Illness:     Patient presents for Medicare Annual Wellness visit    Patient Care Team:  Lora Goodwin MD as PCP - John Gallegos MD as PCP - [address] [place], [place] [id number] [facility] ([facility])  Myrna Marie MD as PCP - PCP-[facility] ([facility])  Fanta Soto MD (Neurology)  Robi Tompkins MD (Radiation Oncology)  Reggie Glover MD PhD (Hematology and Oncology)  Dereck Arboleda DO (Physical Medicine and Rehabilitation)     Problem List:     Patient Active Problem List   Diagnosis    History of right breast cancer    Restless leg syndrome    Fibromyalgia    Disease of thyroid gland    Cognitive impairment, mild, so stated    Hypotension    Depression    Vitamin B deficiency    Monoclonal gammopathy of undetermined significance    Pain syndrome, chronic    Vitamin D deficiency    Tachycardia    Current severe episode of major depressive disorder without psychotic features ([facility] [address] )    Hypothyroidism    Malignant neoplasm of right female breast ([address] )    Rib pain      Past Medical and Surgical History:     Past Medical History:   Diagnosis Date    Anxiety     Breast cancer ([facility] [address] ) 2017    right    Cancer [facility])     right breast  dx 04/2017    Cervical disc disorder of cervicothoracic region     Cervical radiculopathy     Cervicalgia     Cognitive impairment, mild, so stated     Depression     Major,recurrent    Diplopia     last assessed 12/23/13    Disc degeneration, lumbar     Disease of thyroid gland     hx of goiter, surgery done 1978    Edema of foot     Esophageal reflux     Fatigue     Fibromyalgia     Hemorrhage, anal or rectal     History of radiation therapy     Hx of radiation therapy     Treatments: Course C1, Plan ID Energy Fractions Dose per Fraction (cGy)total Dose delivered(cGy)elapsed days   R Breast 6x 28/28 180 5,040 39, R breast e 12E 5/5 200 1,000 7 Treatment dates: 7/20/17-09/05/17    Hx of radiation therapy     33 treatments    Hypotension     Hypothyroidism     Insomnia     Memory loss     Monoclonal gammopathy of undetermined significance     Myalgia     last assessed 07/08/16    Myositis     last assessed 07/08/16    Neck stiffness     Neuralgia     Neuritis     Pain syndrome, chronic     Palpitations     Paresthesia     Peripheral neuropathy     Polyneuropathy     Pseudodementia     Radiculopathy     12/23/13    Restless leg syndrome     Sacroiliitis (HCC)     SLE (systemic lupus erythematosus) ([facility] [address] )     last assessed 12/23/13    Tachycardia     Viral warts     Vitamin B deficiency     Vitamin D deficiency     Vitamin D deficiency     Wears glasses      Past Surgical History:   Procedure Laterality Date    BLADDER SURGERY      bladder prolapse    BREAST BIOPSY Right     BREAST SURGERY Right     breast bx    COLONOSCOPY      age 45 normal    HYSTERECTOMY  2009    vaginal hysterectomy, rectocele repair    WI MASTECTOMY, PARTIAL Right 5/24/2017    Procedure:  (NUCLEAR MED  8 AM , NEEDLE LOCAL TO FOLLOW 8:30 AM ) SEGMENTAL MASTECTOMY BREAST NEEDLE PLACEMENT,  SENTINAL LYMPH NODE BIOPSY;  Surgeon: Milla Kelly MD;  Location: [facility] OR;  Service: General    REPAIR RECTOCELE      THYROID LOBECTOMY Left 1978    TONSILLECTOMY      US BREAST NEEDLE LOC RIGHT Right 5/24/2017    US GUIDED BREAST BIOPSY RIGHT COMPLETE Right 4/20/2017    US GUIDED THYROID BIOPSY  4/25/2019      Family History:     Family History   Problem Relation Age of Onset    Heart disease Mother         CHF    Heart failure Mother     Cirrhosis Father     Esophageal cancer Brother 72    No Known Problems Brother     No Known Problems Brother     No Known Problems Brother       Social History:     Social History     Socioeconomic History    Marital status: /Civil Union     Spouse name: Not on file    Number of children: Not on file    Years of education: Not on file    Highest education level: Not on file   Occupational History    Occupation: Disabled due to Fibromyalgia    Occupation: Unemployed   Tobacco Use    Smoking status: Former Smoker     Packs/day: 1 50     Years: 5 00     Pack years: 7 50     Types: Cigarettes     Quit date:      Years since quittin 6    Smokeless tobacco: Never Used    Tobacco comment: Former quit 30 years ago   Vaping Use    Vaping Use: Never used   Substance and Sexual Activity    Alcohol use: Not Currently    Drug use: No    Sexual activity: Yes     Partners: Male   Other Topics Concern    Not on file   Social History Narrative    Not on file     Social Determinants of Health     Financial Resource Strain:     Difficulty of Paying Living Expenses:    Food Insecurity:     Worried About [address] in the Last Year:     [address] in the Last Year:    Transportation Needs:     Lack of Transportation (Medical):      Lack of Transportation (Non-Medical):    Physical Activity:     Days of Exercise per Week:     Minutes of Exercise per Session:    Stress:     Feeling of Stress :    Social Connections:     Frequency of Communication with Friends and Family:     Frequency of Social Gatherings with Friends and Family:     Attends Holiness Services:     Active Member of Clubs or Organizations:     Attends Club or Organization Meetings:     Marital Status:    Intimate Partner Violence:     Fear of Current or Ex-Partner:     Emotionally Abused:     Physically Abused:     Sexually Abused:       Medications and Allergies:     Current Outpatient Medications   Medication Sig Dispense Refill    DULoxetine (CYMBALTA) 30 mg delayed release capsule Take 1 capsule (30 mg total) by mouth every morning 90 capsule 3    DULoxetine (CYMBALTA) 60 mg delayed release capsule Take 1 capsule (60 mg total) by mouth daily at bedtime 90 capsule 3    levothyroxine 100 mcg tablet Take 1 tablet (100 mcg total) by mouth daily 90 tablet 1    Melatonin 5 MG TABS Take by mouth daily at bedtime      tamoxifen (NOLVADEX) 20 mg tablet Take 1 tablet (20 mg total) by mouth daily 90 tablet 3    tiZANidine (ZANAFLEX) 4 mg tablet Take 1 tablet (4 mg total) by mouth daily at bedtime 90 tablet 1     No current facility-administered medications for this visit  No Known Allergies   Immunizations:     Immunization History   Administered Date(s) Administered    INFLUENZA 10/05/2009, 10/12/2010, 11/09/2017, 11/09/2017    Influenza Quadrivalent, 6-35 Months IM 11/08/2017    Influenza, recombinant, quadrivalent,injectable, preservative free 11/27/2018    Influenza, seasonal, injectable 10/05/2009, 10/12/2010    SARS-CoV-2 / COVID-19 mRNA IM ([facility]) 04/14/2021, 05/06/2021    Tdap 09/10/2008    Tuberculin Skin Test-PPD Intradermal 10/05/2009    influenza, injectable, quadrivalent 01/01/2019      Health Maintenance:         Topic Date Due    Hepatitis C Screening  Never done    HIV Screening  Never done    Cervical Cancer Screening  Never done    Colorectal Cancer Screening  Never done    Breast Cancer Screening: Mammogram  09/21/2021         Topic Date Due    DTaP,Tdap,and Td Vaccines (2 - Td or Tdap) 09/10/2018    Influenza Vaccine (1) 09/01/2021      Medicare Health Risk Assessment:     There were no vitals taken for this visit  June Meade is here for her Initial Wellness visit  Health Risk Assessment:   Patient rates overall health as good  Patient feels that their physical health rating is same  Patient is satisfied with their life  Eyesight was rated as same  Hearing was rated as same   Patient feels that their emotional and mental health rating is same  Patients states they are never, rarely angry  Patient states they are never, rarely unusually tired/fatigued  Pain experienced in the last 7 days has been none  Patient states that she has experienced no weight loss or gain in last 6 months  Depression Screening:   PHQ-2 Score: 0  PHQ-9 Score: 0      Fall Risk Screening: In the past year, patient has experienced: no history of falling in past year      Urinary Incontinence Screening:   Patient has not leaked urine accidently in the last six months  Home Safety:  Patient does not have trouble with stairs inside or outside of their home  Patient has no working smoke alarms and has working carbon monoxide detector  Home safety hazards include: none  Nutrition:   Current diet is Regular  Medications:   Patient is not currently taking any over-the-counter supplements  Patient is able to manage medications  Activities of Daily Living (ADLs)/Instrumental Activities of Daily Living (IADLs):   Walk and transfer into and out of bed and chair?: Yes  Dress and groom yourself?: Yes    Bathe or shower yourself?: Yes    Feed yourself?  Yes  Do your laundry/housekeeping?: Yes  Manage your money, pay your bills and track your expenses?: Yes  Make your own meals?: Yes    Do your own shopping?: Yes    Previous Hospitalizations:   Any hospitalizations or ED visits within the last 12 months?: No      Advance Care Planning:   Living will: No    Durable POA for healthcare: No    Advanced directive: No      Cognitive Screening:   Provider or family/friend/caregiver concerned regarding cognition?: No    PREVENTIVE SCREENINGS      Cardiovascular Screening:    General: Screening Current      Diabetes Screening:     General: Screening Current      Colorectal Cancer Screening:     General: Patient Declines      Breast Cancer Screening:     General: History Breast Cancer and Screening Current      Cervical Cancer Screening: General: Screening Current      Osteoporosis Screening:    General: Patient Declines      Abdominal Aortic Aneurysm (AAA) Screening:        General: Screening Not Indicated      Lung Cancer Screening:     General: Screening Not Indicated      Hepatitis C Screening:    General: Patient Declines    Screening, Brief Intervention, and Referral to Treatment (SBIRT)    Screening  Typical number of drinks in a day: 0  Typical number of drinks in a week: 0  Interpretation: Low risk drinking behavior  Single Item Drug Screening:  How often have you used an illegal drug (including marijuana) or a prescription medication for non-medical reasons in the past year? never    Single Item Drug Screen Score: 0  Interpretation: Negative screen for possible drug use disorder    Other Counseling Topics:   Car/seat belt/driving safety, skin self-exam, sunscreen and calcium and vitamin D intake and regular weightbearing exercise         RAFAT Sosa

## 2021-09-08 ENCOUNTER — HOSPITAL ENCOUNTER (OUTPATIENT)
Dept: RADIOLOGY | Facility: HOSPITAL | Age: 61
Discharge: HOME | End: 2021-09-08
Attending: PHYSICAL MEDICINE & REHABILITATION
Payer: COMMERCIAL

## 2021-09-08 DIAGNOSIS — M54.50 BILATERAL LOW BACK PAIN, UNSPECIFIED CHRONICITY, UNSPECIFIED WHETHER SCIATICA PRESENT: ICD-10-CM

## 2021-09-08 PROCEDURE — 72110 X-RAY EXAM L-2 SPINE 4/>VWS: CPT

## 2021-09-28 ENCOUNTER — TELEPHONE (OUTPATIENT)
Dept: HEMATOLOGY ONCOLOGY | Facility: CLINIC | Age: 61
End: 2021-09-28

## 2021-10-12 ENCOUNTER — TELEPHONE (OUTPATIENT)
Dept: HEMATOLOGY ONCOLOGY | Facility: CLINIC | Age: 61
End: 2021-10-12

## 2021-10-28 ENCOUNTER — APPOINTMENT (OUTPATIENT)
Dept: LAB | Facility: HOSPITAL | Age: 61
End: 2021-10-28
Payer: COMMERCIAL

## 2021-10-28 ENCOUNTER — OFFICE VISIT (OUTPATIENT)
Dept: HEMATOLOGY ONCOLOGY | Facility: CLINIC | Age: 61
End: 2021-10-28
Payer: COMMERCIAL

## 2021-10-28 VITALS
DIASTOLIC BLOOD PRESSURE: 80 MMHG | HEIGHT: 68 IN | WEIGHT: 150.5 LBS | RESPIRATION RATE: 16 BRPM | SYSTOLIC BLOOD PRESSURE: 102 MMHG | BODY MASS INDEX: 22.81 KG/M2

## 2021-10-28 DIAGNOSIS — D47.2 MGUS (MONOCLONAL GAMMOPATHY OF UNKNOWN SIGNIFICANCE): ICD-10-CM

## 2021-10-28 DIAGNOSIS — D05.11 INTRADUCTAL CARCINOMA OF RIGHT BREAST: Primary | ICD-10-CM

## 2021-10-28 LAB
ALBUMIN SERPL BCP-MCNC: 3.6 G/DL (ref 3.5–5)
ALP SERPL-CCNC: 38 U/L (ref 46–116)
ALT SERPL W P-5'-P-CCNC: 13 U/L (ref 12–78)
ANION GAP SERPL CALCULATED.3IONS-SCNC: 10 MMOL/L (ref 4–13)
AST SERPL W P-5'-P-CCNC: 9 U/L (ref 5–45)
BASOPHILS # BLD AUTO: 0.03 THOUSANDS/ΜL (ref 0–0.1)
BASOPHILS NFR BLD AUTO: 1 % (ref 0–1)
BILIRUB SERPL-MCNC: 0.46 MG/DL (ref 0.2–1)
BUN SERPL-MCNC: 12 MG/DL (ref 5–25)
CALCIUM SERPL-MCNC: 8.1 MG/DL (ref 8.3–10.1)
CHLORIDE SERPL-SCNC: 106 MMOL/L (ref 100–108)
CO2 SERPL-SCNC: 28 MMOL/L (ref 21–32)
CREAT SERPL-MCNC: 0.86 MG/DL (ref 0.6–1.3)
EOSINOPHIL # BLD AUTO: 0.22 THOUSAND/ΜL (ref 0–0.61)
EOSINOPHIL NFR BLD AUTO: 5 % (ref 0–6)
ERYTHROCYTE [DISTWIDTH] IN BLOOD BY AUTOMATED COUNT: 12.3 % (ref 11.6–15.1)
GFR SERPL CREATININE-BSD FRML MDRD: 73 ML/MIN/1.73SQ M
GLUCOSE SERPL-MCNC: 90 MG/DL (ref 65–140)
HCT VFR BLD AUTO: 38.6 % (ref 34.8–46.1)
HGB BLD-MCNC: 13 G/DL (ref 11.5–15.4)
IGA SERPL-MCNC: 958 MG/DL (ref 70–400)
IGG SERPL-MCNC: 983 MG/DL (ref 700–1600)
IGM SERPL-MCNC: 28 MG/DL (ref 40–230)
IMM GRANULOCYTES # BLD AUTO: 0.01 THOUSAND/UL (ref 0–0.2)
IMM GRANULOCYTES NFR BLD AUTO: 0 % (ref 0–2)
LYMPHOCYTES # BLD AUTO: 1.22 THOUSANDS/ΜL (ref 0.6–4.47)
LYMPHOCYTES NFR BLD AUTO: 27 % (ref 14–44)
MCH RBC QN AUTO: 32.3 PG (ref 26.8–34.3)
MCHC RBC AUTO-ENTMCNC: 33.7 G/DL (ref 31.4–37.4)
MCV RBC AUTO: 96 FL (ref 82–98)
MONOCYTES # BLD AUTO: 0.4 THOUSAND/ΜL (ref 0.17–1.22)
MONOCYTES NFR BLD AUTO: 9 % (ref 4–12)
NEUTROPHILS # BLD AUTO: 2.65 THOUSANDS/ΜL (ref 1.85–7.62)
NEUTS SEG NFR BLD AUTO: 58 % (ref 43–75)
NRBC BLD AUTO-RTO: 0 /100 WBCS
PLATELET # BLD AUTO: 236 THOUSANDS/UL (ref 149–390)
PMV BLD AUTO: 9.5 FL (ref 8.9–12.7)
POTASSIUM SERPL-SCNC: 4.4 MMOL/L (ref 3.5–5.3)
PROT SERPL-MCNC: 7.4 G/DL (ref 6.4–8.2)
RBC # BLD AUTO: 4.03 MILLION/UL (ref 3.81–5.12)
SODIUM SERPL-SCNC: 144 MMOL/L (ref 136–145)
WBC # BLD AUTO: 4.53 THOUSAND/UL (ref 4.31–10.16)

## 2021-10-28 PROCEDURE — 82784 ASSAY IGA/IGD/IGG/IGM EACH: CPT

## 2021-10-28 PROCEDURE — 86334 IMMUNOFIX E-PHORESIS SERUM: CPT | Performed by: PATHOLOGY

## 2021-10-28 PROCEDURE — 80053 COMPREHEN METABOLIC PANEL: CPT | Performed by: INTERNAL MEDICINE

## 2021-10-28 PROCEDURE — 84165 PROTEIN E-PHORESIS SERUM: CPT | Performed by: INTERNAL MEDICINE

## 2021-10-28 PROCEDURE — 84165 PROTEIN E-PHORESIS SERUM: CPT | Performed by: PATHOLOGY

## 2021-10-28 PROCEDURE — 99214 OFFICE O/P EST MOD 30 MIN: CPT | Performed by: INTERNAL MEDICINE

## 2021-10-28 PROCEDURE — 3008F BODY MASS INDEX DOCD: CPT | Performed by: INTERNAL MEDICINE

## 2021-10-28 PROCEDURE — 36415 COLL VENOUS BLD VENIPUNCTURE: CPT | Performed by: INTERNAL MEDICINE

## 2021-10-28 PROCEDURE — 86334 IMMUNOFIX E-PHORESIS SERUM: CPT | Performed by: INTERNAL MEDICINE

## 2021-10-28 PROCEDURE — 83883 ASSAY NEPHELOMETRY NOT SPEC: CPT | Performed by: INTERNAL MEDICINE

## 2021-10-28 PROCEDURE — 85025 COMPLETE CBC W/AUTO DIFF WBC: CPT | Performed by: INTERNAL MEDICINE

## 2021-10-29 LAB
KAPPA LC FREE SER-MCNC: 27.1 MG/L (ref 3.3–19.4)
KAPPA LC FREE/LAMBDA FREE SER: 1.88 {RATIO} (ref 0.26–1.65)
LAMBDA LC FREE SERPL-MCNC: 14.4 MG/L (ref 5.7–26.3)

## 2021-10-30 LAB
ALBUMIN SERPL ELPH-MCNC: 4.18 G/DL (ref 3.5–5)
ALBUMIN SERPL ELPH-MCNC: 57.2 % (ref 52–65)
ALPHA1 GLOB SERPL ELPH-MCNC: 0.28 G/DL (ref 0.1–0.4)
ALPHA1 GLOB SERPL ELPH-MCNC: 3.9 % (ref 2.5–5)
ALPHA2 GLOB SERPL ELPH-MCNC: 0.64 G/DL (ref 0.4–1.2)
ALPHA2 GLOB SERPL ELPH-MCNC: 8.7 % (ref 7–13)
BETA GLOB ABNORMAL SERPL ELPH-MCNC: 0.42 G/DL (ref 0.4–0.8)
BETA1 GLOB SERPL ELPH-MCNC: 5.8 % (ref 5–13)
BETA2 GLOB SERPL ELPH-MCNC: 6.2 % (ref 2–8)
BETA2+GAMMA GLOB SERPL ELPH-MCNC: 0.45 G/DL (ref 0.2–0.5)
GAMMA GLOB ABNORMAL SERPL ELPH-MCNC: 1.33 G/DL (ref 0.5–1.6)
GAMMA GLOB SERPL ELPH-MCNC: 18.2 % (ref 12–22)
IGG/ALB SER: 1.34 {RATIO} (ref 1.1–1.8)
INTERPRETATION UR IFE-IMP: NORMAL
M PROTEIN 1 MFR SERPL ELPH: 4.4 %
M PROTEIN 1 SERPL ELPH-MCNC: 0.32 G/DL
PROT PATTERN SERPL ELPH-IMP: NORMAL
PROT SERPL-MCNC: 7.3 G/DL (ref 6.4–8.2)

## 2021-11-24 ENCOUNTER — HOSPITAL ENCOUNTER (OUTPATIENT)
Dept: MAMMOGRAPHY | Facility: CLINIC | Age: 61
Discharge: HOME | End: 2021-11-24
Payer: COMMERCIAL

## 2021-11-24 VITALS — WEIGHT: 150 LBS | BODY MASS INDEX: 22.73 KG/M2 | HEIGHT: 68 IN

## 2021-11-24 DIAGNOSIS — Z12.31 SCREENING MAMMOGRAM FOR BREAST CANCER: ICD-10-CM

## 2021-11-24 DIAGNOSIS — Z85.3 HISTORY OF RIGHT BREAST CANCER: ICD-10-CM

## 2021-11-24 PROCEDURE — G0279 TOMOSYNTHESIS, MAMMO: HCPCS

## 2021-11-24 PROCEDURE — 77066 DX MAMMO INCL CAD BI: CPT

## 2021-11-26 ENCOUNTER — OFFICE VISIT (OUTPATIENT)
Dept: NEUROLOGY | Facility: CLINIC | Age: 61
End: 2021-11-26
Payer: COMMERCIAL

## 2021-11-26 VITALS
HEART RATE: 70 BPM | TEMPERATURE: 96.2 F | DIASTOLIC BLOOD PRESSURE: 60 MMHG | WEIGHT: 150 LBS | SYSTOLIC BLOOD PRESSURE: 100 MMHG | BODY MASS INDEX: 22.73 KG/M2 | HEIGHT: 68 IN

## 2021-11-26 DIAGNOSIS — M54.12 CERVICAL RADICULOPATHY AT C7: Primary | ICD-10-CM

## 2021-11-26 DIAGNOSIS — G25.81 RLS (RESTLESS LEGS SYNDROME): ICD-10-CM

## 2021-11-26 DIAGNOSIS — M79.7 FIBROMYALGIA: ICD-10-CM

## 2021-11-26 DIAGNOSIS — M54.2 CERVICALGIA: ICD-10-CM

## 2021-11-26 PROCEDURE — 99214 OFFICE O/P EST MOD 30 MIN: CPT | Performed by: PSYCHIATRY & NEUROLOGY

## 2021-11-26 PROCEDURE — 3008F BODY MASS INDEX DOCD: CPT | Performed by: PSYCHIATRY & NEUROLOGY

## 2021-11-26 PROCEDURE — 1036F TOBACCO NON-USER: CPT | Performed by: PSYCHIATRY & NEUROLOGY

## 2021-11-26 RX ORDER — DULOXETIN HYDROCHLORIDE 60 MG/1
60 CAPSULE, DELAYED RELEASE ORAL
Qty: 90 CAPSULE | Refills: 3 | Status: SHIPPED | OUTPATIENT
Start: 2021-11-26 | End: 2022-03-02

## 2021-11-26 RX ORDER — GABAPENTIN 100 MG/1
100 CAPSULE ORAL 3 TIMES DAILY
Qty: 90 CAPSULE | Refills: 1 | Status: SHIPPED | OUTPATIENT
Start: 2021-11-26 | End: 2022-05-27 | Stop reason: SDUPTHER

## 2021-11-26 RX ORDER — DULOXETIN HYDROCHLORIDE 30 MG/1
30 CAPSULE, DELAYED RELEASE ORAL EVERY MORNING
Qty: 90 CAPSULE | Refills: 3 | Status: SHIPPED | OUTPATIENT
Start: 2021-11-26 | End: 2022-03-02

## 2022-01-17 ENCOUNTER — TELEPHONE (OUTPATIENT)
Dept: HEMATOLOGY ONCOLOGY | Facility: CLINIC | Age: 62
End: 2022-01-17

## 2022-01-17 DIAGNOSIS — D05.11 INTRADUCTAL CARCINOMA OF RIGHT BREAST: Primary | ICD-10-CM

## 2022-01-17 RX ORDER — TAMOXIFEN CITRATE 20 MG/1
20 TABLET ORAL DAILY
Qty: 90 TABLET | Refills: 0 | Status: SHIPPED | OUTPATIENT
Start: 2022-01-17 | End: 2022-04-29 | Stop reason: SDUPTHER

## 2022-01-17 NOTE — TELEPHONE ENCOUNTER
Medication Refill     Who is Calling  Patient   Medication tamoxifen (NOLVADEX) 20 mg        How many pills left  1   Preferred Pharmacy / Address [facility] site    Call back number [phone]   Relevant Information

## 2022-02-02 ENCOUNTER — OFFICE VISIT (OUTPATIENT)
Dept: INTERNAL MEDICINE CLINIC | Facility: CLINIC | Age: 62
End: 2022-02-02
Payer: COMMERCIAL

## 2022-02-02 VITALS
DIASTOLIC BLOOD PRESSURE: 86 MMHG | HEART RATE: 84 BPM | SYSTOLIC BLOOD PRESSURE: 124 MMHG | WEIGHT: 150.2 LBS | RESPIRATION RATE: 16 BRPM | BODY MASS INDEX: 22.76 KG/M2 | HEIGHT: 68 IN

## 2022-02-02 DIAGNOSIS — J34.89 INTERNAL NASAL LESION: Primary | ICD-10-CM

## 2022-02-02 PROCEDURE — 99214 OFFICE O/P EST MOD 30 MIN: CPT | Performed by: NURSE PRACTITIONER

## 2022-02-02 PROCEDURE — 1036F TOBACCO NON-USER: CPT | Performed by: NURSE PRACTITIONER

## 2022-02-02 PROCEDURE — 3008F BODY MASS INDEX DOCD: CPT | Performed by: NURSE PRACTITIONER

## 2022-02-02 NOTE — PROGRESS NOTES
[place]    NAME: Tamica Cho  AGE: 57 y o  SEX: female  : 1960     DATE: 2022     Assessment and Plan:     1  Internal nasal lesion  2mm pustule with erythemous base left nostril  - mupirocin (BACTROBAN) 2 % ointment; Apply topically 3 (three) times a day  Dispense: 22 g; Refill: 0        No follow-ups on file  Chief Complaint:     Chief Complaint   Patient presents with    Follow-up     growth on nose  History of Present Illness:     Lump inside of left nostril, tender to touch  Been there for one week  No drainage  Tried to squeeze, hard in nature  No fevers  Review of Systems:     Review of Systems   Constitutional: Negative for activity change, fatigue and fever  HENT: Negative for congestion, hearing loss, nosebleeds, rhinorrhea, trouble swallowing and voice change  Nasal lump   Eyes: Negative  Negative for photophobia, pain, discharge and visual disturbance  Respiratory: Negative  Negative for cough, chest tightness and shortness of breath  Cardiovascular: Negative  Negative for chest pain, palpitations and leg swelling  Gastrointestinal: Negative for abdominal pain, blood in stool, constipation, nausea and vomiting  Endocrine: Negative for cold intolerance and heat intolerance  Genitourinary: Negative for difficulty urinating, frequency, hematuria, urgency, vaginal bleeding and vaginal discharge  Musculoskeletal: Negative for arthralgias and myalgias  Skin: Negative  Neurological: Negative for dizziness, weakness, numbness and headaches  Psychiatric/Behavioral: Negative for decreased concentration  The patient is not nervous/anxious           Problem List:     Patient Active Problem List   Diagnosis    History of right breast cancer    Restless leg syndrome    Fibromyalgia    Disease of thyroid gland    Cognitive impairment, mild, so stated    Hypotension    Depression    Vitamin B deficiency    Monoclonal gammopathy of undetermined significance    Pain syndrome, chronic    Vitamin D deficiency    Tachycardia    Current severe episode of major depressive disorder without psychotic features ([address] )    Hypothyroidism    Malignant neoplasm of right female breast ([address] )    Rib pain    Internal nasal lesion        Objective:     /86 (BP Location: Left arm, Patient Position: Sitting, Cuff Size: Standard)   Pulse 84   Resp 16   Ht 5' 8" (1 727 m)   Wt 68 1 kg (150 lb 3 2 oz)   BMI 22 84 kg/m²     Physical Exam  Constitutional:       Appearance: She is normal weight  HENT:      Head: Normocephalic and atraumatic  Comments: Pustule with erythema     Nose: No congestion or rhinorrhea  Eyes:      Conjunctiva/sclera: Conjunctivae normal       Pupils: Pupils are equal, round, and reactive to light  Pulmonary:      Effort: Pulmonary effort is normal    Skin:     General: Skin is warm and dry  Neurological:      Mental Status: She is alert  Psychiatric:         Mood and Affect: Mood normal          Behavior: Behavior normal          Thought Content:  Thought content normal          Judgment: Judgment normal          Jono Hernandez, [address] [facility]

## 2022-02-09 PROBLEM — J34.89 INTERNAL NASAL LESION: Status: ACTIVE | Noted: 2022-02-09

## 2022-02-10 NOTE — ASSESSMENT & PLAN NOTE
The patient has a small pustule like lesion in her left nares  Tender to touch  Recommend warm compresses and bactroban  Will call office next week if not improved

## 2022-03-02 DIAGNOSIS — M79.7 FIBROMYALGIA: ICD-10-CM

## 2022-03-02 RX ORDER — DULOXETIN HYDROCHLORIDE 30 MG/1
CAPSULE, DELAYED RELEASE ORAL
Qty: 90 CAPSULE | Refills: 3 | Status: SHIPPED | OUTPATIENT
Start: 2022-03-02 | End: 2022-03-03 | Stop reason: SDUPTHER

## 2022-03-02 RX ORDER — DULOXETIN HYDROCHLORIDE 60 MG/1
CAPSULE, DELAYED RELEASE ORAL
Qty: 90 CAPSULE | Refills: 3 | Status: SHIPPED | OUTPATIENT
Start: 2022-03-02 | End: 2022-03-03 | Stop reason: SDUPTHER

## 2022-03-02 NOTE — TELEPHONE ENCOUNTER
Dami from [facility] order called on behalf of the patient to request a 90day refill of the patient's Duloxetine 60mg and Duloxetine 30mg, but the patient will also need a 10day supply sent to the local [facility] in her chart because she is almost out

## 2022-03-02 NOTE — TELEPHONE ENCOUNTER
Dr Janeth Butcher,    I see you sent the 90 day refill to [facility] mail order   The patient ALSO needs a 10 day supply of each sent to the [facility] in her chart because she will run out before the mail order gets delivered

## 2022-03-14 DIAGNOSIS — M79.7 FIBROMYALGIA: ICD-10-CM

## 2022-03-14 NOTE — TELEPHONE ENCOUNTER
Pt calling to request refill of Cymbalta 30 mg and 60 mg  Pt states rx was sent for a bridge supply to [facility] but she has not received mail order  Pt in a lot of pain due to not having medication  Chart reviewed  Script to mail order pharmacy was cancelled  Called [facility] mail order pharmacy and submitted verbal order  They can send to pt today  Nothing further at this time

## 2022-03-28 ENCOUNTER — OFFICE VISIT (OUTPATIENT)
Dept: DERMATOLOGY | Facility: CLINIC | Age: 62
End: 2022-03-28
Payer: COMMERCIAL

## 2022-03-28 VITALS — TEMPERATURE: 98 F | BODY MASS INDEX: 22.84 KG/M2 | WEIGHT: 150.2 LBS

## 2022-03-28 DIAGNOSIS — Z13.89 SCREENING FOR SKIN CONDITION: ICD-10-CM

## 2022-03-28 DIAGNOSIS — L98.9 UNKNOWN SKIN LESION: Primary | ICD-10-CM

## 2022-03-28 DIAGNOSIS — L82.1 SEBORRHEIC KERATOSIS: ICD-10-CM

## 2022-03-28 PROCEDURE — 11102 TANGNTL BX SKIN SINGLE LES: CPT | Performed by: DERMATOLOGY

## 2022-03-28 PROCEDURE — 88305 TISSUE EXAM BY PATHOLOGIST: CPT | Performed by: STUDENT IN AN ORGANIZED HEALTH CARE EDUCATION/TRAINING PROGRAM

## 2022-03-28 PROCEDURE — 99204 OFFICE O/P NEW MOD 45 MIN: CPT | Performed by: DERMATOLOGY

## 2022-03-28 NOTE — PROGRESS NOTES
[facility] DERMATOLOGY  [address]  [place] [address]  [phone]  [phone]     MRN: [id number] : 1960  Encounter: [id number]  Patient Information: Sergio Oviedo  Chief complaint:  Skin  Lesions inside nose    History of present illness:  57-year-old female presents secondary to concerns regarding irritation inside her left nares that has been present for over a year patient noted 1 point it did drain has slowly been getting larger and patient was given some mupirocin ointment without any improvement  Past Medical History:   Diagnosis Date    Anxiety     Breast cancer ([facility] [address] ) 2017    right    Cancer [facility])     right breast  dx 2017    Cervical disc disorder of cervicothoracic region     Cervical radiculopathy     Cervicalgia     Cognitive impairment, mild, so stated     Depression     Major,recurrent    Diplopia     last assessed 13    Disc degeneration, lumbar     Disease of thyroid gland     hx of goiter, surgery done     Edema of foot     Esophageal reflux     Fatigue     Fibromyalgia     Hemorrhage, anal or rectal     History of radiation therapy     Hx of radiation therapy     Treatments: Course C1, Plan ID Energy Fractions Dose per Fraction (cGy)total Dose delivered(cGy)elapsed days   R Breast 6x 28/28 180 5,040 39, R breast e 12E  200 1,000 7 Treatment dates: 17-17    Hx of radiation therapy     33 treatments    Hypotension     Hypothyroidism     Insomnia     Memory loss     Monoclonal gammopathy of undetermined significance     Myalgia     last assessed 16    Myositis     last assessed 16    Neck stiffness     Neuralgia     Neuritis     Pain syndrome, chronic     Palpitations     Paresthesia     Peripheral neuropathy     Polyneuropathy     Pseudodementia     Radiculopathy     13    Restless leg syndrome     Sacroiliitis (HCC)     [facility]     last assessed 13    Tachycardia     Viral warts     Vitamin B deficiency     Vitamin D deficiency     Vitamin D deficiency     Wears glasses      Past Surgical History:   Procedure Laterality Date    BLADDER SURGERY      bladder prolapse    BREAST BIOPSY Right     BREAST SURGERY Right     breast bx    COLONOSCOPY      age 45 normal    HYSTERECTOMY  2009    vaginal hysterectomy, rectocele repair    AR MASTECTOMY, PARTIAL Right 2017    Procedure:  (NUCLEAR MED  8 AM , NEEDLE LOCAL TO FOLLOW 8:30 AM ) SEGMENTAL MASTECTOMY BREAST NEEDLE PLACEMENT,  SENTINAL LYMPH NODE BIOPSY;  Surgeon: Rolan Castellanos MD;  Location: [facility] OR;  Service: General    REPAIR RECTOCELE      THYROID LOBECTOMY Left 1978    TONSILLECTOMY      US BREAST NEEDLE LOC RIGHT Right 2017    US GUIDED BREAST BIOPSY RIGHT COMPLETE Right 2017    US GUIDED THYROID BIOPSY  2019     Social History   Social History     Substance and Sexual Activity   Alcohol Use Not Currently     Social History     Substance and Sexual Activity   Drug Use No     Social History     Tobacco Use   Smoking Status Former Smoker    Packs/day: 1 50    Years: 5 00    Pack years: 7 50    Types: Cigarettes    Quit date: 65    Years since quittin 2   Smokeless Tobacco Never Used   Tobacco Comment    Former quit 27 years ago     Family History   Problem Relation Age of Onset    Heart disease Mother         CHF    Heart failure Mother     Cirrhosis Father     Esophageal cancer Brother 72    No Known Problems Brother     No Known Problems Brother     No Known Problems Brother      Meds/Allergies   No Known Allergies    Meds:  Prior to Admission medications    Medication Sig Start Date End Date Taking?  Authorizing Provider   DULoxetine (CYMBALTA) 30 mg delayed release capsule Take 1 capsule (30 mg total) by mouth every morning 3/3/22  Yes Rosa Goodman MD   DULoxetine (CYMBALTA) 60 mg delayed release capsule Take 1 capsule (60 mg total) by mouth daily at bedtime 3/3/22  Yes Galen Fairbanks MD   tamoxifen (NOLVADEX) 20 mg tablet Take 1 tablet (20 mg total) by mouth daily 1/17/22  Yes Williams Ganser, MD PhD   gabapentin (Neurontin) 100 mg capsule Take 1 capsule (100 mg total) by mouth 3 (three) times a day  Patient not taking: Reported on 2/2/2022 11/26/21   Galen Fairbanks MD   levothyroxine 100 mcg tablet Take 1 tablet (100 mcg total) by mouth daily  Patient not taking: Reported on 3/28/2022  12/27/20   Donavon Nazario MD   mupirocin Vani Nephew) 2 % ointment Apply topically 3 (three) times a day  Patient not taking: Reported on 3/28/2022  2/2/22   RAFAT Shepard   predniSONE 20 mg tablet Take 3 tabs for three days, take 2 tabs for three days, take 1 tab for three days    Patient not taking: Reported on 2/2/2022 8/19/21   RAFAT Parra       Subjective:     Review of Systems:    General: negative for - chills, fatigue, fever,  weight gain or weight loss  Psychological: negative for - anxiety, behavioral disorder, concentration difficulties, decreased libido, depression, irritability, memory difficulties, mood swings, sleep disturbances or suicidal ideation  ENT: negative for - hearing difficulties , nasal congestion, nasal discharge, oral lesions, sinus pain, sneezing, sore throat  Allergy and Immunology: negative for - hives, insect bite sensitivity,  Hematological and Lymphatic: negative for - bleeding problems, blood clots,bruising, swollen lymph nodes  Endocrine: negative for - hair pattern changes, hot flashes, malaise/lethargy, mood swings, palpitations, polydipsia/polyuria, skin changes, temperature intolerance or unexpected weight change  Respiratory: negative for - cough, hemoptysis, orthopnea, shortness of breath, or wheezing  Cardiovascular: negative for - chest pain, dyspnea on exertion, edema,  Gastrointestinal: negative for - abdominal pain, nausea/vomiting  Genito-Urinary: negative for - dysuria, incontinence, irregular/heavy menses or urinary frequency/urgency  Musculoskeletal: negative for - gait disturbance, joint pain, joint stiffness, joint swelling, muscle pain, muscular weakness  Dermatological:  As in HPI  Neurological: negative for confusion, dizziness, headaches, impaired coordination/balance, memory loss, numbness/tingling, seizures, speech problems, tremors or weakness       Objective:   Temp 98 °F (36 7 °C) (Temporal)   Wt 68 1 kg (150 lb 3 2 oz)   BMI 22 84 kg/m²     Physical Exam:    General Appearance:    Alert, cooperative, no distress   Head:    Normocephalic, without obvious abnormality, atraumatic           Skin:   A full skin exam was performed including scalp, head scalp, eyes, ears, nose, lips, neck, chest, axilla, abdomen, back, buttocks, bilateral upper extremities, bilateral lower extremities, hands, feet, fingers, toes, fingernails, and toenails 6 mm slightly pearly papule noted inside the left naris normal keratotic papules greasy stuck appearance nothing else remarkable noted on exam      Shave Biopsy Procedure Note    Pre-operative Diagnosis:  Rule out basal cell carcinoma    Plan:  1  Instructed to keep the wound dry and covered for 24 and clean thereafter  2  Warning signs of infection were reviewed  3  Recommended that the patient use OTC acetaminophen as needed for pain  4  Return  Pending results of biopsy(ies)    Locations:  Anterior nasal septum    Indications:  Suspicious nonhealing lesion    Anesthesia: Lidocaine 1% with epinephrine without added sodium bicarbonate    Procedure Details     Patient informed of the risks (including bleeding and infection) and benefits of the   procedure and Verbal informed consent obtained  The lesion and surrounding area were given a sterile prep using alcohol and draped in the usual sterile fashion  A Gradle  scissor was used to obtain a specimen  Hemostasis achieved with aluminum chloride   Petrolatum and a sterile dressing applied  The specimen was sent for pathologic examination  The patient tolerated the procedure(s) well  Complications:  none  Assessment:     1  Unknown skin lesion     2  Seborrheic keratosis     3  Screening for skin condition           Plan:   Skin lesion  unusual area but suspicious clinically for a basal cell carcinoma if positive will refer either for Mohs or ENT  Seborrheic Keratosis  Patient reasurred these are normal growths we acquire with age no treatment needed  Screening for Dermatologic Disorders: Nothing else of concern noted on complete exam follow up in prn  Bre Encinas MD  3/28/2022,10:47 AM    Portions of the record may have been created with voice recognition software   Occasional wrong word or "sound a like" substitutions may have occurred due to the inherent limitations of voice recognition software   Read the chart carefully and recognize, using context, where substitutions have occurred

## 2022-03-28 NOTE — PATIENT INSTRUCTIONS
Skin lesion  unusual area but suspicious clinically for a basal cell carcinoma if positive will refer either for Mohs or ENT  Seborrheic Keratosis  Patient reasurred these are normal growths we acquire with age no treatment needed    Screening for Dermatologic Disorders: Nothing else of concern noted on complete exam follow up in prn

## 2022-04-05 ENCOUNTER — TELEPHONE (OUTPATIENT)
Dept: DERMATOLOGY | Facility: CLINIC | Age: 62
End: 2022-04-05

## 2022-04-05 NOTE — TELEPHONE ENCOUNTER
----- Message from Daya Cardoso MD sent at 4/4/2022  4:31 PM EDT -----  Please call her of normal pathology

## 2022-04-28 ENCOUNTER — TELEPHONE (OUTPATIENT)
Dept: HEMATOLOGY ONCOLOGY | Facility: CLINIC | Age: 62
End: 2022-04-28

## 2022-04-28 NOTE — TELEPHONE ENCOUNTER
Called and LMOM advising patient she needed to lave blood work done no later than 4/29 so we can have results in time for her appointment on 5/2  I encouraged a call back ti [phone] if she could not have this done and needed to RS her appointment

## 2022-04-29 ENCOUNTER — TELEPHONE (OUTPATIENT)
Dept: HEMATOLOGY ONCOLOGY | Facility: CLINIC | Age: 62
End: 2022-04-29

## 2022-04-29 DIAGNOSIS — D05.11 INTRADUCTAL CARCINOMA OF RIGHT BREAST: ICD-10-CM

## 2022-04-29 RX ORDER — TAMOXIFEN CITRATE 20 MG/1
20 TABLET ORAL DAILY
Qty: 90 TABLET | Refills: 0 | Status: SHIPPED | OUTPATIENT
Start: 2022-04-29 | End: 2022-05-02 | Stop reason: SDUPTHER

## 2022-04-29 NOTE — TELEPHONE ENCOUNTER
Medication Refill     Who is [address] [facility]   Medication tamoxifen (NOLVADEX) 20 mg tablet        How many pills left  None   Preferred Pharmacy / Address [address] [facility], [address] [facility]   [place] [address]   Phone:  [phone]  Fax:  [phone]    Who is your Physician?  B Freddie   Call back number [phone]     Relevant Information  Asking for 90 day supply authorization

## 2022-05-02 ENCOUNTER — TELEPHONE (OUTPATIENT)
Dept: HEMATOLOGY ONCOLOGY | Facility: CLINIC | Age: 62
End: 2022-05-02

## 2022-05-02 ENCOUNTER — APPOINTMENT (OUTPATIENT)
Dept: LAB | Facility: HOSPITAL | Age: 62
End: 2022-05-02
Payer: COMMERCIAL

## 2022-05-02 DIAGNOSIS — D05.11 INTRADUCTAL CARCINOMA OF RIGHT BREAST: ICD-10-CM

## 2022-05-02 PROCEDURE — 84165 PROTEIN E-PHORESIS SERUM: CPT | Performed by: PATHOLOGY

## 2022-05-02 RX ORDER — TAMOXIFEN CITRATE 20 MG/1
20 TABLET ORAL DAILY
Qty: 90 TABLET | Refills: 1 | Status: SHIPPED | OUTPATIENT
Start: 2022-05-02 | End: 2022-05-05 | Stop reason: SDUPTHER

## 2022-05-03 NOTE — PROGRESS NOTES
HEMATOLOGY CLINIC NOTE    Primary Care Provider: Keila Torres MD  Referring Provider:    MRN: [id number]  : 1960    Assessment / Plan:   1  Intraductal carcinoma of right breast  This is a 78-year-old female with a history of stage IA right breast cancer diagnosed 2017 after mammogram  Details as in HPI  S/P lumpectomy 2017  S/P RT 7-2017  Patient started Tamoxifen 20mg once daily 2017  She was started on this and not AI due to hx of fibromyalgia, arthritis  Patient has tolerated tamoxifen since without any significant side effect  No vaginal bleeding or leg swelling  Mammogram 2021 was normal   Patient had a breast examination today by me showing no new masses, lymphadenopathy  Recent CBC, CMP were normal     Patient will continue this medication until July which will martin her 5 year martin  We will see her in July to discuss discontinuing tamoxifen  - tamoxifen (NOLVADEX) 20 mg tablet; Take 1 tablet (20 mg total) by mouth daily  Dispense: 90 tablet; Refill: 0  - CBC and differential; Future  - Comprehensive metabolic panel; Future    2  MGUS (monoclonal gammopathy of unknown significance)  Patient has history of MGUS, IgA kappa  This has been present since   Recent labs showed 2022: SPEP M spike 0 47, Kappa 23 2, Lambda 14 8, K/L ratio 1 57  Again, CBC, CMP showed no signs of "CRAB"  No constitutional symptoms or bone pain  Mild increase in M spike  Trends are in HPI  Patient will have these labs repeated again in 6 months  If these are stable, we will return to annual testing     - CBC and differential; Future  - Comprehensive metabolic panel; Future  - Protein electrophoresis, serum; Future  - Immunoglobulin free LT chains blood; Future    Follow up in 2 5 months     Patient voiced understanding and agreement to the above  The patient knows to call the office with any questions or concerns regarding the above      I have spent 30 minutes with Patient  today in which greater than 50% of this time was spent in counseling/coordination of care regarding Diagnostic results, Risks and benefits of tx options, Intructions for management, Patient and family education, Importance of tx compliance, Risk factor reductions and Impressions  Reason for visit:       Chief Complaint   Patient presents with    Follow-up       History of Hematology Illness:     Teresa Burdick is a 57 y o  female who came in for follow up  1   Stage IA right breast cancer, postmenopausal; pT1b pN0 (4 LN -) Mx G1 R0 ER/MT+ HER-2/mike negative RS = 7 = low        - 4/2017: Patient had concerning mammogram  FNA done --> pathology showed the below findings:      - Invasive breast carcinoma, no special type (invasive ductal carcinoma)  - [place] combined histologic grade 1 of 3, well differentiated (tubule formation score 2 of 3, nuclear        pleomorphism score 2 of 3, mitotic activity score 1 of 3, total score 5 of 9)  - Carcinoma involves 4 of 4 cores, maximum linear length: 6 4 mm  - Lymphovascular invasion not identified                                    - Ductal carcinoma in-situ (DCIS) not identified  - Microcalcifications: Present, associated with invasive carcinoma and benign epithelium  - ER/MT 90-95% Positive; HER2 by IHC 1+ Negative  - 5/24/17:  Lumpectomy with SLNBx --> Stage IA (pT1b, pN0 (sn), G1  Margins: Uninvolved by invasive and in-situ carcinoma  Invasive Carcinoma: 4 5 mm from nearest inferior margin  DCIS: 6 mm from nearest posterior inferior margin  - 7/2017 - 9/2017: S/P Radiation   - 7/2017: started Tamoxifen 20mg once daily over AI due to hx of fibromylagia, arthritis  She is s/p hysterectomy, ovaries still remain  - 11/2021: last mammogram --> benign findings  2  MGUS - IgA  - patient unsure why she was tested for this initially  - initially found on SPEP 7/2016 --> M spike 0 31  IgA kappa on immunofixation    - 4/2017:  IgA 1000 (), IgG 1260 (WNL), IgM 36 (LLN 36)  - 4/2019: SPEP M spike 0 34  IgA 901, IgG 1050, IgM 23    - 10/2020: SPEP M spike 0 28  IgA 942, IgG 1080, IgM 31  Kappa free lt chain 25  3  lambda free lt chain 14 8, K/L ratio 1 71    - 10/2021: SPEP M spike 0 32, IgA 958, IgG 983, IgM 28  Kappa 27 1, lambda 14 4, K/L ratio 1 88     - 5/2022: SPEP M spike 0 47, Kappa 23 2, Lambda 14 8, K/L ratio 1 57  Interval History:   "4/9:  Reported overall doing well  No lumps bumps  No constitutional symptoms  Following surgeon, for breast examination and mammogram, per patient no new findings  Tolerating tamoxifen well, no hot flash symptoms, no new bone pains, status post hysterectomy      10/2018 : Came in for follow-up, doing well  Reported still having breast pain, otherwise no new lumps bumps  No bone pain  No other constitutional symptoms      10/17/2019 : came in for follow up  Has been doing well  No leg swelling, no vaginal bleeding  No new lumps / bumps "    "10/15/2020:  Patient is doing well  Patient is up-to-date on mammography completed in September 2020  No vaginal bleeding no lower abdominal bloating  Patient had hysterectomy, but has one ovary retained  Colonoscopy is out of date "     "10/2021 :  Patient came for follow-up doing well  No constitutional symptoms, no new lumps bumps  Reported right breast chest wall shoulder soreness, has been chronic since surgery"    5/5/2022: Patient came in for follow up  Tolerating tamoxifen without any significant side effect  She has no vaginal bleeding, leg swelling, fatigue, constitutional symptoms such as fever, chills, night sweats, lumps, bumps, sudden weight loss  No appetite changes  No bone pain   uptodate on mammogram     Problem list:       Patient Active Problem List   Diagnosis    History of right breast cancer    Restless leg syndrome    Fibromyalgia    Disease of thyroid gland    Cognitive impairment, mild, so stated    Hypotension    Depression    Vitamin B deficiency    Monoclonal gammopathy of undetermined significance    Pain syndrome, chronic    Vitamin D deficiency    Tachycardia    Current severe episode of major depressive disorder without psychotic features ([facility] [address] [facility]    Hypothyroidism    Malignant neoplasm of right female breast ([facility] [address] )    Rib pain    Internal nasal lesion       REVIEW OF SYMPTOMS:   Review of Systems   Constitutional: Negative for activity change, appetite change, chills, diaphoresis, fatigue, fever and unexpected weight change  HENT: Negative for mouth sores and nosebleeds  Eyes: Negative for visual disturbance  Respiratory: Negative for apnea, cough, choking, chest tightness, shortness of breath, wheezing and stridor  Cardiovascular: Negative for chest pain, palpitations and leg swelling  Gastrointestinal: Negative for abdominal pain, anal bleeding, blood in stool, constipation, diarrhea, nausea and vomiting  Endocrine: Negative for cold intolerance  Genitourinary: Negative for hematuria, menstrual problem and vaginal bleeding  Musculoskeletal: Negative for arthralgias  Skin: Negative for color change, pallor and rash  Neurological: Negative for dizziness, syncope, light-headedness and headaches  Hematological: Negative for adenopathy  Does not bruise/bleed easily  Psychiatric/Behavioral: Negative for sleep disturbance  PHYSICAL EXAMINATION:     Vital Signs:   /64 (BP Location: Left arm, Patient Position: Sitting, Cuff Size: Adult)   Pulse 86   Temp 98 1 °F (36 7 °C)   Resp 16   Ht 5' 8" (1 727 m)   Wt 67 6 kg (149 lb)   SpO2 94%   BMI 22 66 kg/m²   Body surface area is 1 8 meters squared     Ht Readings from Last 8 Encounters:   05/05/22 5' 8" (1 727 m)   02/02/22 5' 8" (1 727 m)   11/26/21 5' 8" (1 727 m)   11/24/21 5' 8" (1 727 m)   10/28/21 5' 8" (1 727 m)   08/19/21 5' 8" (1 727 m)   05/19/21 5' 8" (1 727 m)   11/06/20 5' 8" (1 727 m)       Wt Readings from Last 8 Encounters: 05/05/22 67 6 kg (149 lb)   03/28/22 68 1 kg (150 lb 3 2 oz)   02/02/22 68 1 kg (150 lb 3 2 oz)   11/26/21 68 kg (150 lb)   11/24/21 68 kg (150 lb)   10/28/21 68 3 kg (150 lb 8 oz)   08/19/21 64 9 kg (143 lb)   05/19/21 66 7 kg (147 lb)          Physical Exam  Constitutional:       General: She is not in acute distress  Appearance: Normal appearance  She is not ill-appearing, toxic-appearing or diaphoretic  HENT:      Head: Normocephalic and atraumatic  Eyes:      General: No scleral icterus  Extraocular Movements: Extraocular movements intact  Conjunctiva/sclera: Conjunctivae normal       Pupils: Pupils are equal, round, and reactive to light  Cardiovascular:      Rate and Rhythm: Normal rate and regular rhythm  Heart sounds: Normal heart sounds  No murmur heard  Pulmonary:      Effort: Pulmonary effort is normal  No respiratory distress  Breath sounds: Normal breath sounds  No stridor  No wheezing, rhonchi or rales  Chest:      Chest wall: No mass, lacerations, deformity, swelling, tenderness, crepitus or edema  There is no dullness to percussion  Breasts:      Right: Normal  No swelling, bleeding, inverted nipple, mass, nipple discharge, skin change, tenderness, axillary adenopathy or supraclavicular adenopathy  Left: Normal  No swelling, bleeding, inverted nipple, mass, nipple discharge, skin change, tenderness, axillary adenopathy or supraclavicular adenopathy  Abdominal:      Palpations: Abdomen is soft  Tenderness: There is no abdominal tenderness  Musculoskeletal:         General: No tenderness  Normal range of motion  Cervical back: Normal range of motion and neck supple  Right lower leg: No edema  Left lower leg: No edema  Lymphadenopathy:      Cervical: No cervical adenopathy  Upper Body:      Right upper body: No supraclavicular, axillary or pectoral adenopathy        Left upper body: No supraclavicular, axillary or pectoral adenopathy  Skin:     General: Skin is warm and dry  Coloration: Skin is not jaundiced or pale  Findings: No bruising, erythema, lesion or rash  Neurological:      General: No focal deficit present  Mental Status: She is alert and oriented to person, place, and time  Mental status is at baseline  Motor: No weakness  Psychiatric:         Mood and Affect: Mood normal          Behavior: Behavior normal          Thought Content: Thought content normal          Judgment: Judgment normal        Reviewed historical information  PAST MEDICAL HISTORY:    Past Medical History:   Diagnosis Date    Anxiety     Breast cancer [facility] [address] ) 2017    right    Cancer [facility])     right breast  dx 04/2017    Cervical disc disorder of cervicothoracic region     Cervical radiculopathy     Cervicalgia     Cognitive impairment, mild, so stated     Depression     Major,recurrent    Diplopia     last assessed 12/23/13    Disc degeneration, lumbar     Disease of thyroid gland     hx of goiter, surgery done 1978    Edema of foot     Esophageal reflux     Fatigue     Fibromyalgia     Hemorrhage, anal or rectal     History of radiation therapy     Hx of radiation therapy     Treatments: Course C1, Plan ID Energy Fractions Dose per Fraction (cGy)total Dose delivered(cGy)elapsed days   R Breast 6x 28/28 180 5,040 39, R breast e 12E 5/5 200 1,000 7 Treatment dates: 7/20/17-09/05/17    Hx of radiation therapy     33 treatments    Hypotension     Hypothyroidism     Insomnia     Memory loss     Monoclonal gammopathy of undetermined significance     Myalgia     last assessed 07/08/16    Myositis     last assessed 07/08/16    Neck stiffness     Neuralgia     Neuritis     Pain syndrome, chronic     Palpitations     Paresthesia     Peripheral neuropathy     Polyneuropathy     Pseudodementia     Radiculopathy     12/23/13    Restless leg syndrome     Sacroiliitis (HCC)     [facility]     last assessed 12/23/13    Tachycardia     Viral warts     Vitamin B deficiency     Vitamin D deficiency     Vitamin D deficiency     Wears glasses        PAST SURGICAL HISTORY:    Past Surgical History:   Procedure Laterality Date    BLADDER SURGERY      bladder prolapse    BREAST BIOPSY Right     BREAST SURGERY Right     breast bx    COLONOSCOPY      age 45 normal    HYSTERECTOMY  2009    vaginal hysterectomy, rectocele repair    [facility] MASTECTOMY, PARTIAL Right 5/24/2017    Procedure:  (NUCLEAR MED  8 AM , NEEDLE LOCAL TO FOLLOW 8:30 AM ) SEGMENTAL MASTECTOMY BREAST NEEDLE PLACEMENT,  SENTINAL LYMPH NODE BIOPSY;  Surgeon: Padilla Cantu MD;  Location: [address] [place] [address];  Service: General    REPAIR RECTOCELE      THYROID LOBECTOMY Left 1978    TONSILLECTOMY      US BREAST NEEDLE LOC RIGHT Right 5/24/2017    US GUIDED BREAST BIOPSY RIGHT COMPLETE Right 4/20/2017    US GUIDED THYROID BIOPSY  4/25/2019         CURRENT MEDICATIONS:     Current Outpatient Medications:     DULoxetine (CYMBALTA) 30 mg delayed release capsule, Take 1 capsule (30 mg total) by mouth every morning, Disp: 10 capsule, Rfl: 0    DULoxetine (CYMBALTA) 60 mg delayed release capsule, Take 1 capsule (60 mg total) by mouth daily at bedtime, Disp: 10 capsule, Rfl: 0    gabapentin (Neurontin) 100 mg capsule, Take 1 capsule (100 mg total) by mouth 3 (three) times a day (Patient not taking: Reported on 2/2/2022 ), Disp: 90 capsule, Rfl: 1    levothyroxine 100 mcg tablet, Take 1 tablet (100 mcg total) by mouth daily (Patient not taking: Reported on 3/28/2022 ), Disp: 90 tablet, Rfl: 1    mupirocin (BACTROBAN) 2 % ointment, Apply topically 3 (three) times a day (Patient not taking: Reported on 3/28/2022 ), Disp: 22 g, Rfl: 0    predniSONE 20 mg tablet, Take 3 tabs for three days, take 2 tabs for three days, take 1 tab for three days   (Patient not taking: Reported on 2/2/2022 ), Disp: 18 tablet, Rfl: 0    tamoxifen (NOLVADEX) 20 mg tablet, Take 1 tablet (20 mg total) by mouth daily, Disp: 90 tablet, Rfl: 1    SOCIAL HISTORY:    Social History     Tobacco Use    Smoking status: Former Smoker     Packs/day: 1 50     Years: 5 00     Pack years: 7 50     Types: Cigarettes     Quit date:      Years since quittin 3    Smokeless tobacco: Never Used    Tobacco comment: Former quit 30 years ago   Vaping Use    Vaping Use: Never used   Substance Use Topics    Alcohol use: Not Currently    Drug use: No     Tobacco Use    Smoking status: Former Smoker     Packs/day: 1 50     Years: 5 00     Pack years: 7 50     Types: Cigarettes     Quit date:      Years since quittin 3    Smokeless tobacco: Never Used    Tobacco comment: Former quit 30 years ago   Vaping Use    Vaping Use: Never used   Substance Use Topics    Alcohol use: Not Currently    Drug use: No      Problem Relation Age of Onset    Heart disease Mother         CHF    Heart failure Mother     Cirrhosis Father     Esophageal cancer Brother 72    No Known Problems Brother     No Known Problems Brother     No Known Problems Brother        ALLERGIES:  No Known Allergies      LAB:    Lab Results   Component Value Date    WBC 4 57 2022    HGB 13 5 2022    HCT 39 8 2022    MCV 98 2022     2022       Lab Results   Component Value Date     2014    SODIUM 142 2022    K 4 0 2022     2022    CO2 29 2022    ANIONGAP 6 6 (L) 2014    AGAP 8 2022    BUN 16 2022    CREATININE 0 87 2022    GLUC 88 2022    CALCIUM 8 2 (L) 2022    AST 15 2022    ALT 17 2022    ALKPHOS 52 2022    PROT 7 5 2014    TP 7 5 2022    TP 7 6 2022    BILITOT 0 4 2014    TBILI 0 24 2022    EGFR 71 2022       IMAGING:  Mammo diagnostic bilateral w 3d & cad  Narrative: DIAGNOSIS: Screening mammogram for breast cancer;  History of right breast   cancer     TECHNIQUE:  Digital screening mammography was performed  Computer Aided Detection   (CAD) analyzed all applicable images  COMPARISONS: Prior breast imaging dated: 09/21/2020, 09/16/2019,   09/13/2018, 05/24/2017, 04/20/2017, and 04/14/2017, 04/06/2017,   02/16/2016, 12/12/2014  RELEVANT HISTORY:   Family Breast Cancer History: No known family history of breast cancer  Family Medical History: No known relevant family medical history  Personal History: Hormone history includes tamoxifen  Surgical history   includes breast biopsy, lumpectomy, and hysterectomy  Medical history   includes breast cancer  RISK ASSESSMENT:   [facility] risk assessment reporting was suppressed due to the patient's   history and/or demographic factors  TISSUE DENSITY:   The breasts are heterogeneously dense, which may obscure small masses  INDICATION: Elmira Tripathi is a 57 y o  female presenting for History   of right breast cancer  Right breast cancer status post lumpectomy and   radiation therapy in 2017  Patient is on tamoxifen  She did not receive   chemotherapy for her breast cancer  Annual mammogram, no reported breast   symptoms  FINDINGS:   Bilateral  There are stable postsurgical changes in the right breast   Mild right   breast skin thickening is similar to prior studies and consistent with   history of radiation therapy  There is no abnormal skin thickening in the   left breast   There are no suspicious masses, grouped microcalcifications or areas of   unexplained architectural distortion  Impression:    Findings are benign  No mammographic evidence of malignancy  ASSESSMENT/BI-RADS CATEGORY:  Left: 1 - Negative  Right: 2 - Benign  Overall: 2 - Benign    RECOMMENDATION:       - Diagnostic mammogram in 1 year for both breasts      Workstation ID: [id number]

## 2022-05-05 ENCOUNTER — OFFICE VISIT (OUTPATIENT)
Dept: HEMATOLOGY ONCOLOGY | Facility: CLINIC | Age: 62
End: 2022-05-05
Payer: COMMERCIAL

## 2022-05-05 VITALS
HEART RATE: 86 BPM | HEIGHT: 68 IN | RESPIRATION RATE: 16 BRPM | BODY MASS INDEX: 22.58 KG/M2 | OXYGEN SATURATION: 94 % | DIASTOLIC BLOOD PRESSURE: 64 MMHG | SYSTOLIC BLOOD PRESSURE: 102 MMHG | TEMPERATURE: 98.1 F | WEIGHT: 149 LBS

## 2022-05-05 DIAGNOSIS — D05.11 INTRADUCTAL CARCINOMA OF RIGHT BREAST: ICD-10-CM

## 2022-05-05 DIAGNOSIS — D47.2 MGUS (MONOCLONAL GAMMOPATHY OF UNKNOWN SIGNIFICANCE): Primary | ICD-10-CM

## 2022-05-05 PROCEDURE — 99214 OFFICE O/P EST MOD 30 MIN: CPT | Performed by: INTERNAL MEDICINE

## 2022-05-05 RX ORDER — TAMOXIFEN CITRATE 20 MG/1
20 TABLET ORAL DAILY
Qty: 90 TABLET | Refills: 0 | Status: SHIPPED | OUTPATIENT
Start: 2022-05-05

## 2022-05-26 ENCOUNTER — TELEPHONE (OUTPATIENT)
Dept: NEUROLOGY | Facility: CLINIC | Age: 62
End: 2022-05-26

## 2022-05-26 NOTE — TELEPHONE ENCOUNTER
Called patient lvm to call back to confirm upcoming appointment with Roberto Carlos Rogers on Friday May 27, 2022

## 2022-05-27 ENCOUNTER — OFFICE VISIT (OUTPATIENT)
Dept: NEUROLOGY | Facility: CLINIC | Age: 62
End: 2022-05-27
Payer: COMMERCIAL

## 2022-05-27 VITALS
OXYGEN SATURATION: 97 % | WEIGHT: 149 LBS | TEMPERATURE: 99.3 F | HEART RATE: 88 BPM | BODY MASS INDEX: 22.58 KG/M2 | DIASTOLIC BLOOD PRESSURE: 59 MMHG | HEIGHT: 68 IN | SYSTOLIC BLOOD PRESSURE: 100 MMHG

## 2022-05-27 DIAGNOSIS — G89.4 PAIN SYNDROME, CHRONIC: Primary | ICD-10-CM

## 2022-05-27 DIAGNOSIS — M54.12 CERVICAL RADICULOPATHY AT C7: ICD-10-CM

## 2022-05-27 DIAGNOSIS — G25.81 RESTLESS LEG SYNDROME: ICD-10-CM

## 2022-05-27 PROCEDURE — 3008F BODY MASS INDEX DOCD: CPT

## 2022-05-27 PROCEDURE — 99213 OFFICE O/P EST LOW 20 MIN: CPT

## 2022-05-27 PROCEDURE — 1036F TOBACCO NON-USER: CPT

## 2022-05-27 RX ORDER — GABAPENTIN 100 MG/1
100 CAPSULE ORAL 3 TIMES DAILY
Qty: 270 CAPSULE | Refills: 1 | Status: SHIPPED | OUTPATIENT
Start: 2022-05-27

## 2022-05-27 NOTE — PROGRESS NOTES
Patient ID: Jorge Armstrong is a 57 y o  female  Assessment/Plan:    No problem-specific Assessment & Plan notes found for this encounter  {Assess/PlanSmartLinks:75291}       Subjective:    HPI    {[facility] HPI texts:56904}    {Common ambulatory SmartLinks:69247}         Objective:    Blood pressure 100/59, pulse 88, temperature 99 3 °F (37 4 °C), temperature source Temporal, height 5' 8" (1 727 m), weight 67 6 kg (149 lb), SpO2 97 %, not currently breastfeeding  Physical Exam    Neurological Exam      ROS:    Review of Systems   Constitutional: Negative  Negative for appetite change and fever  HENT: Negative  Negative for hearing loss, tinnitus, trouble swallowing and voice change  Eyes: Negative  Negative for photophobia and pain  Respiratory: Negative  Negative for shortness of breath  Cardiovascular: Negative  Negative for palpitations  Gastrointestinal: Negative  Negative for nausea and vomiting  Endocrine: Negative  Negative for cold intolerance  Genitourinary: Negative  Negative for dysuria, frequency and urgency  Musculoskeletal: Positive for back pain, gait problem and neck pain  Negative for myalgias  Skin: Negative  Negative for rash  Neurological: Positive for tremors  Negative for dizziness, seizures, syncope, facial asymmetry, speech difficulty, weakness, light-headedness, numbness and headaches  Patient stated that she has tremors in both hands  Hematological: Negative  Does not bruise/bleed easily  Psychiatric/Behavioral: Negative  Negative for confusion, hallucinations and sleep disturbance

## 2022-05-27 NOTE — PATIENT INSTRUCTIONS
- Continue Cymbalta 30 mg in the morning and 60 mg at bedtime  - Start Gabapentin 100 mg three times a day  - Continue with home exercise program  - Continue to follow up with Dr Darlin Reyez  - Follow up in 6 months    Neck Exercises   AMBULATORY CARE:   Neck exercises  help reduce neck pain, and improve neck movement and strength  Neck exercises also help prevent long-term neck problems  What you need to know about neck exercises:   Do the exercises every day,  or as often as directed by your healthcare provider  Move slowly, gently, and smoothly  Avoid fast or jerky motions  Stand and sit the way your healthcare provider shows you  Good posture may reduce your neck pain  Check your posture often, even when you are not doing your neck exercises  How to perform neck exercises safely:   Exercise position:  You may sit or stand while you do neck exercises  Face forward  Your shoulders should be straight and relaxed, with a good posture  Head tilts, forward and back:  Gently bow your head and try to touch your chin to your chest  Your healthcare provider may tell you to push on the back of your neck to help bow your head  Raise your chin back to the starting position  Tilt your head back as far as possible so you are looking up at the ceiling  Your healthcare provider may tell you to lift your chin to help tilt your head back  Return your head to the starting position  Head tilts, side to side:  Tilt your head, bringing your ear toward your shoulder  Then tilt your head toward the other shoulder  Head turns:  Turn your head to look over your shoulder  Tilt your chin down and try to touch it to your shoulder  Do not raise your shoulder to your chin  Face forward again  Do the same on the other side  Head rolls:  Slowly bring your chin toward your chest  Next, roll your head to the right  Your ear should be positioned over your shoulder  Hold this position for 5 seconds   Roll your head back toward your chest and to the left into the same position  Hold for 5 seconds  Gently roll your head back and around in a clockwise [place] 3 times  Next, move your head in the reverse direction (counterclockwise) in a [place] 3 times  Do not shrug your shoulders upwards while you do this exercise  Contact your healthcare provider if:   Your pain does not get better, or gets worse  You have questions or concerns about your condition, care, or exercise program     © Copyright [employer] 2022 Information is for End User's use only and may not be sold, redistributed or otherwise used for commercial purposes  All illustrations and images included in CareNotes® are the copyrighted property of A D A M , Inc  or [facility] Chika Guadarrama   The above information is an  only  It is not intended as medical advice for individual conditions or treatments  Talk to your doctor, nurse or pharmacist before following any medical regimen to see if it is safe and effective for you  Lower Back Exercises   AMBULATORY CARE:   Lower back exercises  help heal and strengthen your back muscles to prevent another injury  Ask your healthcare provider if you need to see a physical therapist for more advanced exercises  Seek care immediately if:   You have severe pain that prevents you from moving  Contact your healthcare provider if:   Your pain becomes worse  You have new pain  You have questions or concerns about your condition or care  Do lower back exercises safely:   Do the exercises on a mat or firm surface  (not on a bed) to support your spine and prevent low back pain  Move slowly and smoothly  Avoid fast or jerky motions  Breathe normally  Do not hold your breath  Stop if you feel pain  It is normal to feel some discomfort at first  Regular exercise will help decrease your discomfort over time  Lower back exercises:   Your healthcare provider may recommend that you do back exercises 10 to 30 minutes each day  He may also recommend that you do exercises 1 to 3 times each day  Ask your healthcare provider which exercises are best for you and how often to do them  Ankle pumps:  Lie on your back  Move your foot up (with your toes pointing toward your head)  Then, move your foot down (with your toes pointing away from you)  Repeat this exercise 10 times on each side  Heel slides:  Lie on your back  Slowly bend one leg and then straighten it  Next, bend the other leg and then straighten it  Repeat 10 times on each side  Pelvic tilt:  Lie on your back with your knees bent and feet flat on the floor  Place your arms in a relaxed position beside your body  Tighten the muscles of your abdomen and flatten your back against the floor  Hold for 5 seconds  Repeat 5 times  Back stretch:  Lie on your back with your hands behind your head  Bend your knees and turn the lower half of your body to one side  Hold this position for 10 seconds  Repeat 3 times on each side  Straight leg raises:  Lie on your back with one leg straight  Bend the other knee  Tighten your abdomen and then slowly lift the straight leg up about 6 to 12 inches off the floor  Hold for 1 to 5 seconds  Lower your leg slowly  Repeat 10 times on each leg  Knee-to-chest:  Lie on your back with your knees bent and feet flat on the floor  Pull one of your knees toward your chest and hold it there for 5 seconds  Return your leg to the starting position  Lift the other knee toward your chest and hold for 5 seconds  Do this 5 times on each side  Cat and camel:  Place your hands and knees on the floor  Arch your back upward toward the ceiling and lower your head  Round out your spine as much as you can  Hold for 5 seconds  Lift your head upward and push your chest downward toward the floor  Hold for 5 seconds  Do 3 sets or as directed           Wall squats:  Stand with your back against a wall  Tighten the muscles of your abdomen  Slowly lower your body until your knees are bent at a 45 degree angle  Hold this position for 5 seconds  Slowly move back up to a standing position  Repeat 10 times  Curl up:  Lie on your back with your knees bent and feet flat on the floor  Place your hands, palms down, underneath the curve in your lower back  Next, with your elbows on the floor, lift your shoulders and chest 2 to 3 inches  Keep your head in line with your shoulders  Hold this position for 5 seconds  When you can do this exercise without pain for 10 to 15 seconds, you may add a rotation  While your shoulders and chest are lifted off the ground, turn slightly to the left and hold  Repeat on the other side  Bird dog:  Place your hands and knees on the floor  Keep your wrists directly below your shoulders and your knees directly below your hips  Pull your belly button in toward your spine  Do not flatten or arch your back  Tighten your abdominal muscles  Raise one arm straight out so that it is aligned with your head  Next, raise the leg opposite your arm  Hold this position for 15 seconds  Lower your arm and leg slowly and change sides  Do 5 sets  © Copyright [employer] 2022 Information is for End User's use only and may not be sold, redistributed or otherwise used for commercial purposes  All illustrations and images included in CareNotes® are the copyrighted property of A D A M , Inc  or Tyree Arriaga  The above information is an  only  It is not intended as medical advice for individual conditions or treatments  Talk to your doctor, nurse or pharmacist before following any medical regimen to see if it is safe and effective for you

## 2022-05-27 NOTE — ASSESSMENT & PLAN NOTE
Daniella Suarez is a 57year old female known to the practice for chronic pain syndrome related to her fibromyalgia, restless legs, and cervical radiculopathy  She is also followed by Dr Boom Murrieta Goods [facility]) for chronic low back pain, in addition to her neck pain  She is still taking Cymbalta 30 mg in the morning and 60 mg at bedtime, and tolerating this well without any side effects  She did not start gabapentin after her last visit, as she states she never received the prescription  She is still having restless legs movements at night that makes it difficult for her to fall asleep  Overall she feels her pain and associated symptoms are stable at this time, she denies any acute worsening of any of her symptoms since the last time she was seen  She denies any new numbness, tinging or weakness  She denies bowel/bladder incontinence  Her exam is normal other than brisk LE reflexes (seen on prior exams), and continued tenderness of  the right trapezius muscle and lower cervical paraspinal muscles  There is also tenderness of the lumbar paraspinal muscles more on the right compared to left, with palpable muscle tension        Plan discussed with patient today includes:  - Continue Cymbalta 30 mg in the morning and 60 mg at bedtime  - Start Gabapentin 100 mg three times a day  - Continue with home exercise program  - Exercises for both your neck and low back were provided today  - Continue to follow up with Dr Boom Barrett (PM[facility])  - Follow up in 6 months

## 2022-05-27 NOTE — PROGRESS NOTES
Patient ID: Nigel Gomez is a 57 y o  female  Assessment/Plan:    Pain syndrome, chronic  Nigel Gomez is a 57year old female known to the practice for chronic pain syndrome related to her fibromyalgia, restless legs, and cervical radiculopathy  She is also followed by Dr Ana Maria Chavarria Mast [facility]) for chronic low back pain, in addition to her neck pain  She is still taking Cymbalta 30 mg in the morning and 60 mg at bedtime, and tolerating this well without any side effects  She did not start gabapentin after her last visit, as she states she never received the prescription  She is still having restless legs movements at night that makes it difficult for her to fall asleep  Overall she feels her pain and associated symptoms are stable at this time, she denies any acute worsening of any of her symptoms since the last time she was seen  She denies any new numbness, tinging or weakness  She denies bowel/bladder incontinence  Her exam is normal other than brisk LE reflexes (seen on prior exams), and continued tenderness of  the right trapezius muscle and lower cervical paraspinal muscles  There is also tenderness of the lumbar paraspinal muscles more on the right compared to left, with palpable muscle tension  Plan discussed with patient today includes:  - Continue Cymbalta 30 mg in the morning and 60 mg at bedtime  - Start Gabapentin 100 mg three times a day  - Continue with home exercise program  - Exercises for both your neck and low back were provided today  - Continue to follow up with Dr Ana Maria Gardner (PM[facility]R)  - Follow up in 6 months       Diagnoses and all orders for this visit:    Pain syndrome, chronic    Cervical radiculopathy at C7  -     gabapentin (Neurontin) 100 mg capsule; Take 1 capsule (100 mg total) by mouth 3 (three) times a day    Restless leg syndrome  -     gabapentin (Neurontin) 100 mg capsule;  Take 1 capsule (100 mg total) by mouth 3 (three) times a day           Subjective:    HPI Edson Arzate is a 57year old female known to the practice for chronic fibromyalgia, restless legs, and neck pain related to her cervical radiculopathy  She also has a hx of breast cancer s/p lumpectomy 4/2017 for which she is on tamoxifen and follows closely with oncology  She is also followed by Dr Charlotte Petty [facility]) for chronic low back pain  She was last seen by Dr Ebonie Mayer 11/26/21  She was maintained on Cymbalta 90 mg and Tizanidine in the past, but at her last appointment was advised to discontinue tizanidine and start gabapentin 100 mg tid and continue Cymbalta by taking 30 mg in the morning and 60 mg at bedtime  She was also encouraged to attend physical therapy for her neck pain  Xray L-Spine 9/8/21  Transitional lumbosacral anatomy with the last disc space labeled as L5-S1  Spondylotic changes at L5-S1, L4-5, L3-4  Facet degenerative disease at L4-5 and L5-S1     MRI C-Spine 6/2/2014  Prominent soft tissue at the level of the lingual tonsils for which   further evaluation recommended   Mild reversal of normal cervical lordosis suspicious for muscle spasm   Multilevel degenerative spondylosis   Mild bilateral foraminal stenosis, small right foraminal disc   protrusions C3-4   Mild bilateral foraminal stenosis, small central disc protrusion C4-5   Mild bilateral foraminal stenosis C5-6   Grade 1 retrolisthesis, mild to moderate bilateral foraminal stenosis   C6-7     She returns to the office today and states she goes to the gym ([employer]) every other day and does exercises for her neck and back  She states she completed physical therapy @ [facility] for her neck but did not notice any improvement in her pain  She states her neck pain still radiates into her right shoulder and her hands still feel (but not look) "swollen" occasionally causing numbness and tingling  She is using salonpas patches and a TENS unit and about 2-3 months ago received cervical injections by Dr Charlotte Stevens   [facility] reviewed and she is not prescribed any controlled substances or opiates  Sadly, her brother recently passed away from cancer, so she has been very upset about this  She is still taking Cymbalta 30 mg in the morning and 60 mg at bedtime, and tolerating this well without any side effects  She did not start gabapentin after her last visit, as she states she never received the prescription  She is still having restless legs movements at night that makes it difficult for her to fall asleep  She denies any falls and ambulates without any assistive devices, but does have some imbalance at time  She lives alone and is independent of all ADLs  Overall she feels her pain and associated symptoms are stable at this time, she denies any acute worsening of any of her symptoms since the last time she was seen  She denies any new numbness, tinging or weakness  She denies bowel/bladder incontinence  The following portions of the patient's history were reviewed and updated as appropriate: allergies, current medications, past family history, past medical history, past social history and past surgical history  Objective:    Blood pressure 100/59, pulse 88, temperature 99 3 °F (37 4 °C), temperature source Temporal, height 5' 8" (1 727 m), weight 67 6 kg (149 lb), SpO2 97 %, not currently breastfeeding  Neurological Exam    On neurological examination patient is alert, awake, oriented and in no distress, although her affect is very flat  Speech is fluent without dysarthria or aphasia but is slow and montone  Cranial nerves 2-12 were symmetrically intact bilaterally  No evidence of any focal weakness or sensory loss in the upper or lower extremities  Motor testing reveals 5/5 strength of the bilateral upper and lower extremities  There was no pronator drift  No fasciculations present  No abnormal involuntary movements  Finger- to-nose reveals no tremor or ataxia and intact proprioceptive function, no dysmetria was noted  Sensation was intact to vibration, light touch, pin prick and temperature in bilateral upper and lower extremities  Deep tendon reflexes were 2+ and symmetric in the bilateral upper and more prominent but symmetric in bilateal lower extremities  She is able to rise easily without assistance from a seated position  Casual gait is normal including stance, stride, and arm swing  mild difficulty with tandem gait  Romberg is absent  There is tenderness with palpation of the right trapezius muscle and lower cervical paraspinal muscles  There is also tenderness of the lumbar paraspinal muscles more on the right compared to left, with palpable muscle tension  ROS:    Review of Systems  Constitutional: Negative  Negative for appetite change and fever  HENT: Negative  Negative for hearing loss, tinnitus, trouble swallowing and voice change  Eyes: Negative  Negative for photophobia and pain  Respiratory: Negative  Negative for shortness of breath  Cardiovascular: Negative  Negative for palpitations  Gastrointestinal: Negative  Negative for nausea and vomiting  Endocrine: Negative  Negative for cold intolerance  Genitourinary: Negative  Negative for dysuria, frequency and urgency  Musculoskeletal: Positive for back pain, gait problem and neck pain  Negative for myalgias  Skin: Negative  Negative for rash  Neurological: Positive for tremors  Negative for dizziness, seizures, syncope, facial asymmetry, speech difficulty, weakness, light-headedness, numbness and headaches  Patient stated that she has tremors in both hands  Hematological: Negative  Does not bruise/bleed easily  Psychiatric/Behavioral: Negative  Negative for confusion, hallucinations and sleep disturbance  Reviewed ROS as entered by medical assistant

## 2022-07-22 ENCOUNTER — TELEPHONE (OUTPATIENT)
Dept: HEMATOLOGY ONCOLOGY | Facility: CLINIC | Age: 62
End: 2022-07-22

## 2022-07-22 NOTE — TELEPHONE ENCOUNTER
Left patient a voice message stating that she has an appointment with PRISCILA Muse and that there are some labs that needed to be collected before this appointment  Stated kerri tthe orders are already in the system so that as long as patient goes to a [facility] there is no paper work needed  Also stated that if patient has any questions and or concerns to please feel to give the office a call back

## 2022-07-24 NOTE — PROGRESS NOTES
HEMATOLOGY / ONCOLOGY CLINIC NOTE    Primary Care Provider: Muriel Hirsch MD  Referring Provider:    MRN: [id number]  : 1960    Assessment / Plan:   1  Malignant neoplasm of right breast in female, estrogen receptor positive, unspecified site of breast [facility])  This is a 76-year-old female with a history of stage IA right breast cancer diagnosed 2017 after mammogram  Details as in HPI  S/P lumpectomy 2017  S/P RT -2017  Patient started Tamoxifen 20mg once daily 2017  She was started on this and not AI due to hx of fibromyalgia, arthritis  Patient has tolerated tamoxifen since without any significant side effect  No vaginal bleeding or leg swelling  Mammogram 2021 was normal  She had a breast examination by me at last visit 2022 which was unrevealing  Her most recent CBC, CMP were acceptable  Patient has been taking tamoxifen 20 mg once daily for a total of 5 years  She began this medication 2017  We decided today to discontinue this medication  Her next mammogram is already scheduled for 2021     2  MGUS (monoclonal gammopathy of unknown significance)  Patient has history of MGUS, IgA kappa  This has been present since   Her last lab work was addressed at our last follow-up  She still has no crab like symptoms, constitutional symptoms, bone pain  She will have repeat SPEP, immunoglobulin free light chain, CBC, CMP prior to her next follow-up in December  · Discussion of decision making    I personally reviewed the following lab results, the image studies, pathology, other specialty/physicians consult notes and recommendations, and outside medical records from [address] [place] [address]  I had a lengthy discussion with the patient and shared the work-up findings   I spent 20 minutes reviewing the records (labs, clinician notes, outside records, medical history, ordering medicine/tests/procedures, interpreting the imaging/labs previously done) and coordination of care as well as direct time with the patient today, of which greater than 50% of the time was spent in counseling and coordination of care with the patient/family  · Plan/Labs  · Discontinue tamoxifen as it has been a total of 5 years she is been on medication  · Next mammogram scheduled for 11/2022  We will follow  · Next MGUS lab work due for 11/2022  She will have SPEP, immunoglobulin free light chain, CBC, CMP done  Follow Up:  5 months    All questions were answered to the patient's satisfaction during this encounter  The patient knows the contact information for our office and knows to reach out for any relevant concerns related to this encounter  They are to call for any temperature 100 4 or higher, new symptoms including but not restricted to shaking chills, decreased appetite, nausea, vomiting, diarrhea, increased fatigue, shortness of breath or chest pain, confusion, and not feeling the strength to come to the clinic  For all other listed problems and medical diagnosis in their chart - they are managed by PCP and/or other specialists, which the patient acknowledges  Thank you very much for your consultation and making us a part of this patient's care  We are continuing to follow closely with you  Please do not hesitate to reach out to me with any additional questions or concerns  Reason for visit:       Chief Complaint   Patient presents with    Follow-up       History of Hematology / Oncology Illness:     Sergio Oviedo is a 57 y o  female who came in to follow up  1   Stage IA right breast cancer, postmenopausal; pT1b pN0 (4 LN -) Mx G1 R0 ER/DE+ HER-2/mike negative RS = 7 = low        - 4/2017: Patient had concerning mammogram  FNA done --> pathology showed the below findings:      - Invasive breast carcinoma, no special type (invasive ductal carcinoma)       - [place] combined histologic grade 1 of 3, well differentiated (tubule formation score 2 of 3, nuclear        pleomorphism score 2 of 3, mitotic activity score 1 of 3, total score 5 of 9)     - Carcinoma involves 4 of 4 cores, maximum linear length: 6 4 mm      - Lymphovascular invasion not identified                                    - Ductal carcinoma in-situ (DCIS) not identified      - Microcalcifications: Present, associated with invasive carcinoma and benign epithelium  - ER/AZ 90-95% Positive; HER2 by IHC 1+ Negative      - 5/24/17:  Lumpectomy with SLNBx --> Stage IA (pT1b, pN0 (sn), G1  Margins: Uninvolved by invasive and in-situ carcinoma  Invasive Carcinoma: 4 5 mm from nearest inferior margin  DCIS: 6 mm from nearest posterior inferior margin      - 7/2017 - 9/2017: S/P Radiation   - 7/2017: started Tamoxifen 20mg once daily over AI due to hx of fibromylagia, arthritis  She is s/p hysterectomy, ovaries still remain      - 11/2021: last mammogram --> benign findings  2  MGUS - IgA  - patient unsure why she was tested for this initially  - initially found on SPEP 7/2016 --> M spike 0 31  IgA kappa on immunofixation  - 4/2017:  IgA 1000 (), IgG 1260 (WNL), IgM 36 (LLN 36)  - 4/2019: SPEP M spike 0 34  IgA 901, IgG 1050, IgM 23    - 10/2020: SPEP M spike 0 28  IgA 942, IgG 1080, IgM 31  Kappa free lt chain 25  3  lambda free lt chain 14 8, K/L ratio 1 71    - 10/2021: SPEP M spike 0 32, IgA 958, IgG 983, IgM 28  Kappa 27 1, lambda 14 4, K/L ratio 1 88     - 5/2022: SPEP M spike 0 47, Kappa 23 2, Lambda 14 8, K/L ratio 1 57  Oncology History Overview Note   Returns for follow up post right breast radiation completed on 9/5/17  The pt was last seen in radiation on 10/15/20      10/15/20 - Mynor Salinas  Pt to continue on Tamoxifen  Breast exam was benign    Upcoming:       Intraductal carcinoma of right breast (Resolved)   4/20/2017 Biopsy    Right breast biopsy:  Invasive breast carcinoma/invasive ductal carcinoma  Grade 1    ER and AZ 90-95% positive, Her2 negative     5/24/2017 Initial Diagnosis    Intraductal carcinoma of right breast      Surgery    Segmental mastectomy with sentinel node biopsy  Invasive mammary  Grade 1       2017 -  Cancer Staged    Pathologic  Stage IA - pT1b, pN0 (sn), G1     2017 - 2017 Radiation    dose of 5040 cGy in 20 daily fractions to the right breast with an additional 1000 cGy to the lumpectomy cavity       2017 -  Hormone Therapy    Tamoxifen     Malignant neoplasm of right female breast ([facility] [address] )   2017 -  Cancer Staged    Staging form: Breast, AJCC 8th Edition  - Clinical stage from 2017: Stage IA (cT1b, cN0, cM0, G1, ER+, NV+, HER2-) - Signed by Cary Romo PA-C on 2022  Stage prefix: Initial diagnosis  Method of lymph node assessment: Axillary lymph node dissection  Nuclear grade: G2  Mitotic count score: Score 1  Histologic grading system: 3 grade system  HER2-IHC interpretation: Negative  HER2-IHC value: Score 1+       2019 Initial Diagnosis    Malignant neoplasm of right female breast [facility])         Cancer Staging  Malignant neoplasm of right female breast [facility])  Staging form: Breast, AJCC 8th Edition  - Clinical stage from 2017: Stage IA (cT1b, cN0, cM0, G1, ER+, NV+, HER2-) - Signed by Cary Romo PA-C on 2022  Stage prefix: Initial diagnosis  Method of lymph node assessment: Axillary lymph node dissection  Nuclear grade: G2  Mitotic count score: Score 1  Histologic grading system: 3 grade system  HER2-IHC interpretation: Negative  HER2-IHC value: Score 1+      ECO - Asymptomatic    Interval History:   ":  Reported overall doing well   No lumps bumps   No constitutional symptoms   Following surgeon, for breast examination and mammogram, per patient no new findings   Tolerating tamoxifen well, no hot flash symptoms, no new bone pains, status post hysterectomy      10/2018 : Came in for follow-up, doing well   Reported still having breast pain, otherwise no new lumps bumps   No bone pain   No other constitutional symptoms      10/17/2019 : came in for follow up  Has been doing well  No leg swelling, no vaginal bleeding  No new lumps / bumps "     "10/15/2020:  Patient is doing well  Dwayne Pan is up-to-date on mammography completed in September 2020   No vaginal bleeding no lower abdominal bloating   Patient had hysterectomy, but has one ovary retained   Colonoscopy is out of date "     "10/2021 :  Patient came for follow-up doing well   No constitutional symptoms, no new lumps bumps   Reported right breast chest wall shoulder soreness, has been chronic since surgery"     5/5/2022: Patient came in for follow up  Tolerating tamoxifen without any significant side effect  She has no vaginal bleeding, leg swelling, fatigue, constitutional symptoms such as fever, chills, night sweats, lumps, bumps, sudden weight loss  No appetite changes  No bone pain  uptodate on mammogram      7/25/2022:  Patient came in for follow-up  She offers no specific complaints and has been tolerating tamoxifen still without any significant side effects  No vaginal bleeding, leg swelling, constitutional symptoms such as fever, chills, night sweats, enlarged lymphadenopathy, sudden weight loss  No appetite changes no bone pain  She does have some fatigue which she attributes could be related to the heat, lack of proper hydration  No headaches, visual changes     Problem list:       Patient Active Problem List   Diagnosis    History of right breast cancer    Restless leg syndrome    Fibromyalgia    Disease of thyroid gland    Cognitive impairment, mild, so stated    Hypotension    Depression    Vitamin B deficiency    Monoclonal gammopathy of undetermined significance    Pain syndrome, chronic    Vitamin D deficiency    Tachycardia    Current severe episode of major depressive disorder without psychotic features ([facility] [address] )    Hypothyroidism    Malignant neoplasm of right female breast ([address] )    Rib pain    Internal nasal lesion    Cervical radiculopathy at C7       REVIEW OF SYMPTOMS:   Review of Systems   Constitutional: Positive for fatigue (attributes it to heat, lack of proper hydration )  Negative for activity change, appetite change and unexpected weight change  HENT: Negative for nosebleeds  Eyes: Negative for visual disturbance  Respiratory: Negative for cough and shortness of breath  Cardiovascular: Negative for chest pain, palpitations and leg swelling  Gastrointestinal: Negative for abdominal pain, anal bleeding, blood in stool, constipation, diarrhea, nausea and vomiting  Endocrine: Negative for cold intolerance  Genitourinary: Negative for hematuria, menstrual problem and vaginal bleeding  Musculoskeletal: Negative for arthralgias  Skin: Negative for color change, pallor and rash  Neurological: Negative for dizziness, syncope, light-headedness and headaches  Hematological: Negative for adenopathy  Does not bruise/bleed easily  Psychiatric/Behavioral: Negative for sleep disturbance  PHYSICAL EXAMINATION:     Vital Signs:   /70 (BP Location: Left arm, Patient Position: Sitting, Cuff Size: Standard)   Pulse 80   Temp 97 9 °F (36 6 °C) (Temporal)   Resp 16   Ht 5' 8" (1 727 m)   Wt 67 6 kg (149 lb)   SpO2 96%   BMI 22 66 kg/m²   Body surface area is 1 8 meters squared  Ht Readings from Last 3 Encounters:   07/26/22 5' 8" (1 727 m)   05/27/22 5' 8" (1 727 m)   05/05/22 5' 8" (1 727 m)       Wt Readings from Last 3 Encounters:   07/26/22 67 6 kg (149 lb)   05/27/22 67 6 kg (149 lb)   05/05/22 67 6 kg (149 lb)          Physical Exam  Constitutional:       General: She is not in acute distress  Appearance: Normal appearance  She is not ill-appearing, toxic-appearing or diaphoretic  HENT:      Head: Normocephalic and atraumatic  Eyes:      General: No scleral icterus  Extraocular Movements: Extraocular movements intact        Conjunctiva/sclera: Conjunctivae normal       Pupils: Pupils are equal, round, and reactive to light  Cardiovascular:      Rate and Rhythm: Normal rate and regular rhythm  Pulmonary:      Effort: Pulmonary effort is normal  No respiratory distress  Abdominal:      Palpations: Abdomen is soft  Tenderness: There is no abdominal tenderness  Musculoskeletal:         General: No tenderness  Normal range of motion  Cervical back: Normal range of motion and neck supple  Right lower leg: No edema  Left lower leg: No edema  Lymphadenopathy:      Cervical: No cervical adenopathy  Skin:     General: Skin is warm and dry  Coloration: Skin is not jaundiced or pale  Findings: No bruising, erythema, lesion or rash  Neurological:      General: No focal deficit present  Mental Status: She is alert and oriented to person, place, and time  Mental status is at baseline  Motor: No weakness  Psychiatric:         Mood and Affect: Mood normal          Behavior: Behavior normal          Thought Content: Thought content normal          Judgment: Judgment normal        Reviewed historical information  PAST MEDICAL HISTORY:    Past Medical History:   Diagnosis Date    Anxiety     Breast cancer ([facility] [address] ) 2017    right    Cancer [facility])     right breast  dx 04/2017    Cervical disc disorder of cervicothoracic region     Cervical radiculopathy     Cervicalgia     Cognitive impairment, mild, so stated     Depression     Major,recurrent    Diplopia     last assessed 12/23/13    Disc degeneration, lumbar     Disease of thyroid gland     hx of goiter, surgery done 1978    Edema of foot     Esophageal reflux     Fatigue     Fibromyalgia     Hemorrhage, anal or rectal     History of radiation therapy     Hx of radiation therapy     Treatments: Course C1, Plan ID Energy Fractions Dose per Fraction (cGy)total Dose delivered(cGy)elapsed days   R Breast 6x 28/28 180 5,040 39, R breast e 12E 5/5 200 1,000 7 Treatment dates: 7/20/17-09/05/17    Hx of radiation therapy     33 treatments    Hypotension     Hypothyroidism     Insomnia     Memory loss     Monoclonal gammopathy of undetermined significance     Myalgia     last assessed 07/08/16    Myositis     last assessed 07/08/16    Neck stiffness     Neuralgia     Neuritis     Pain syndrome, chronic     Palpitations     Paresthesia     Peripheral neuropathy     Polyneuropathy     Pseudodementia     Radiculopathy     12/23/13    Restless leg syndrome     Sacroiliitis (HCC)     SLE (systemic lupus erythematosus) ([facility] [address] )     last assessed 12/23/13    Tachycardia     Viral warts     Vitamin B deficiency     Vitamin D deficiency     Vitamin D deficiency     Wears glasses        PAST SURGICAL HISTORY:    Past Surgical History:   Procedure Laterality Date    BLADDER SURGERY      bladder prolapse    BREAST BIOPSY Right     BREAST SURGERY Right     breast bx    COLONOSCOPY      age 45 normal    HYSTERECTOMY  2009    vaginal hysterectomy, rectocele repair    IA MASTECTOMY, PARTIAL Right 5/24/2017    Procedure:  (NUCLEAR MED  8 AM , NEEDLE LOCAL TO FOLLOW 8:30 AM ) SEGMENTAL MASTECTOMY BREAST NEEDLE PLACEMENT,  SENTINAL LYMPH NODE BIOPSY;  Surgeon: Patricio Abraham MD;  [facility];  Service: General    REPAIR RECTOCELE      THYROID LOBECTOMY Left 1978    TONSILLECTOMY      US BREAST NEEDLE LOC RIGHT Right 5/24/2017    US GUIDED BREAST BIOPSY RIGHT COMPLETE Right 4/20/2017    US GUIDED THYROID BIOPSY  4/25/2019         CURRENT MEDICATIONS:     Current Outpatient Medications:     DULoxetine (CYMBALTA) 30 mg delayed release capsule, Take 1 capsule (30 mg total) by mouth every morning, Disp: 10 capsule, Rfl: 0    DULoxetine (CYMBALTA) 60 mg delayed release capsule, Take 1 capsule (60 mg total) by mouth daily at bedtime, Disp: 10 capsule, Rfl: 0    gabapentin (Neurontin) 100 mg capsule, Take 1 capsule (100 mg total) by mouth 3 (three) times a day, Disp: 270 capsule, Rfl: 1   levothyroxine 100 mcg tablet, Take 1 tablet (100 mcg total) by mouth daily, Disp: 90 tablet, Rfl: 1    meloxicam (MOBIC) 7 5 mg tablet, , Disp: , Rfl:     tamoxifen (NOLVADEX) 20 mg tablet, Take 1 tablet (20 mg total) by mouth daily, Disp: 90 tablet, Rfl: 0    SOCIAL HISTORY:    Social History     Tobacco Use    Smoking status: Former Smoker     Packs/day: 1 50     Years: 5 00     Pack years: 7 50     Types: Cigarettes     Quit date:      Years since quittin 5    Smokeless tobacco: Never Used    Tobacco comment: Former quit 30 years ago   Vaping Use    Vaping Use: Never used   Substance Use Topics    Alcohol use: Not Currently    Drug use: No       FAMILY HISTORY:    Family History   Problem Relation Age of Onset    Heart disease Mother         CHF    Heart failure Mother     Cirrhosis Father     Esophageal cancer Brother 72    No Known Problems Brother     No Known Problems Brother     No Known Problems Brother        ALLERGIES:  No Known Allergies      LAB:    Lab Results   Component Value Date    WBC 4 41 2022    HGB 12 6 2022    HCT 37 4 2022    MCV 98 2022     2022       Lab Results   Component Value Date     2014    SODIUM 142 2022    K 3 6 2022     2022    CO2 26 2022    ANIONGAP 6 6 (L) 2014    AGAP 10 2022    BUN 16 2022    CREATININE 0 94 2022    GLUC 115 2022    CALCIUM 8 0 (L) 2022    AST 14 2022    ALT 15 2022    ALKPHOS 42 (L) 2022    PROT 7 5 2014    TP 7 2 2022    BILITOT 0 4 2014    TBILI 0 42 2022    EGFR 65 2022       IMAGING:  Mammo diagnostic bilateral w 3d & cad  Narrative: DIAGNOSIS: Screening mammogram for breast cancer; History of right breast   cancer     TECHNIQUE:  Digital screening mammography was performed  Computer Aided Detection   (CAD) analyzed all applicable images     COMPARISONS: Prior breast imaging dated: 09/21/2020, 09/16/2019,   09/13/2018, 05/24/2017, 04/20/2017, and 04/14/2017, 04/06/2017,   02/16/2016, 12/12/2014  RELEVANT HISTORY:   Family Breast Cancer History: No known family history of breast cancer  Family Medical History: No known relevant family medical history  Personal History: Hormone history includes tamoxifen  Surgical history   includes breast biopsy, lumpectomy, and hysterectomy  Medical history   includes breast cancer  RISK ASSESSMENT:   [facility] risk assessment reporting was suppressed due to the patient's   history and/or demographic factors  TISSUE DENSITY:   The breasts are heterogeneously dense, which may obscure small masses  INDICATION: Harvis Prader is a 57 y o  female presenting for History   of right breast cancer  Right breast cancer status post lumpectomy and   radiation therapy in 2017  Patient is on tamoxifen  She did not receive   chemotherapy for her breast cancer  Annual mammogram, no reported breast   symptoms  FINDINGS:   Bilateral  There are stable postsurgical changes in the right breast   Mild right   breast skin thickening is similar to prior studies and consistent with   history of radiation therapy  There is no abnormal skin thickening in the   left breast   There are no suspicious masses, grouped microcalcifications or areas of   unexplained architectural distortion  Impression:    Findings are benign  No mammographic evidence of malignancy  ASSESSMENT/BI-RADS CATEGORY:  Left: 1 - Negative  Right: 2 - Benign  Overall: 2 - Benign    RECOMMENDATION:       - Diagnostic mammogram in 1 year for both breasts      Workstation ID: [id number]

## 2022-07-25 ENCOUNTER — APPOINTMENT (OUTPATIENT)
Dept: LAB | Facility: HOSPITAL | Age: 62
End: 2022-07-25
Payer: COMMERCIAL

## 2022-07-25 DIAGNOSIS — D47.2 MGUS (MONOCLONAL GAMMOPATHY OF UNKNOWN SIGNIFICANCE): ICD-10-CM

## 2022-07-25 DIAGNOSIS — D47.2 MGUS (MONOCLONAL GAMMOPATHY OF UNKNOWN SIGNIFICANCE): Primary | ICD-10-CM

## 2022-07-25 DIAGNOSIS — D05.11 INTRADUCTAL CARCINOMA OF RIGHT BREAST: ICD-10-CM

## 2022-07-25 LAB
ALBUMIN SERPL BCP-MCNC: 3.5 G/DL (ref 3.5–5)
ALP SERPL-CCNC: 42 U/L (ref 46–116)
ALT SERPL W P-5'-P-CCNC: 15 U/L (ref 12–78)
ANION GAP SERPL CALCULATED.3IONS-SCNC: 10 MMOL/L (ref 4–13)
AST SERPL W P-5'-P-CCNC: 14 U/L (ref 5–45)
BASOPHILS # BLD AUTO: 0.02 THOUSANDS/ΜL (ref 0–0.1)
BASOPHILS NFR BLD AUTO: 1 % (ref 0–1)
BILIRUB SERPL-MCNC: 0.42 MG/DL (ref 0.2–1)
BUN SERPL-MCNC: 16 MG/DL (ref 5–25)
CALCIUM SERPL-MCNC: 8 MG/DL (ref 8.3–10.1)
CHLORIDE SERPL-SCNC: 106 MMOL/L (ref 96–108)
CO2 SERPL-SCNC: 26 MMOL/L (ref 21–32)
CREAT SERPL-MCNC: 0.94 MG/DL (ref 0.6–1.3)
EOSINOPHIL # BLD AUTO: 0.13 THOUSAND/ΜL (ref 0–0.61)
EOSINOPHIL NFR BLD AUTO: 3 % (ref 0–6)
ERYTHROCYTE [DISTWIDTH] IN BLOOD BY AUTOMATED COUNT: 12.7 % (ref 11.6–15.1)
GFR SERPL CREATININE-BSD FRML MDRD: 65 ML/MIN/1.73SQ M
GLUCOSE SERPL-MCNC: 115 MG/DL (ref 65–140)
HCT VFR BLD AUTO: 37.4 % (ref 34.8–46.1)
HGB BLD-MCNC: 12.6 G/DL (ref 11.5–15.4)
IMM GRANULOCYTES # BLD AUTO: 0.01 THOUSAND/UL (ref 0–0.2)
IMM GRANULOCYTES NFR BLD AUTO: 0 % (ref 0–2)
LYMPHOCYTES # BLD AUTO: 0.97 THOUSANDS/ΜL (ref 0.6–4.47)
LYMPHOCYTES NFR BLD AUTO: 22 % (ref 14–44)
MCH RBC QN AUTO: 33.2 PG (ref 26.8–34.3)
MCHC RBC AUTO-ENTMCNC: 33.7 G/DL (ref 31.4–37.4)
MCV RBC AUTO: 98 FL (ref 82–98)
MONOCYTES # BLD AUTO: 0.39 THOUSAND/ΜL (ref 0.17–1.22)
MONOCYTES NFR BLD AUTO: 9 % (ref 4–12)
NEUTROPHILS # BLD AUTO: 2.89 THOUSANDS/ΜL (ref 1.85–7.62)
NEUTS SEG NFR BLD AUTO: 65 % (ref 43–75)
NRBC BLD AUTO-RTO: 0 /100 WBCS
PLATELET # BLD AUTO: 205 THOUSANDS/UL (ref 149–390)
PMV BLD AUTO: 9.2 FL (ref 8.9–12.7)
POTASSIUM SERPL-SCNC: 3.6 MMOL/L (ref 3.5–5.3)
PROT SERPL-MCNC: 7.2 G/DL (ref 6.4–8.4)
RBC # BLD AUTO: 3.8 MILLION/UL (ref 3.81–5.12)
SODIUM SERPL-SCNC: 142 MMOL/L (ref 135–147)
WBC # BLD AUTO: 4.41 THOUSAND/UL (ref 4.31–10.16)

## 2022-07-25 PROCEDURE — 80053 COMPREHEN METABOLIC PANEL: CPT

## 2022-07-25 PROCEDURE — 36415 COLL VENOUS BLD VENIPUNCTURE: CPT

## 2022-07-25 PROCEDURE — 85025 COMPLETE CBC W/AUTO DIFF WBC: CPT

## 2022-07-26 ENCOUNTER — OFFICE VISIT (OUTPATIENT)
Dept: HEMATOLOGY ONCOLOGY | Facility: CLINIC | Age: 62
End: 2022-07-26
Payer: COMMERCIAL

## 2022-07-26 VITALS
RESPIRATION RATE: 16 BRPM | SYSTOLIC BLOOD PRESSURE: 100 MMHG | WEIGHT: 149 LBS | DIASTOLIC BLOOD PRESSURE: 70 MMHG | HEART RATE: 80 BPM | TEMPERATURE: 97.9 F | HEIGHT: 68 IN | BODY MASS INDEX: 22.58 KG/M2 | OXYGEN SATURATION: 96 %

## 2022-07-26 DIAGNOSIS — D47.2 MGUS (MONOCLONAL GAMMOPATHY OF UNKNOWN SIGNIFICANCE): ICD-10-CM

## 2022-07-26 DIAGNOSIS — Z17.0 MALIGNANT NEOPLASM OF RIGHT BREAST IN FEMALE, ESTROGEN RECEPTOR POSITIVE, UNSPECIFIED SITE OF BREAST (HCC): Primary | ICD-10-CM

## 2022-07-26 DIAGNOSIS — C50.911 MALIGNANT NEOPLASM OF RIGHT BREAST IN FEMALE, ESTROGEN RECEPTOR POSITIVE, UNSPECIFIED SITE OF BREAST (HCC): Primary | ICD-10-CM

## 2022-07-26 DIAGNOSIS — D05.11 INTRADUCTAL CARCINOMA OF RIGHT BREAST: ICD-10-CM

## 2022-07-26 PROCEDURE — 99213 OFFICE O/P EST LOW 20 MIN: CPT | Performed by: INTERNAL MEDICINE

## 2022-07-26 RX ORDER — MELOXICAM 7.5 MG/1
TABLET ORAL
COMMUNITY
Start: 2022-07-14

## 2022-09-16 ENCOUNTER — VBI (OUTPATIENT)
Dept: ADMINISTRATIVE | Facility: OTHER | Age: 62
End: 2022-09-16

## 2022-10-03 ENCOUNTER — OFFICE VISIT (OUTPATIENT)
Dept: URGENT CARE | Facility: CLINIC | Age: 62
End: 2022-10-03
Payer: COMMERCIAL

## 2022-10-03 VITALS
BODY MASS INDEX: 22.81 KG/M2 | SYSTOLIC BLOOD PRESSURE: 113 MMHG | WEIGHT: 150 LBS | OXYGEN SATURATION: 98 % | TEMPERATURE: 98.9 F | HEART RATE: 86 BPM | RESPIRATION RATE: 18 BRPM | DIASTOLIC BLOOD PRESSURE: 68 MMHG

## 2022-10-03 DIAGNOSIS — B34.9 VIRAL SYNDROME: Primary | ICD-10-CM

## 2022-10-03 DIAGNOSIS — R52 BODY ACHES: ICD-10-CM

## 2022-10-03 PROCEDURE — S9088 SERVICES PROVIDED IN URGENT: HCPCS | Performed by: PHYSICIAN ASSISTANT

## 2022-10-03 PROCEDURE — 99213 OFFICE O/P EST LOW 20 MIN: CPT | Performed by: PHYSICIAN ASSISTANT

## 2022-10-03 PROCEDURE — 87636 SARSCOV2 & INF A&B AMP PRB: CPT | Performed by: PHYSICIAN ASSISTANT

## 2022-10-03 RX ORDER — METHYLPREDNISOLONE 4 MG/1
TABLET ORAL
Qty: 21 EACH | Refills: 0 | Status: SHIPPED | OUTPATIENT
Start: 2022-10-03

## 2022-10-03 NOTE — PROGRESS NOTES
[facility] Now        NAME: Sergio Oviedo is a 60 y o  female  : 1960    MRN: [id number]  DATE: October 3, 2022  TIME: 10:07 AM    Assessment and Plan   Viral syndrome [B34 9]  1  Viral syndrome     2  Body aches  Covid/Flu-Office Collect     - Symptoms most likely due to viral infection  - Supportive care with rest, fluids, and OTC decongestants, nasal sprays, cough suppressants, Tylenol/Ibuprofen PRN     Patient Instructions       Follow up with PCP in 3-5 days  Proceed to  ER if symptoms worsen  Chief Complaint     Chief Complaint   Patient presents with    Cold Like Symptoms     Patient reports that she is having cold symptoms, feeling congested with ear pain and throat pain  Patient feels chills and bodyaches, denies fever  Patient reports feeling tired too  Patient reports symptoms for 3 days  Patient reported taking Nightquil without relief  History of Present Illness       Patient is a 61 yo female who presents for evaluation of nasal congestion, runny nose, sore throat, cough, chills, body aches, fatigue, ear pain x 3 days  Denies SOB and reduced PO intake  Review of Systems   Review of Systems   Constitutional: Positive for chills and fatigue  Negative for activity change, appetite change and fever  HENT: Positive for congestion, rhinorrhea and sore throat  Negative for ear pain, sinus pressure, sinus pain, trouble swallowing and voice change  Respiratory: Positive for cough  Negative for shortness of breath  Cardiovascular: Negative for chest pain  Musculoskeletal: Positive for arthralgias and myalgias  Neurological: Positive for headaches  Negative for dizziness           Current Medications       Current Outpatient Medications:     DULoxetine (CYMBALTA) 30 mg delayed release capsule, Take 1 capsule (30 mg total) by mouth every morning, Disp: 10 capsule, Rfl: 0    DULoxetine (CYMBALTA) 60 mg delayed release capsule, Take 1 capsule (60 mg total) by mouth daily at bedtime, Disp: 10 capsule, Rfl: 0    gabapentin (Neurontin) 100 mg capsule, Take 1 capsule (100 mg total) by mouth 3 (three) times a day (Patient taking differently: Take 100 mg by mouth if needed (makes her tired)), Disp: 270 capsule, Rfl: 1    levothyroxine 100 mcg tablet, Take 1 tablet (100 mcg total) by mouth daily, Disp: 90 tablet, Rfl: 1    meloxicam (MOBIC) 7 5 mg tablet, , Disp: , Rfl:     tamoxifen (NOLVADEX) 20 mg tablet, Take 1 tablet (20 mg total) by mouth daily (Patient not taking: Reported on 10/3/2022), Disp: 90 tablet, Rfl: 0    Current Allergies     Allergies as of 10/03/2022    (No Known Allergies)            The following portions of the patient's history were reviewed and updated as appropriate: allergies, current medications, past family history, past medical history, past social history, past surgical history and problem list      Past Medical History:   Diagnosis Date    Anxiety     Breast cancer ([facility] [address] ) 2017    right    Cancer [facility])     right breast  dx 04/2017    Cervical disc disorder of cervicothoracic region     Cervical radiculopathy     Cervicalgia     Cognitive impairment, mild, so stated     Depression     Major,recurrent    Diplopia     last assessed 12/23/13    Disc degeneration, lumbar     Disease of thyroid gland     hx of goiter, surgery done 1978    Edema of foot     Esophageal reflux     Fatigue     Fibromyalgia     Hemorrhage, anal or rectal     History of radiation therapy     Hx of radiation therapy     Treatments: Course C1, Plan ID Energy Fractions Dose per Fraction (cGy)total Dose delivered(cGy)elapsed days   R Breast 6x 28/28 180 5,040 39, R breast e 12E 5/5 200 1,000 7 Treatment dates: 7/20/17-09/05/17    Hx of radiation therapy     33 treatments    Hypotension     Hypothyroidism     Insomnia     Memory loss     Monoclonal gammopathy of undetermined significance     Myalgia     last assessed 07/08/16    Myositis     last assessed 07/08/16    Neck stiffness     Neuralgia     Neuritis     Pain syndrome, chronic     Palpitations     Paresthesia     Peripheral neuropathy     Polyneuropathy     Pseudodementia     Radiculopathy     12/23/13    Restless leg syndrome     Sacroiliitis (HCC)     SLE (systemic lupus erythematosus) ([facility] [address] )     last assessed 12/23/13    Tachycardia     Viral warts     Vitamin B deficiency     Vitamin D deficiency     Vitamin D deficiency     Wears glasses        Past Surgical History:   Procedure Laterality Date    BLADDER SURGERY      bladder prolapse    BREAST BIOPSY Right     BREAST SURGERY Right     breast bx    COLONOSCOPY      age 45 normal    HYSTERECTOMY  2009    vaginal hysterectomy, rectocele repair    RI MASTECTOMY, PARTIAL Right 5/24/2017    Procedure:  (NUCLEAR MED  8 AM , NEEDLE LOCAL TO FOLLOW 8:30 AM ) SEGMENTAL MASTECTOMY BREAST NEEDLE PLACEMENT,  SENTINAL LYMPH NODE BIOPSY;  Surgeon: Torey Ann MD;  Location: [facility];  Service: General    REPAIR RECTOCELE      THYROID LOBECTOMY Left 1978    TONSILLECTOMY      US BREAST NEEDLE LOC RIGHT Right 5/24/2017    US GUIDED BREAST BIOPSY RIGHT COMPLETE Right 4/20/2017    US GUIDED THYROID BIOPSY  4/25/2019       Family History   Problem Relation Age of Onset    Heart disease Mother         CHF    Heart failure Mother     Cirrhosis Father     Esophageal cancer Brother 72    No Known Problems Brother     No Known Problems Brother     No Known Problems Brother          Medications have been verified  Objective   /68   Pulse 86   Temp 98 9 °F (37 2 °C) (Tympanic)   Resp 18   Wt 68 kg (150 lb)   SpO2 98%   BMI 22 81 kg/m²        Physical Exam     Physical Exam  Constitutional:       General: She is not in acute distress  Appearance: Normal appearance  She is not ill-appearing or diaphoretic     HENT:      Right Ear: Tympanic membrane, ear canal and external ear normal       Left Ear: Tympanic membrane, ear canal and external ear normal       Nose: Congestion and rhinorrhea present  Mouth/Throat:      Mouth: Mucous membranes are moist       Pharynx: Oropharynx is clear  Posterior oropharyngeal erythema present  Eyes:      Conjunctiva/sclera: Conjunctivae normal    Cardiovascular:      Rate and Rhythm: Normal rate and regular rhythm  Heart sounds: Normal heart sounds  Pulmonary:      Effort: Pulmonary effort is normal       Breath sounds: Normal breath sounds  Skin:     General: Skin is warm and dry  Neurological:      Mental Status: She is alert     Psychiatric:         Mood and Affect: Mood normal          Behavior: Behavior normal

## 2022-10-04 LAB
FLUAV RNA RESP QL NAA+PROBE: NEGATIVE
FLUBV RNA RESP QL NAA+PROBE: NEGATIVE
SARS-COV-2 RNA RESP QL NAA+PROBE: POSITIVE

## 2022-10-06 ENCOUNTER — HOSPITAL ENCOUNTER (EMERGENCY)
Facility: HOSPITAL | Age: 62
Discharge: HOME | End: 2022-10-06
Attending: EMERGENCY MEDICINE
Payer: COMMERCIAL

## 2022-10-06 VITALS
RESPIRATION RATE: 16 BRPM | WEIGHT: 151 LBS | HEIGHT: 68 IN | DIASTOLIC BLOOD PRESSURE: 58 MMHG | TEMPERATURE: 98.9 F | SYSTOLIC BLOOD PRESSURE: 92 MMHG | BODY MASS INDEX: 22.88 KG/M2 | HEART RATE: 85 BPM | OXYGEN SATURATION: 98 %

## 2022-10-06 DIAGNOSIS — U07.1 COVID-19: Primary | ICD-10-CM

## 2022-10-06 LAB
FLUAV RNA RESP QL NAA+PROBE: NEGATIVE
FLUBV RNA RESP QL NAA+PROBE: NEGATIVE
RSV RNA RESP QL NAA+PROBE: NEGATIVE
SARS-COV-2 RNA RESP QL NAA+PROBE: POSITIVE

## 2022-10-06 PROCEDURE — 0241U HB NFCT DS VIR RESP RNA 4 TRGT: CPT | Performed by: EMERGENCY MEDICINE

## 2022-10-06 PROCEDURE — 99284 EMERGENCY DEPT VISIT MOD MDM: CPT | Performed by: PHYSICIAN ASSISTANT

## 2022-10-06 PROCEDURE — 99283 EMERGENCY DEPT VISIT LOW MDM: CPT

## 2022-10-06 RX ORDER — ALBUTEROL SULFATE 90 UG/1
2 AEROSOL, METERED RESPIRATORY (INHALATION) EVERY 6 HOURS PRN
Qty: 6.7 G | Refills: 0 | Status: SHIPPED | OUTPATIENT
Start: 2022-10-06

## 2022-10-06 NOTE — ED PROVIDER NOTES
History  Chief Complaint   Patient presents with    COVID-19 Exposure     Patient stated that she was seen at urgent care on Monday and was diagnosed with a viral illness  Was given steroids and still not feeling better  C/o sore throat, ear aches, and headaches  59-year-old female patient here for evaluation of cough and congestion  Symptoms started 1 week ago  Unsure of any sick contacts  She has had chills but no fever  No shortness of breath and chest tightness  Denies hemoptysis  Went to urgent care few days ago and was put on a Z-Austyn and given steroids  No improvement of symptoms prompting her ED visit today  No leg pain or swelling no recent travels traumas or surgeries  History provided by:  Patient   used: No    Cough  Cough characteristics:  Non-productive  Sputum characteristics:  Nondescript  Severity:  Moderate  Onset quality:  Gradual  Duration:  1 week  Timing:  Sporadic  Progression:  Unchanged  Chronicity:  New  Smoker: yes    Relieved by:  Nothing  Worsened by:  Nothing  Ineffective treatments:  None tried  Associated symptoms: no chest pain, no chills, no ear pain, no fever, no rash, no shortness of breath and no sore throat        Prior to Admission Medications   Prescriptions Last Dose Informant Patient Reported? Taking?    DULoxetine (CYMBALTA) 30 mg delayed release capsule   No No   Sig: Take 1 capsule (30 mg total) by mouth every morning   DULoxetine (CYMBALTA) 60 mg delayed release capsule   No No   Sig: Take 1 capsule (60 mg total) by mouth daily at bedtime   gabapentin (Neurontin) 100 mg capsule   No No   Sig: Take 1 capsule (100 mg total) by mouth 3 (three) times a day   Patient taking differently: Take 100 mg by mouth if needed (makes her tired)   levothyroxine 100 mcg tablet  Self No No   Sig: Take 1 tablet (100 mcg total) by mouth daily   meloxicam (MOBIC) 7 5 mg tablet   Yes No   Patient not taking: Reported on 10/3/2022   methylPREDNISolone 4 MG tablet therapy pack   No No   Sig: Use as directed on package   tamoxifen (NOLVADEX) 20 mg tablet   No No   Sig: Take 1 tablet (20 mg total) by mouth daily   Patient not taking: Reported on 10/3/2022      Facility-Administered Medications: None       Past Medical History:   Diagnosis Date    Anxiety     Breast cancer ([facility] ) 2017    right    Cancer [facility])     right breast  dx 04/2017    Cervical disc disorder of cervicothoracic region     Cervical radiculopathy     Cervicalgia     Cognitive impairment, mild, so stated     Depression     Major,recurrent    Diplopia     last assessed 12/23/13    Disc degeneration, lumbar     Disease of thyroid gland     hx of goiter, surgery done 1978    Edema of foot     Esophageal reflux     Fatigue     Fibromyalgia     Hemorrhage, anal or rectal     History of radiation therapy     Hx of radiation therapy     Treatments: Course C1, Plan ID Energy Fractions Dose per Fraction (cGy)total Dose delivered(cGy)elapsed days   R Breast 6x 28/28 180 5,040 39, R breast e 12E 5/5 200 1,000 7 Treatment dates: 7/20/17-09/05/17    Hx of radiation therapy     33 treatments    Hypotension     Hypothyroidism     Insomnia     Memory loss     Monoclonal gammopathy of undetermined significance     Myalgia     last assessed 07/08/16    Myositis     last assessed 07/08/16    Neck stiffness     Neuralgia     Neuritis     Pain syndrome, chronic     Palpitations     Paresthesia     Peripheral neuropathy     Polyneuropathy     Pseudodementia     Radiculopathy     12/23/13    Restless leg syndrome     Sacroiliitis (HCC)     SLE (systemic lupus erythematosus) ([facility] )     last assessed 12/23/13    Tachycardia     Viral warts     Vitamin B deficiency     Vitamin D deficiency     Vitamin D deficiency     Wears glasses        Past Surgical History:   Procedure Laterality Date    BLADDER SURGERY      bladder prolapse    BREAST BIOPSY Right     BREAST SURGERY Right breast bx    COLONOSCOPY      age 45 normal    HYSTERECTOMY  2009    vaginal hysterectomy, rectocele repair    CT MASTECTOMY, PARTIAL Right 2017    Procedure:  (NUCLEAR MED  8 AM , NEEDLE LOCAL TO FOLLOW 8:30 AM ) SEGMENTAL MASTECTOMY BREAST NEEDLE PLACEMENT,  SENTINAL LYMPH NODE BIOPSY;  Surgeon: Emanuel Valdez MD;  Location: [facility];  Service: General    REPAIR RECTOCELE      THYROID LOBECTOMY Left     TONSILLECTOMY      US BREAST NEEDLE LOC RIGHT Right 2017    US GUIDED BREAST BIOPSY RIGHT COMPLETE Right 2017    US GUIDED THYROID BIOPSY  2019       Family History   Problem Relation Age of Onset    Heart disease Mother         CHF    Heart failure Mother     Cirrhosis Father     Esophageal cancer Brother 72    No Known Problems Brother     No Known Problems Brother     No Known Problems Brother      I have reviewed and agree with the history as documented  E-Cigarette/Vaping    E-Cigarette Use Never User      E-Cigarette/Vaping Substances    Nicotine No     THC No     CBD No     Flavoring No     Other No     Unknown No      Social History     Tobacco Use    Smoking status: Former Smoker     Packs/day: 1 50     Years: 5 00     Pack years: 7 50     Types: Cigarettes     Quit date:      Years since quittin 7    Smokeless tobacco: Never Used    Tobacco comment: Former quit 30 years ago   Vaping Use    Vaping Use: Never used   Substance Use Topics    Alcohol use: Not Currently    Drug use: No       Review of Systems   Constitutional: Negative for chills and fever  HENT: Negative for ear pain and sore throat  Eyes: Negative for pain and visual disturbance  Respiratory: Positive for cough  Negative for shortness of breath  Cardiovascular: Negative for chest pain and palpitations  Gastrointestinal: Negative for abdominal pain and vomiting  Genitourinary: Negative for dysuria and hematuria     Musculoskeletal: Negative for arthralgias and back pain  Skin: Negative for color change and rash  Neurological: Negative for seizures and syncope  All other systems reviewed and are negative  Physical Exam  Physical Exam  Vitals and nursing note reviewed  Constitutional:       General: She is not in acute distress  Appearance: She is well-developed  HENT:      Head: Normocephalic and atraumatic  Eyes:      Conjunctiva/sclera: Conjunctivae normal    Cardiovascular:      Rate and Rhythm: Normal rate and regular rhythm  Heart sounds: No murmur heard  Pulmonary:      Effort: Pulmonary effort is normal  No respiratory distress  Breath sounds: Normal breath sounds  Abdominal:      Palpations: Abdomen is soft  Tenderness: There is no abdominal tenderness  Musculoskeletal:      Cervical back: Neck supple  Skin:     General: Skin is warm and dry  Neurological:      Mental Status: She is alert and oriented to person, place, and time  GCS: GCS eye subscore is 4  GCS verbal subscore is 5  GCS motor subscore is 6  Comments: GCS 15  AAOx3  Ambulating in department without difficulty  CN II-XII grossly intact  No focal neuro deficits             Vital Signs  ED Triage Vitals   Temperature Pulse Respirations Blood Pressure SpO2   10/06/22 1323 10/06/22 1320 10/06/22 1320 10/06/22 1320 10/06/22 1320   98 9 °F (37 2 °C) 85 16 92/58 98 %      Temp Source Heart Rate Source Patient Position - Orthostatic VS BP Location FiO2 (%)   10/06/22 1323 10/06/22 1320 10/06/22 1320 10/06/22 1320 --   Oral Monitor Sitting Right arm       Pain Score       --                  Vitals:    10/06/22 1320   BP: 92/58   Pulse: 85   Patient Position - Orthostatic VS: Sitting         Visual Acuity      ED Medications  Medications - No data to display    Diagnostic Studies  Results Reviewed     Procedure Component Value Units Date/Time    FLU/RSV/COVID - if FLU/RSV clinically relevant [[id number]]  (Abnormal) Collected: 10/06/22 1340    Lab Status: Final result Specimen: Nares from Nose Updated: 10/06/22 1426     SARS-CoV-2 Positive     INFLUENZA A PCR Negative     INFLUENZA B PCR Negative     RSV PCR Negative    Narrative:      FOR PEDIATRIC PATIENTS - copy/paste COVID Guidelines URL to browser: [email or web address]    SARS-CoV-2 assay is a Nucleic Acid Amplification assay intended for the  qualitative detection of nucleic acid from SARS-CoV-2 in nasopharyngeal  swabs  Results are for the presumptive identification of SARS-CoV-2 RNA  Positive results are indicative of infection with SARS-CoV-2, the virus  causing COVID-19, but do not rule out bacterial infection or co-infection  with other viruses  Laboratories within the [place] and its  territories are required to report all positive results to the appropriate  public health authorities  Negative results do not preclude SARS-CoV-2  infection and should not be used as the sole basis for treatment or other  patient management decisions  Negative results must be combined with  clinical observations, patient history, and epidemiological information  This test has not been FDA cleared or approved  This test has been authorized by FDA under an Emergency Use Authorization  (EUA)  This test is only authorized for the duration of time the  declaration that circumstances exist justifying the authorization of the  emergency use of an in vitro diagnostic tests for detection of SARS-CoV-2  virus and/or diagnosis of COVID-19 infection under section 564(b)(1) of  the Act, 21 U  S C  302SVM-9(W)(9), unless the authorization is terminated  or revoked sooner  The test has been validated but independent review by FDA  and CLIA is pending  Test performed using [place] Gene[employer]: This RT-PCR assay targets N2,  a region unique to SARS-CoV-2  A conserved region in the E-gene was chosen  for pan-Sarbecovirus detection which includes SARS-CoV-2      According to CMS---R, this platform meets the definition of high-throughput technology  No orders to display              Procedures  Procedures         ED Course                               SBIRT 22yo+    Flowsheet Row Most Recent Value   SBIRT (23 yo +)    In order to provide better care to our patients, we are screening all of our patients for alcohol and drug use  Would it be okay to ask you these screening questions? Unable to answer at this time Filed at: 10/06/2022 1340                    [facility]  Number of Diagnoses or Management Options  COVID-19: new and requires workup  Diagnosis management comments: DDx including but not limited to:  URI, bronchitis, pneumonia, GERD, aspiration pneumonitis, viral illness, COVID 23, smoke inhalation, CO poisoning, adverse medication reaction  Risk of Complications, Morbidity, and/or Mortality  Presenting problems: low  Management options: low  General comments: Plan/medical decision [age]year-old with cough and congestion  She had COVID swab done at the time of her triage which is positive  COVID is likely the source of her symptoms  At this time she is not hypoxic or in any respiratory distress  She is stable for discharge home and outpatient follow-up with her family doctor  We discussed using albuterol inhaler as needed for wheezing/shortness of breath  Discussed conservative management of cough, recommended honey  Return parameters provided  Patient understands and agrees with plan      Patient Progress  Patient progress: stable      Disposition  Final diagnoses:   [id number]     Time reflects when diagnosis was documented in both MDM as applicable and the Disposition within this note     Time User Action Codes Description Comment    10/6/2022  2:45 PM Vicki Linares Add [U07 1] COVID-19       ED Disposition     ED Disposition   Discharge    Condition   Stable    Date/Time   Thu Oct 6, 2022 1100 [facility] discharge to home/self care  Follow-up Information     Follow up With Specialties Details Why Raffaele Murray MD Internal Medicine Call   [address] [facility] [address] [facility]      Gillian Peterson MD Internal Medicine   [address] [facility] [address] [id number] [phone]            Patient's Medications   Discharge Prescriptions    ALBUTEROL (PROVENTIL HFA,VENTOLIN HFA) 90 MCG/ACT INHALER    Inhale 2 puffs every 6 (six) hours as needed for wheezing or shortness of breath       Start Date: 10/6/2022 End Date: --       Order Dose: 2 puffs       Quantity: 6 7 g    Refills: 0       No discharge procedures on file      PDMP Review       Value Time User    PDMP Reviewed  Yes 5/27/2022 12:57 PM Alejandrina Hendricks, [facility]          ED Provider  Electronically Signed by           Kenia Mccann PA-C  10/06/22 1458

## 2022-10-08 ENCOUNTER — HOSPITAL ENCOUNTER (EMERGENCY)
Facility: HOSPITAL | Age: 62
Discharge: HOME | End: 2022-10-08
Attending: EMERGENCY MEDICINE
Payer: COMMERCIAL

## 2022-10-08 VITALS
DIASTOLIC BLOOD PRESSURE: 67 MMHG | SYSTOLIC BLOOD PRESSURE: 113 MMHG | RESPIRATION RATE: 18 BRPM | TEMPERATURE: 97.9 F | HEART RATE: 84 BPM | OXYGEN SATURATION: 98 %

## 2022-10-08 DIAGNOSIS — L50.9 URTICARIA: Primary | ICD-10-CM

## 2022-10-08 PROCEDURE — 99282 EMERGENCY DEPT VISIT SF MDM: CPT

## 2022-10-08 PROCEDURE — 99284 EMERGENCY DEPT VISIT MOD MDM: CPT | Performed by: EMERGENCY MEDICINE

## 2022-10-08 RX ORDER — PREDNISONE 20 MG/1
40 TABLET ORAL ONCE
Status: COMPLETED | OUTPATIENT
Start: 2022-10-08 | End: 2022-10-08

## 2022-10-08 RX ORDER — FAMOTIDINE 20 MG/1
20 TABLET, FILM COATED ORAL 2 TIMES DAILY
Qty: 30 TABLET | Refills: 0 | Status: SHIPPED | OUTPATIENT
Start: 2022-10-08

## 2022-10-08 RX ORDER — DIPHENHYDRAMINE HCL 25 MG
25 TABLET ORAL EVERY 6 HOURS PRN
Qty: 30 TABLET | Refills: 0 | Status: SHIPPED | OUTPATIENT
Start: 2022-10-08

## 2022-10-08 RX ORDER — FAMOTIDINE 20 MG/1
20 TABLET, FILM COATED ORAL ONCE
Status: COMPLETED | OUTPATIENT
Start: 2022-10-08 | End: 2022-10-08

## 2022-10-08 RX ORDER — PREDNISONE 20 MG/1
60 TABLET ORAL DAILY
Qty: 12 TABLET | Refills: 0 | Status: SHIPPED | OUTPATIENT
Start: 2022-10-08 | End: 2022-10-12

## 2022-10-08 RX ORDER — DIPHENHYDRAMINE HCL 25 MG
25 TABLET ORAL ONCE
Status: COMPLETED | OUTPATIENT
Start: 2022-10-08 | End: 2022-10-08

## 2022-10-08 RX ADMIN — DIPHENHYDRAMINE HYDROCHLORIDE 25 MG: 25 TABLET ORAL at 15:57

## 2022-10-08 RX ADMIN — PREDNISONE 40 MG: 20 TABLET ORAL at 15:57

## 2022-10-08 RX ADMIN — FAMOTIDINE 20 MG: 20 TABLET ORAL at 15:57

## 2022-10-08 NOTE — DISCHARGE INSTRUCTIONS
Take steroids daily, first dose tomorrow, benadryl and pepcid as prescribed for itching and call your primary care doctor for follow up next week

## 2022-10-08 NOTE — ED PROVIDER NOTES
History  Chief Complaint   Patient presents with   • Itching     Pt reports being one week into covid and today she woke up with hives all over her body  HPI  59 yo F presents with itching for the past day that started this morning with hives all over body  Nothing makes better or worse  No SOB or chest pain  No new soaps/lotions/exposures  Prior to Admission Medications   Prescriptions Last Dose Informant Patient Reported? Taking?    DULoxetine (CYMBALTA) 30 mg delayed release capsule   No No   Sig: Take 1 capsule (30 mg total) by mouth every morning   DULoxetine (CYMBALTA) 60 mg delayed release capsule   No No   Sig: Take 1 capsule (60 mg total) by mouth daily at bedtime   albuterol (PROVENTIL HFA,VENTOLIN HFA) 90 mcg/act inhaler   No No   Sig: Inhale 2 puffs every 6 (six) hours as needed for wheezing or shortness of breath   gabapentin (Neurontin) 100 mg capsule   No No   Sig: Take 1 capsule (100 mg total) by mouth 3 (three) times a day   Patient taking differently: Take 100 mg by mouth if needed (makes her tired)   levothyroxine 100 mcg tablet  Self No No   Sig: Take 1 tablet (100 mcg total) by mouth daily   meloxicam (MOBIC) 7 5 mg tablet   Yes No   Patient not taking: Reported on 10/3/2022   methylPREDNISolone 4 MG tablet therapy pack   No No   Sig: Use as directed on package   tamoxifen (NOLVADEX) 20 mg tablet   No No   Sig: Take 1 tablet (20 mg total) by mouth daily   Patient not taking: Reported on 10/3/2022      Facility-Administered Medications: None       Past Medical History:   Diagnosis Date   • Anxiety    • Breast cancer ([facility] ) 2017    right   • Cancer ([facility] )     right breast  dx 04/2017   • Cervical disc disorder of cervicothoracic region    • Cervical radiculopathy    • Cervicalgia    • Cognitive impairment, mild, so stated    • Depression     Major,recurrent   • Diplopia     last assessed 12/23/13   • Disc degeneration, lumbar    • Disease of thyroid gland     hx of goiter, surgery done 1978   • Edema of foot    • Esophageal reflux    • Fatigue    • Fibromyalgia    • Hemorrhage, anal or rectal    • History of radiation therapy    • Hx of radiation therapy     Treatments: Course C1, Plan ID Energy Fractions Dose per Fraction (cGy)total Dose delivered(cGy)elapsed days   R Breast 6x 28/28 180 5,040 39, R breast e 12E 5/5 200 1,000 7 Treatment dates: 7/20/17-09/05/17   • Hx of radiation therapy     33 treatments   • Hypotension    • Hypothyroidism    • Insomnia    • Memory loss    • Monoclonal gammopathy of undetermined significance    • Myalgia     last assessed 07/08/16   • Myositis     last assessed 07/08/16   • Neck stiffness    • Neuralgia    • Neuritis    • Pain syndrome, chronic    • Palpitations    • Paresthesia    • Peripheral neuropathy    • Polyneuropathy    • Pseudodementia    • Radiculopathy     12/23/13   • Restless leg syndrome    • Sacroiliitis ([facility] )    • SLE (systemic lupus erythematosus) ([facility] )     last assessed 12/23/13   • Tachycardia    • Viral warts    • Vitamin B deficiency    • Vitamin D deficiency    • Vitamin D deficiency    • Wears glasses        Past Surgical History:   Procedure Laterality Date   • BLADDER SURGERY      bladder prolapse   • BREAST BIOPSY Right    • BREAST SURGERY Right     breast bx   • COLONOSCOPY      age 45 normal   • HYSTERECTOMY  2009    vaginal hysterectomy, rectocele repair   • AK MASTECTOMY, PARTIAL Right 5/24/2017    Procedure:  (NUCLEAR MED  8 AM , NEEDLE LOCAL TO FOLLOW 8:30 AM ) SEGMENTAL MASTECTOMY BREAST NEEDLE PLACEMENT,  SENTINAL LYMPH NODE BIOPSY;  Surgeon: Varghese Mcdonnell MD;  Location: [facility];  Service: General   • REPAIR RECTOCELE     • THYROID LOBECTOMY Left 1978   • TONSILLECTOMY     • US BREAST NEEDLE LOC RIGHT Right 5/24/2017   • US GUIDED BREAST BIOPSY RIGHT COMPLETE Right 4/20/2017   • US GUIDED THYROID BIOPSY  4/25/2019       Family History   Problem Relation Age of Onset   • Heart disease Mother         CHF   • Heart failure Mother    • Cirrhosis Father    • Esophageal cancer Brother 72   • No Known Problems Brother    • No Known Problems Brother    • No Known Problems Brother      I have reviewed and agree with the history as documented  E-Cigarette/Vaping   • E-Cigarette Use Never User      E-Cigarette/Vaping Substances   • Nicotine No    • THC No    • CBD No    • Flavoring No    • Other No    • Unknown No      Social History     Tobacco Use   • Smoking status: Former Smoker     Packs/day: 1 50     Years: 5 00     Pack years: 7 50     Types: Cigarettes     Quit date:      Years since quittin 8   • Smokeless tobacco: Never Used   • Tobacco comment: Former quit 30 years ago   Vaping Use   • Vaping Use: Never used   Substance Use Topics   • Alcohol use: Not Currently   • Drug use: No       Review of Systems   Constitutional: Negative for chills and fever  HENT: Negative for dental problem and ear pain  Eyes: Negative for pain and redness  Respiratory: Negative for cough and shortness of breath  Cardiovascular: Negative for chest pain and palpitations  Gastrointestinal: Negative for abdominal pain and nausea  Endocrine: Negative for polydipsia and polyphagia  Genitourinary: Negative for dysuria and frequency  Musculoskeletal: Negative for arthralgias and joint swelling  Skin: Positive for rash  Negative for color change  Neurological: Negative for dizziness and headaches  Psychiatric/Behavioral: Negative for behavioral problems and confusion  All other systems reviewed and are negative  Physical Exam  Physical Exam  Vitals and nursing note reviewed  Constitutional:       General: She is not in acute distress  HENT:      Head: Normocephalic and atraumatic  Right Ear: External ear normal       Left Ear: External ear normal       Nose: Nose normal    Eyes:      General: No scleral icterus  Cardiovascular:      Rate and Rhythm: Normal rate     Pulmonary:      Effort: Pulmonary effort is normal  No respiratory distress  Abdominal:      General: There is no distension  Musculoskeletal:         General: No deformity  Normal range of motion  Skin:     Findings: No rash  Comments: Urticarial rash across buttocks, abdomen   Neurological:      General: No focal deficit present  Mental Status: She is alert  Gait: Gait normal    Psychiatric:         Mood and Affect: Mood normal          Vital Signs  ED Triage Vitals [10/08/22 1526]   Temperature Pulse Respirations Blood Pressure SpO2   97 9 °F (36 6 °C) 84 18 113/67 98 %      Temp src Heart Rate Source Patient Position - Orthostatic VS BP Location FiO2 (%)   -- -- -- -- --      Pain Score       --           Vitals:    10/08/22 1526   BP: 113/67   Pulse: 84         Visual Acuity      ED Medications  Medications   predniSONE tablet 40 mg (has no administration in time range)   diphenhydrAMINE (BENADRYL) tablet 25 mg (has no administration in time range)   famotidine (PEPCID) tablet 20 mg (has no administration in time range)       Diagnostic Studies  Results Reviewed     None                 No orders to display              Procedures  Procedures         ED Course                                             MDM  No signs of SJS/TEN/SSS, no petechiae or purpura  Clinically with viral exanthem vs allergic reaction, will rx steroids/benadryl/pepcid and recommend PCP follow up next week  Disposition  Final diagnoses:   Urticaria     Time reflects when diagnosis was documented in both MDM as applicable and the Disposition within this note     Time User Action Codes Description Comment    10/8/2022  3:49 PM Miguel Elliott Add [L50 9] Urticaria       ED Disposition     ED Disposition   Discharge    Condition   Stable    Date/Time   Sat Oct 8, 2022  3:49 PM    Kuldip Richards discharge to home/self care                 Follow-up Information     Follow up With Specialties Details Why Kasia Meza MD Internal Medicine Call   [phone] [address] [address]  [phone]            Patient's Medications   Discharge Prescriptions    DIPHENHYDRAMINE (BENADRYL) 25 MG TABLET    Take 1 tablet (25 mg total) by mouth every 6 (six) hours as needed for itching       Start Date: 10/8/2022 End Date: --       Order Dose: 25 mg       Quantity: 30 tablet    Refills: 0    FAMOTIDINE (PEPCID) 20 MG TABLET    Take 1 tablet (20 mg total) by mouth 2 (two) times a day       Start Date: 10/8/2022 End Date: --       Order Dose: 20 mg       Quantity: 30 tablet    Refills: 0    PREDNISONE 20 MG TABLET    Take 3 tablets (60 mg total) by mouth daily for 4 days       Start Date: 10/8/2022 End Date: 10/12/2022       Order Dose: 60 mg       Quantity: 12 tablet    Refills: 0       No discharge procedures on file      PDMP Review       Value Time User    PDMP Reviewed  Yes 5/27/2022 12:57 PM Alejandrina Fuller, [facility] Provider  Electronically Signed by           Amber Westbrook MD  10/08/22

## 2022-10-11 ENCOUNTER — OFFICE VISIT (OUTPATIENT)
Dept: INTERNAL MEDICINE CLINIC | Facility: CLINIC | Age: 62
End: 2022-10-11
Payer: COMMERCIAL

## 2022-10-11 VITALS
SYSTOLIC BLOOD PRESSURE: 98 MMHG | BODY MASS INDEX: 22.46 KG/M2 | TEMPERATURE: 98.1 F | WEIGHT: 148.2 LBS | OXYGEN SATURATION: 98 % | DIASTOLIC BLOOD PRESSURE: 60 MMHG | HEIGHT: 68 IN | HEART RATE: 88 BPM

## 2022-10-11 DIAGNOSIS — Z11.59 NEED FOR HEPATITIS C SCREENING TEST: ICD-10-CM

## 2022-10-11 DIAGNOSIS — Z12.4 SCREENING FOR CERVICAL CANCER: ICD-10-CM

## 2022-10-11 DIAGNOSIS — Z12.31 ENCOUNTER FOR SCREENING MAMMOGRAM FOR BREAST CANCER: ICD-10-CM

## 2022-10-11 DIAGNOSIS — U07.1 COVID-19: Primary | ICD-10-CM

## 2022-10-11 DIAGNOSIS — Z11.4 SCREENING FOR HIV (HUMAN IMMUNODEFICIENCY VIRUS): ICD-10-CM

## 2022-10-11 PROCEDURE — 99213 OFFICE O/P EST LOW 20 MIN: CPT

## 2022-10-11 NOTE — PROGRESS NOTES
INTERNAL MEDICINE FOLLOW-UP VISIT  [facility]    NAME: Monisha Jameson  AGE: 60 y o  SEX: female  : 1960     DATE: 10/11/2022     Assessment and Plan:   1  COVID-19  On day 7 since symptoms  ER visit for rash, started on prednisone which has decreased symptoms  Use flonase 2 sprays twice a day for congestion  If symptoms persist or worsen contact the office  Stay hydrated with plenty of water      No follow-ups on file  Chief Complaint:     Chief Complaint   Patient presents with   • Follow-up   • COVID-19     Positive and went to the ED and was told to f/u with PCP      History of Present Illness:     Patient presents today in the office for a follow-up after being diagnosed with COVID on 10/03  She continues with congestion  On 10 6th she went to emergency room due to a rash  She was started on a Medrol Dosepak as well as Benadryl  She states this has improved       The following portions of the patient's history were reviewed and updated as appropriate: allergies, current medications, past family history, past medical history, past social history, past surgical history and problem list      Review of Systems:     Review of Systems   Constitutional: Negative for appetite change, chills, diaphoresis, fatigue, fever and unexpected weight change  HENT: Positive for congestion  Negative for postnasal drip and sneezing  Eyes: Negative for visual disturbance  Respiratory: Negative for chest tightness and shortness of breath  Cardiovascular: Negative for chest pain, palpitations and leg swelling  Gastrointestinal: Negative for abdominal pain and blood in stool  Endocrine: Negative for cold intolerance, heat intolerance, polydipsia, polyphagia and polyuria  Genitourinary: Negative for difficulty urinating, dysuria, frequency and urgency  Musculoskeletal: Negative for arthralgias and myalgias  Skin: Positive for rash   Negative for wound  Neurological: Negative for dizziness, weakness, light-headedness and headaches  Hematological: Negative for adenopathy  Psychiatric/Behavioral: Negative for confusion, dysphoric mood and sleep disturbance  The patient is not nervous/anxious  Past Medical History:     Past Medical History:   Diagnosis Date   • Anxiety    • Breast cancer ([facility] ) 2017    right   • Cancer [facility])     right breast  dx 04/2017   • Cervical disc disorder of cervicothoracic region    • Cervical radiculopathy    • Cervicalgia    • Cognitive impairment, mild, so stated    • Depression     Major,recurrent   • Diplopia     last assessed 12/23/13   • Disc degeneration, lumbar    • Disease of thyroid gland     hx of goiter, surgery done 1978   • Edema of foot    • Esophageal reflux    • Fatigue    • Fibromyalgia    • Hemorrhage, anal or rectal    • History of radiation therapy    • Hx of radiation therapy     Treatments: Course C1, Plan ID Energy Fractions Dose per Fraction (cGy)total Dose delivered(cGy)elapsed days   R Breast 6x 28/28 180 5,040 39, R breast e 12E 5/5 200 1,000 7 Treatment dates: 7/20/17-09/05/17   • Hx of radiation therapy     33 treatments   • Hypotension    • Hypothyroidism    • Insomnia    • Memory loss    • Monoclonal gammopathy of undetermined significance    • Myalgia     last assessed 07/08/16   • Myositis     last assessed 07/08/16   • Neck stiffness    • Neuralgia    • Neuritis    • Pain syndrome, chronic    • Palpitations    • Paresthesia    • Peripheral neuropathy    • Polyneuropathy    • Pseudodementia    • Radiculopathy     12/23/13   • Restless leg syndrome    • Sacroiliitis ([facility] )    • SLE (systemic lupus erythematosus) ([facility] )     last assessed 12/23/13   • Tachycardia    • Viral warts    • Vitamin B deficiency    • Vitamin D deficiency    • Vitamin D deficiency    • Wears glasses         Current Medications:     Current Outpatient Medications:   •  albuterol (PROVENTIL HFA,VENTOLIN HFA) 90 mcg/act inhaler, Inhale 2 puffs every 6 (six) hours as needed for wheezing or shortness of breath, Disp: 6 7 g, Rfl: 0  •  diphenhydrAMINE (BENADRYL) 25 mg tablet, Take 1 tablet (25 mg total) by mouth every 6 (six) hours as needed for itching, Disp: 30 tablet, Rfl: 0  •  DULoxetine (CYMBALTA) 30 mg delayed release capsule, Take 1 capsule (30 mg total) by mouth every morning, Disp: 10 capsule, Rfl: 0  •  DULoxetine (CYMBALTA) 60 mg delayed release capsule, Take 1 capsule (60 mg total) by mouth daily at bedtime, Disp: 10 capsule, Rfl: 0  •  famotidine (PEPCID) 20 mg tablet, Take 1 tablet (20 mg total) by mouth 2 (two) times a day, Disp: 30 tablet, Rfl: 0  •  gabapentin (Neurontin) 100 mg capsule, Take 1 capsule (100 mg total) by mouth 3 (three) times a day (Patient taking differently: Take 100 mg by mouth if needed (makes her tired)), Disp: 270 capsule, Rfl: 1  •  levothyroxine 100 mcg tablet, Take 1 tablet (100 mcg total) by mouth daily, Disp: 90 tablet, Rfl: 1  •  methylPREDNISolone 4 MG tablet therapy pack, Use as directed on package, Disp: 21 each, Rfl: 0  •  predniSONE 20 mg tablet, Take 3 tablets (60 mg total) by mouth daily for 4 days, Disp: 12 tablet, Rfl: 0  •  meloxicam (MOBIC) 7 5 mg tablet, , Disp: , Rfl:   •  tamoxifen (NOLVADEX) 20 mg tablet, Take 1 tablet (20 mg total) by mouth daily (Patient not taking: No sig reported), Disp: 90 tablet, Rfl: 0     Allergies:   No Known Allergies     Physical Exam:     BP 98/60 (BP Location: Left arm, Patient Position: Sitting, Cuff Size: Standard) Comment: bp  Pulse 88   Temp 98 1 °F (36 7 °C) (Temporal) Comment: no  Ht 5' 8" (1 727 m)   Wt 67 2 kg (148 lb 3 2 oz)   SpO2 98%   BMI 22 53 kg/m²     Physical Exam  Constitutional:       Appearance: She is well-developed  HENT:      Head: Normocephalic and atraumatic  Eyes:      Pupils: Pupils are equal, round, and reactive to light  Neck:      Thyroid: No thyromegaly     Cardiovascular:      Rate and Rhythm: Normal rate and regular rhythm  Heart sounds: No murmur heard  Pulmonary:      Effort: Pulmonary effort is normal       Breath sounds: Normal breath sounds  Abdominal:      General: Bowel sounds are normal       Palpations: Abdomen is soft  Musculoskeletal:         General: Normal range of motion  Cervical back: Normal range of motion and neck supple  Lymphadenopathy:      Cervical: No cervical adenopathy  Skin:     General: Skin is warm and dry  Neurological:      Mental Status: She is alert and oriented to person, place, and time            [address] [facility]

## 2022-10-29 ENCOUNTER — APPOINTMENT (EMERGENCY)
Dept: RADIOLOGY | Facility: HOSPITAL | Age: 62
End: 2022-10-29

## 2022-10-29 ENCOUNTER — HOSPITAL ENCOUNTER (EMERGENCY)
Facility: HOSPITAL | Age: 62
Discharge: HOME | End: 2022-10-29
Attending: EMERGENCY MEDICINE

## 2022-10-29 VITALS
DIASTOLIC BLOOD PRESSURE: 59 MMHG | TEMPERATURE: 98.2 F | RESPIRATION RATE: 23 BRPM | SYSTOLIC BLOOD PRESSURE: 121 MMHG | OXYGEN SATURATION: 97 % | HEART RATE: 61 BPM

## 2022-10-29 DIAGNOSIS — R68.83 CHILLS: Primary | ICD-10-CM

## 2022-10-29 DIAGNOSIS — E03.9 HYPOTHYROID: ICD-10-CM

## 2022-10-29 DIAGNOSIS — E03.9 HYPOTHYROIDISM, UNSPECIFIED TYPE: ICD-10-CM

## 2022-10-29 LAB
ALBUMIN SERPL BCP-MCNC: 3.3 G/DL (ref 3.5–5)
ALP SERPL-CCNC: 52 U/L (ref 46–116)
ALT SERPL W P-5'-P-CCNC: 24 U/L (ref 12–78)
ANION GAP SERPL CALCULATED.3IONS-SCNC: 10 MMOL/L (ref 4–13)
AST SERPL W P-5'-P-CCNC: 20 U/L (ref 5–45)
BASOPHILS # BLD AUTO: 0.01 THOUSANDS/ÂΜL (ref 0–0.1)
BASOPHILS NFR BLD AUTO: 0 % (ref 0–1)
BILIRUB SERPL-MCNC: 0.54 MG/DL (ref 0.2–1)
BUN SERPL-MCNC: 11 MG/DL (ref 5–25)
CALCIUM ALBUM COR SERPL-MCNC: 8.8 MG/DL (ref 8.3–10.1)
CALCIUM SERPL-MCNC: 8.2 MG/DL (ref 8.3–10.1)
CARDIAC TROPONIN I PNL SERPL HS: <2 NG/L
CHLORIDE SERPL-SCNC: 106 MMOL/L (ref 96–108)
CO2 SERPL-SCNC: 28 MMOL/L (ref 21–32)
CREAT SERPL-MCNC: 0.77 MG/DL (ref 0.6–1.3)
EOSINOPHIL # BLD AUTO: 0.13 THOUSAND/ÂΜL (ref 0–0.61)
EOSINOPHIL NFR BLD AUTO: 3 % (ref 0–6)
ERYTHROCYTE [DISTWIDTH] IN BLOOD BY AUTOMATED COUNT: 12.4 % (ref 11.6–15.1)
GFR SERPL CREATININE-BSD FRML MDRD: 82 ML/MIN/1.73SQ M
GLUCOSE SERPL-MCNC: 94 MG/DL (ref 65–140)
HCT VFR BLD AUTO: 34.4 % (ref 34.8–46.1)
HGB BLD-MCNC: 12 G/DL (ref 11.5–15.4)
IMM GRANULOCYTES # BLD AUTO: 0.01 THOUSAND/UL (ref 0–0.2)
IMM GRANULOCYTES NFR BLD AUTO: 0 % (ref 0–2)
INR PPP: 1.17 (ref 0.84–1.19)
LYMPHOCYTES # BLD AUTO: 0.99 THOUSANDS/ÂΜL (ref 0.6–4.47)
LYMPHOCYTES NFR BLD AUTO: 26 % (ref 14–44)
MCH RBC QN AUTO: 32.7 PG (ref 26.8–34.3)
MCHC RBC AUTO-ENTMCNC: 34.9 G/DL (ref 31.4–37.4)
MCV RBC AUTO: 94 FL (ref 82–98)
MONOCYTES # BLD AUTO: 0.46 THOUSAND/ÂΜL (ref 0.17–1.22)
MONOCYTES NFR BLD AUTO: 12 % (ref 4–12)
NEUTROPHILS # BLD AUTO: 2.28 THOUSANDS/ÂΜL (ref 1.85–7.62)
NEUTS SEG NFR BLD AUTO: 59 % (ref 43–75)
NRBC BLD AUTO-RTO: 0 /100 WBCS
PLATELET # BLD AUTO: 219 THOUSANDS/UL (ref 149–390)
PMV BLD AUTO: 8.7 FL (ref 8.9–12.7)
POTASSIUM SERPL-SCNC: 3.4 MMOL/L (ref 3.5–5.3)
PROT SERPL-MCNC: 7 G/DL (ref 6.4–8.4)
PROTHROMBIN TIME: 14.7 SECONDS (ref 11.6–14.5)
RBC # BLD AUTO: 3.67 MILLION/UL (ref 3.81–5.12)
SODIUM SERPL-SCNC: 144 MMOL/L (ref 135–147)
TSH SERPL DL<=0.05 MIU/L-ACNC: 40.97 UIU/ML (ref 0.45–4.5)
WBC # BLD AUTO: 3.88 THOUSAND/UL (ref 4.31–10.16)

## 2022-10-30 LAB — T4 FREE SERPL-MCNC: 0.5 NG/DL (ref 0.76–1.46)

## 2022-10-30 NOTE — ED NOTES
Charge instructions and follow up reviewed  Patient AAO x4, ambulatory to waiting room  Accompanied by significant other        Jeff Juarez RN  10/29/22 1679

## 2022-10-30 NOTE — DISCHARGE INSTRUCTIONS
Please follow-up with your primary care physician to discuss the utility of changing the dose of Synthroid the drawn based on your TSH from today    I would prefer that they change that dose as they can also follow it if they choose to make any adjustments to her medications

## 2022-10-31 NOTE — ED PROVIDER NOTES
History  Chief Complaint   Patient presents with   • Chills     Chills, congestion, throat pain, covid 3 wks ago      60year old female patient presents emergency department for evaluation of generalized malaise and fatigue  The patient states no specific complaint other than feeling unwell and at one point having chills  The patient is awake, alert, oriented, has no chest pain, shortness of breath, nausea, no vomiting, no diarrhea  The patient will be evaluated the broad differential diagnosis because her complaint is fairly vague at this point in time  History provided by:  Patient   used: No    Malaise - 7 years or greater  Severity:  Mild  Onset quality:  Gradual  Timing:  Constant  Progression:  Worsening  Chronicity:  New  Relieved by:  Nothing  Worsened by:  Nothing  Ineffective treatments:  None tried  Associated symptoms: no arthralgias, no cough, no lethargy and no stroke symptoms    Risk factors: no congestive heart failure        Prior to Admission Medications   Prescriptions Last Dose Informant Patient Reported? Taking?    DULoxetine (CYMBALTA) 30 mg delayed release capsule  Self No No   Sig: Take 1 capsule (30 mg total) by mouth every morning   DULoxetine (CYMBALTA) 60 mg delayed release capsule  Self No No   Sig: Take 1 capsule (60 mg total) by mouth daily at bedtime   albuterol (PROVENTIL HFA,VENTOLIN HFA) 90 mcg/act inhaler  Self No No   Sig: Inhale 2 puffs every 6 (six) hours as needed for wheezing or shortness of breath   diphenhydrAMINE (BENADRYL) 25 mg tablet  Self No No   Sig: Take 1 tablet (25 mg total) by mouth every 6 (six) hours as needed for itching   famotidine (PEPCID) 20 mg tablet  Self No No   Sig: Take 1 tablet (20 mg total) by mouth 2 (two) times a day   gabapentin (Neurontin) 100 mg capsule  Self No No   Sig: Take 1 capsule (100 mg total) by mouth 3 (three) times a day   Patient taking differently: Take 100 mg by mouth if needed (makes her tired) levothyroxine 100 mcg tablet  Self No No   Sig: Take 1 tablet (100 mcg total) by mouth daily   meloxicam (MOBIC) 7 5 mg tablet  Self Yes No   Patient not taking: No sig reported   methylPREDNISolone 4 MG tablet therapy pack  Self No No   Sig: Use as directed on package   tamoxifen (NOLVADEX) 20 mg tablet  Self No No   Sig: Take 1 tablet (20 mg total) by mouth daily   Patient not taking: No sig reported      Facility-Administered Medications: None       Past Medical History:   Diagnosis Date   • Anxiety    • Breast cancer ([place] [address] ) 2017    right   • Cancer ([facility] )     right breast  dx 04/2017   • Cervical disc disorder of cervicothoracic region    • Cervical radiculopathy    • Cervicalgia    • Cognitive impairment, mild, so stated    • Depression     Major,recurrent   • Diplopia     last assessed 12/23/13   • Disc degeneration, lumbar    • Disease of thyroid gland     hx of goiter, surgery done 1978   • Edema of foot    • Esophageal reflux    • Fatigue    • Fibromyalgia    • Hemorrhage, anal or rectal    • History of radiation therapy    • Hx of radiation therapy     Treatments: Course C1, Plan ID Energy Fractions Dose per Fraction (cGy)total Dose delivered(cGy)elapsed days   R Breast 6x 28/28 180 5,040 39, R breast e 12E 5/5 200 1,000 7 Treatment dates: 7/20/17-09/05/17   • Hx of radiation therapy     33 treatments   • Hypotension    • Hypothyroidism    • Insomnia    • Memory loss    • Monoclonal gammopathy of undetermined significance    • Myalgia     last assessed 07/08/16   • Myositis     last assessed 07/08/16   • Neck stiffness    • Neuralgia    • Neuritis    • Pain syndrome, chronic    • Palpitations    • Paresthesia    • Peripheral neuropathy    • Polyneuropathy    • Pseudodementia    • Radiculopathy     12/23/13   • Restless leg syndrome    • Sacroiliitis ([facility] )    • SLE (systemic lupus erythematosus) ([place] [address] )     last assessed 12/23/13   • Tachycardia    • Viral warts    • Vitamin B deficiency    • Vitamin D deficiency    • Vitamin D deficiency    • Wears glasses        Past Surgical History:   Procedure Laterality Date   • BLADDER SURGERY      bladder prolapse   • BREAST BIOPSY Right    • BREAST SURGERY Right     breast bx   • COLONOSCOPY      age 45 normal   • HYSTERECTOMY  2009    vaginal hysterectomy, rectocele repair   • IA MASTECTOMY, PARTIAL Right 2017    Procedure:  (NUCLEAR MED  8 AM , NEEDLE LOCAL TO FOLLOW 8:30 AM ) SEGMENTAL MASTECTOMY BREAST NEEDLE PLACEMENT,  SENTINAL LYMPH NODE BIOPSY;  Surgeon: Barbara Waters MD;  Location: [facility];  Service: General   • REPAIR RECTOCELE     • THYROID LOBECTOMY Left    • TONSILLECTOMY     • US BREAST NEEDLE LOC RIGHT Right 2017   • US GUIDED BREAST BIOPSY RIGHT COMPLETE Right 2017   • Faina [address] THYROID BIOPSY  2019       Family History   Problem Relation Age of Onset   • Heart disease Mother         CHF   • Heart failure Mother    • Cirrhosis Father    • Esophageal cancer Brother 72   • No Known Problems Brother    • No Known Problems Brother    • No Known Problems Brother      I have reviewed and agree with the history as documented  E-Cigarette/Vaping   • E-Cigarette Use Never User      E-Cigarette/Vaping Substances   • Nicotine No    • THC No    • CBD No    • Flavoring No    • Other No    • Unknown No      Social History     Tobacco Use   • Smoking status: Former Smoker     Packs/day: 1 50     Years: 5 00     Pack years: 7 50     Types: Cigarettes     Quit date:      Years since quittin 8   • Smokeless tobacco: Never Used   • Tobacco comment: Former quit 30 years ago   Vaping Use   • Vaping Use: Never used   Substance Use Topics   • Alcohol use: Not Currently   • Drug use: No       Review of Systems   Respiratory: Negative for cough  Musculoskeletal: Negative for arthralgias  All other systems reviewed and are negative  Physical Exam  Physical Exam  Vitals and nursing note reviewed     Constitutional:       Appearance: She is well-developed  HENT:      Head: Normocephalic and atraumatic  Right Ear: External ear normal       Left Ear: External ear normal    Eyes:      Conjunctiva/sclera: Conjunctivae normal    Neck:      Thyroid: No thyromegaly  Vascular: No JVD  Trachea: No tracheal deviation  Cardiovascular:      Rate and Rhythm: Normal rate  Pulmonary:      Effort: Pulmonary effort is normal       Breath sounds: Normal breath sounds  No stridor  Abdominal:      General: There is no distension  Palpations: Abdomen is soft  There is no mass  Tenderness: There is no abdominal tenderness  There is no guarding  Hernia: No hernia is present  Musculoskeletal:         General: No tenderness or deformity  Normal range of motion  Lymphadenopathy:      Cervical: No cervical adenopathy  Skin:     General: Skin is warm  Coloration: Skin is not pale  Findings: No erythema or rash  Neurological:      Mental Status: She is alert and oriented to person, place, and time     Psychiatric:         Behavior: Behavior normal          Vital Signs  ED Triage Vitals [10/29/22 1816]   Temperature Pulse Respirations Blood Pressure SpO2   98 2 °F (36 8 °C) 70 16 108/59 98 %      Temp src Heart Rate Source Patient Position - Orthostatic VS BP Location FiO2 (%)   -- Monitor Sitting Left arm --      Pain Score       --           Vitals:    10/29/22 2020 10/29/22 2035 10/29/22 2105 10/29/22 2135   BP: 122/59 124/58 117/56 121/59   Pulse: 63 68 65 61   Patient Position - Orthostatic VS:             Visual Acuity      ED Medications  Medications - No data to display    Diagnostic Studies  Results Reviewed     Procedure Component Value Units Date/Time    T4, free [[id number]]  (Abnormal) Collected: 10/29/22 1955    Lab Status: Final result Specimen: Blood from Arm, Right Updated: 10/30/22 1515     Free T4 0 50 ng/dL     HS Troponin 0hr (reflex protocol) [[id number]]  (Normal) Collected: 10/29/22 1955    Lab Status: Final result Specimen: Blood from Arm, Right Updated: 10/29/22 2044     hs TnI 0hr <2 ng/L     TSH, 3rd generation with Free T4 reflex [[id number]]  (Abnormal) Collected: 10/29/22 1955    Lab Status: Final result Specimen: Blood from Arm, Right Updated: 10/29/22 2035     TSH 3RD GENERATON 40 968 uIU/mL     Narrative:      Patients undergoing fluorescein dye angiography may retain small amounts of fluorescein in the body for 48-72 hours post procedure  Samples containing fluorescein can produce falsely depressed TSH values  If the patient had this procedure,a specimen should be resubmitted post fluorescein clearance        Protime-INR [[id number]]  (Abnormal) Collected: 10/29/22 1955    Lab Status: Final result Specimen: Blood from Arm, Right Updated: 10/29/22 2035     Protime 14 7 seconds      INR 1 17    Comprehensive metabolic panel [[id number]]  (Abnormal) Collected: 10/29/22 1955    Lab Status: Final result Specimen: Blood from Arm, Right Updated: 10/29/22 2026     Sodium 144 mmol/L      Potassium 3 4 mmol/L      Chloride 106 mmol/L      CO2 28 mmol/L      ANION GAP 10 mmol/L      BUN 11 mg/dL      Creatinine 0 77 mg/dL      Glucose 94 mg/dL      Calcium 8 2 mg/dL      Corrected Calcium 8 8 mg/dL      AST 20 U/L      ALT 24 U/L      Alkaline Phosphatase 52 U/L      Total Protein 7 0 g/dL      Albumin 3 3 g/dL      Total Bilirubin 0 54 mg/dL      eGFR 82 ml/min/1 73sq m     Narrative:      Ton guidelines for Chronic Kidney Disease (CKD):   •  Stage 1 with normal or high GFR (GFR > 90 mL/min/1 73 square meters)  •  Stage 2 Mild CKD (GFR = 60-89 mL/min/1 73 square meters)  •  Stage 3A Moderate CKD (GFR = 45-59 mL/min/1 73 square meters)  •  Stage 3B Moderate CKD (GFR = 30-44 mL/min/1 73 square meters)  •  Stage 4 Severe CKD (GFR = 15-29 mL/min/1 73 square meters)  •  Stage 5 End Stage CKD (GFR <15 mL/min/1 73 square meters)  Note: GFR calculation is accurate only with a steady state creatinine    CBC and differential [[id number]]  (Abnormal) Collected: 10/29/22 1955    Lab Status: Final result Specimen: Blood from Arm, Right Updated: 10/29/22 2006     WBC 3 88 Thousand/uL      RBC 3 67 Million/uL      Hemoglobin 12 0 g/dL      Hematocrit 34 4 %      MCV 94 fL      MCH 32 7 pg      MCHC 34 9 g/dL      RDW 12 4 %      MPV 8 7 fL      Platelets 491 Thousands/uL      nRBC 0 /100 WBCs      Neutrophils Relative 59 %      Immat GRANS % 0 %      Lymphocytes Relative 26 %      Monocytes Relative 12 %      Eosinophils Relative 3 %      Basophils Relative 0 %      Neutrophils Absolute 2 28 Thousands/µL      Immature Grans Absolute 0 01 Thousand/uL      Lymphocytes Absolute 0 99 Thousands/µL      Monocytes Absolute 0 46 Thousand/µL      Eosinophils Absolute 0 13 Thousand/µL      Basophils Absolute 0 01 Thousands/µL                  XR chest 2 views   Final Result by Jake Perales MD (10/30 6530)      No acute cardiopulmonary disease  Workstation performed: [id number]                    Procedures  Procedures         ED Course  ED Course as of 10/30/22 2130   Sat Oct 29, 2022   2139 TSH 3RD GENERATON(!): 40 968                               SBIRT 22yo+    Flowsheet Row Most Recent Value   SBIRT (25 yo +)    In order to provide better care to our patients, we are screening all of our patients for alcohol and drug use  Would it be okay to ask you these screening questions? Yes Filed at: 10/29/2022 1925   Initial Alcohol Screen: US AUDIT-C     1  How often do you have a drink containing alcohol? 1 Filed at: 10/29/2022 1925   2  How many drinks containing alcohol do you have on a typical day you are drinking? 0 Filed at: 10/29/2022 1925   3b  FEMALE Any Age, or MALE 65+: How often do you have 4 or more drinks on one occassion? 0 Filed at: 10/29/2022 1925   Audit-C Score 1 Filed at: 10/29/2022 1925   TSERING: How many times in the past year have you        Used an illegal drug or used a prescription medication for non-medical reasons? Never Filed at: 10/29/2022 1925                    [facility]  Number of Diagnoses or Management Options  Chills: new and requires workup  Hypothyroid: new and requires workup     Amount and/or Complexity of Data Reviewed  Clinical lab tests: ordered and reviewed  Tests in the radiology section of CPT®: ordered and reviewed  Independent visualization of images, tracings, or specimens: yes    Patient Progress  Patient progress: stable      Disposition  Final diagnoses:   Chills   Hypothyroid     Time reflects when diagnosis was documented in both MDM as applicable and the Disposition within this note     Time User Action Codes Description Comment    10/29/2022  9:41 PM Arpit Rule Add [R68 83] Chills     10/29/2022  9:41 PM Arpit Rule Add [E03 9] Hypothyroid       ED Disposition     ED Disposition   Discharge    Condition   Stable    Date/Time   Sat Oct 29, 2022 2141    Comment   Leanne Roblero discharge to home/self care  Follow-up Information     Follow up With Specialties Details Why Contact Info    Josie Cagle MD Internal Medicine   [address] [place] [address]  [phone]            Discharge Medication List as of 10/29/2022  9:44 PM      CONTINUE these medications which have NOT CHANGED    Details   albuterol (PROVENTIL HFA,VENTOLIN HFA) 90 mcg/act inhaler Inhale 2 puffs every 6 (six) hours as needed for wheezing or shortness of breath, Starting Thu 10/6/2022, Print      diphenhydrAMINE (BENADRYL) 25 mg tablet Take 1 tablet (25 mg total) by mouth every 6 (six) hours as needed for itching, Starting Sat 10/8/2022, Normal      !! DULoxetine (CYMBALTA) 30 mg delayed release capsule Take 1 capsule (30 mg total) by mouth every morning, Starting Thu 3/3/2022, Normal      !!  DULoxetine (CYMBALTA) 60 mg delayed release capsule Take 1 capsule (60 mg total) by mouth daily at bedtime, Starting Thu 3/3/2022, Normal      famotidine (PEPCID) 20 mg tablet Take 1 tablet (20 mg total) by mouth 2 (two) times a day, Starting Sat 10/8/2022, Normal      gabapentin (Neurontin) 100 mg capsule Take 1 capsule (100 mg total) by mouth 3 (three) times a day, Starting Fri 5/27/2022, Normal      levothyroxine 100 mcg tablet Take 1 tablet (100 mcg total) by mouth daily, Starting Sun 12/27/2020, Normal      meloxicam (MOBIC) 7 5 mg tablet Starting Thu 7/14/2022, Historical Med      methylPREDNISolone 4 MG tablet therapy pack Use as directed on package, Normal      tamoxifen (NOLVADEX) 20 mg tablet Take 1 tablet (20 mg total) by mouth daily, Starting Thu 5/5/2022, Normal       !! - Potential duplicate medications found  Please discuss with provider  No discharge procedures on file      PDMP Review       Value Time User    PDMP Reviewed  Yes 5/27/2022 12:57 PM Alejandrina Roman, [facility] Provider  Electronically Signed by           Jenny Blair DO  10/30/22 0742

## 2022-11-01 ENCOUNTER — OFFICE VISIT (OUTPATIENT)
Dept: INTERNAL MEDICINE CLINIC | Facility: CLINIC | Age: 62
End: 2022-11-01

## 2022-11-01 VITALS
RESPIRATION RATE: 18 BRPM | SYSTOLIC BLOOD PRESSURE: 96 MMHG | HEIGHT: 68 IN | TEMPERATURE: 98 F | OXYGEN SATURATION: 96 % | DIASTOLIC BLOOD PRESSURE: 68 MMHG | BODY MASS INDEX: 22.34 KG/M2 | WEIGHT: 147.4 LBS | HEART RATE: 90 BPM

## 2022-11-01 DIAGNOSIS — E07.9 DISEASE OF THYROID GLAND: ICD-10-CM

## 2022-11-01 DIAGNOSIS — R53.83 FATIGUE, UNSPECIFIED TYPE: ICD-10-CM

## 2022-11-01 DIAGNOSIS — G25.81 RESTLESS LEG SYNDROME: ICD-10-CM

## 2022-11-01 DIAGNOSIS — E03.9 HYPOTHYROIDISM, UNSPECIFIED TYPE: Primary | ICD-10-CM

## 2022-11-01 DIAGNOSIS — I95.0 IDIOPATHIC HYPOTENSION: ICD-10-CM

## 2022-11-01 LAB
CHOLEST SERPL-MCNC: 219 MG/DL
HDLC SERPL-MCNC: 55 MG/DL
LDLC SERPL CALC-MCNC: 140 MG/DL (ref 0–100)
TRIGL SERPL-MCNC: 122 MG/DL

## 2022-11-01 RX ORDER — LEVOTHYROXINE SODIUM 112 UG/1
112 TABLET ORAL
Qty: 90 TABLET | Refills: 0 | Status: SHIPPED | OUTPATIENT
Start: 2022-11-01

## 2022-11-01 NOTE — PATIENT INSTRUCTIONS
Please take synthroid daily in the AM at least 1hr before meals, have blood work done in 6 weeks and we will be in touch with the results and follow-up appointment

## 2022-11-01 NOTE — PROGRESS NOTES
Name: Jhonatan Mcbride      : 1960      MRN: [id number]  Encounter Provider: RAFAT Hendricks  Encounter Date: 2022   Encounter [facility]: [facility]   [address]     1  Hypothyroidism, unspecified type  -     TSH, 3rd generation; Future; Expected date: 2022  -     Lipid Panel with Direct LDL reflex; Future; Expected date: 2022  -     DXA bone density spine hip and pelvis; Future; Expected date: 2022  -     levothyroxine (Synthroid) 112 mcg tablet; Take 1 tablet (112 mcg total) by mouth daily in the early morning        Repeat TSH 6 weeks  Subjective      28-year-old female with history of left thyroidectomy and acquired hypothyroidism presents for follow-up visit from the emergency room the patient presented to the ED on 10/27 where blood work revealed elevated TSH 40 9 and T4 0 5, the last resulted TSH on file was 2019 which was within range  She does report a 3 week history of weakness fatigue decreased appetite chills cold intolerance and increase neuropathic pain  Patient reported that she has been taking her levothyroxine at home and has not missed a dose  Review of Systems   Constitutional: Positive for activity change, appetite change, fatigue and unexpected weight change  HENT: Negative  Eyes: Negative  Respiratory: Positive for shortness of breath  Cardiovascular: Negative  Gastrointestinal: Negative  Endocrine: Positive for cold intolerance  Musculoskeletal: Negative  Skin: Color change: dry         Current Outpatient Medications on File Prior to Visit   Medication Sig   • albuterol (PROVENTIL HFA,VENTOLIN HFA) 90 mcg/act inhaler Inhale 2 puffs every 6 (six) hours as needed for wheezing or shortness of breath   • DULoxetine (CYMBALTA) 30 mg delayed release capsule Take 1 capsule (30 mg total) by mouth every morning   • DULoxetine (CYMBALTA) 60 mg delayed release capsule Take 1 capsule (60 mg total) by mouth daily at bedtime   • gabapentin (Neurontin) 100 mg capsule Take 1 capsule (100 mg total) by mouth 3 (three) times a day (Patient taking differently: Take 100 mg by mouth if needed (makes her tired))   • [DISCONTINUED] levothyroxine 100 mcg tablet Take 1 tablet (100 mcg total) by mouth daily   • diphenhydrAMINE (BENADRYL) 25 mg tablet Take 1 tablet (25 mg total) by mouth every 6 (six) hours as needed for itching (Patient not taking: No sig reported)   • famotidine (PEPCID) 20 mg tablet Take 1 tablet (20 mg total) by mouth 2 (two) times a day (Patient not taking: Reported on 11/1/2022)   • meloxicam (MOBIC) 7 5 mg tablet  (Patient not taking: No sig reported)   • methylPREDNISolone 4 MG tablet therapy pack Use as directed on package (Patient not taking: No sig reported)   • tamoxifen (NOLVADEX) 20 mg tablet Take 1 tablet (20 mg total) by mouth daily (Patient not taking: No sig reported)       Objective     BP 96/68 (BP Location: Left arm, Patient Position: Sitting, Cuff Size: Standard)   Pulse 90   Temp 98 °F (36 7 °C) (Temporal)   Resp 18   Ht 5' 8" (1 727 m)   Wt 66 9 kg (147 lb 6 4 oz) Comment: WITH SHOES ON  SpO2 96%   BMI 22 41 kg/m²      Physical Exam  Constitutional:       Appearance: She is ill-appearing  HENT:      Mouth/Throat:      Mouth: Mucous membranes are dry  Cardiovascular:      Rate and Rhythm: Normal rate and regular rhythm  Pulses: Normal pulses  Heart sounds: Normal heart sounds  Pulmonary:      Effort: Pulmonary effort is normal       Breath sounds: Normal breath sounds  Musculoskeletal:      Cervical back: Neck supple  Skin:     General: Skin is dry  Coloration: Skin is pale  Comments: Dry, brittle hair with increased shedding   Neurological:      Mental Status: She is alert and oriented to person, place, and time  Motor: Weakness present  Psychiatric:         Attention and Perception: Attention normal          Mood and Affect: Affect is flat  Christiano Palmainica, [address]

## 2022-11-02 ENCOUNTER — TELEPHONE (OUTPATIENT)
Dept: INTERNAL MEDICINE CLINIC | Facility: CLINIC | Age: 62
End: 2022-11-02

## 2022-11-02 NOTE — TELEPHONE ENCOUNTER
----- Message from Selenej [address] sent at 11/1/2022  6:21 PM EDT -----  Please let the patient know her cholesterol and LDLs are elevated she should continue a low-fat diet

## 2022-11-18 ENCOUNTER — HOSPITAL ENCOUNTER (OUTPATIENT)
Dept: BONE DENSITY | Facility: CLINIC | Age: 62
Discharge: HOME | End: 2022-11-18

## 2022-11-18 VITALS — HEIGHT: 68 IN | WEIGHT: 142 LBS | BODY MASS INDEX: 21.52 KG/M2

## 2022-11-18 DIAGNOSIS — M85.89 OSTEOPENIA OF MULTIPLE SITES: Primary | ICD-10-CM

## 2022-11-18 DIAGNOSIS — E03.9 HYPOTHYROIDISM, UNSPECIFIED TYPE: ICD-10-CM

## 2022-11-21 ENCOUNTER — TELEPHONE (OUTPATIENT)
Dept: INTERNAL MEDICINE CLINIC | Facility: CLINIC | Age: 62
End: 2022-11-21

## 2022-11-21 NOTE — TELEPHONE ENCOUNTER
----- [facility] at 11/18/2022  5:48 PM EST -----  Please let the patient know that her dxa result shows osteopenia, which means she has low bone mass  I sent calcium and vitamin-D to her pharmacy    And she should perform weight-bearing exercise such as dancing, hiking,  or tennis to preserve bone m  ass

## 2022-11-28 ENCOUNTER — HOSPITAL ENCOUNTER (OUTPATIENT)
Dept: MAMMOGRAPHY | Facility: CLINIC | Age: 62
Discharge: HOME | End: 2022-11-28

## 2022-11-28 VITALS — HEIGHT: 67 IN | BODY MASS INDEX: 22.29 KG/M2 | WEIGHT: 142 LBS

## 2022-11-28 DIAGNOSIS — Z12.31 ENCOUNTER FOR SCREENING MAMMOGRAM FOR BREAST CANCER: ICD-10-CM

## 2022-12-02 ENCOUNTER — TELEPHONE (OUTPATIENT)
Dept: HEMATOLOGY ONCOLOGY | Facility: CLINIC | Age: 62
End: 2022-12-02

## 2022-12-02 ENCOUNTER — TELEPHONE (OUTPATIENT)
Dept: NEUROLOGY | Facility: CLINIC | Age: 62
End: 2022-12-02

## 2022-12-02 DIAGNOSIS — D47.2 MGUS (MONOCLONAL GAMMOPATHY OF UNKNOWN SIGNIFICANCE): Primary | ICD-10-CM

## 2022-12-02 NOTE — TELEPHONE ENCOUNTER
Phoned patient to inform that updated lab orders have been placed  Instructed patient she can go to any [facility]'s lab to have them drawn

## 2022-12-02 NOTE — TELEPHONE ENCOUNTER
Pt called to say she can not travel to [place] for an appt  On 12/5 with Alejandrina Hamm  appt is now 6/6/23 @ 9:00 with Dr Arlene Murcia

## 2022-12-02 NOTE — TELEPHONE ENCOUNTER
CALL RETURN FORM   Reason for patient call? Patient calling to request updated lab orders   Patient's primary oncologist?  Dianna Fink   Name of person the patient was calling for? [place]   Any additional information to add, if applicable? [phone]   Informed patient that the message will be forwarded to the team and someone will get back to them as soon as possible    Did you relay this information to the patient?  yes

## 2022-12-05 ENCOUNTER — APPOINTMENT (OUTPATIENT)
Dept: LAB | Facility: HOSPITAL | Age: 62
End: 2022-12-05

## 2022-12-05 ENCOUNTER — TELEPHONE (OUTPATIENT)
Dept: HEMATOLOGY ONCOLOGY | Facility: CLINIC | Age: 62
End: 2022-12-05

## 2022-12-05 DIAGNOSIS — D47.2 MGUS (MONOCLONAL GAMMOPATHY OF UNKNOWN SIGNIFICANCE): ICD-10-CM

## 2022-12-05 LAB
ALBUMIN SERPL BCP-MCNC: 3.3 G/DL (ref 3.5–5)
ALP SERPL-CCNC: 51 U/L (ref 46–116)
ALT SERPL W P-5'-P-CCNC: 19 U/L (ref 12–78)
ANION GAP SERPL CALCULATED.3IONS-SCNC: 6 MMOL/L (ref 4–13)
AST SERPL W P-5'-P-CCNC: 16 U/L (ref 5–45)
BASOPHILS # BLD AUTO: 0.03 THOUSANDS/ÂΜL (ref 0–0.1)
BASOPHILS NFR BLD AUTO: 1 % (ref 0–1)
BILIRUB SERPL-MCNC: 0.52 MG/DL (ref 0.2–1)
BUN SERPL-MCNC: 11 MG/DL (ref 5–25)
CALCIUM ALBUM COR SERPL-MCNC: 9.2 MG/DL (ref 8.3–10.1)
CALCIUM SERPL-MCNC: 8.6 MG/DL (ref 8.3–10.1)
CHLORIDE SERPL-SCNC: 109 MMOL/L (ref 96–108)
CO2 SERPL-SCNC: 27 MMOL/L (ref 21–32)
CREAT SERPL-MCNC: 0.67 MG/DL (ref 0.6–1.3)
EOSINOPHIL # BLD AUTO: 0.2 THOUSAND/ÂΜL (ref 0–0.61)
EOSINOPHIL NFR BLD AUTO: 5 % (ref 0–6)
ERYTHROCYTE [DISTWIDTH] IN BLOOD BY AUTOMATED COUNT: 12.1 % (ref 11.6–15.1)
GFR SERPL CREATININE-BSD FRML MDRD: 93 ML/MIN/1.73SQ M
GLUCOSE P FAST SERPL-MCNC: 95 MG/DL (ref 65–99)
HCT VFR BLD AUTO: 39 % (ref 34.8–46.1)
HGB BLD-MCNC: 12.9 G/DL (ref 11.5–15.4)
IMM GRANULOCYTES # BLD AUTO: 0.01 THOUSAND/UL (ref 0–0.2)
IMM GRANULOCYTES NFR BLD AUTO: 0 % (ref 0–2)
LYMPHOCYTES # BLD AUTO: 1.03 THOUSANDS/ÂΜL (ref 0.6–4.47)
LYMPHOCYTES NFR BLD AUTO: 26 % (ref 14–44)
MCH RBC QN AUTO: 31.6 PG (ref 26.8–34.3)
MCHC RBC AUTO-ENTMCNC: 33.1 G/DL (ref 31.4–37.4)
MCV RBC AUTO: 96 FL (ref 82–98)
MONOCYTES # BLD AUTO: 0.52 THOUSAND/ÂΜL (ref 0.17–1.22)
MONOCYTES NFR BLD AUTO: 13 % (ref 4–12)
NEUTROPHILS # BLD AUTO: 2.22 THOUSANDS/ÂΜL (ref 1.85–7.62)
NEUTS SEG NFR BLD AUTO: 55 % (ref 43–75)
NRBC BLD AUTO-RTO: 0 /100 WBCS
PLATELET # BLD AUTO: 275 THOUSANDS/UL (ref 149–390)
PMV BLD AUTO: 9 FL (ref 8.9–12.7)
POTASSIUM SERPL-SCNC: 3.7 MMOL/L (ref 3.5–5.3)
PROT SERPL-MCNC: 7.2 G/DL (ref 6.4–8.4)
RBC # BLD AUTO: 4.08 MILLION/UL (ref 3.81–5.12)
SODIUM SERPL-SCNC: 142 MMOL/L (ref 135–147)
WBC # BLD AUTO: 4.01 THOUSAND/UL (ref 4.31–10.16)

## 2022-12-05 NOTE — PROGRESS NOTES
HEMATOLOGY / ONCOLOGY CLINIC NOTE    Primary Care Provider: Sebastian Lundborg, MD  Referring Provider:    MRN: [id number]  : 1960    Assessment / Plan:   1  Malignant neoplasm of right breast in female, estrogen receptor positive, unspecified site of breast [facility])  This is a 79-year-old female with a history of stage IA right breast cancer diagnosed 2017 after mammogram  Details as in HPI  S/P lumpectomy 2017  S/P RT 7-2017  Patient started Tamoxifen 20mg once daily 2017  She was started on this and not AI due to hx of fibromyalgia, arthritis  Patient was on tamoxifen until 2022  Mammogram 2022 was normal  She had a breast examination by me 2022 which was unrevealing  Her most recent CBC, CMP were acceptable  Patient's next mammogram already ordered  She offers no breast complaints, constitutional symptoms in today's visit  F/U in 1 year with CBC, CMP Q6m  2  MGUS (monoclonal gammopathy of unknown significance)  Patient has history of MGUS, IgA kappa  This has been present since   SPEP, free lt chain, CBC, CMP acceptable  She continues to have no "crab" like symptoms, constitutional symptoms, bone pain  Next labs in 1 year  · Discussion of decision making    I personally reviewed the following lab results, the image studies, pathology, other specialty/physicians consult notes and recommendations, and outside medical records from [address] [place] [address]  I had a lengthy discussion with the patient and shared the work-up findings  I spent 20 minutes reviewing the records (labs, clinician notes, outside records, medical history, ordering medicine/tests/procedures, interpreting the imaging/labs previously done) and coordination of care as well as direct time with the patient today, of which greater than 50% of the time was spent in counseling and coordination of care with the patient/family      · Plan/Labs  · Patient to continue surveillance with us regarding breast cancer history above  Mammogram scheduled for next year  Patient to F/U with OBGYN for breast examinations  · CBC, CMP Q6m  Next MGUS labs in 1 year  Follow Up: 1 year     All questions were answered to the patient's satisfaction during this encounter  The patient knows the contact information for our office and knows to reach out for any relevant concerns related to this encounter  They are to call for any temperature 100 4 or higher, new symptoms including but not restricted to shaking chills, decreased appetite, nausea, vomiting, diarrhea, increased fatigue, shortness of breath or chest pain, confusion, and not feeling the strength to come to the clinic  For all other listed problems and medical diagnosis in their chart - they are managed by PCP and/or other specialists, which the patient acknowledges  Thank you very much for your consultation and making us a part of this patient's care  We are continuing to follow closely with you  Please do not hesitate to reach out to me with any additional questions or concerns  Reason for visit:       Chief Complaint   Patient presents with   • Follow-up     History of Hematology / Oncology Illness:     Abner Loera is a 60 y o  female who came in to follow up  1   Stage IA right breast cancer, postmenopausal; pT1b pN0 (4 LN -) Mx G1 R0 ER/KS+ HER-2/mike negative RS = 7 = low        - 4/2017: Patient had concerning mammogram  FNA done --> pathology showed the below findings:      - Invasive breast carcinoma, no special type (invasive ductal carcinoma)     - Poli combined histologic grade 1 of 3, well differentiated (tubule formation score 2 of 3, nuclear        pleomorphism score 2 of 3, mitotic activity score 1 of 3, total score 5 of 9)       - Carcinoma involves 4 of 4 cores, maximum linear length: 6 4 mm      - Lymphovascular invasion not identified                                    - Ductal carcinoma in-situ (DCIS) not identified      - Microcalcifications: Present, associated with invasive carcinoma and benign epithelium  - ER/ME 90-95% Positive; HER2 by IHC 1+ Negative      - 5/24/17:  Lumpectomy with SLNBx --> Stage IA (pT1b, pN0 (sn), G1  Margins: Uninvolved by invasive and in-situ carcinoma  Invasive Carcinoma: 4 5 mm from nearest inferior margin  DCIS: 6 mm from nearest posterior inferior margin      - 7/2017 - 9/2017: S/P Radiation   - 7/2017: started Tamoxifen 20mg once daily over AI due to hx of fibromylagia, arthritis  She is s/p hysterectomy, ovaries still remain  - 7/2022: patient stopped Tamoxifen as it has been 5 years  - 11/2022: last mammogram --> benign findings  2  MGUS - IgA  - patient unsure why she was tested for this initially  - initially found on SPEP 7/2016 --> M spike 0 31  IgA kappa on immunofixation  - 4/2017:  IgA 1000 (), IgG 1260 (WNL), IgM 36 (LLN 36)  - 4/2019: SPEP M spike 0 34  IgA 901, IgG 1050, IgM 23    - 10/2020: SPEP M spike 0 28  IgA 942, IgG 1080, IgM 31  Kappa free lt chain 25  3  lambda free lt chain 14 8, K/L ratio 1 71    - 10/2021: SPEP M spike 0 32, IgA 958, IgG 983, IgM 28  Kappa 27 1, lambda 14 4, K/L ratio 1 88     - 5/2022: SPEP M spike 0 47, Kappa 23 2, Lambda 14 8, K/L ratio 1 57    - 12/2022: SPEP M spike 0 42, Kappa 25 7, Lambda 14 0, K/L ratio 1 84  CBC-D, CMP acceptable  Oncology History Overview Note   Returns for follow up post right breast radiation completed on 9/5/17  The pt was last seen in radiation on 10/15/20      10/15/20 - Mynor Salinas  Pt to continue on Tamoxifen  Breast exam was benign    Upcoming:       Intraductal carcinoma of right breast (Resolved)   4/20/2017 Biopsy    Right breast biopsy:  Invasive breast carcinoma/invasive ductal carcinoma  Grade 1  ER and ME 90-95% positive, Her2 negative     5/24/2017 Initial Diagnosis    Intraductal carcinoma of right breast      Surgery    Segmental mastectomy with sentinel node biopsy  Invasive mammary    Grade 1       5/24/2017 - Cancer Staged    Pathologic  Stage IA - pT1b, pN0 (sn), G1     2017 - 2017 Radiation    dose of 5040 cGy in 20 daily fractions to the right breast with an additional 1000 cGy to the lumpectomy cavity  2017 -  Hormone Therapy    Tamoxifen     Malignant neoplasm of right female breast ([facility] [address] )   2017 -  Cancer Staged    Staging form: Breast, AJCC 8th Edition  - Clinical stage from 2017: Stage IA (cT1b, cN0, cM0, G1, ER+, AK+, HER2-) - Signed by Sylvia Hooks PA-C on 2022  Stage prefix: Initial diagnosis  Method of lymph node assessment: Axillary lymph node dissection  Nuclear grade: G2  Mitotic count score: Score 1  Histologic grading system: 3 grade system  HER2-IHC interpretation: Negative  HER2-IHC value: Score 1+       2019 Initial Diagnosis    Malignant neoplasm of right female breast [facility])         Cancer Staging  Malignant neoplasm of right female breast [facility])  Staging form: Breast, AJCC 8th Edition  - Clinical stage from 2017: Stage IA (cT1b, cN0, cM0, G1, ER+, AK+, HER2-) - Signed by Sylvia Hooks PA-C on 2022  Stage prefix: Initial diagnosis  Method of lymph node assessment: Axillary lymph node dissection  Nuclear grade: G2  Mitotic count score: Score 1  Histologic grading system: 3 grade system  HER2-IHC interpretation: Negative  HER2-IHC value: Score 1+      ECO - Asymptomatic    Interval History:   ":  Reported overall doing well   No lumps bumps   No constitutional symptoms   Following surgeon, for breast examination and mammogram, per patient no new findings   Tolerating tamoxifen well, no hot flash symptoms, no new bone pains, status post hysterectomy      10/2018 : Came in for follow-up, doing well   Reported still having breast pain, otherwise no new lumps bumps   No bone pain   No other constitutional symptoms      10/17/2019 : came in for follow up  Has been doing well  No leg swelling, no vaginal bleeding   No new lumps / bumps "     "10/15/2020:  Patient is doing well  Claude Gums is up-to-date on mammography completed in September 2020   No vaginal bleeding no lower abdominal bloating   Patient had hysterectomy, but has one ovary retained   Colonoscopy is out of date "     "10/2021 :  Patient came for follow-up doing well   No constitutional symptoms, no new lumps bumps   Reported right breast chest wall shoulder soreness, has been chronic since surgery"     5/5/2022: Patient came in for follow up  Tolerating tamoxifen without any significant side effect  She has no vaginal bleeding, leg swelling, fatigue, constitutional symptoms such as fever, chills, night sweats, lumps, bumps, sudden weight loss  No appetite changes  No bone pain  uptodate on mammogram      7/25/2022:  Patient came in for follow-up  She offers no specific complaints and has been tolerating tamoxifen still without any significant side effects  No vaginal bleeding, leg swelling, constitutional symptoms such as fever, chills, night sweats, enlarged lymphadenopathy, sudden weight loss  No appetite changes no bone pain  She does have some fatigue which she attributes could be related to the heat, lack of proper hydration  No headaches, visual changes  12/7/2022: Patient came in for follow up  She offers no specific complaints  She has no constitutional symptoms as above, frequent infections, bleeding anywhere  She has no breast complaints  Doing well off tamoxifen    Problem list:       Patient Active Problem List   Diagnosis   • History of right breast cancer   • Restless leg syndrome   • Fibromyalgia   • Disease of thyroid gland   • Cognitive impairment, mild, so stated   • Hypotension   • Depression   • Vitamin B deficiency   • Monoclonal gammopathy of undetermined significance   • Pain syndrome, chronic   • Vitamin D deficiency   • Tachycardia   • Current severe episode of major depressive disorder without psychotic features ([facility] [address] )   • Hypothyroidism   • Malignant neoplasm of right female breast [facility])   • Rib pain   • Internal nasal lesion   • Cervical radiculopathy at C7   • COVID-19       REVIEW OF SYMPTOMS:   Review of Systems   Constitutional: Negative for activity change, appetite change, chills, diaphoresis, fatigue, fever and unexpected weight change  HENT: Negative for nosebleeds  Eyes: Negative for visual disturbance  Respiratory: Negative for cough and shortness of breath  Cardiovascular: Negative for chest pain, palpitations and leg swelling  Gastrointestinal: Negative for abdominal pain, anal bleeding, blood in stool, constipation, diarrhea, nausea and vomiting  Endocrine: Negative for cold intolerance  Genitourinary: Negative for hematuria, menstrual problem and vaginal bleeding  Musculoskeletal: Negative for arthralgias  Skin: Negative for color change, pallor and rash  Neurological: Negative for dizziness, syncope, light-headedness and headaches  Hematological: Negative for adenopathy  Does not bruise/bleed easily  Psychiatric/Behavioral: Negative for sleep disturbance  PHYSICAL EXAMINATION:     Vital Signs: There were no vitals taken for this visit  There is no height or weight on file to calculate BSA  Ht Readings from Last 3 Encounters:   11/28/22 5' 7" (1 702 m)   11/18/22 5' 7 5" (1 715 m)   11/01/22 5' 8" (1 727 m)       Wt Readings from Last 3 Encounters:   11/28/22 64 4 kg (142 lb)   11/18/22 64 4 kg (142 lb)   11/01/22 66 9 kg (147 lb 6 4 oz)          Physical Exam  Constitutional:       General: She is not in acute distress  Appearance: Normal appearance  She is not ill-appearing, toxic-appearing or diaphoretic  HENT:      Head: Normocephalic and atraumatic  Eyes:      General: No scleral icterus  Extraocular Movements: Extraocular movements intact  Conjunctiva/sclera: Conjunctivae normal       Pupils: Pupils are equal, round, and reactive to light     Cardiovascular:      Rate and Rhythm: Normal rate and regular rhythm  Pulmonary:      Effort: Pulmonary effort is normal  No respiratory distress  Abdominal:      Tenderness: There is no abdominal tenderness  Musculoskeletal:         General: No tenderness  Normal range of motion  Cervical back: Normal range of motion and neck supple  Right lower leg: No edema  Left lower leg: No edema  Lymphadenopathy:      Cervical: No cervical adenopathy  Skin:     General: Skin is warm  Coloration: Skin is not jaundiced or pale  Findings: No bruising, erythema, lesion or rash  Neurological:      General: No focal deficit present  Mental Status: She is alert and oriented to person, place, and time  Mental status is at baseline  Motor: No weakness  Psychiatric:         Mood and Affect: Mood normal          Behavior: Behavior normal          Thought Content: Thought content normal          Judgment: Judgment normal        Reviewed historical information  PAST MEDICAL HISTORY:    Past Medical History:   Diagnosis Date   • Anxiety    • Breast cancer ([facility] [address] ) 2017    right   • Cancer [facility])     right breast  dx 04/2017   • Cervical disc disorder of cervicothoracic region    • Cervical radiculopathy    • Cervicalgia    • Cognitive impairment, mild, so stated    • Depression     Major,recurrent   • Diplopia     last assessed 12/23/13   • Disc degeneration, lumbar    • Disease of thyroid gland     hx of goiter, surgery done 1978   • Edema of foot    • Esophageal reflux    • Fatigue    • Fibromyalgia    • Hemorrhage, anal or rectal    • History of radiation therapy    • Hx of radiation therapy     Treatments: Course C1, Plan ID Energy Fractions Dose per Fraction (cGy)total Dose delivered(cGy)elapsed days   R Breast 6x 28/28 180 5,040 39, R breast e 12E 5/5 200 1,000 7 Treatment dates: 7/20/17-09/05/17   • Hx of radiation therapy     33 treatments   • Hypotension    • Hypothyroidism    • Insomnia    • Lab test positive for detection of COVID-19 virus 10/14/2022   • Memory loss    • Monoclonal gammopathy of undetermined significance    • Myalgia     last assessed 07/08/16   • Myositis     last assessed 07/08/16   • Neck stiffness    • Neuralgia    • Neuritis    • Pain syndrome, chronic    • Palpitations    • Paresthesia    • Peripheral neuropathy    • Polyneuropathy    • Pseudodementia    • Radiculopathy     12/23/13   • Restless leg syndrome    • Sacroiliitis (HCC)    • SLE (systemic lupus erythematosus) ([facility] [address] )     last assessed 12/23/13   • Tachycardia    • Viral warts    • Vitamin B deficiency    • Vitamin D deficiency    • Vitamin D deficiency    • Wears glasses        PAST SURGICAL HISTORY:    Past Surgical History:   Procedure Laterality Date   • BLADDER SURGERY      bladder prolapse   • BREAST BIOPSY Right    • BREAST SURGERY Right     breast bx   • COLONOSCOPY      age 45 normal   • HYSTERECTOMY  2009    vaginal hysterectomy, rectocele repair   • AK MASTECTOMY, PARTIAL Right 5/24/2017    Procedure:  (NUCLEAR MED  8 AM , NEEDLE LOCAL TO FOLLOW 8:30 AM ) SEGMENTAL MASTECTOMY BREAST NEEDLE PLACEMENT,  SENTINAL LYMPH NODE BIOPSY;  Surgeon: Martin Barlow MD;  Location: [address] [place] [address];  Service: General   • REPAIR RECTOCELE     • THYROID LOBECTOMY Left 1978   • TONSILLECTOMY     • US BREAST NEEDLE LOC RIGHT Right 5/24/2017   • US GUIDED BREAST BIOPSY RIGHT COMPLETE Right 4/20/2017   • US GUIDED THYROID BIOPSY  4/25/2019         CURRENT MEDICATIONS:     Current Outpatient Medications:   •  albuterol (PROVENTIL HFA,VENTOLIN HFA) 90 mcg/act inhaler, Inhale 2 puffs every 6 (six) hours as needed for wheezing or shortness of breath, Disp: 6 7 g, Rfl: 0  •  calcium carbonate-vitamin D 250 mg-3 125 mcg per tablet, Take 1 tablet by mouth daily, Disp: 60 tablet, Rfl: 3  •  DULoxetine (CYMBALTA) 30 mg delayed release capsule, Take 1 capsule (30 mg total) by mouth every morning, Disp: 10 capsule, Rfl: 0  •  DULoxetine (CYMBALTA) 60 mg delayed release capsule, Take 1 capsule (60 mg total) by mouth daily at bedtime, Disp: 10 capsule, Rfl: 0  •  famotidine (PEPCID) 20 mg tablet, Take 1 tablet (20 mg total) by mouth 2 (two) times a day (Patient not taking: Reported on 2022), Disp: 30 tablet, Rfl: 0  •  gabapentin (Neurontin) 100 mg capsule, Take 1 capsule (100 mg total) by mouth 3 (three) times a day (Patient taking differently: Take 100 mg by mouth if needed (makes her tired)), Disp: 270 capsule, Rfl: 1  •  levothyroxine (Synthroid) 112 mcg tablet, Take 1 tablet (112 mcg total) by mouth daily in the early morning, Disp: 90 tablet, Rfl: 0    Tobacco Use   • Sm  Social History     Tobacco Use   • Smoking status: Former     Packs/day: 1 50     Years: 5 00     Pack years: 7 50     Types: Cigarettes     Quit date:      Years since quittin 9   • Smokeless tobacco: Never   • Tobacco comments:     Former quit 30 years ago   Vaping Use   • Vaping Use: Never used   Substance Use Topics   • Alcohol use: Not Currently   • Drug use: No   oking status: Former Smoker     Packs/day: 1 50     Years: 5 00     Pack years: 7 50     Types: Cigarettes     Quit date:      Years since quittin 5   • Smokeless tobacco: Never Used   • Tobacco comment: Former quit 30 years ago   Vaping Use   • Vaping Use: Never used   Substance Use Topics   • Alcohol use: Not Currently   • Drug use: No      Problem Relation Age of Onset   • Heart disease Mother         CHF   • Heart failure Mother    • Cirrhosis Father    • Esophageal cancer Brother 72   • No Known Problems Brother    • No Known Problems Brother    • No Known Problems Brother        ALLERGIES:  No Known Allergies    Lab Re  Lab Results   Component Value Date    WBC 4 01 (L) 2022    HGB 12 9 2022    HCT 39 0 2022    MCV 96 2022     2022   sults   Component Value Date    WBC 4 41 2022    HGB 12 6 2022    HCT 37 4 2022    MCV 98 2022     2022 Component Value Date     02/12/2014    SODIUM 144 10/29/2022    K 3 4 (L) 10/29/2022     10/29/2022    CO2 28 10/29/2022    ANIONGAP 6 6 (L) 02/12/2014    AGAP 10 10/29/2022    BUN 11 10/29/2022    CREATININE 0 77 10/29/2022    GLUC 94 10/29/2022    CALCIUM 8 2 (L) 10/29/2022    AST 20 10/29/2022    ALT 24 10/29/2022    ALKPHOS 52 10/29/2022    PROT 7 5 02/12/2014    TP 7 0 10/29/2022    BILITOT 0 4 02/12/2014    TBILI 0 54 10/29/2022    EGFR 82 10/29/2022       IMAGING:  Mammo screening bilateral w 3d & cad  Narrative: DIAGNOSIS: Encounter for screening mammogram for breast cancer     TECHNIQUE:  Digital screening mammography was performed  Computer Aided Detection   (CAD) analyzed all applicable images  COMPARISONS: Prior breast imaging dated: 11/24/2021, 09/21/2020,   09/16/2019, 09/13/2018, 05/24/2017, 05/24/2017, 05/19/2017, 04/20/2017,   04/20/2017, 04/20/2017, 04/14/2017, and 04/14/2017    RELEVANT HISTORY:   Family Breast Cancer History: No known family history of breast cancer  Family Medical History: No known relevant family medical history  Personal History: Hormone history includes tamoxifen  Surgical history   includes breast biopsy, lumpectomy, and hysterectomy  Medical history   includes breast cancer  The patient is scheduled in a reminder system for screening mammography  8-10% of cancers will be missed on mammography  Management of a palpable   abnormality must be based on clinical grounds  Patients will be notified   of their results via letter from our facility  Accredited by [employer] and FDA  RISK ASSESSMENT:   Fitzer-Carson risk assessment reporting was suppressed due to the patient's   history and/or demographic factors  TISSUE DENSITY:   The breasts are heterogeneously dense, which may obscure small masses  INDICATION: Lindsey Christiansen is a 60 y o  female presenting for screening   mammography      FINDINGS:   Bilateral  There are no suspicious masses, grouped microcalcifications or areas of   unexplained architectural distortion  The skin and nipple areolar complex   are unremarkable  Stable postop changes on the right  Benign calcification on left  Impression: No mammographic evidence of malignancy  ASSESSMENT/BI-RADS CATEGORY:  Left: 2 - Benign  Right: 2 - Benign  Overall: 2 - Benign    RECOMMENDATION:       - Routine screening mammogram in 1 year for both breasts      Workstation ID: [id number]

## 2022-12-05 NOTE — TELEPHONE ENCOUNTER
Appointment Confirmation (to confirm pre existing appointments - ONLY)  No need to route   Appointment with  MARTI BROWN [facility]   Appointment date & time  12/7/22 11:00 am    Location [place]   Patient verbalized Understanding  yes

## 2022-12-06 LAB
KAPPA LC FREE SER-MCNC: 25.7 MG/L (ref 3.3–19.4)
KAPPA LC FREE/LAMBDA FREE SER: 1.84 {RATIO} (ref 0.26–1.65)
LAMBDA LC FREE SERPL-MCNC: 14 MG/L (ref 5.7–26.3)

## 2022-12-07 ENCOUNTER — OFFICE VISIT (OUTPATIENT)
Dept: HEMATOLOGY ONCOLOGY | Facility: CLINIC | Age: 62
End: 2022-12-07

## 2022-12-07 VITALS
TEMPERATURE: 98.6 F | BODY MASS INDEX: 22.6 KG/M2 | HEIGHT: 67 IN | HEART RATE: 116 BPM | DIASTOLIC BLOOD PRESSURE: 62 MMHG | WEIGHT: 144 LBS | RESPIRATION RATE: 16 BRPM | SYSTOLIC BLOOD PRESSURE: 102 MMHG | OXYGEN SATURATION: 93 %

## 2022-12-07 DIAGNOSIS — D47.2 MGUS (MONOCLONAL GAMMOPATHY OF UNKNOWN SIGNIFICANCE): Primary | ICD-10-CM

## 2022-12-07 DIAGNOSIS — Z17.0 MALIGNANT NEOPLASM OF RIGHT BREAST IN FEMALE, ESTROGEN RECEPTOR POSITIVE, UNSPECIFIED SITE OF BREAST (HCC): ICD-10-CM

## 2022-12-07 DIAGNOSIS — C50.911 MALIGNANT NEOPLASM OF RIGHT BREAST IN FEMALE, ESTROGEN RECEPTOR POSITIVE, UNSPECIFIED SITE OF BREAST (HCC): ICD-10-CM

## 2022-12-07 LAB
ALBUMIN SERPL ELPH-MCNC: 4 G/DL (ref 3.5–5)
ALBUMIN SERPL ELPH-MCNC: 58 % (ref 52–65)
ALPHA1 GLOB SERPL ELPH-MCNC: 0.26 G/DL (ref 0.1–0.4)
ALPHA1 GLOB SERPL ELPH-MCNC: 3.7 % (ref 2.5–5)
ALPHA2 GLOB SERPL ELPH-MCNC: 0.64 G/DL (ref 0.4–1.2)
ALPHA2 GLOB SERPL ELPH-MCNC: 9.3 % (ref 7–13)
BETA GLOB ABNORMAL SERPL ELPH-MCNC: 0.35 G/DL (ref 0.4–0.8)
BETA1 GLOB SERPL ELPH-MCNC: 5.1 % (ref 5–13)
BETA2 GLOB SERPL ELPH-MCNC: 6.6 % (ref 2–8)
BETA2+GAMMA GLOB SERPL ELPH-MCNC: 0.46 G/DL (ref 0.2–0.5)
GAMMA GLOB ABNORMAL SERPL ELPH-MCNC: 1.19 G/DL (ref 0.5–1.6)
GAMMA GLOB SERPL ELPH-MCNC: 17.3 % (ref 12–22)
IGG/ALB SER: 1.38 {RATIO} (ref 1.1–1.8)
INTERPRETATION UR IFE-IMP: NORMAL
M PROTEIN 1 MFR SERPL ELPH: 6.1 %
M PROTEIN 1 SERPL ELPH-MCNC: 0.42 G/DL
PROT PATTERN SERPL ELPH-IMP: ABNORMAL
PROT SERPL-MCNC: 6.9 G/DL (ref 6.4–8.2)

## 2022-12-09 ENCOUNTER — TELEPHONE (OUTPATIENT)
Dept: INTERNAL MEDICINE CLINIC | Facility: CLINIC | Age: 62
End: 2022-12-09

## 2022-12-09 ENCOUNTER — APPOINTMENT (OUTPATIENT)
Dept: LAB | Facility: HOSPITAL | Age: 62
End: 2022-12-09

## 2022-12-09 DIAGNOSIS — E03.9 HYPOTHYROIDISM, UNSPECIFIED TYPE: Primary | ICD-10-CM

## 2022-12-09 DIAGNOSIS — E03.9 HYPOTHYROIDISM, UNSPECIFIED TYPE: ICD-10-CM

## 2022-12-09 LAB — TSH SERPL DL<=0.05 MIU/L-ACNC: 0.33 UIU/ML (ref 0.45–4.5)

## 2022-12-09 RX ORDER — LEVOTHYROXINE SODIUM 0.1 MG/1
100 TABLET ORAL
Qty: 30 TABLET | Refills: 5 | Status: SHIPPED | OUTPATIENT
Start: 2022-12-09 | End: 2022-12-13 | Stop reason: SDUPTHER

## 2022-12-13 ENCOUNTER — OFFICE VISIT (OUTPATIENT)
Dept: INTERNAL MEDICINE CLINIC | Facility: CLINIC | Age: 62
End: 2022-12-13

## 2022-12-13 VITALS
HEART RATE: 92 BPM | OXYGEN SATURATION: 99 % | SYSTOLIC BLOOD PRESSURE: 90 MMHG | DIASTOLIC BLOOD PRESSURE: 62 MMHG | HEIGHT: 67 IN | BODY MASS INDEX: 22.26 KG/M2 | TEMPERATURE: 98 F | RESPIRATION RATE: 18 BRPM | WEIGHT: 141.8 LBS

## 2022-12-13 DIAGNOSIS — Z11.59 NEED FOR HEPATITIS C SCREENING TEST: ICD-10-CM

## 2022-12-13 DIAGNOSIS — K91.840 COLONOSCOPY CAUSING POST-PROCEDURAL BLEEDING: Primary | ICD-10-CM

## 2022-12-13 DIAGNOSIS — E03.9 HYPOTHYROIDISM, UNSPECIFIED TYPE: ICD-10-CM

## 2022-12-13 DIAGNOSIS — Z11.4 SCREENING FOR HIV (HUMAN IMMUNODEFICIENCY VIRUS): ICD-10-CM

## 2022-12-13 DIAGNOSIS — Z23 ENCOUNTER FOR IMMUNIZATION: ICD-10-CM

## 2022-12-13 RX ORDER — LEVOTHYROXINE SODIUM 0.1 MG/1
100 TABLET ORAL
Qty: 90 TABLET | Refills: 3 | Status: SHIPPED | OUTPATIENT
Start: 2022-12-13

## 2022-12-13 NOTE — PROGRESS NOTES
Assessment/Plan:       Diagnoses and all orders for this visit:    Hypothyroidism, unspecified type  -     levothyroxine 100 mcg tablet; Take 1 tablet (100 mcg total) by mouth daily in the early morning  -     Basic metabolic panel; Future  -     TSH, 3rd generation; Future  -     T3, free; Future  -     T4, free; Future  -     Cortisol Level, AM Specimen; Future    Need for hepatitis C screening test  -     Hepatitis C Antibody ([facility]); Future    Screening for HIV (human immunodeficiency virus)  -     HIV 1/2 Antigen/Antibody (4th Generation) w Reflex [facility]; Future    Encounter for immunization  -     influenza vaccine, quadrivalent, recombinant, PF, 0 5 mL, for patients 18 yr+ (FLUBLOK)                Subjective:      Patient ID: Milla Michaels is a 60 y o  female  28-year-old female with a near lifelong chronic illness of unknown cause with multiple symptoms was major symptom is chronic musculoskeletal pain  Associated features include neuropathy, radiculopathy, low blood pressure, inappropriate tachycardia,     he has seen numerous physicians without a firm diagnosis  She has been given numerous treatment programs which have failed completely these include nonsteroidal anti-inflammatory drugs, and also neuroleptics such as Cymbalta, and Lyrica  They do not work      MGUS: several years ago, as part of a lab screen, she was found to have MGUS with no evidence of myeloma  Needs to follow with hematology oncology and likewise this continues to be stable  April of 2017 she went for screening mammogram  Right breast abnormality was noted which led to a diagnosis of breast cancer  She has had treatment with lumpectomy and radiation therapy  Continues to follow-up  Through all of this, her symptoms of chronic pain fatigue weakness and all the ancillary symptoms have persisted unchanged  Metoprolol 25 twice a day for sinus tachycardia of unknown cause  "     Immediate follow-up for thyroid    Found to have TSH of 40  Insisted she was taking her medication  Started back on levothyroxine 112  TSH rechecked a few days ago and was suppressed so recommendation was to go back to 100  Recheck thyroid but also check cortisol in about 4 weeks      The following portions of the patient's history were reviewed and updated as appropriate:   She has a past medical history of Anxiety, Breast cancer ([facility] [address] ) (2017), Cancer [facility]), Cervical disc disorder of cervicothoracic region, Cervical radiculopathy, Cervicalgia, Cognitive impairment, mild, so stated, Depression, Diplopia, Disc degeneration, lumbar, Disease of thyroid gland, Edema of foot, Esophageal reflux, Fatigue, Fibromyalgia, Hemorrhage, anal or rectal, History of radiation therapy, radiation therapy, radiation therapy, Hypotension, Hypothyroidism, Insomnia, Lab test positive for detection of COVID-19 virus (10/14/2022), Memory loss, Monoclonal gammopathy of undetermined significance, Myalgia, Myositis, Neck stiffness, Neuralgia, Neuritis, Pain syndrome, chronic, Palpitations, Paresthesia, Peripheral neuropathy, Polyneuropathy, Pseudodementia, Radiculopathy, Restless leg syndrome, Sacroiliitis ([address] ), SLE (systemic lupus erythematosus) ([facility] [address] ), Tachycardia, Viral warts, Vitamin B deficiency, Vitamin D deficiency, Vitamin D deficiency, and Wears glasses  ,  does not have any pertinent problems on file  ,   has a past surgical history that includes Thyroid lobectomy (Left, 1978); Hysterectomy (2009); Tonsillectomy; Breast surgery (Right); pr mastectomy partial (Right, 5/24/2017); Bladder surgery; Colonoscopy; US guided breast biopsy right complete (Right, 4/20/2017); US breast needle loc right (Right, 5/24/2017); Repair rectocele; US guided thyroid biopsy (4/25/2019); and Breast biopsy (Right)  ,  family history includes Cirrhosis in her father; Esophageal cancer (age of onset: 72) in her brother; Heart disease in her mother; Heart failure in her mother; No Known Problems in her brother, brother, and brother  ,   reports that she quit smoking about 47 years ago  Her smoking use included cigarettes  She has a 7 50 pack-year smoking history  She has never used smokeless tobacco  She reports that she does not currently use alcohol after a past usage of about 1 0 standard drink per week  She reports that she does not use drugs  ,  has No Known Allergies     Current Outpatient Medications   Medication Sig Dispense Refill   • albuterol (PROVENTIL HFA,VENTOLIN HFA) 90 mcg/act inhaler Inhale 2 puffs every 6 (six) hours as needed for wheezing or shortness of breath 6 7 g 0   • calcium carbonate-vitamin D 250 mg-3 125 mcg per tablet Take 1 tablet by mouth daily 60 tablet 3   • DULoxetine (CYMBALTA) 30 mg delayed release capsule Take 1 capsule (30 mg total) by mouth every morning 10 capsule 0   • DULoxetine (CYMBALTA) 60 mg delayed release capsule Take 1 capsule (60 mg total) by mouth daily at bedtime 10 capsule 0   • levothyroxine 100 mcg tablet Take 1 tablet (100 mcg total) by mouth daily in the early morning 90 tablet 3     No current facility-administered medications for this visit  Review of Systems   Constitutional: Positive for fatigue  Musculoskeletal: Positive for arthralgias and myalgias  Psychiatric/Behavioral: Positive for dysphoric mood  The patient is nervous/anxious  Objective:  Vitals:    12/13/22 1029   BP: 90/62   Pulse: 92   Resp: 18   Temp: 98 °F (36 7 °C)   SpO2: 99%      Physical Exam  Constitutional:       Comments: Underweight female patient who appears to be the stated age   Cardiovascular:      Rate and Rhythm: Normal rate  Pulses: Normal pulses  Pulmonary:      Effort: Pulmonary effort is normal       Breath sounds: Normal breath sounds  Neurological:      General: No focal deficit present  Mental Status: She is alert     Psychiatric:         Mood and Affect: Mood normal            There are no Patient Instructions on file for this visit

## 2023-01-16 ENCOUNTER — APPOINTMENT (OUTPATIENT)
Dept: LAB | Facility: HOSPITAL | Age: 63
End: 2023-01-16

## 2023-01-16 DIAGNOSIS — Z11.4 SCREENING FOR HIV (HUMAN IMMUNODEFICIENCY VIRUS): ICD-10-CM

## 2023-01-16 DIAGNOSIS — E03.9 HYPOTHYROIDISM, UNSPECIFIED TYPE: ICD-10-CM

## 2023-01-16 DIAGNOSIS — Z11.59 NEED FOR HEPATITIS C SCREENING TEST: ICD-10-CM

## 2023-01-16 LAB
ANION GAP SERPL CALCULATED.3IONS-SCNC: 11 MMOL/L (ref 4–13)
BUN SERPL-MCNC: 13 MG/DL (ref 5–25)
CALCIUM SERPL-MCNC: 8.5 MG/DL (ref 8.3–10.1)
CHLORIDE SERPL-SCNC: 105 MMOL/L (ref 96–108)
CO2 SERPL-SCNC: 27 MMOL/L (ref 21–32)
CREAT SERPL-MCNC: 0.62 MG/DL (ref 0.6–1.3)
GFR SERPL CREATININE-BSD FRML MDRD: 96 ML/MIN/1.73SQ M
GLUCOSE P FAST SERPL-MCNC: 76 MG/DL (ref 65–99)
POTASSIUM SERPL-SCNC: 3.9 MMOL/L (ref 3.5–5.3)
SODIUM SERPL-SCNC: 143 MMOL/L (ref 135–147)
T3FREE SERPL-MCNC: 3.42 PG/ML (ref 2.3–4.2)
T4 FREE SERPL-MCNC: 1.22 NG/DL (ref 0.76–1.46)
TSH SERPL DL<=0.05 MIU/L-ACNC: 0.2 UIU/ML (ref 0.45–4.5)

## 2023-01-17 ENCOUNTER — APPOINTMENT (OUTPATIENT)
Dept: LAB | Facility: HOSPITAL | Age: 63
End: 2023-01-17

## 2023-01-17 LAB
CORTIS AM PEAK SERPL-MCNC: 11.3 UG/DL (ref 4.2–22.4)
HCV AB SER QL: NORMAL
HIV 1+2 AB+HIV1 P24 AG SERPL QL IA: NORMAL
HIV 2 AB SERPL QL IA: NORMAL
HIV1 AB SERPL QL IA: NORMAL
HIV1 P24 AG SERPL QL IA: NORMAL

## 2023-02-20 ENCOUNTER — TELEPHONE (OUTPATIENT)
Dept: NEUROLOGY | Facility: CLINIC | Age: 63
End: 2023-02-20

## 2023-02-20 NOTE — TELEPHONE ENCOUNTER
Left message informing that appointment on 6/6 with Dr Roxi Leary needs to be reschedule  Provider is not going to be in the office

## 2023-04-04 ENCOUNTER — OFFICE VISIT (OUTPATIENT)
Dept: DERMATOLOGY | Facility: CLINIC | Age: 63
End: 2023-04-04

## 2023-04-04 VITALS — HEIGHT: 67 IN | WEIGHT: 141 LBS | BODY MASS INDEX: 22.13 KG/M2

## 2023-04-04 DIAGNOSIS — L98.9 UNKNOWN SKIN LESION: Primary | ICD-10-CM

## 2023-04-04 DIAGNOSIS — Z13.89 SCREENING FOR SKIN CONDITION: ICD-10-CM

## 2023-04-04 NOTE — PROGRESS NOTES
"[facility] Note     Patient Name: Lobo Fraga  Encounter Date: April 4, 2023     Have you been cared for by a [place] [address] Dermatologist in the last 3 years and, if so, which description applies to you? Yes  I have been here within the last 3 years, and my medical history has NOT changed since that time  I am FEMALE/of child-bearing potential     REVIEW OF SYSTEMS:  Have you recently had or currently have any of the following? · No changes in my recent health  PAST MEDICAL HISTORY:  Have you personally ever had or currently have any of the following? If \"YES,\" then please provide more detail  · No changes in my medical history  FAMILY HISTORY:  Any \"first degree relatives\" (parent, brother, sister, or child) with the following? • No changes in my family's known health  PATIENT EXPERIENCE:    • Do you want the Dermatologist to perform a COMPLETE skin exam today including a clinical examination under the \"bra and underwear\" areas? Yes  • If necessary, do we have your permission to call and leave a detailed message on your Preferred Phone number that includes your specific medical information?   Yes      No Known Allergies   Current Outpatient Medications:   •  albuterol (PROVENTIL HFA,VENTOLIN HFA) 90 mcg/act inhaler, Inhale 2 puffs every 6 (six) hours as needed for wheezing or shortness of breath, Disp: 6 7 g, Rfl: 0  •  calcium carbonate-vitamin D 250 mg-3 125 mcg per tablet, Take 1 tablet by mouth daily, Disp: 60 tablet, Rfl: 3  •  DULoxetine (CYMBALTA) 30 mg delayed release capsule, Take 1 capsule (30 mg total) by mouth every morning, Disp: 10 capsule, Rfl: 0  •  DULoxetine (CYMBALTA) 60 mg delayed release capsule, Take 1 capsule (60 mg total) by mouth daily at bedtime, Disp: 10 capsule, Rfl: 0  •  levothyroxine 100 mcg tablet, Take 1 tablet (100 mcg total) by mouth daily in the early morning, Disp: 90 tablet, Rfl: 3          • Whom besides the patient is providing clinical " information about today's encounter?   o NO ADDITIONAL HISTORIAN (patient alone provided history)    Physical Exam and Assessment/Plan by Diagnosis:

## 2023-04-04 NOTE — PROGRESS NOTES
[facility] DERMATOLOG[facility]  [address] [place] [id number]  [phone]  [phone]     MRN: [id number] : 1960  Encounter: [id number]  Patient Information: Oneta Ike  Chief complaint: Skin lesion left anterior nose and overall checkup    History of present illness: 60-year-old female was seen by me about a year ago with a lesion in the inside of the left nares which we went ahead and biopsied did not reveal anything atypical still concerned regarding a lump in the area because it does not correlate is bothersome to her  Past Medical History:   Diagnosis Date   • Anxiety    • Breast cancer ([facility] [address] [facility] 2017    right   • Cancer [facility])     right breast  dx 2017   • Cervical disc disorder of cervicothoracic region    • Cervical radiculopathy    • Cervicalgia    • Cognitive impairment, mild, so stated    • Depression     Major,recurrent   • Diplopia     last assessed 13   • Disc degeneration, lumbar    • Disease of thyroid gland     hx of goiter, surgery done    • Edema of foot    • Esophageal reflux    • Fatigue    • Fibromyalgia    • Hemorrhage, anal or rectal    • History of radiation therapy    • Hx of radiation therapy     Treatments: Course C1, Plan ID Energy Fractions Dose per Fraction (cGy)total Dose delivered(cGy)elapsed days   R Breast 6x 28/28 180 5,040 39, R breast e 12E  200 1,000 7 Treatment dates: 17-17   • Hx of radiation therapy     33 treatments   • Hypotension    • Hypothyroidism    • Insomnia    • Lab test positive for detection of COVID-19 virus 10/14/2022   • Memory loss    • Monoclonal gammopathy of undetermined significance    • Myalgia     last assessed 16   • Myositis     last assessed 16   • Neck stiffness    • Neuralgia    • Neuritis    • Pain syndrome, chronic    • Palpitations    • Paresthesia    • Peripheral neuropathy    • Polyneuropathy    • Pseudodementia    • Radiculopathy     13   • Restless leg syndrome    • Sacroiliitis (HCC)    • SLE (systemic lupus erythematosus) ([facility] [address] )     last assessed 13   • Tachycardia    • Viral warts    • Vitamin B deficiency    • Vitamin D deficiency    • Vitamin D deficiency    • Wears glasses      Past Surgical History:   Procedure Laterality Date   • BLADDER SURGERY      bladder prolapse   • BREAST BIOPSY Right    • BREAST SURGERY Right     breast bx   • COLONOSCOPY      age 45 normal   • HYSTERECTOMY  2009    vaginal hysterectomy, rectocele repair   • MS MASTECTOMY PARTIAL Right 2017    Procedure:  (NUCLEAR MED  8 AM , NEEDLE LOCAL TO FOLLOW 8:30 AM ) SEGMENTAL MASTECTOMY BREAST NEEDLE PLACEMENT,  SENTINAL LYMPH NODE BIOPSY;  Surgeon: Suly Arrington MD;  Location: [address] [place] [address];  Service: General   • REPAIR RECTOCELE     • THYROID LOBECTOMY Left    • TONSILLECTOMY     • US BREAST NEEDLE LOC RIGHT Right 2017   • US GUIDED BREAST BIOPSY RIGHT COMPLETE Right 2017   • [address] THYROID BIOPSY  2019     Social History   Social History     Substance and Sexual Activity   Alcohol Use Not Currently   • Alcohol/week: 1 0 standard drink   • Types: 1 Glasses of wine per week     Social History     Substance and Sexual Activity   Drug Use No     Social History     Tobacco Use   Smoking Status Former   • Packs/day: 1 50   • Years: 5 00   • Pack years: 7 50   • Types: Cigarettes   • Quit date: 64   • Years since quittin 2   Smokeless Tobacco Never   Tobacco Comments    Former quit 27 years ago     Family History   Problem Relation Age of Onset   • Heart disease Mother         CHF   • Heart failure Mother    • Cirrhosis Father    • Esophageal cancer Brother 72   • No Known Problems Brother    • No Known Problems Brother    • No Known Problems Brother      Meds/Allergies   No Known Allergies    Meds:  Prior to Admission medications    Medication Sig Start Date End Date Taking?  Authorizing Provider   albuterol (PROVENTIL HFA,VENTOLIN HFA) 90 mcg/act inhaler Inhale 2 puffs every 6 (six) hours as needed for wheezing or shortness of breath 10/6/22  Yes Benja Nina PA-C   calcium carbonate-vitamin D 250 mg-3 125 mcg per tablet Take 1 tablet by mouth daily 11/18/22  Yes RAFAT Merritt   DULoxetine (CYMBALTA) 30 mg delayed release capsule Take 1 capsule (30 mg total) by mouth every morning 3/3/22  Yes Juan Heard MD   DULoxetine (CYMBALTA) 60 mg delayed release capsule Take 1 capsule (60 mg total) by mouth daily at bedtime 3/3/22  Yes Juan Heard MD   levothyroxine 100 mcg tablet Take 1 tablet (100 mcg total) by mouth daily in the early morning 12/13/22  Yes Neha Stringer MD       Subjective:     Review of Systems:    General: negative for - chills, fatigue, fever,  weight gain or weight loss  Psychological: negative for - anxiety, behavioral disorder, concentration difficulties, decreased libido, depression, irritability, memory difficulties, mood swings, sleep disturbances or suicidal ideation  ENT: negative for - hearing difficulties , nasal congestion, nasal discharge, oral lesions, sinus pain, sneezing, sore throat  Allergy and Immunology: negative for - hives, insect bite sensitivity,  Hematological and Lymphatic: negative for - bleeding problems, blood clots,bruising, swollen lymph nodes  Endocrine: negative for - hair pattern changes, hot flashes, malaise/lethargy, mood swings, palpitations, polydipsia/polyuria, skin changes, temperature intolerance or unexpected weight change  Respiratory: negative for - cough, hemoptysis, orthopnea, shortness of breath, or wheezing  Cardiovascular: negative for - chest pain, dyspnea on exertion, edema,  Gastrointestinal: negative for - abdominal pain, nausea/vomiting  Genito-Urinary: negative for - dysuria, incontinence, irregular/heavy menses or urinary frequency/urgency  Musculoskeletal: negative for - gait disturbance, joint pain, joint stiffness, joint swelling, muscle pain, "muscular weakness  Dermatological:  As in HPI  Neurological: negative for confusion, dizziness, headaches, impaired coordination/balance, memory loss, numbness/tingling, seizures, speech problems, tremors or weakness       Objective:   Ht 5' 7\" (1 702 m)   Wt 64 kg (141 lb)   BMI 22 08 kg/m²     Physical Exam:    General Appearance:    Alert, cooperative, no distress   Head:    Normocephalic, without obvious abnormality, atraumatic           Skin:   A full skin exam was performed including scalp, head scalp, eyes, ears, nose, lips, neck, chest, axilla, abdomen, back, buttocks, bilateral upper extremities, bilateral lower extremities, hands, feet, fingers, toes, fingernails, and toenails 4 mm area on the left mid anterior nasal septum appears nodular and hard and else of concern noted on exam     Assessment:     1  Unknown skin lesion        2  Screening for skin condition              Plan:   Question of extraneous cartilage that is calcified in the area at present I feel the patient if this is bothersome to her should see an ear nose and throat for evaluation and removal  Screening for Dermatologic Disorders: Nothing else of concern noted on complete exam follow up in 1 year       Ashley Caballero MD  4/4/2023,11:04 AM    Portions of the record may have been created with voice recognition software   Occasional wrong word or \"sound a like\" substitutions may have occurred due to the inherent limitations of voice recognition software   Read the chart carefully and recognize, using context, where substitutions have occurred    "

## 2023-04-04 NOTE — PATIENT INSTRUCTIONS
Question of extraneous cartilage that is calcified in the area at present I feel the patient if this is bothersome to her should see an ear nose and throat for evaluation and removal

## 2023-05-15 DIAGNOSIS — M79.7 FIBROMYALGIA: ICD-10-CM
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
## 2023-05-16 RX ORDER — DULOXETIN HYDROCHLORIDE 60 MG/1
60 CAPSULE, DELAYED RELEASE ORAL
Qty: 90 CAPSULE | Refills: 1 | Status: SHIPPED | OUTPATIENT
Start: 2023-05-16 | End: 2023-05-24 | Stop reason: SDUPTHER

## 2023-05-16 RX ORDER — DULOXETIN HYDROCHLORIDE 30 MG/1
30 CAPSULE, DELAYED RELEASE ORAL EVERY MORNING
Qty: 90 CAPSULE | Refills: 1 | Status: SHIPPED | OUTPATIENT
Start: 2023-05-16 | End: 2023-05-24 | Stop reason: SDUPTHER

## 2023-05-16 NOTE — TELEPHONE ENCOUNTER
Patient seen almost 1 year ago  Follow up scheduled with Dr Mena Leisure 9/2023  Will refill enough until that appt

## 2023-05-21 PROCEDURE — 99283 EMERGENCY DEPT VISIT LOW MDM: CPT

## 2023-05-22 ENCOUNTER — APPOINTMENT (EMERGENCY)
Dept: RADIOLOGY | Facility: HOSPITAL | Age: 63
End: 2023-05-22

## 2023-05-22 ENCOUNTER — HOSPITAL ENCOUNTER (EMERGENCY)
Facility: HOSPITAL | Age: 63
Discharge: HOME | End: 2023-05-22
Attending: EMERGENCY MEDICINE

## 2023-05-22 VITALS
TEMPERATURE: 97.7 F | OXYGEN SATURATION: 95 % | RESPIRATION RATE: 20 BRPM | DIASTOLIC BLOOD PRESSURE: 57 MMHG | HEART RATE: 92 BPM | SYSTOLIC BLOOD PRESSURE: 122 MMHG

## 2023-05-22 DIAGNOSIS — T14.8XXA HEMATOMA: Primary | ICD-10-CM

## 2023-05-22 PROCEDURE — 73060 X-RAY EXAM OF HUMERUS: CPT

## 2023-05-23 ENCOUNTER — TELEPHONE (OUTPATIENT)
Dept: OTHER | Facility: OTHER | Age: 63
End: 2023-05-23

## 2023-05-23 DIAGNOSIS — M79.7 FIBROMYALGIA: ICD-10-CM

## 2023-05-23 NOTE — TELEPHONE ENCOUNTER
PT called in to request the recently prescribed cymbalta ( both dosages) be sent to the [facility] mail order, not her local [facility], in which they are at

## 2023-05-24 RX ORDER — DULOXETIN HYDROCHLORIDE 60 MG/1
60 CAPSULE, DELAYED RELEASE ORAL
Qty: 90 CAPSULE | Refills: 1 | Status: SHIPPED | OUTPATIENT
Start: 2023-05-24

## 2023-05-24 RX ORDER — DULOXETIN HYDROCHLORIDE 30 MG/1
30 CAPSULE, DELAYED RELEASE ORAL EVERY MORNING
Qty: 90 CAPSULE | Refills: 1 | Status: SHIPPED | OUTPATIENT
Start: 2023-05-24
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
## 2023-06-12 ENCOUNTER — HOSPITAL ENCOUNTER (OUTPATIENT)
Dept: RADIOLOGY | Facility: HOSPITAL | Age: 63
Discharge: HOME | End: 2023-06-12
Payer: COMMERCIAL

## 2023-06-12 DIAGNOSIS — S22.43XD: ICD-10-CM

## 2023-06-12 PROCEDURE — 71046 X-RAY EXAM CHEST 2 VIEWS: CPT

## 2023-08-24 ENCOUNTER — VBI (OUTPATIENT)
Dept: ADMINISTRATIVE | Facility: OTHER | Age: 63
End: 2023-08-24

## 2023-09-20 ENCOUNTER — TELEPHONE (OUTPATIENT)
Dept: NEUROLOGY | Facility: CLINIC | Age: 63
End: 2023-09-20

## 2023-09-21 ENCOUNTER — OFFICE VISIT (OUTPATIENT)
Dept: NEUROLOGY | Facility: CLINIC | Age: 63
End: 2023-09-21
Payer: COMMERCIAL

## 2023-09-21 VITALS
WEIGHT: 137.8 LBS | HEIGHT: 67 IN | OXYGEN SATURATION: 98 % | BODY MASS INDEX: 21.63 KG/M2 | DIASTOLIC BLOOD PRESSURE: 70 MMHG | HEART RATE: 92 BPM | SYSTOLIC BLOOD PRESSURE: 110 MMHG

## 2023-09-21 DIAGNOSIS — M79.7 FIBROMYALGIA: ICD-10-CM

## 2023-09-21 DIAGNOSIS — G25.81 RESTLESS LEG SYNDROME: ICD-10-CM

## 2023-09-21 DIAGNOSIS — G89.4 PAIN SYNDROME, CHRONIC: ICD-10-CM

## 2023-09-21 DIAGNOSIS — M54.12 CERVICAL RADICULOPATHY AT C7: Primary | ICD-10-CM

## 2023-09-21 DIAGNOSIS — D47.2 MONOCLONAL GAMMOPATHY OF UNDETERMINED SIGNIFICANCE: ICD-10-CM

## 2023-09-21 PROCEDURE — 99215 OFFICE O/P EST HI 40 MIN: CPT | Performed by: PSYCHIATRY & NEUROLOGY

## 2023-09-21 RX ORDER — GABAPENTIN 100 MG/1
100 CAPSULE ORAL AS NEEDED
COMMUNITY

## 2023-09-21 RX ORDER — DULOXETIN HYDROCHLORIDE 30 MG/1
30 CAPSULE, DELAYED RELEASE ORAL EVERY MORNING
Qty: 90 CAPSULE | Refills: 1 | Status: SHIPPED | OUTPATIENT
Start: 2023-09-21

## 2023-09-21 RX ORDER — DULOXETIN HYDROCHLORIDE 60 MG/1
60 CAPSULE, DELAYED RELEASE ORAL
Qty: 90 CAPSULE | Refills: 1 | Status: SHIPPED | OUTPATIENT
Start: 2023-09-21
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
## 2023-10-07 ENCOUNTER — HOSPITAL ENCOUNTER (OUTPATIENT)
Dept: MRI IMAGING | Facility: HOSPITAL | Age: 63
Discharge: HOME | End: 2023-10-07
Attending: PSYCHIATRY & NEUROLOGY
Payer: COMMERCIAL

## 2023-10-07 DIAGNOSIS — M54.12 CERVICAL RADICULOPATHY AT C7: ICD-10-CM

## 2023-10-07 PROCEDURE — G1004 CDSM NDSC: HCPCS

## 2023-10-07 PROCEDURE — 72141 MRI NECK SPINE W/O DYE: CPT

## 2023-10-20 ENCOUNTER — TELEPHONE (OUTPATIENT)
Dept: HEMATOLOGY ONCOLOGY | Facility: CLINIC | Age: 63
End: 2023-10-20

## 2023-10-20 NOTE — TELEPHONE ENCOUNTER
NORMA  DR marylou ARAIZA   Who are you speaking with? Patient   If it is not the patient, are they listed on an active communication consent form? N/A   Is this a NORMA or DR marylou ARAIZA NORMA   Which provider is patient currently scheduled or established with? Az Bowden   What is the original appointment date and time? 12/11/23 11AM   At which location is the appointment scheduled to take place? [place]   Which provider is the patient transitioning care to? Dr. Darvin Luciano   What is the new appointment date and time? 12/14/23 1:40PM   At which location is the new appointment scheduled to take place? [place]   What is the reason for this change?  Provider

## 2023-11-07 ENCOUNTER — VBI (OUTPATIENT)
Dept: ADMINISTRATIVE | Facility: OTHER | Age: 63
End: 2023-11-07

## 2023-11-29 ENCOUNTER — HOSPITAL ENCOUNTER (OUTPATIENT)
Dept: MAMMOGRAPHY | Facility: CLINIC | Age: 63
Discharge: HOME | End: 2023-11-29
Payer: COMMERCIAL

## 2023-11-29 VITALS — WEIGHT: 140 LBS | HEIGHT: 67 IN | BODY MASS INDEX: 21.97 KG/M2

## 2023-11-29 DIAGNOSIS — Z12.31 OTHER SCREENING MAMMOGRAM: ICD-10-CM

## 2023-11-29 PROCEDURE — 77067 SCR MAMMO BI INCL CAD: CPT

## 2023-11-29 PROCEDURE — 77063 BREAST TOMOSYNTHESIS BI: CPT

## 2023-12-08 ENCOUNTER — TELEPHONE (OUTPATIENT)
Dept: HEMATOLOGY ONCOLOGY | Facility: CLINIC | Age: 63
End: 2023-12-08

## 2023-12-08 DIAGNOSIS — C50.911 MALIGNANT NEOPLASM OF RIGHT BREAST IN FEMALE, ESTROGEN RECEPTOR POSITIVE, UNSPECIFIED SITE OF BREAST: ICD-10-CM

## 2023-12-08 DIAGNOSIS — D47.2 MGUS (MONOCLONAL GAMMOPATHY OF UNKNOWN SIGNIFICANCE): Primary | ICD-10-CM

## 2023-12-08 DIAGNOSIS — D05.11 INTRADUCTAL CARCINOMA OF RIGHT BREAST: ICD-10-CM

## 2023-12-08 DIAGNOSIS — Z17.0 MALIGNANT NEOPLASM OF RIGHT BREAST IN FEMALE, ESTROGEN RECEPTOR POSITIVE, UNSPECIFIED SITE OF BREAST: ICD-10-CM

## 2023-12-08 NOTE — TELEPHONE ENCOUNTER
Laney and gave reminder about the appointment scheduled with Dr. Nuzhat Hayes and of labs that should be completed prior to the appointment.

## 2023-12-12 ENCOUNTER — PROCEDURE VISIT (OUTPATIENT)
Dept: NEUROLOGY | Facility: CLINIC | Age: 63
End: 2023-12-12
Payer: COMMERCIAL

## 2023-12-12 ENCOUNTER — TELEPHONE (OUTPATIENT)
Dept: NEUROLOGY | Facility: CLINIC | Age: 63
End: 2023-12-12

## 2023-12-12 DIAGNOSIS — G56.23 ULNAR NEUROPATHY OF BOTH UPPER EXTREMITIES: Primary | ICD-10-CM

## 2023-12-12 DIAGNOSIS — M54.12 CERVICAL RADICULOPATHY AT C7: ICD-10-CM

## 2023-12-12 PROCEDURE — 95886 MUSC TEST DONE W/N TEST COMP: CPT | Performed by: PHYSICAL MEDICINE & REHABILITATION

## 2023-12-12 PROCEDURE — 95911 NRV CNDJ TEST 9-10 STUDIES: CPT | Performed by: PHYSICAL MEDICINE & REHABILITATION

## 2023-12-12 NOTE — TELEPHONE ENCOUNTER
----- Message from Billy Arroyo MD sent at 12/12/2023  2:27 PM EST -----  I left a message for the patient to call back, please tell her to see a hand surgeon for bilateral ulnar neuropathy on the EMG I did put in a referral, If she still needs to talk to me you can please connect her to me.

## 2023-12-13 ENCOUNTER — APPOINTMENT (OUTPATIENT)
Dept: LAB | Facility: HOSPITAL | Age: 63
End: 2023-12-13
Payer: COMMERCIAL

## 2023-12-13 ENCOUNTER — OFFICE VISIT (OUTPATIENT)
Dept: HEMATOLOGY ONCOLOGY | Facility: CLINIC | Age: 63
End: 2023-12-13
Payer: COMMERCIAL

## 2023-12-13 VITALS
DIASTOLIC BLOOD PRESSURE: 68 MMHG | TEMPERATURE: 98.4 F | OXYGEN SATURATION: 99 % | RESPIRATION RATE: 16 BRPM | WEIGHT: 142 LBS | HEART RATE: 90 BPM | HEIGHT: 67 IN | SYSTOLIC BLOOD PRESSURE: 112 MMHG | BODY MASS INDEX: 22.29 KG/M2

## 2023-12-13 DIAGNOSIS — Z17.0 MALIGNANT NEOPLASM OF OVERLAPPING SITES OF RIGHT BREAST IN FEMALE, ESTROGEN RECEPTOR POSITIVE: ICD-10-CM

## 2023-12-13 DIAGNOSIS — D53.9 NUTRITIONAL ANEMIA, UNSPECIFIED: ICD-10-CM

## 2023-12-13 DIAGNOSIS — C50.811 MALIGNANT NEOPLASM OF OVERLAPPING SITES OF RIGHT BREAST IN FEMALE, ESTROGEN RECEPTOR POSITIVE: ICD-10-CM

## 2023-12-13 DIAGNOSIS — D05.11 INTRADUCTAL CARCINOMA OF RIGHT BREAST: ICD-10-CM

## 2023-12-13 DIAGNOSIS — D47.2 MGUS (MONOCLONAL GAMMOPATHY OF UNKNOWN SIGNIFICANCE): Primary | ICD-10-CM

## 2023-12-13 DIAGNOSIS — Z17.0 MALIGNANT NEOPLASM OF RIGHT BREAST IN FEMALE, ESTROGEN RECEPTOR POSITIVE, UNSPECIFIED SITE OF BREAST: Primary | ICD-10-CM

## 2023-12-13 DIAGNOSIS — C50.911 MALIGNANT NEOPLASM OF RIGHT BREAST IN FEMALE, ESTROGEN RECEPTOR POSITIVE, UNSPECIFIED SITE OF BREAST: Primary | ICD-10-CM

## 2023-12-13 DIAGNOSIS — C50.911 MALIGNANT NEOPLASM OF RIGHT BREAST IN FEMALE, ESTROGEN RECEPTOR POSITIVE, UNSPECIFIED SITE OF BREAST: ICD-10-CM

## 2023-12-13 DIAGNOSIS — D47.2 MONOCLONAL GAMMOPATHY OF UNDETERMINED SIGNIFICANCE: ICD-10-CM

## 2023-12-13 DIAGNOSIS — Z17.0 MALIGNANT NEOPLASM OF RIGHT BREAST IN FEMALE, ESTROGEN RECEPTOR POSITIVE, UNSPECIFIED SITE OF BREAST: ICD-10-CM

## 2023-12-13 LAB
ALBUMIN SERPL BCP-MCNC: 4.2 G/DL (ref 3.5–5)
ALP SERPL-CCNC: 56 U/L (ref 34–104)
ALT SERPL W P-5'-P-CCNC: 7 U/L (ref 7–52)
ANION GAP SERPL CALCULATED.3IONS-SCNC: 7 MMOL/L
AST SERPL W P-5'-P-CCNC: 12 U/L (ref 13–39)
BASOPHILS # BLD AUTO: 0.03 THOUSANDS/ÂΜL (ref 0–0.1)
BASOPHILS NFR BLD AUTO: 1 % (ref 0–1)
BILIRUB SERPL-MCNC: 0.85 MG/DL (ref 0.2–1)
BUN SERPL-MCNC: 15 MG/DL (ref 5–25)
CALCIUM SERPL-MCNC: 8.6 MG/DL (ref 8.4–10.2)
CHLORIDE SERPL-SCNC: 105 MMOL/L (ref 96–108)
CO2 SERPL-SCNC: 28 MMOL/L (ref 21–32)
CREAT SERPL-MCNC: 0.69 MG/DL (ref 0.6–1.3)
EOSINOPHIL # BLD AUTO: 0.18 THOUSAND/ÂΜL (ref 0–0.61)
EOSINOPHIL NFR BLD AUTO: 4 % (ref 0–6)
ERYTHROCYTE [DISTWIDTH] IN BLOOD BY AUTOMATED COUNT: 12.7 % (ref 11.6–15.1)
GFR SERPL CREATININE-BSD FRML MDRD: 92 ML/MIN/1.73SQ M
GLUCOSE P FAST SERPL-MCNC: 99 MG/DL (ref 65–99)
HCT VFR BLD AUTO: 37.8 % (ref 34.8–46.1)
HGB BLD-MCNC: 13.2 G/DL (ref 11.5–15.4)
IMM GRANULOCYTES # BLD AUTO: 0.01 THOUSAND/UL (ref 0–0.2)
IMM GRANULOCYTES NFR BLD AUTO: 0 % (ref 0–2)
LYMPHOCYTES # BLD AUTO: 1.16 THOUSANDS/ÂΜL (ref 0.6–4.47)
LYMPHOCYTES NFR BLD AUTO: 28 % (ref 14–44)
MCH RBC QN AUTO: 36.3 PG (ref 26.8–34.3)
MCHC RBC AUTO-ENTMCNC: 34.9 G/DL (ref 31.4–37.4)
MCV RBC AUTO: 104 FL (ref 82–98)
MONOCYTES # BLD AUTO: 0.27 THOUSAND/ÂΜL (ref 0.17–1.22)
MONOCYTES NFR BLD AUTO: 7 % (ref 4–12)
NEUTROPHILS # BLD AUTO: 2.48 THOUSANDS/ÂΜL (ref 1.85–7.62)
NEUTS SEG NFR BLD AUTO: 60 % (ref 43–75)
NRBC BLD AUTO-RTO: 0 /100 WBCS
PLATELET # BLD AUTO: 252 THOUSANDS/UL (ref 149–390)
PMV BLD AUTO: 9 FL (ref 8.9–12.7)
POTASSIUM SERPL-SCNC: 3.8 MMOL/L (ref 3.5–5.3)
PROT SERPL-MCNC: 7.6 G/DL (ref 6.4–8.4)
RBC # BLD AUTO: 3.64 MILLION/UL (ref 3.81–5.12)
SODIUM SERPL-SCNC: 140 MMOL/L (ref 135–147)
WBC # BLD AUTO: 4.13 THOUSAND/UL (ref 4.31–10.16)

## 2023-12-13 PROCEDURE — 99215 OFFICE O/P EST HI 40 MIN: CPT | Performed by: INTERNAL MEDICINE

## 2023-12-13 NOTE — PROGRESS NOTES
[address] [facility] [place] [address]    Aniya Linker  1960      PRIMARY HEMATOLOGIC/ONCOLOGIC DIAGNOSIS:  Invasive carcinoma of the right breast Stage IA- pT1b, pN0(sn), G1. ER/PgR 90-95% Positive; HER2 by IHC 1+ Negative. Dx 2017  Neutropenia    PATHOLOGY:   Final Diagnosis   INVASIVE CARCINOMA OF THE BREAST TUMOR STAGING SUMMARY (includes specimens A-D of this case and prior biopsy [id number])  1. Specimen Identification     - Procedure: Excision with image-guided localization.      - Lymph Node Sampling: [place] lymph nodes. - Specimen Laterality: Right.      - Tumor Site: Lower inner quadrant (LIQ), 3 o'clock position, 4 cm FN. 2. Invasive Tumor:     - Histologic Type: Invasive mammary carcinoma of no special type (ductal, NOS). - Tumor Size (pT1b): 6.4 mm (prior biopsy). - Tumor Grade (G1): Jarreau Histologic Score 5 of 9; Grade I of III. * Glandular (Acinar) / Tubular Differentiation: 2 of 3.        * Nuclear Pleomorphism: 2 of 3.        * Mitotic Rate: < 3/mm2, 1 of 3.      - Tumor Focality: Unifocal.      - Macroscopic and Microscopic Extent of Tumor:       -- Skin: Present, invasive carcinoma does not invade the dermis or epidermis. -- Nipple: Not present. -- Skeletal muscle: No skeletal muscle present. 3. Lymph-vascular invasion: Not identified. 4. Dermal lymph-vascular invasion: Not identified. 5. In situ tumor     - Ductal carcinoma in Situ (DCIS): Present, not an extensive intraductal component. -- Rare foci associated with invasive carcinoma < 5% of total tumor. * Size: 0.6 mm in greatest measurable dimension; present in 2 tumor blocks. * Architectural Pattern(s): Cribriform. * Nuclear Grade: II (intermediate). * Necrosis:  Present, focal.     - Lobular Carcinoma In Situ (LCIS): Not identified.     6. Margins: Uninvolved by invasive and in-situ carcinoma. - Invasive Carcinoma: 4.5 mm from nearest inferior margin.      - DCIS: 6 mm from nearest posterior inferior margin. 7. Treatment Effect, Response to Presurgical (Neoadjuvant) Therapy: N/A.   8. Lymph nodes (pN0): Four benign sentinel lymph nodes (0/4). - Number of lymph nodes with macrometastases (>2 mm): 0.     - Number of lymph nodes with micrometastases (>0.2 mm to 2 mm and/or >200 cells): 0.     - Number of lymph nodes with isolated tumor cells (<0.2 mm and <200 cells): 0.     - Extranodal extension: N/A.     - Method of evaluation of sentinel lymph nodes: Multiple H&E levels. 9. Additional Pathologic Findings: Prior biopsy site changes with clip. - Columnar cell hyperplasia with flat epithelial atypia (FEA). Atypical lobular         hyperplasia. 10. Microcalcifications: Present, associated with invasive carcinoma and benign         breast elements. 11. Ancillary Studies: Performed on the prior biopsy ([id number]) with the following         reported results:     - ER/PgR 90-95% Positive; HER2 by IHC 1+ Negative. - Immunostains E-cadherin and P120 (membranous) positive. - Repeat HER2 testing (2013 ASCO/CAP Recommendations): Not indicated. - Best representative tumor block: D9.       -- Sufficient tumor present for          Agendia Mammaprint/Blueprint (1 cm2 of invasive tumor in aggregate): No.           MI Profile/Foundation One (at least 5 x 5 mm of tumor): Yes. 12. Clinical History: Radiologic mass. 13. 7th ED AJCC Stage:  at least Stage IA- pT1b, pN0(sn), G1.     A. Lymph Node, [place], RT BREAST SENTINEL NODE #1, lymphadenectomy:  - One benign lymph node (0/1); negative for malignancy on multiple examined levels. B. Lymph Node, [place], RT BREAST SENTINEL NODE #2, lymphadenectomy:  - One benign lymph node (0/1); negative for malignancy on multiple examined levels.      C. Lymph Node, [place], RT BREAST SENTINEL NODE #3, lymphadenectomy:  - Two benign lymph nodes (0/2); negative for malignancy on multiple examined levels. D. Breast, Right, TO ULTRASOUND (NEEDLE REMOVED/WIRE IN), lumpectomy/excision with image-guided localization:  - Invasive mammary carcinoma of no special type (ductal, not otherwise specified). -- [place] grade 1 of 3 (total score: 5 of 9)        * Tubule formation 10-75%, score 2.        * Nuclear grade 2 of 3, score 2.        * Mitoses < 3/mm2, (4 mitoses/10HPF), score 1.    -- 5.1 mm greatest measurable microscopic dimension (9 mm gross dimension). - Ductal carcinoma in-situ (DCIS): Present; minor component (< 5% of total tumor). * Size: Rare foci 0.6 mm in greatest dimension, present in 2 blocks associated with           invasive carcinoma. * Architectural Pattern(s): Cribriform. * Nuclear Grade: II (intermediate). * Necrosis:  Present, focal.  - Lymph-vascular invasion: Not identified. - Margins uninvolved by invasive and in-situ carcinoma. -- Invasive carcinoma 4.5 mm from nearest inferior margin and 4.8 mm from posterior        margin; DCIS 6 mm from nearest posterior inferior margin.   - Benign skin. -- No dermal lymph-vascular invasion.   - Microcalcifications: Present, associated with invasive carcinoma and benign     breast elements. - Changes consistent with prior biopsy site with clip. - Benign proliferative and non-proliferative fibrocystic changes. -- Columnar cell hyperplasia with FEA, atypical lobular hyperplasia, tubular and        apocrine adenosis and cystic apocrine metaplasia. - Duct ectasia. - Small capillary hemangioma.          STAGING: Cancer Staging   Malignant neoplasm of right female breast [facility])  Staging form: Breast, AJCC 8th Edition  - Clinical stage from 5/24/2017: Stage IA (cT1b, cN0, cM0, G1, ER+, MS+, HER2-) - Signed by Florecita Aguilera PA-C on 5/1/2022  Stage prefix: Initial diagnosis  Method of lymph node assessment: Axillary lymph node dissection  Nuclear grade: G2  Mitotic count score: Score 1  Histologic grading system: 3 grade system  HER2-IHC interpretation: Negative  HER2-IHC value: Score 1+         Oncology History Overview Note   Returns for follow up post right breast radiation completed on 9/5/17. The pt was last seen in radiation on 10/15/20.     10/15/20 - Hem [facility]  Pt to continue on Tamoxifen  Breast exam was benign    Upcoming:       Intraductal carcinoma of right breast (Resolved)   4/20/2017 Biopsy    Right breast biopsy:  Invasive breast carcinoma/invasive ductal carcinoma. Grade 1. ER and NH 90-95% positive, Her2 negative     5/24/2017 Initial Diagnosis    Intraductal carcinoma of right breast      Surgery    Segmental mastectomy with sentinel node biopsy  Invasive mammary. Grade 1       5/24/2017 -  Cancer Staged    Pathologic  Stage IA - pT1b, pN0 (sn), G1     7/20/2017 - 9/5/2017 Radiation    dose of 5040 cGy in 20 daily fractions to the right breast with an additional 1000 cGy to the lumpectomy cavity. 7/2017 -  Hormone Therapy    Tamoxifen     Malignant neoplasm of right female breast ([address])   5/24/2017 -  Cancer Staged    Staging form: Breast, AJCC 8th Edition  - Clinical stage from 5/24/2017: Stage IA (cT1b, cN0, cM0, G1, ER+, NH+, HER2-) - Signed by Gia Lance PA-C on 5/1/2022  Stage prefix: Initial diagnosis  Method of lymph node assessment: Axillary lymph node dissection  Nuclear grade: G2  Mitotic count score: Score 1  Histologic grading system: 3 grade system  HER2-IHC interpretation: Negative  HER2-IHC value: Score 1+       5/7/2019 Initial Diagnosis    Malignant neoplasm of right female breast [facility])         INTERIM HISTORY:  The patient presents for a follow-up. No complaints. On surveillance.      PAST MEDICAL,PAST SURGICAL, FAMILY AND SOCIAL HISTORY:    Patient Active Problem List   Diagnosis    History of right breast cancer    Restless leg syndrome    Fibromyalgia    Disease of thyroid gland    Cognitive impairment, mild, so stated    Hypotension    Depression    Vitamin B deficiency    Monoclonal gammopathy of undetermined significance    Pain syndrome, chronic    Vitamin D deficiency    Tachycardia    Current severe episode of major depressive disorder without psychotic features ([address])    Hypothyroidism    Malignant neoplasm of right female breast ([address])    Rib pain    Internal nasal lesion    Cervical radiculopathy at C7    COVID-19     Past Medical History:   Diagnosis Date    Anxiety     Breast cancer ([address]) 2017    right    Cancer ([address])     right breast  dx 04/2017    Cervical disc disorder of cervicothoracic region     Cervical radiculopathy     Cervicalgia     Cognitive impairment, mild, so stated     Depression     Major,recurrent    Diplopia     last assessed 12/23/13    Disc degeneration, lumbar     Disease of thyroid gland     hx of goiter, surgery done 1978    Edema of foot     Esophageal reflux     Fatigue     Fibromyalgia     Hemorrhage, anal or rectal     History of radiation therapy     Hx of radiation therapy     Treatments: Course C1, Plan ID Energy Fractions Dose per Fraction (cGy)total Dose delivered(cGy)elapsed days.  R Breast 6x 28/28 180 5,040 39, R breast e 12E 5/5 200 1,000 7 Treatment dates: 7/20/17-09/05/17    Hx of radiation therapy     33 treatments    Hypotension     Hypothyroidism     Insomnia     Lab test positive for detection of COVID-19 virus 10/14/2022    Memory loss     Monoclonal gammopathy of undetermined significance     Myalgia     last assessed 07/08/16    Myositis     last assessed 07/08/16    Neck stiffness     Neuralgia     Neuritis     Pain syndrome, chronic     Palpitations     Paresthesia     Peripheral neuropathy     Polyneuropathy     Pseudodementia     Radiculopathy     12/23/13    Restless leg syndrome     Sacroiliitis (HCC)     SLE (systemic lupus erythematosus) ([address])     last assessed 12/23/13    Tachycardia     Viral warts     Vitamin B deficiency     Vitamin D deficiency     Vitamin D deficiency     Wears glasses      Past Surgical History:   Procedure Laterality Date    BLADDER SURGERY      bladder prolapse    BREAST BIOPSY Right     BREAST SURGERY Right     breast bx    COLONOSCOPY      age 45 normal    HYSTERECTOMY  2009    vaginal hysterectomy, rectocele repair    [place] MASTECTOMY PARTIAL Right 2017    Procedure:  (NUCLEAR MED. 8 AM., NEEDLE LOCAL TO FOLLOW 8:30 AM.) SEGMENTAL MASTECTOMY BREAST NEEDLE PLACEMENT,  SENTINAL LYMPH NODE BIOPSY;  Surgeon: Darwin Mason MD;  Location: [facility] OR;  Service: General    REPAIR RECTOCELE      THYROID LOBECTOMY Left 1978    TONSILLECTOMY      US BREAST NEEDLE LOC RIGHT Right 2017    US GUIDED BREAST BIOPSY RIGHT COMPLETE Right 2017    US GUIDED THYROID BIOPSY  2019     Family History   Problem Relation Age of Onset    Heart disease Mother         CHF    Heart failure Mother     Cirrhosis Father     Esophageal cancer Brother 72    Lung cancer Brother     No Known Problems Brother     No Known Problems Brother     No Known Problems Brother      Social History     Socioeconomic History    Marital status: /Civil Union     Spouse name: Not on file    Number of children: Not on file    Years of education: Not on file    Highest education level: Not on file   Occupational History    Occupation: Disabled due to Fibromyalgia    Occupation: Unemployed   Tobacco Use    Smoking status: Former     Current packs/day: 0.00     Average packs/day: 1.5 packs/day for 5.0 years (7.5 ttl pk-yrs)     Types: Cigarettes     Start date: 5     Quit date: 1975     Years since quittin.9    Smokeless tobacco: Never    Tobacco comments:     Former quit 30 years ago   Vaping Use    Vaping status: Never Used   Substance and Sexual Activity    Alcohol use: Not Currently     Alcohol/week: 1.0 standard drink of alcohol     Types: 1 Glasses of wine per week    Drug use: No    Sexual activity: Yes Partners: Male   Other Topics Concern    Not on file   Social History Narrative    Not on file     Social Determinants of Health     Financial Resource Strain: Low Risk  (5/6/2023)    Received from [address] Strain (CARDIA)     Difficulty of Paying Living Expenses: Not hard at all   Food Insecurity: No Food Insecurity (5/6/2023)    Received from [address] Vital Sign     Worried About [place] in the Last Year: Never true     [address] in the Last Year: Never true   Transportation Needs: No Transportation Needs (5/6/2023)    Received from [address] [facility] of Transportation (Medical): No     Lack of Transportation (Non-Medical): No   Physical Activity: Sufficiently Active (8/6/2019)    Exercise Vital Sign     Days of Exercise per Week: 5 days     Minutes of Exercise per Session: 30 min   Stress: No Stress Concern Present (11/1/2022)    [address] [facility]     Feeling of Stress :  Only a little   Social Connections: Not on file   Intimate Partner Violence: Not At Risk (5/6/2023)    Received from [address], Afraid, Rape, and Kick questionnaire     Fear of Current or Ex-Partner: No     Emotionally Abused: No     Physically Abused: No     Sexually Abused: No   Housing Stability: Low Risk  (5/6/2023)    Received from [address] to Pay for Housing in the Last Year: No     Number of [address] in the Last Year: 1     Unstable Housing in the Last Year: No       Current Outpatient Medications:     calcium carbonate-vitamin D 250 mg-3.125 mcg per tablet, Take 1 tablet by mouth daily, Disp: 60 tablet, Rfl: 3    DULoxetine (CYMBALTA) 30 mg delayed release capsule, Take 1 capsule (30 mg total) by mouth every morning, Disp: 90 capsule, Rfl: 1    DULoxetine (CYMBALTA) 60 mg delayed release capsule, Take 1 capsule (60 mg total) by mouth daily at bedtime, Disp: 90 capsule, Rfl: 1    gabapentin (NEURONTIN) 100 mg capsule, Take 100 mg by mouth if needed, Disp: , Rfl:     levothyroxine 100 mcg tablet, Take 1 tablet (100 mcg total) by mouth daily in the early morning, Disp: 90 tablet, Rfl: 3    albuterol (PROVENTIL HFA,VENTOLIN HFA) 90 mcg/act inhaler, Inhale 2 puffs every 6 (six) hours as needed for wheezing or shortness of breath (Patient not taking: Reported on 9/21/2023), Disp: 6.7 g, Rfl: 0  No Known Allergies  Vitals:    12/13/23 1318   BP: 112/68   Pulse: 90   Resp: 16   Temp: 98.4 °F (36.9 °C)   SpO2: 99%       ROS:  CONSTITUTIONAL:  No fever. No chills. No dizziness. No weakness. EYES:  No pain, erythema, or discharge. No blurring of vision. ENT:  No sore throat, URI symptoms. No epistaxis. No tinnitus. CARDIOVASCULAR:  No chest pain. No palpitations. No lower extremity edema. RESPIRATORY:  No shortness of breath, cough, pain with respiration, pleuritic chest pain. No hemoptysis. No dyspnea. No paroxysmal nocturnal dyspnea. GASTROINTESTINAL:  Normal appetite. No nausea, vomiting, diarrhea. No pain. No bloating. No melena. GENITOURINARY:  No frequency, urgency, nocturia. No hematuria or dysuria. MUSCULOSKELETAL:  No arthralgias or myalgias. INTEGUMENTARY:  No swelling. No bruising. No contusions. No abrasions. No lymphangitis. NEUROLOGIC:  No headache. No neck pain. No numbness or tingling of the extremities. No weakness. PSYCHIATRIC:  No confusion. ENDOCRINE:  No fatigue. No weakness. No history of thyroid, diabetes or adrenal problems. HEMATOLOGICAL:  No bleeding. No petechiae. No bruising.     PHYSICAL EXAM:    GENERAL: AAO x 3  HEENT: AT,NC  CVS: S1S2 RRR  LUNGS: CTA b/l  ABD: NT,ND, +BS  EXTR: no edema  NEURO: CN II-XII grossly intact  Deferred--recent mammogram    LABS:  I have reviewed pertinent labs:  CBC:   Lab Results   Component Value Date    WBC 4.13 (L) 12/13/2023    RBC 3.64 (L) 12/13/2023    HGB 13.2 12/13/2023    HCT 37.8 12/13/2023     (H) 12/13/2023     12/13/2023    MCH 36.3 (H) 12/13/2023    MCHC 34.9 12/13/2023    RDW 12.7 12/13/2023    MPV 9.0 12/13/2023    NEUTROABS 2.48 12/13/2023     CMP:   Lab Results   Component Value Date    SODIUM 140 12/13/2023    K 3.8 12/13/2023     12/13/2023    CO2 28 12/13/2023    AGAP 7 12/13/2023    BUN 15 12/13/2023    CREATININE 0.69 12/13/2023    GLUC 94 10/29/2022    GLUF 99 12/13/2023    CALCIUM 8.6 12/13/2023    AST 12 (L) 12/13/2023    ALT 7 12/13/2023    ALKPHOS 56 12/13/2023    TP 7.6 12/13/2023    ALB 4.2 12/13/2023    TBILI 0.85 12/13/2023    EGFR 92 12/13/2023     Liver Enzymes:   Lab Results   Component Value Date    AST 12 (L) 12/13/2023    ALT 7 12/13/2023    ALKPHOS 56 12/13/2023    TP 7.6 12/13/2023    ALB 4.2 12/13/2023    TBILI 0.85 12/13/2023    BILIDIR <9 11/08/2017    BILIDIR <9 11/08/2017    BILIDIR <9 11/08/2017     Vitamin B12   Lab Results   Component Value Date    COFRZRTD54 177 04/02/2019     Iron Study No results found for: "RETIC", "RETICCTPCT", "FERRITIN", "CONCFE", "TIBC", "PRIMIDONE", "FOLATEHEMO", "IRON"  Folate   Lab Results   Component Value Date    FOLATE 14.8 04/02/2019     Magnesium   Lab Results   Component Value Date    MG 2.3 07/08/2016     Phosphorus No results found for: "PHOS"  Coagulation Panel   Lab Results   Component Value Date    PROTIME 14.7 (H) 10/29/2022    INR 1.17 10/29/2022   IMAGING:  Mammo screening bilateral w 3d & cad    Result Date: 11/30/2023  Narrative: DIAGNOSIS: Other screening mammogram TECHNIQUE: Digital screening mammography was performed. Computer Aided Detection (CAD) analyzed all applicable images. COMPARISONS: Multiple prior exams dated between 10/25/2013 and 11/28/2022. RELEVANT HISTORY: Family Breast Cancer History: No known family history of breast cancer.  Family Medical History: No known relevant family medical history. Personal History: Hormone history includes tamoxifen. Surgical history includes breast biopsy, lumpectomy, and hysterectomy. Medical history includes breast cancer. The patient is scheduled in a reminder system for screening mammography. 8-10% of cancers will be missed on mammography. Management of a palpable abnormality must be based on clinical grounds. Patients will be notified of their results via letter from our facility. Accredited by [employer] of Radiology and [facility]. RISK ASSESSMENT: [facility] risk assessment reporting was suppressed due to the patient's history and/or demographic factors. TISSUE DENSITY: The breasts are heterogeneously dense, which may obscure small masses. INDICATION: Daniel Snyder is a 58 y.o. female presenting for screening mammography. FINDINGS: Bilateral There are no suspicious masses, grouped microcalcifications or areas of unexplained architectural distortion. The skin and nipple areolar complex are unremarkable. Postsurgical changes are noted in the right breast.  Benign-appearing calcifications are noted in each breast.     Impression: No mammographic evidence of malignancy. ASSESSMENT/BI-RADS CATEGORY: Left: 2 - Benign Right: 2 - Benign Overall: 2 - Benign RECOMMENDATION:      - Routine screening mammogram in 1 year for both breasts. Workstation ID: [id number]     I reviewed the above laboratory and imaging data. ASSESSMENT/PLAN:  1. Invasive carcinoma of the right breast Stage IA- pT1b, pN0(sn), G1. ER/PgR 90-95% Positive; HER2 by IHC 1+ Negative. Dx 2017. On Surveillance. Mammogram 11/29/23--no mammographic evidence of malignancy. Mammogram in 1 year ordered. 2. Neutropenia. Labs ordered for further work-up. Has been on Cymbalta and Neurontin for years. No recurrent infections. 3. MGUS. Labs pending. No anemia/hypercalcemia/bone pain/renal dysfunction. F/u in 4m. Labs prior.

## 2023-12-14 LAB
KAPPA LC FREE SER-MCNC: 26.7 MG/L (ref 3.3–19.4)
KAPPA LC FREE/LAMBDA FREE SER: 1.99 {RATIO} (ref 0.26–1.65)
LAMBDA LC FREE SERPL-MCNC: 13.4 MG/L (ref 5.7–26.3)

## 2023-12-15 ENCOUNTER — TELEPHONE (OUTPATIENT)
Dept: NEUROLOGY | Facility: CLINIC | Age: 63
End: 2023-12-15

## 2023-12-15 LAB
ALBUMIN SERPL ELPH-MCNC: 3.88 G/DL (ref 3.2–5.1)
ALBUMIN SERPL ELPH-MCNC: 56.2 % (ref 48–70)
ALPHA1 GLOB SERPL ELPH-MCNC: 0.24 G/DL (ref 0.15–0.47)
ALPHA1 GLOB SERPL ELPH-MCNC: 3.5 % (ref 1.8–7)
ALPHA2 GLOB SERPL ELPH-MCNC: 0.57 G/DL (ref 0.42–1.04)
ALPHA2 GLOB SERPL ELPH-MCNC: 8.2 % (ref 5.9–14.9)
BETA GLOB ABNORMAL SERPL ELPH-MCNC: 0.43 G/DL (ref 0.31–0.57)
BETA1 GLOB SERPL ELPH-MCNC: 6.3 % (ref 4.7–7.7)
BETA2 GLOB SERPL ELPH-MCNC: 11.2 % (ref 3.1–7.9)
BETA2+GAMMA GLOB SERPL ELPH-MCNC: 0.77 G/DL (ref 0.2–0.58)
GAMMA GLOB ABNORMAL SERPL ELPH-MCNC: 1.01 G/DL (ref 0.4–1.66)
GAMMA GLOB SERPL ELPH-MCNC: 14.6 % (ref 6.9–22.3)
IGG/ALB SER: 1.28 {RATIO} (ref 1.1–1.8)
INTERPRETATION UR IFE-IMP: NORMAL
PROT PATTERN SERPL ELPH-IMP: ABNORMAL
PROT SERPL-MCNC: 6.9 G/DL (ref 6.4–8.2)

## 2023-12-15 PROCEDURE — 86334 IMMUNOFIX E-PHORESIS SERUM: CPT | Performed by: STUDENT IN AN ORGANIZED HEALTH CARE EDUCATION/TRAINING PROGRAM

## 2023-12-15 PROCEDURE — 84165 PROTEIN E-PHORESIS SERUM: CPT | Performed by: STUDENT IN AN ORGANIZED HEALTH CARE EDUCATION/TRAINING PROGRAM

## 2023-12-15 NOTE — TELEPHONE ENCOUNTER
Unable to reach patient LVM to schedule a EMG.  Please schedule from Referral, Availability as of 12/15/23 at 945am = Bryant 3 2024 10:30am, 11:15am, 12, 12:45 & 1:30

## 2023-12-28 ENCOUNTER — OFFICE VISIT (OUTPATIENT)
Dept: OBGYN CLINIC | Facility: CLINIC | Age: 63
End: 2023-12-28
Payer: COMMERCIAL

## 2023-12-28 VITALS
HEIGHT: 67 IN | BODY MASS INDEX: 22.29 KG/M2 | WEIGHT: 142 LBS | DIASTOLIC BLOOD PRESSURE: 61 MMHG | HEART RATE: 84 BPM | RESPIRATION RATE: 16 BRPM | SYSTOLIC BLOOD PRESSURE: 97 MMHG

## 2023-12-28 DIAGNOSIS — G56.23 CUBITAL TUNNEL SYNDROME OF BOTH UPPER EXTREMITIES: ICD-10-CM

## 2023-12-28 DIAGNOSIS — R20.0 BILATERAL HAND NUMBNESS: Primary | ICD-10-CM

## 2023-12-28 PROCEDURE — 99204 OFFICE O/P NEW MOD 45 MIN: CPT | Performed by: SURGERY

## 2024-01-05 ENCOUNTER — VBI (OUTPATIENT)
Dept: ADMINISTRATIVE | Facility: OTHER | Age: 64
End: 2024-01-05

## 2024-01-10 NOTE — TELEPHONE ENCOUNTER
-- DO NOT REPLY / DO NOT REPLY ALL --  -- Message is from Engagement Center Operations (ECO) --    General Patient Message:      Patient requesting images from Xray XR SCOLIOSIS STUDY. Patient has an appt in Feb 2024 and would like to show the surgeon the xrays images . Please call once ready for      Caller Information         Type Contact Phone/Fax    01/10/2024 09:17 AM CST Phone (Incoming) Renard Moss (Self) [phone] (M)          Alternative phone number: NA    Can a detailed message be left? Yes    Message Turnaround:                Next OV 9/21/2023 with Dr Lorelei Carrero  LOV 5/27/2022  Alejandrina - Rx entered to be sent to [facility] order pharmacy  Please review and sign if in agreement

## 2024-02-19 ENCOUNTER — TELEPHONE (OUTPATIENT)
Age: 64
End: 2024-02-19

## 2024-02-19 DIAGNOSIS — E03.9 HYPOTHYROIDISM, UNSPECIFIED TYPE: ICD-10-CM

## 2024-02-19 RX ORDER — LEVOTHYROXINE SODIUM 0.1 MG/1
100 TABLET ORAL
Qty: 90 TABLET | Refills: 3 | OUTPATIENT
Start: 2024-02-19

## 2024-02-19 NOTE — TELEPHONE ENCOUNTER
Left vm on script line also; didn't know if she gave all the information that's needed    Former Bibi pt  Don't see that she's anyone else with the office

## 2024-02-28 DIAGNOSIS — E03.9 HYPOTHYROIDISM, UNSPECIFIED TYPE: ICD-10-CM

## 2024-02-28 RX ORDER — LEVOTHYROXINE SODIUM 0.1 MG/1
100 TABLET ORAL
Qty: 30 TABLET | Refills: 0 | Status: SHIPPED | OUTPATIENT
Start: 2024-02-28

## 2024-02-28 NOTE — TELEPHONE ENCOUNTER
Patient made an  appt with Bryanna on 3/12. Please send in her 30 day supply of   levothyroxine 100 mcg tablet .t she is on her last one today thanks

## 2024-03-01 ENCOUNTER — HOSPITAL ENCOUNTER (OUTPATIENT)
Dept: ULTRASOUND IMAGING | Facility: HOSPITAL | Age: 64
Discharge: HOME | End: 2024-03-01
Attending: SURGERY
Payer: COMMERCIAL

## 2024-03-01 DIAGNOSIS — R20.0 BILATERAL HAND NUMBNESS: ICD-10-CM

## 2024-03-01 DIAGNOSIS — G56.23 CUBITAL TUNNEL SYNDROME OF BOTH UPPER EXTREMITIES: ICD-10-CM

## 2024-03-01 PROCEDURE — 20606 DRAIN/INJ JOINT/BURSA W/US: CPT

## 2024-03-01 PROCEDURE — 76882 US LMTD JT/FCL EVL NVASC XTR: CPT

## 2024-03-01 RX ORDER — METHYLPREDNISOLONE ACETATE 80 MG/ML
80 INJECTION, SUSPENSION INTRA-ARTICULAR; INTRALESIONAL; INTRAMUSCULAR; SOFT TISSUE ONCE
Status: COMPLETED | OUTPATIENT
Start: 2024-03-01 | End: 2024-03-01

## 2024-03-01 RX ORDER — LIDOCAINE HYDROCHLORIDE 10 MG/ML
5 INJECTION, SOLUTION EPIDURAL; INFILTRATION; INTRACAUDAL; PERINEURAL ONCE
Status: COMPLETED | OUTPATIENT
Start: 2024-03-01 | End: 2024-03-01

## 2024-03-01 RX ADMIN — LIDOCAINE HYDROCHLORIDE 5 ML: 10 INJECTION, SOLUTION EPIDURAL; INFILTRATION; INTRACAUDAL; PERINEURAL at 10:44

## 2024-03-01 RX ADMIN — METHYLPREDNISOLONE ACETATE 80 MG: 80 INJECTION, SUSPENSION INTRA-ARTICULAR; INTRALESIONAL; INTRAMUSCULAR; SOFT TISSUE at 10:44

## 2024-03-12 ENCOUNTER — OFFICE VISIT (OUTPATIENT)
Age: 64
End: 2024-03-12
Payer: COMMERCIAL

## 2024-03-12 ENCOUNTER — APPOINTMENT (OUTPATIENT)
Age: 64
End: 2024-03-12
Payer: COMMERCIAL

## 2024-03-12 VITALS
HEIGHT: 67 IN | OXYGEN SATURATION: 98 % | HEART RATE: 87 BPM | BODY MASS INDEX: 22.03 KG/M2 | DIASTOLIC BLOOD PRESSURE: 64 MMHG | SYSTOLIC BLOOD PRESSURE: 90 MMHG | RESPIRATION RATE: 18 BRPM | WEIGHT: 140.4 LBS | TEMPERATURE: 98 F

## 2024-03-12 DIAGNOSIS — C50.811 MALIGNANT NEOPLASM OF OVERLAPPING SITES OF RIGHT BREAST IN FEMALE, ESTROGEN RECEPTOR POSITIVE: ICD-10-CM

## 2024-03-12 DIAGNOSIS — F33.42 RECURRENT MAJOR DEPRESSIVE DISORDER, IN FULL REMISSION (HCC): ICD-10-CM

## 2024-03-12 DIAGNOSIS — Z17.0 MALIGNANT NEOPLASM OF OVERLAPPING SITES OF RIGHT BREAST IN FEMALE, ESTROGEN RECEPTOR POSITIVE: ICD-10-CM

## 2024-03-12 DIAGNOSIS — R29.6 FREQUENT FALLS: ICD-10-CM

## 2024-03-12 DIAGNOSIS — Z00.00 MEDICARE ANNUAL WELLNESS VISIT, SUBSEQUENT: ICD-10-CM

## 2024-03-12 DIAGNOSIS — Z12.11 COLON CANCER SCREENING: ICD-10-CM

## 2024-03-12 DIAGNOSIS — G89.4 PAIN SYNDROME, CHRONIC: ICD-10-CM

## 2024-03-12 DIAGNOSIS — E03.9 HYPOTHYROIDISM, UNSPECIFIED TYPE: Primary | ICD-10-CM

## 2024-03-12 LAB
ALBUMIN SERPL BCP-MCNC: 4.3 G/DL (ref 3.5–5)
ALP SERPL-CCNC: 51 U/L (ref 34–104)
ALT SERPL W P-5'-P-CCNC: 6 U/L (ref 7–52)
ANION GAP SERPL CALCULATED.3IONS-SCNC: 8 MMOL/L (ref 4–13)
AST SERPL W P-5'-P-CCNC: 13 U/L (ref 13–39)
BASOPHILS # BLD AUTO: 0.03 THOUSANDS/ÂΜL (ref 0–0.1)
BASOPHILS NFR BLD AUTO: 1 % (ref 0–1)
BILIRUB SERPL-MCNC: 0.99 MG/DL (ref 0.2–1)
BUN SERPL-MCNC: 16 MG/DL (ref 5–25)
CALCIUM SERPL-MCNC: 8.5 MG/DL (ref 8.4–10.2)
CHLORIDE SERPL-SCNC: 103 MMOL/L (ref 96–108)
CHOLEST SERPL-MCNC: 160 MG/DL
CO2 SERPL-SCNC: 30 MMOL/L (ref 21–32)
CREAT SERPL-MCNC: 0.74 MG/DL (ref 0.6–1.3)
EOSINOPHIL # BLD AUTO: 0.14 THOUSAND/ÂΜL (ref 0–0.61)
EOSINOPHIL NFR BLD AUTO: 3 % (ref 0–6)
ERYTHROCYTE [DISTWIDTH] IN BLOOD BY AUTOMATED COUNT: 14 % (ref 11.6–15.1)
GFR SERPL CREATININE-BSD FRML MDRD: 85 ML/MIN/1.73SQ M
GLUCOSE P FAST SERPL-MCNC: 101 MG/DL (ref 65–99)
HCT VFR BLD AUTO: 37.9 % (ref 34.8–46.1)
HDLC SERPL-MCNC: 58 MG/DL
HGB BLD-MCNC: 13 G/DL (ref 11.5–15.4)
IMM GRANULOCYTES # BLD AUTO: 0.03 THOUSAND/UL (ref 0–0.2)
IMM GRANULOCYTES NFR BLD AUTO: 1 % (ref 0–2)
LDLC SERPL CALC-MCNC: 79 MG/DL (ref 0–100)
LYMPHOCYTES # BLD AUTO: 1.32 THOUSANDS/ÂΜL (ref 0.6–4.47)
LYMPHOCYTES NFR BLD AUTO: 25 % (ref 14–44)
MCH RBC QN AUTO: 38.2 PG (ref 26.8–34.3)
MCHC RBC AUTO-ENTMCNC: 34.3 G/DL (ref 31.4–37.4)
MCV RBC AUTO: 112 FL (ref 82–98)
MONOCYTES # BLD AUTO: 0.37 THOUSAND/ÂΜL (ref 0.17–1.22)
MONOCYTES NFR BLD AUTO: 7 % (ref 4–12)
NEUTROPHILS # BLD AUTO: 3.44 THOUSANDS/ÂΜL (ref 1.85–7.62)
NEUTS SEG NFR BLD AUTO: 63 % (ref 43–75)
NONHDLC SERPL-MCNC: 102 MG/DL
NRBC BLD AUTO-RTO: 0 /100 WBCS
PLATELET # BLD AUTO: 255 THOUSANDS/UL (ref 149–390)
PMV BLD AUTO: 9.7 FL (ref 8.9–12.7)
POTASSIUM SERPL-SCNC: 3.9 MMOL/L (ref 3.5–5.3)
PROT SERPL-MCNC: 7.2 G/DL (ref 6.4–8.4)
RBC # BLD AUTO: 3.4 MILLION/UL (ref 3.81–5.12)
SODIUM SERPL-SCNC: 141 MMOL/L (ref 135–147)
T4 FREE SERPL-MCNC: 0.89 NG/DL (ref 0.61–1.12)
TRIGL SERPL-MCNC: 113 MG/DL
TSH SERPL DL<=0.05 MIU/L-ACNC: 5.21 UIU/ML (ref 0.45–4.5)
WBC # BLD AUTO: 5.33 THOUSAND/UL (ref 4.31–10.16)

## 2024-03-12 PROCEDURE — 80053 COMPREHEN METABOLIC PANEL: CPT

## 2024-03-12 PROCEDURE — 36415 COLL VENOUS BLD VENIPUNCTURE: CPT

## 2024-03-12 PROCEDURE — 84439 ASSAY OF FREE THYROXINE: CPT

## 2024-03-12 PROCEDURE — 84443 ASSAY THYROID STIM HORMONE: CPT

## 2024-03-12 PROCEDURE — 85025 COMPLETE CBC W/AUTO DIFF WBC: CPT

## 2024-03-12 PROCEDURE — G0439 PPPS, SUBSEQ VISIT: HCPCS

## 2024-03-12 PROCEDURE — 80061 LIPID PANEL: CPT

## 2024-03-12 PROCEDURE — 99214 OFFICE O/P EST MOD 30 MIN: CPT

## 2024-03-12 RX ORDER — LEVOTHYROXINE SODIUM 0.1 MG/1
100 TABLET ORAL
Qty: 30 TABLET | Refills: 0 | Status: SHIPPED | OUTPATIENT
Start: 2024-03-12 | End: 2024-03-13 | Stop reason: SDUPTHER
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
## 2024-03-12 NOTE — ASSESSMENT & PLAN NOTE
PHQ-9 score of 0 today.  Patient feels stable on Cymbalta.  Mood decreases at times secondary to chronic pain and fibromyalgia.  Patient instructed to follow-up with us if she experiences worsening mood.

## 2024-03-12 NOTE — ASSESSMENT & PLAN NOTE
Will recheck thyroid labs.  Refill placed for Synthroid 100 mcg daily. We will follow up if medication needs adjustment.

## 2024-03-12 NOTE — PATIENT INSTRUCTIONS
Medicare Preventive Visit Patient Instructions  Thank you for completing your Welcome to Medicare Visit or Medicare Annual Wellness Visit today. Your next wellness visit will be due in one year (3/13/2025).  The screening/preventive services that you may require over the next 5-10 years are detailed below. Some tests may not apply to you based off risk factors and/or age. Screening tests ordered at today's visit but not completed yet may show as past due. Also, please note that scanned in results may not display below.  Preventive Screenings:  Service Recommendations Previous Testing/Comments   Colorectal Cancer Screening  * Colonoscopy    * Fecal Occult Blood Test (FOBT)/Fecal Immunochemical Test (FIT)  * Fecal DNA/Cologuard Test  * Flexible Sigmoidoscopy Age: 45-75 years old   Colonoscopy: every 10 years (may be performed more frequently if at higher risk)  OR  FOBT/FIT: every 1 year  OR  Cologuard: every 3 years  OR  Sigmoidoscopy: every 5 years  Screening may be recommended earlier than age 45 if at higher risk for colorectal cancer. Also, an individualized decision between you and your healthcare provider will decide whether screening between the ages of 76-85 would be appropriate. Colonoscopy: Not on file  FOBT/FIT: Not on file  Cologuard: Not on file  Sigmoidoscopy: Not on file          Breast Cancer Screening Age: 40+ years old  Frequency: every 1-2 years  Not required if history of left and right mastectomy Mammogram: 11/29/2023    History Breast Cancer   Cervical Cancer Screening Between the ages of 21-29, pap smear recommended once every 3 years.   Between the ages of 30-65, can perform pap smear with HPV co-testing every 5 years.   Recommendations may differ for women with a history of total hysterectomy, cervical cancer, or abnormal pap smears in past. Pap Smear: Not on file        Hepatitis C Screening Once for adults born between 1945 and 1965  More frequently in patients at high risk for Hepatitis C  Hep C Antibody: 01/17/2023    Screening Current   Diabetes Screening 1-2 times per year if you're at risk for diabetes or have pre-diabetes Fasting glucose: 99 mg/dL (12/13/2023)  A1C: No results in last 5 years (No results in last 5 years)  Screening Current   Cholesterol Screening Once every 5 years if you don't have a lipid disorder. May order more often based on risk factors. Lipid panel: 10/29/2022    Screening Current     Other Preventive Screenings Covered by Medicare:  Abdominal Aortic Aneurysm (AAA) Screening: covered once if your at risk. You're considered to be at risk if you have a family history of AAA.  Lung Cancer Screening: covers low dose CT scan once per year if you meet all of the following conditions: (1) Age 55-77; (2) No signs or symptoms of lung cancer; (3) Current smoker or have quit smoking within the last 15 years; (4) You have a tobacco smoking history of at least 20 pack years (packs per day multiplied by number of years you smoked); (5) You get a written order from a healthcare provider.  Glaucoma Screening: covered annually if you're considered high risk: (1) You have diabetes OR (2) Family history of glaucoma OR (3)  aged 50 and older OR (4)  American aged 65 and older  Osteoporosis Screening: covered every 2 years if you meet one of the following conditions: (1) You're estrogen deficient and at risk for osteoporosis based off medical history and other findings; (2) Have a vertebral abnormality; (3) On glucocorticoid therapy for more than 3 months; (4) Have primary hyperparathyroidism; (5) On osteoporosis medications and need to assess response to drug therapy.   Last bone density test (DXA Scan): 11/18/2022.  HIV Screening: covered annually if you're between the age of 15-65. Also covered annually if you are younger than 15 and older than 65 with risk factors for HIV infection. For pregnant patients, it is covered up to 3 times per  pregnancy.    Immunizations:  Immunization Recommendations   Influenza Vaccine Annual influenza vaccination during flu season is recommended for all persons aged >= 6 months who do not have contraindications   Pneumococcal Vaccine   * Pneumococcal conjugate vaccine = PCV13 (Prevnar 13), PCV15 (Vaxneuvance), PCV20 (Prevnar 20)  * Pneumococcal polysaccharide vaccine = PPSV23 (Pneumovax) Adults 19-63 yo with certain risk factors or if 65+ yo  If never received any pneumonia vaccine: recommend Prevnar 20 (PCV20)  Give PCV20 if previously received 1 dose of PCV13 or PPSV23   Hepatitis B Vaccine 3 dose series if at intermediate or high risk (ex: diabetes, end stage renal disease, liver disease)   Respiratory syncytial virus (RSV) Vaccine - COVERED BY MEDICARE PART D  * RSVPreF3 (Arexvy) CDC recommends that adults 60 years of age and older may receive a single dose of RSV vaccine using shared clinical decision-making (SCDM)   Tetanus (Td) Vaccine - COST NOT COVERED BY MEDICARE PART B Following completion of primary series, a booster dose should be given every 10 years to maintain immunity against tetanus. Td may also be given as tetanus wound prophylaxis.   Tdap Vaccine - COST NOT COVERED BY MEDICARE PART B Recommended at least once for all adults. For pregnant patients, recommended with each pregnancy.   Shingles Vaccine (Shingrix) - COST NOT COVERED BY MEDICARE PART B  2 shot series recommended in those 19 years and older who have or will have weakened immune systems or those 50 years and older     Health Maintenance Due:      Topic Date Due   • Colorectal Cancer Screening  Never done   • Breast Cancer Screening: Mammogram  11/29/2024   • HIV Screening  Completed   • Hepatitis C Screening  Completed     Immunizations Due:      Topic Date Due   • Influenza Vaccine (1) 09/01/2023   • COVID-19 Vaccine (4 - 2023-24 season) 09/01/2023     Advance Directives   What are advance directives?  Advance directives are legal  documents that state your wishes and plans for medical care. These plans are made ahead of time in case you lose your ability to make decisions for yourself. Advance directives can apply to any medical decision, such as the treatments you want, and if you want to donate organs.   What are the types of advance directives?  There are many types of advance directives, and each state has rules about how to use them. You may choose a combination of any of the following:  Living will:  This is a written record of the treatment you want. You can also choose which treatments you do not want, which to limit, and which to stop at a certain time. This includes surgery, medicine, IV fluid, and tube feedings.   Durable power of  for healthcare (DPAHC):  This is a written record that states who you want to make healthcare choices for you when you are unable to make them for yourself. This person, called a proxy, is usually a family member or a friend. You may choose more than 1 proxy.  Do not resuscitate (DNR) order:  A DNR order is used in case your heart stops beating or you stop breathing. It is a request not to have certain forms of treatment, such as CPR. A DNR order may be included in other types of advance directives.  Medical directive:  This covers the care that you want if you are in a coma, near death, or unable to make decisions for yourself. You can list the treatments you want for each condition. Treatment may include pain medicine, surgery, blood transfusions, dialysis, IV or tube feedings, and a ventilator (breathing machine).  Values history:  This document has questions about your views, beliefs, and how you feel and think about life. This information can help others choose the care that you would choose.  Why are advance directives important?  An advance directive helps you control your care. Although spoken wishes may be used, it is better to have your wishes written down. Spoken wishes can be  misunderstood, or not followed. Treatments may be given even if you do not want them. An advance directive may make it easier for your family to make difficult choices about your care.       © Copyright [employer] 2018 Information is for End User's use only and may not be sold, redistributed or otherwise used for commercial purposes. All illustrations and images included in CareNotes® are the copyrighted property of [employer] or [employer]_

## 2024-03-12 NOTE — ASSESSMENT & PLAN NOTE
Continue treatment for pain management.  Reduce tripping hazards in the home such as rugs or cords and keep good lighting in the home.  Use a cane as needed for stability. Patient instructed to seek care if she falls and hits her head and has headaches, dizziness, or changes in vision.  Continue home exercise regimen.

## 2024-03-12 NOTE — ASSESSMENT & PLAN NOTE
Patient is on Cymbalta and gabapentin as prescribed by neurology.  Continue to follow with neurology.  Patient also follows with [facility].

## 2024-03-13 ENCOUNTER — TELEPHONE (OUTPATIENT)
Age: 64
End: 2024-03-13

## 2024-03-13 DIAGNOSIS — E03.9 HYPOTHYROIDISM, UNSPECIFIED TYPE: ICD-10-CM

## 2024-03-13 DIAGNOSIS — E03.9 HYPOTHYROIDISM, UNSPECIFIED TYPE: Primary | ICD-10-CM

## 2024-03-13 RX ORDER — LEVOTHYROXINE SODIUM 0.1 MG/1
100 TABLET ORAL
Qty: 90 TABLET | Refills: 1 | Status: SHIPPED | OUTPATIENT
Start: 2024-03-13

## 2024-03-13 RX ORDER — LEVOTHYROXINE SODIUM 0.1 MG/1
100 TABLET ORAL
Qty: 90 TABLET | Refills: 1 | Status: SHIPPED | OUTPATIENT
Start: 2024-03-13 | End: 2024-03-13 | Stop reason: SDUPTHER

## 2024-03-13 NOTE — TELEPHONE ENCOUNTER
Bryanna has to send a New script for the levothyroxine; it should be a 90 day supply     Fax to [facility]

## 2024-03-22 ENCOUNTER — HOSPITAL ENCOUNTER (OUTPATIENT)
Dept: CT IMAGING | Facility: HOSPITAL | Age: 64
End: 2024-03-22
Attending: PHYSICAL MEDICINE & REHABILITATION
Payer: COMMERCIAL

## 2024-03-22 DIAGNOSIS — S09.90XA UNSPECIFIED INJURY OF HEAD, INITIAL ENCOUNTER: ICD-10-CM

## 2024-03-22 PROCEDURE — 70450 CT HEAD/BRAIN W/O DYE: CPT

## 2024-04-04 ENCOUNTER — TELEPHONE (OUTPATIENT)
Dept: HEMATOLOGY ONCOLOGY | Facility: CLINIC | Age: 64
End: 2024-04-04

## 2024-04-09 ENCOUNTER — APPOINTMENT (OUTPATIENT)
Dept: LAB | Facility: HOSPITAL | Age: 64
End: 2024-04-09
Payer: COMMERCIAL

## 2024-04-09 DIAGNOSIS — D53.9 NUTRITIONAL ANEMIA, UNSPECIFIED: ICD-10-CM

## 2024-04-09 DIAGNOSIS — C50.911 MALIGNANT NEOPLASM OF RIGHT BREAST IN FEMALE, ESTROGEN RECEPTOR POSITIVE, UNSPECIFIED SITE OF BREAST (HCC): ICD-10-CM

## 2024-04-09 DIAGNOSIS — Z17.0 MALIGNANT NEOPLASM OF RIGHT BREAST IN FEMALE, ESTROGEN RECEPTOR POSITIVE, UNSPECIFIED SITE OF BREAST (HCC): ICD-10-CM

## 2024-04-09 DIAGNOSIS — D47.2 MONOCLONAL GAMMOPATHY OF UNDETERMINED SIGNIFICANCE: ICD-10-CM

## 2024-04-09 LAB
ANA SER QL IA: NEGATIVE
ERYTHROCYTE [SEDIMENTATION RATE] IN BLOOD: 10 MM/HOUR (ref 0–29)
FERRITIN SERPL-MCNC: 101 NG/ML (ref 11–307)
FOLATE SERPL-MCNC: 19.8 NG/ML
IRON SATN MFR SERPL: 51 % (ref 15–50)
IRON SERPL-MCNC: 130 UG/DL (ref 50–212)
LDH SERPL-CCNC: 137 U/L (ref 140–271)
TIBC SERPL-MCNC: 254 UG/DL (ref 250–450)
UIBC SERPL-MCNC: 124 UG/DL (ref 155–355)
VIT B12 SERPL-MCNC: 67 PG/ML (ref 180–914)

## 2024-04-09 PROCEDURE — 82728 ASSAY OF FERRITIN: CPT

## 2024-04-09 PROCEDURE — 86300 IMMUNOASSAY TUMOR CA 15-3: CPT

## 2024-04-09 PROCEDURE — 82746 ASSAY OF FOLIC ACID SERUM: CPT

## 2024-04-09 PROCEDURE — 85652 RBC SED RATE AUTOMATED: CPT

## 2024-04-09 PROCEDURE — 86038 ANTINUCLEAR ANTIBODIES: CPT

## 2024-04-09 PROCEDURE — 83550 IRON BINDING TEST: CPT

## 2024-04-09 PROCEDURE — 36415 COLL VENOUS BLD VENIPUNCTURE: CPT

## 2024-04-09 PROCEDURE — 83540 ASSAY OF IRON: CPT

## 2024-04-09 PROCEDURE — 82607 VITAMIN B-12: CPT

## 2024-04-09 PROCEDURE — 83615 LACTATE (LD) (LDH) ENZYME: CPT

## 2024-04-10 ENCOUNTER — TELEPHONE (OUTPATIENT)
Dept: HEMATOLOGY ONCOLOGY | Facility: CLINIC | Age: 64
End: 2024-04-10

## 2024-04-10 ENCOUNTER — OFFICE VISIT (OUTPATIENT)
Dept: HEMATOLOGY ONCOLOGY | Facility: CLINIC | Age: 64
End: 2024-04-10
Payer: COMMERCIAL

## 2024-04-10 ENCOUNTER — TELEPHONE (OUTPATIENT)
Dept: INFUSION CENTER | Facility: HOSPITAL | Age: 64
End: 2024-04-10

## 2024-04-10 VITALS
HEART RATE: 58 BPM | SYSTOLIC BLOOD PRESSURE: 92 MMHG | OXYGEN SATURATION: 97 % | DIASTOLIC BLOOD PRESSURE: 64 MMHG | RESPIRATION RATE: 18 BRPM | WEIGHT: 143 LBS | TEMPERATURE: 97.6 F | BODY MASS INDEX: 22.44 KG/M2 | HEIGHT: 67 IN

## 2024-04-10 DIAGNOSIS — D47.2 MONOCLONAL GAMMOPATHY OF UNDETERMINED SIGNIFICANCE: ICD-10-CM

## 2024-04-10 DIAGNOSIS — Z17.0 MALIGNANT NEOPLASM OF RIGHT BREAST IN FEMALE, ESTROGEN RECEPTOR POSITIVE, UNSPECIFIED SITE OF BREAST (HCC): Primary | ICD-10-CM

## 2024-04-10 DIAGNOSIS — E53.8 B12 DEFICIENCY: ICD-10-CM

## 2024-04-10 DIAGNOSIS — E53.8 B12 DEFICIENCY: Primary | ICD-10-CM

## 2024-04-10 DIAGNOSIS — M79.7 FIBROMYALGIA: ICD-10-CM

## 2024-04-10 DIAGNOSIS — C50.911 MALIGNANT NEOPLASM OF RIGHT BREAST IN FEMALE, ESTROGEN RECEPTOR POSITIVE, UNSPECIFIED SITE OF BREAST (HCC): Primary | ICD-10-CM

## 2024-04-10 LAB — CANCER AG27-29 SERPL-ACNC: 23.4 U/ML (ref 0–38.6)

## 2024-04-10 PROCEDURE — G2211 COMPLEX E/M VISIT ADD ON: HCPCS | Performed by: INTERNAL MEDICINE

## 2024-04-10 PROCEDURE — 99214 OFFICE O/P EST MOD 30 MIN: CPT | Performed by: INTERNAL MEDICINE

## 2024-04-10 RX ORDER — CYANOCOBALAMIN 1000 UG/ML
1000 INJECTION, SOLUTION INTRAMUSCULAR; SUBCUTANEOUS ONCE
OUTPATIENT
Start: 2024-06-05

## 2024-04-10 RX ORDER — CYANOCOBALAMIN 1000 UG/ML
1000 INJECTION, SOLUTION INTRAMUSCULAR; SUBCUTANEOUS ONCE
OUTPATIENT
Start: 2024-04-17

## 2024-04-10 NOTE — TELEPHONE ENCOUNTER
Called and spoke directly with patient at this time to schedule weekly Vitamin b 12 injections x 4. Pt is scheduled to start on 4/17 at 12 pm at [facility]. Pt scheduled all 4 weekly treatments and one monthly one. She will schedule the future monthly appts at time of her last scheduled appt (6/5/24). Pt verbalized understanding and provided with infusion phone number for any future questions or concerns.

## 2024-04-11 NOTE — PROGRESS NOTES
Hematology/Oncology Outpatient Follow- up Note  Mariola Dos Santos 63 y.o. female MRN: @ Encounter: [id number]        Date:  4/11/2024        Assessment / Plan:    1 vitamin B12 deficiency  2 history of stage I breast cancer presently stable  3 MGUS IgA kappa  Plan patient will receive B12 she will get 1000 mcg weekly x 4 followed by monthly B12.  She will get a SPEP serum free light chain serum immunoglobulins CBC CMP.  She will get a follow-up B12 folate level.  She will return for follow-up in 3 months.        HPI: 63-year-old female followed with a history of breast cancer T1b N0 M0 ER/WA positive H ER 21+ negative diagnosed 2017 treated with lumpectomy radiation therapy and hormonal therapy with tamoxifen now discontinued.  She is feeling fairly well regarding that.  Also has a history of mild neutropenia.  She has a normal folate level and low B12 level.  Her ferritin level is normal at 101 and leukemia lymphoma flow cytometry is negative.  PERRI is negative.  CT scan chest abdomen pelvis showed no visceral metastatic disease and an external left iliac lymph node become smaller and not measurable.  Her white count is normal platelets are normal of note her MCV is up to 112.  Her CA 27.29 is in normal range.  Her CMP is also negative.  In 12/13/2023 she had monoclonal gammopathy identifies an IgA kappa similar to priors.    Interval History: Note from 12/13/2023:  PRIMARY HEMATOLOGIC/ONCOLOGIC DIAGNOSIS:  Invasive carcinoma of the right breast Stage IA- pT1b, pN0(sn), G1. ER/PgR 90-95% Positive; HER2 by IHC 1+ Negative. Dx 2017  Neutropenia  ASSESSMENT/PLAN:  1.Invasive carcinoma of the right breast Stage IA- pT1b, pN0(sn), G1. ER/PgR 90-95% Positive; HER2 by IHC 1+ Negative. Dx 2017. On Surveillance. Mammogram 11/29/23--no mammographic evidence of malignancy. Mammogram in 1 year ordered.   2. Neutropenia. Labs ordered for further work-up. Has been on Cymbalta and Neurontin for years. No recurrent infections.    3. MGUS. Labs pending. No anemia/hypercalcemia/bone pain/renal dysfunction.  F/u in 4m. Labs prior.     Cancer Staging:  Cancer Staging   Malignant neoplasm of right female breast (HCC)  Staging form: Breast, AJCC 8th Edition  - Clinical stage from 5/24/2017: Stage IA (cT1b, cN0, cM0, G1, ER+, PA+, HER2-) - Signed by Opal Frazier PA-C on 5/1/2022  Stage prefix: Initial diagnosis  Method of lymph node assessment: Axillary lymph node dissection  Nuclear grade: G2  Mitotic count score: Score 1  Histologic grading system: 3 grade system  HER2-IHC interpretation: Negative  HER2-IHC value: Score 1+      Molecular Testing:     Previous Hematologic/ Oncologic History:    Oncology History Overview Note   Returns for follow up post right breast radiation completed on 9/5/17. The pt was last seen in radiation on 10/15/20.     10/15/20 - [facility]  Pt to continue on Tamoxifen  Breast exam was benign    Upcoming:       Intraductal carcinoma of right breast (Resolved)   4/20/2017 Biopsy    Right breast biopsy:  Invasive breast carcinoma/invasive ductal carcinoma.  Grade 1.  ER and PA 90-95% positive, Her2 negative     5/24/2017 Initial Diagnosis    Intraductal carcinoma of right breast      Surgery    Segmental mastectomy with sentinel node biopsy  Invasive mammary.  Grade 1       5/24/2017 -  Cancer Staged    Pathologic  Stage IA - pT1b, pN0 (sn), G1     7/20/2017 - 9/5/2017 Radiation    dose of 5040 cGy in 20 daily fractions to the right breast with an additional 1000 cGy to the lumpectomy cavity.     7/2017 -  Hormone Therapy    Tamoxifen     Malignant neoplasm of right female breast (HCC)   5/24/2017 -  Cancer Staged    Staging form: Breast, AJCC 8th Edition  - Clinical stage from 5/24/2017: Stage IA (cT1b, cN0, cM0, G1, ER+, PA+, HER2-) - Signed by Opal Frazier PA-C on 5/1/2022  Stage prefix: Initial diagnosis  Method of lymph node assessment: Axillary lymph node dissection  Nuclear grade: G2  Mitotic count  score: Score 1  Histologic grading system: 3 grade system  HER2-IHC interpretation: Negative  HER2-IHC value: Score 1+       5/7/2019 Initial Diagnosis    Malignant neoplasm of right female breast (HCC)         Current Hematologic/ Oncologic Treatment:       Cycle 1         Test Results:    Imaging: CT head wo contrast    Result Date: 3/30/2024  Narrative: CT BRAIN - WITHOUT CONTRAST INDICATION:   S09.90XA: Unspecified injury of head, initial encounter. COMPARISON: Prior brain MRI dated 6/8/2020. TECHNIQUE:  CT examination of the brain was performed.  Multiplanar 2D reformatted images were created from the source data. Radiation dose length product (DLP) for this visit:  827 mGy-cm .  This examination, like all CT scans performed in the [facility], was performed utilizing techniques to minimize radiation dose exposure, including the use of iterative reconstruction and automated exposure control. IMAGE QUALITY:  Diagnostic. FINDINGS: PARENCHYMA:  No intracranial decreased attenuation is noted in periventricular and subcortical white matter demonstrating an appearance that is statistically most likely to represent mild microangiopathic change. No CT signs of acute infarction.  No intracranial mass, mass effect or midline shift.  No acute parenchymal hemorrhage.  ass, mass effect or midline shift. No CT signs of acute infarction.  No acute parenchymal hemorrhage. VENTRICLES AND EXTRA-AXIAL SPACES:  Normal for the patient's age. VISUALIZED ORBITS: Normal visualized orbits. PARANASAL SINUSES: Normal visualized paranasal sinuses. CALVARIUM AND EXTRACRANIAL SOFT TISSUES:  Normal.     Impression: No acute intracranial abnormality.  Chronic microangiopathic changes. Workstation performed: [id number]             Labs:   Lab Results   Component Value Date    WBC 5.33 03/12/2024    HGB 13.0 03/12/2024    HCT 37.9 03/12/2024     (H) 03/12/2024     03/12/2024     Lab Results   Component Value Date     " 02/12/2014    K 3.9 03/12/2024     03/12/2024    CO2 30 03/12/2024    ANIONGAP 6.6 (L) 02/12/2014    BUN 16 03/12/2024    CREATININE 0.74 03/12/2024    GLUCOSE 82 02/12/2014    GLUF 101 (H) 03/12/2024    CALCIUM 8.5 03/12/2024    CORRECTEDCA 9.2 12/05/2022    AST 13 03/12/2024    ALT 6 (L) 03/12/2024    ALKPHOS 51 03/12/2024    PROT 7.5 02/12/2014    BILITOT 0.4 02/12/2014    EGFR 85 03/12/2024         Lab Results   Component Value Date    SPEP See Comment 12/13/2023       No results found for: \"PSA\"    No results found for: \"CEA\"    No results found for: \"\"    No results found for: \"AFP\"    Lab Results   Component Value Date    IRON 130 04/09/2024    TIBC 254 04/09/2024    FERRITIN 101 04/09/2024       Lab Results   Component Value Date    OODXFMQU01 67 (L) 04/09/2024         ROS: Review of Systems   Constitutional: Negative.    HENT: Negative.     Eyes: Negative.    Respiratory: Negative.     Cardiovascular: Negative.    Gastrointestinal: Negative.    Endocrine: Negative.    Genitourinary: Negative.    Musculoskeletal: Negative.    Skin: Negative.    Allergic/Immunologic: Negative.    Neurological: Negative.    Hematological: Negative.          Current Medications: Reviewed  Allergies: Reviewed  PMH/FH/SH:  Reviewed      Physical Exam:    Body surface area is 1.75 meters squared.    Wt Readings from Last 3 Encounters:   04/10/24 64.9 kg (143 lb)   03/12/24 63.7 kg (140 lb 6.4 oz)   12/28/23 64.4 kg (142 lb)        Temp Readings from Last 3 Encounters:   04/10/24 97.6 °F (36.4 °C) (Temporal)   03/12/24 98 °F (36.7 °C) (Tympanic)   12/13/23 98.4 °F (36.9 °C) (Temporal)        BP Readings from Last 3 Encounters:   04/10/24 92/64   03/12/24 90/64   12/28/23 97/61         Pulse Readings from Last 3 Encounters:   04/10/24 58   03/12/24 87   12/28/23 84     @LASTSAO2(3)@      Physical Exam  Constitutional:       Appearance: Normal appearance. She is normal weight.   HENT:      Head: Normocephalic and " atraumatic.   Eyes:      Extraocular Movements: Extraocular movements intact.      Conjunctiva/sclera: Conjunctivae normal.      Pupils: Pupils are equal, round, and reactive to light.   Cardiovascular:      Rate and Rhythm: Normal rate and regular rhythm.   Pulmonary:      Effort: Pulmonary effort is normal.      Breath sounds: Normal breath sounds.   Abdominal:      General: Abdomen is flat. Bowel sounds are normal.      Palpations: Abdomen is soft.   Musculoskeletal:         General: Normal range of motion.      Cervical back: Normal range of motion and neck supple.   Skin:     General: Skin is warm and dry.   Neurological:      General: No focal deficit present.      Mental Status: She is alert and oriented to person, place, and time. Mental status is at baseline.     No definite masses appreciated in the breasts.  No axillary adenopathy.      Goals and Barriers:  Current Goal: Prolong Survival from Cancer.   Barriers: None.      Patient's Capacity to Self Care:  Patient is able to self care.    Code Status: [unfilled]  Advance Directive and Living Will:      Power of :

## 2024-04-15 ENCOUNTER — TELEPHONE (OUTPATIENT)
Dept: NEUROLOGY | Facility: CLINIC | Age: 64
End: 2024-04-15

## 2024-04-15 NOTE — TELEPHONE ENCOUNTER
LVM for patient to call me back to reschedule May 1 appointment as provider will not be in the office.

## 2024-04-16 LAB — SCAN RESULT: NORMAL

## 2024-04-17 ENCOUNTER — HOSPITAL ENCOUNTER (OUTPATIENT)
Dept: INFUSION CENTER | Facility: CLINIC | Age: 64
Discharge: HOME | End: 2024-04-17
Payer: COMMERCIAL

## 2024-04-17 DIAGNOSIS — E53.8 B12 DEFICIENCY: Primary | ICD-10-CM

## 2024-04-17 PROCEDURE — 96372 THER/PROPH/DIAG INJ SC/IM: CPT

## 2024-04-17 RX ORDER — CYANOCOBALAMIN 1000 UG/ML
1000 INJECTION, SOLUTION INTRAMUSCULAR; SUBCUTANEOUS ONCE
Status: CANCELLED | OUTPATIENT
Start: 2024-04-24

## 2024-04-17 RX ORDER — CYANOCOBALAMIN 1000 UG/ML
1000 INJECTION, SOLUTION INTRAMUSCULAR; SUBCUTANEOUS ONCE
Status: COMPLETED | OUTPATIENT
Start: 2024-04-17 | End: 2024-04-17

## 2024-04-17 RX ADMIN — CYANOCOBALAMIN 1000 MCG: 1000 INJECTION, SOLUTION INTRAMUSCULAR; SUBCUTANEOUS at 11:42

## 2024-04-17 NOTE — PROGRESS NOTES
Pt into clinic for IM B12 injection. Pt offers no complaints. Tolerated injection in R deltoid. Pt aware of next appointment on 4/24/24 at 4pm. AVS declined.

## 2024-04-22 DIAGNOSIS — E53.8 B12 DEFICIENCY: Primary | ICD-10-CM

## 2024-04-24 ENCOUNTER — HOSPITAL ENCOUNTER (OUTPATIENT)
Dept: INFUSION CENTER | Facility: CLINIC | Age: 64
Discharge: HOME | End: 2024-04-24
Payer: COMMERCIAL

## 2024-04-24 DIAGNOSIS — E53.8 B12 DEFICIENCY: Primary | ICD-10-CM

## 2024-04-24 PROCEDURE — 96372 THER/PROPH/DIAG INJ SC/IM: CPT

## 2024-04-24 RX ORDER — CYANOCOBALAMIN 1000 UG/ML
1000 INJECTION, SOLUTION INTRAMUSCULAR; SUBCUTANEOUS ONCE
Status: CANCELLED | OUTPATIENT
Start: 2024-05-01

## 2024-04-24 RX ORDER — CYANOCOBALAMIN 1000 UG/ML
1000 INJECTION, SOLUTION INTRAMUSCULAR; SUBCUTANEOUS ONCE
Status: COMPLETED | OUTPATIENT
Start: 2024-04-24 | End: 2024-04-24

## 2024-04-24 RX ADMIN — CYANOCOBALAMIN 1000 MCG: 1000 INJECTION, SOLUTION INTRAMUSCULAR; SUBCUTANEOUS at 16:00

## 2024-04-24 NOTE — PROGRESS NOTES
Pt to clinic for B12 injection. Pt offers no complaints. Tolerated injection in R deltoid without complications. AVS declined. Pt aware of next appointment on 5/1/24 at 1430.

## 2024-05-01 ENCOUNTER — HOSPITAL ENCOUNTER (OUTPATIENT)
Dept: INFUSION CENTER | Facility: CLINIC | Age: 64
Discharge: HOME | End: 2024-05-01
Payer: COMMERCIAL

## 2024-05-01 DIAGNOSIS — E53.8 B12 DEFICIENCY: Primary | ICD-10-CM

## 2024-05-01 PROCEDURE — 96372 THER/PROPH/DIAG INJ SC/IM: CPT

## 2024-05-01 RX ORDER — CYANOCOBALAMIN 1000 UG/ML
1000 INJECTION, SOLUTION INTRAMUSCULAR; SUBCUTANEOUS ONCE
Status: COMPLETED | OUTPATIENT
Start: 2024-05-01 | End: 2024-05-01

## 2024-05-01 RX ORDER — CYANOCOBALAMIN 1000 UG/ML
1000 INJECTION, SOLUTION INTRAMUSCULAR; SUBCUTANEOUS ONCE
Status: CANCELLED | OUTPATIENT
Start: 2024-05-08

## 2024-05-01 RX ADMIN — CYANOCOBALAMIN 1000 MCG: 1000 INJECTION, SOLUTION INTRAMUSCULAR; SUBCUTANEOUS at 14:31

## 2024-05-01 NOTE — PROGRESS NOTES
Patient arrives to infusion center for B-12 injection. Patient offers no complaints today. Injection given in right deltoid without incident. AVS declined.     Next appointment: 5/8/24 @ 0062

## 2024-05-06 ENCOUNTER — TELEPHONE (OUTPATIENT)
Age: 64
End: 2024-05-06

## 2024-05-06 DIAGNOSIS — Z12.11 COLON CANCER SCREENING: Primary | ICD-10-CM

## 2024-05-06 LAB — COLOGUARD RESULT REPORTABLE: NORMAL

## 2024-05-06 NOTE — TELEPHONE ENCOUNTER
----- Message from Bryanna Zavala PA-C sent at 5/6/2024  4:32 PM EDT -----  There was insufficient stool DNA to produce a valid result, so they are recommending repeat Cologuard.  I have placed an order for another Cologuard kit and encourage the patient to resubmit a sample.

## 2024-05-08 ENCOUNTER — HOSPITAL ENCOUNTER (OUTPATIENT)
Dept: INFUSION CENTER | Facility: CLINIC | Age: 64
Discharge: HOME | End: 2024-05-08
Payer: COMMERCIAL

## 2024-05-08 DIAGNOSIS — E53.8 B12 DEFICIENCY: Primary | ICD-10-CM

## 2024-05-08 RX ORDER — CYANOCOBALAMIN 1000 UG/ML
1000 INJECTION, SOLUTION INTRAMUSCULAR; SUBCUTANEOUS ONCE
Status: CANCELLED | OUTPATIENT
Start: 2024-05-08

## 2024-05-08 RX ORDER — CYANOCOBALAMIN 1000 UG/ML
1000 INJECTION, SOLUTION INTRAMUSCULAR; SUBCUTANEOUS ONCE
Status: COMPLETED | OUTPATIENT
Start: 2024-05-08 | End: 2024-05-08

## 2024-05-08 RX ADMIN — CYANOCOBALAMIN 1000 MCG: 1000 INJECTION, SOLUTION INTRAMUSCULAR; SUBCUTANEOUS at 13:18

## 2024-05-08 NOTE — PROGRESS NOTES
Pt presents for vitamin B12 IM injection offering no complaints. Pt tolerated treatment without incident, injection administered in the right deltoid. AVS declined. Next appointment on 6/5/24 at 12pm.

## 2024-05-16 ENCOUNTER — OFFICE VISIT (OUTPATIENT)
Dept: URGENT CARE | Facility: CLINIC | Age: 64
End: 2024-05-16
Payer: COMMERCIAL

## 2024-05-16 VITALS
TEMPERATURE: 98.4 F | DIASTOLIC BLOOD PRESSURE: 66 MMHG | OXYGEN SATURATION: 99 % | HEART RATE: 84 BPM | SYSTOLIC BLOOD PRESSURE: 90 MMHG | RESPIRATION RATE: 18 BRPM

## 2024-05-16 DIAGNOSIS — U07.1 COVID: Primary | ICD-10-CM

## 2024-05-16 LAB
SARS-COV-2 AG UPPER RESP QL IA: POSITIVE
VALID CONTROL: ABNORMAL

## 2024-05-16 PROCEDURE — 87811 SARS-COV-2 COVID19 W/OPTIC: CPT | Performed by: PHYSICIAN ASSISTANT

## 2024-05-16 PROCEDURE — 99213 OFFICE O/P EST LOW 20 MIN: CPT | Performed by: PHYSICIAN ASSISTANT

## 2024-05-16 PROCEDURE — S9088 SERVICES PROVIDED IN URGENT: HCPCS | Performed by: PHYSICIAN ASSISTANT

## 2024-05-16 NOTE — PROGRESS NOTES
[facility]        NAME: Mariola Dos Santos is a 63 y.o. female  : 1960    MRN: [id number]  DATE: May 16, 2024  TIME: 1:15 PM      Assessment and Plan     COVID [U07.1]  1. COVID  Poct Covid 19 Rapid Antigen Test        Note:   Rapid covid positive in office   Offered patient Paxlovid due to co-morbidities, but patient refused and will continue OTC medications for symptoms.     Patient Instructions   There are no Patient Instructions on file for this visit.     Follow up with PCP in 3-5 days.  Go to ER if symptoms worsen.    Chief Complaint     Chief Complaint   Patient presents with    Cold Like Symptoms     Pt c/o chills sore throat cough loss of appetite congestion ear pain and taste funny that started 3 days ago and has been taking mucinex         History of Present Illness     Patient presents with sore throat, chills, cough, decreased appetite, nasal congestion, and distorted taste x 3 days. She has been taking Mucinex with mild relief.         Review of Systems     Review of Systems   Constitutional:  Positive for appetite change and chills. Negative for fatigue and fever.   HENT:  Positive for congestion and sore throat. Negative for ear pain, postnasal drip, rhinorrhea, sinus pressure and sinus pain.    Eyes:  Negative for pain and visual disturbance.   Respiratory:  Positive for cough. Negative for chest tightness and shortness of breath.    Cardiovascular:  Negative for chest pain and palpitations.   Gastrointestinal:  Negative for abdominal pain, diarrhea, nausea and vomiting.   Genitourinary:  Negative for dysuria and hematuria.   Musculoskeletal:  Negative for arthralgias, back pain and myalgias.   Skin:  Negative for rash.   Neurological:  Negative for dizziness, seizures, syncope, numbness and headaches.   All other systems reviewed and are negative.        Current Medications       Current Outpatient Medications:     DULoxetine (CYMBALTA) 30 mg delayed release capsule, Take 1  capsule (30 mg total) by mouth every morning, Disp: 90 capsule, Rfl: 1    DULoxetine (CYMBALTA) 60 mg delayed release capsule, Take 1 capsule (60 mg total) by mouth daily at bedtime, Disp: 90 capsule, Rfl: 1    gabapentin (NEURONTIN) 100 mg capsule, Take 100 mg by mouth if needed, Disp: , Rfl:     levothyroxine 100 mcg tablet, Take 1 tablet (100 mcg total) by mouth daily in the early morning, Disp: 90 tablet, Rfl: 1    albuterol (PROVENTIL HFA,VENTOLIN HFA) 90 mcg/act inhaler, Inhale 2 puffs every 6 (six) hours as needed for wheezing or shortness of breath (Patient not taking: Reported on 9/21/2023), Disp: 6.7 g, Rfl: 0    calcium carbonate-vitamin D 250 mg-3.125 mcg per tablet, Take 1 tablet by mouth daily (Patient not taking: Reported on 3/12/2024), Disp: 60 tablet, Rfl: 3    Current Allergies     Allergies as of 05/16/2024    (No Known Allergies)              The following portions of the patient's history were reviewed and updated as appropriate: allergies, current medications, past family history, past medical history, past social history, past surgical history, and problem list.     Past Medical History:   Diagnosis Date    Anxiety     Breast cancer (HCC) 2017    right    Cancer (HCC)     right breast  dx 04/2017    Cervical disc disorder of cervicothoracic region     Cervical radiculopathy     Cervicalgia     Cognitive impairment, mild, so stated     Depression     Major,recurrent    Diplopia     last assessed 12/23/13    Disc degeneration, lumbar     Disease of thyroid gland     hx of goiter, surgery done 1978    Edema of foot     Esophageal reflux     Fatigue     Fibromyalgia     Hemorrhage, anal or rectal     History of radiation therapy     Hx of radiation therapy     Treatments: Course C1, Plan ID Energy Fractions Dose per Fraction (cGy)total Dose delivered(cGy)elapsed days. R Breast 6x 28/28 180 5,040 39, R breast e 12E 5/5 200 1,000 7 Treatment dates: 7/20/17-09/05/17    Hx of radiation therapy     33  treatments    Hypotension     Hypothyroidism     Insomnia     Lab test positive for detection of COVID-19 virus 10/14/2022    Memory loss     Monoclonal gammopathy of undetermined significance     Myalgia     last assessed 07/08/16    Myositis     last assessed 07/08/16    Neck stiffness     Neuralgia     Neuritis     Pain syndrome, chronic     Palpitations     Paresthesia     Peripheral neuropathy     Polyneuropathy     Pseudodementia     Radiculopathy     12/23/13    Restless leg syndrome     Sacroiliitis (HCC)     SLE (systemic lupus erythematosus) (HCC)     last assessed 12/23/13    Tachycardia     Viral warts     Vitamin B deficiency     Vitamin D deficiency     Vitamin D deficiency     Wears glasses        Past Surgical History:   Procedure Laterality Date    BLADDER SURGERY      bladder prolapse    BREAST BIOPSY Right     BREAST SURGERY Right     breast bx    COLONOSCOPY      age 50 normal    HYSTERECTOMY  2009    vaginal hysterectomy, rectocele repair    [place] MASTECTOMY PARTIAL Right 5/24/2017    Procedure:  (NUCLEAR MED. 8 AM., NEEDLE LOCAL TO FOLLOW 8:30 AM.) SEGMENTAL MASTECTOMY BREAST NEEDLE PLACEMENT,  SENTINAL LYMPH NODE BIOPSY;  Surgeon: Mckay Lockett MD;  Location: [facility] OR;  Service: General    REPAIR RECTOCELE      THYROID LOBECTOMY Left 1978    TONSILLECTOMY      [place] BREAST NEEDLE LOC RIGHT Right 5/24/2017    [place] GUIDED BREAST BIOPSY RIGHT COMPLETE Right 4/20/2017    US GUIDED THYROID BIOPSY  4/25/2019       Family History   Problem Relation Age of Onset    Heart disease Mother         CHF    Heart failure Mother     Cirrhosis Father     Esophageal cancer Brother 65    Lung cancer Brother     No Known Problems Brother     No Known Problems Brother     No Known Problems Brother          Medications have been verified.        Objective     BP 90/66   Pulse 84   Temp 98.4 °F (36.9 °C) (Tympanic)   Resp 18   SpO2 99%   No LMP recorded. Patient has had a hysterectomy.         Physical Exam      Physical Exam  Vitals and nursing note reviewed.   Constitutional:       Appearance: Normal appearance. She is normal weight. She is not ill-appearing.   HENT:      Head: Normocephalic and atraumatic.      Nose: Nose normal.      Mouth/Throat:      Mouth: Mucous membranes are moist.      Pharynx: Oropharynx is clear.   Cardiovascular:      Rate and Rhythm: Normal rate and regular rhythm.      Heart sounds: Normal heart sounds.   Pulmonary:      Effort: Pulmonary effort is normal.      Breath sounds: Normal breath sounds.   Skin:     General: Skin is warm and dry.   Neurological:      General: No focal deficit present.      Mental Status: She is alert and oriented to person, place, and time.   Psychiatric:         Mood and Affect: Mood normal.         Behavior: Behavior normal.

## 2024-05-17 ENCOUNTER — HOSPITAL ENCOUNTER (OUTPATIENT)
Dept: RADIOLOGY | Facility: HOSPITAL | Age: 64
End: 2024-05-17
Payer: COMMERCIAL

## 2024-05-17 DIAGNOSIS — M25.562 LEFT KNEE PAIN, UNSPECIFIED CHRONICITY: ICD-10-CM

## 2024-05-17 PROCEDURE — 73564 X-RAY EXAM KNEE 4 OR MORE: CPT

## 2024-05-23 ENCOUNTER — OFFICE VISIT (OUTPATIENT)
Dept: NEUROLOGY | Facility: CLINIC | Age: 64
End: 2024-05-23
Payer: COMMERCIAL

## 2024-05-23 VITALS
BODY MASS INDEX: 21.74 KG/M2 | DIASTOLIC BLOOD PRESSURE: 80 MMHG | HEIGHT: 68 IN | SYSTOLIC BLOOD PRESSURE: 130 MMHG | HEART RATE: 79 BPM

## 2024-05-23 DIAGNOSIS — D47.2 MONOCLONAL GAMMOPATHY OF UNDETERMINED SIGNIFICANCE: ICD-10-CM

## 2024-05-23 DIAGNOSIS — G89.4 PAIN SYNDROME, CHRONIC: ICD-10-CM

## 2024-05-23 DIAGNOSIS — M54.12 CERVICAL RADICULOPATHY AT C7: Primary | ICD-10-CM

## 2024-05-23 DIAGNOSIS — M79.7 FIBROMYALGIA: ICD-10-CM

## 2024-05-23 DIAGNOSIS — G25.81 RESTLESS LEG SYNDROME: ICD-10-CM

## 2024-05-23 PROCEDURE — 99214 OFFICE O/P EST MOD 30 MIN: CPT | Performed by: PSYCHIATRY & NEUROLOGY

## 2024-05-23 RX ORDER — DULOXETIN HYDROCHLORIDE 30 MG/1
30 CAPSULE, DELAYED RELEASE ORAL EVERY MORNING
Qty: 90 CAPSULE | Refills: 1 | Status: SHIPPED | OUTPATIENT
Start: 2024-05-23

## 2024-05-23 RX ORDER — DULOXETIN HYDROCHLORIDE 60 MG/1
60 CAPSULE, DELAYED RELEASE ORAL
Qty: 90 CAPSULE | Refills: 1 | Status: SHIPPED | OUTPATIENT
Start: 2024-05-23
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
## 2024-05-28 ENCOUNTER — APPOINTMENT (OUTPATIENT)
Age: 64
End: 2024-05-28
Payer: COMMERCIAL

## 2024-05-28 ENCOUNTER — OFFICE VISIT (OUTPATIENT)
Age: 64
End: 2024-05-28
Payer: COMMERCIAL

## 2024-05-28 VITALS
OXYGEN SATURATION: 94 % | SYSTOLIC BLOOD PRESSURE: 110 MMHG | HEART RATE: 91 BPM | DIASTOLIC BLOOD PRESSURE: 70 MMHG | RESPIRATION RATE: 16 BRPM | BODY MASS INDEX: 21.13 KG/M2 | TEMPERATURE: 98.4 F | HEIGHT: 68 IN | WEIGHT: 139.4 LBS

## 2024-05-28 DIAGNOSIS — M25.562 ACUTE PAIN OF LEFT KNEE: Primary | ICD-10-CM

## 2024-05-28 DIAGNOSIS — M25.462 EFFUSION OF LEFT KNEE: ICD-10-CM

## 2024-05-28 LAB — URATE SERPL-MCNC: 2 MG/DL (ref 2–7.5)

## 2024-05-28 PROCEDURE — 99213 OFFICE O/P EST LOW 20 MIN: CPT

## 2024-05-28 PROCEDURE — 36415 COLL VENOUS BLD VENIPUNCTURE: CPT

## 2024-05-28 PROCEDURE — 84550 ASSAY OF BLOOD/URIC ACID: CPT

## 2024-05-28 PROCEDURE — G2211 COMPLEX E/M VISIT ADD ON: HCPCS

## 2024-05-28 RX ORDER — IBUPROFEN 200 MG
400 TABLET ORAL EVERY 6 HOURS PRN
Qty: 30 TABLET | Refills: 3 | Status: SHIPPED | OUTPATIENT
Start: 2024-05-28

## 2024-05-28 NOTE — PROGRESS NOTES
Ambulatory Visit  Name: Mariola Dos Santos      : 1960      MRN: [id number]  Encounter Provider: Bryanna Zavala PA-C  Encounter Date: 2024   Encounter department: [facility] [place]    Assessment & Plan   1. Acute pain of left knee  -     ibuprofen (MOTRIN) 200 mg tablet; Take 2 tablets (400 mg total) by mouth every 6 (six) hours as needed for mild pain  Unknown etiology.  Low suspicion for septic arthritis given no fever, erythema, or warmth of the knee.  Recommend the patient take 400 mg of ibuprofen every 6 hours as needed for pain and inflammation.  The patient may use ice on her knee for up to 10 minutes at a time over a thin towel to reduce pain and inflammation.  Recommend the patient follow-up with orthopedics and physical therapy regarding the pain and the small effusion that was found on x-ray of the knee.  Will check uric acid level for any evidence of gout.  Instructed the patient to go to the ER if she develops fever, chills, worsening pain or swelling.  2. Effusion of left knee  -     Ambulatory Referral to Physical Therapy; Future  -     Uric acid; Future  -     Ambulatory Referral to Orthopedic Surgery; Future     History of Present Illness   {Disappearing Hyperlinks I Encounters * My Last Note * Since Last Visit * History :56090}  Mariola is a 63-year-old female presenting to the office with complaint of medial left knee pain for the past week.  She fell about 1 month ago.  She was walking outside and it was dark out and she tripped over a rock and landed on her left knee, but her knee did not bother her initially.  She is having pain with walking and bending her knee.  She is walking into the office with a cane for support today.  She denies any fever or chills or recent infection.  She says that she has had some issues and pain with that knee prior to her fall 1 month ago, but in the past the pain was more behind the knee.  Dr. Cody, her provider for disability,  "ordered an x-ray of the knee, which did not show any fracture or bony or degenerative changes, but noted a small effusion.  She denies hip pain, back pain.            Review of Systems   Constitutional:  Negative for chills and fever.   HENT:  Negative for congestion and sore throat.    Eyes:  Negative for pain and visual disturbance.   Respiratory:  Negative for cough and shortness of breath.    Cardiovascular:  Negative for chest pain and palpitations.   Gastrointestinal:  Negative for abdominal pain, constipation and diarrhea.   Genitourinary:  Negative for dysuria and hematuria.   Musculoskeletal:  Positive for joint swelling (Left knee) and myalgias (Left knee). Negative for arthralgias.   Skin:  Negative for color change and rash.   Neurological:  Negative for dizziness and headaches.       Objective   {Disappearing Hyperlinks   Review Vitals * Enter New Vitals * Results Review * Labs * Imaging * Cardiology * Procedures * Lung Cancer Screening :47410}  /70 (BP Location: Left arm, Patient Position: Sitting, Cuff Size: Standard)   Pulse 91   Temp 98.4 °F (36.9 °C) (Tympanic)   Resp 16   Ht 5' 8\" (1.727 m)   Wt 63.2 kg (139 lb 6.4 oz)   SpO2 94%   BMI 21.20 kg/m²     Physical Exam  Vitals and nursing note reviewed.   Constitutional:       General: She is not in acute distress.     Appearance: She is well-developed.   HENT:      Head: Normocephalic and atraumatic.      Right Ear: Tympanic membrane normal.      Left Ear: Tympanic membrane normal.      Nose: Nose normal.      Mouth/Throat:      Mouth: Mucous membranes are moist.      Pharynx: Oropharynx is clear. No oropharyngeal exudate.   Eyes:      Extraocular Movements: Extraocular movements intact.      Conjunctiva/sclera: Conjunctivae normal.   Cardiovascular:      Rate and Rhythm: Normal rate and regular rhythm.      Heart sounds: No murmur heard.  Pulmonary:      Effort: Pulmonary effort is normal. No respiratory distress.      Breath sounds: " Normal breath sounds. No wheezing, rhonchi or rales.   Abdominal:      Palpations: Abdomen is soft.      Tenderness: There is no abdominal tenderness.   Musculoskeletal:         General: No swelling.      Cervical back: Neck supple.      Right knee: Normal. No swelling, deformity, effusion or erythema. Normal range of motion. No tenderness.      Left knee: Swelling and effusion present. No deformity or erythema. Decreased range of motion. Tenderness present over the medial joint line.      Comments: Small effusion of the left knee.  No erythema, no warmth.   Tenderness to palpation of the medial side of the left knee.  Decreased range of motion of the left knee secondary to pain.   Skin:     General: Skin is warm and dry.      Capillary Refill: Capillary refill takes less than 2 seconds.      Findings: No bruising, erythema, lesion or rash.   Neurological:      General: No focal deficit present.      Mental Status: She is alert.   Psychiatric:         Mood and Affect: Mood normal.       Administrative Statements {Disappearing Hyperlinks I  [id number]}

## 2024-05-30 ENCOUNTER — TELEPHONE (OUTPATIENT)
Age: 64
End: 2024-05-30

## 2024-05-30 NOTE — TELEPHONE ENCOUNTER
----- Message from Bryanna Zavala PA-C sent at 5/29/2024  5:36 PM EDT -----  Uric acid level is normal.  I have placed an order for another lab test to look at white blood cell counts.  Elevation in white cell counts may indicate infection.  I would recommend the patient have this lab test done.  Follow-up with orthopedics as discussed and go to the ER if pain or swelling of the knee worsens or if the patient develops fever or chills.

## 2024-06-04 ENCOUNTER — OFFICE VISIT (OUTPATIENT)
Dept: OBGYN CLINIC | Facility: CLINIC | Age: 64
End: 2024-06-04
Payer: COMMERCIAL

## 2024-06-04 VITALS
HEART RATE: 96 BPM | TEMPERATURE: 98.4 F | BODY MASS INDEX: 21.16 KG/M2 | OXYGEN SATURATION: 99 % | HEIGHT: 68 IN | SYSTOLIC BLOOD PRESSURE: 105 MMHG | DIASTOLIC BLOOD PRESSURE: 78 MMHG | RESPIRATION RATE: 18 BRPM | WEIGHT: 139.6 LBS

## 2024-06-04 DIAGNOSIS — M17.12 PRIMARY OSTEOARTHRITIS OF LEFT KNEE: Primary | ICD-10-CM

## 2024-06-04 PROCEDURE — 99214 OFFICE O/P EST MOD 30 MIN: CPT | Performed by: FAMILY MEDICINE

## 2024-06-04 RX ORDER — NAPROXEN 500 MG/1
500 TABLET ORAL 2 TIMES DAILY WITH MEALS
Qty: 60 TABLET | Refills: 0 | Status: SHIPPED | OUTPATIENT
Start: 2024-06-04

## 2024-06-04 NOTE — PROGRESS NOTES
Subjective:    Chief Complaint   Patient presents with    Left Knee - Swelling, Clicking, Pain, Numbness, Tingling     Burning sensation at times.  Stiffness and limited ROM       Mariola Dos Santos is a 63 y.o. female complains of left knee pain. Onset of the symptoms was several weeks ago.  Mechanism of injury:  none reported . Aggravating factors: going up and down stairs, walking , and weight bearing. Treatment to date: avoidance of offending activity, ice, and rest. Symptoms have progressed to a point and plateaued.    Denies fevers or chills, no acute injury reported. Had xr performed which was unremarkable for fracture or dislocaiton.       The following portions were reviewed and updated as needed: allergies, current medications, past medical history, past social history, past surgical history and problem list.    Review of Systems   Constitutional: Negative for fever.   HENT: Negative for dental problem and headaches.    Eyes: Negative for vision loss.   Respiratory: Negative for cough and shortness of breath.    Cardiovascular: Negative for leg swelling and palpitations.   Gastrointestinal: Negative for constipation and diarrhea.   Genitourinary: Negative for bladder incontinence and difficulty urinating.   Musculoskeletal: Negative for back pain and difficulty walking.   Skin: Negative for rash and ulcer.   Neurological: Negative for dizziness and headaches.   Hem/Lymph/Immuno: Negative for blood clots. Does not bruise/bleed easily.   Psychiatric/Behavioral: Negative for confusion.         Objective:  General: no acute distress, non toxic, AAO x3   Skin: no skin changes, no rashes, no wounds or laceration  Vasculature: normal cap refill, no LE edema, normal popliteal and dorsalis pedis pulse  Neurologic:   Musculoskeletal: left  KNEE EXAM  Gait: limping gait positive, able to weight bear without difficulty  Inspection: No gross deformity, no redness or warmth   Effusion: negative   Medial joint line  TTP: +  Lateral joint line TTP: negative  ROM: Full flexion and extension. Pain with flexoin  [facility]'s: negative,   Instability to varus/valgus stress: negative  Anterior Drawer: negative   Lachman's test: negative  Posterior Drawer: negative  Crepitus: negative            Imaging:       Assessment/Plan:\  1. Primary osteoarthritis of left knee  63-year-old female patient presenting today for initial evaluation of acute left knee pain.  Prior radiographs of the left knee were reviewed, no acute fracture or dislocation was noted.  There were mild degenerative changes appreciated.  Patient examination reveals no instability with ligamentous evaluation.  She does have pain reproduction over the medial joint line and with patellar grind.  No effusion was noted on exam and point-of-care ultrasound was performed to evaluate for possible joint effusion which was unremarkable.  We discussed the nature of knee osteoarthritis and the recommended treatment plan.  Patient will begin with naproxen 500 mg twice daily with food.  She will additionally begin with physical therapy for the next 6 weeks.  She will return at 8-week follow-up for reevaluation.  If symptoms persist we can consider corticosteroid injection for left knee.  Advised to return sooner if symptoms worsen or fail to improve  - Ambulatory Referral to Orthopedic Surgery  - naproxen (Naprosyn) 500 mg tablet; Take 1 tablet (500 mg total) by mouth 2 (two) times a day with meals  Dispense: 60 tablet; Refill: 0  - Ambulatory Referral to Physical Therapy; Future

## 2024-06-05 ENCOUNTER — HOSPITAL ENCOUNTER (OUTPATIENT)
Dept: INFUSION CENTER | Facility: CLINIC | Age: 64
Discharge: HOME | End: 2024-06-05
Payer: COMMERCIAL

## 2024-06-05 DIAGNOSIS — E53.8 B12 DEFICIENCY: Primary | ICD-10-CM

## 2024-06-05 PROCEDURE — 96372 THER/PROPH/DIAG INJ SC/IM: CPT

## 2024-06-05 RX ORDER — CYANOCOBALAMIN 1000 UG/ML
1000 INJECTION, SOLUTION INTRAMUSCULAR; SUBCUTANEOUS ONCE
Status: COMPLETED | OUTPATIENT
Start: 2024-06-05 | End: 2024-06-05

## 2024-06-05 RX ORDER — CYANOCOBALAMIN 1000 UG/ML
1000 INJECTION, SOLUTION INTRAMUSCULAR; SUBCUTANEOUS ONCE
OUTPATIENT
Start: 2024-07-03

## 2024-06-05 RX ADMIN — CYANOCOBALAMIN 1000 MCG: 1000 INJECTION, SOLUTION INTRAMUSCULAR; SUBCUTANEOUS at 11:16

## 2024-06-05 NOTE — PROGRESS NOTES
Patient arrives to infusion center for B-12 injection. Patient offers no complaints today. Injection administered in right arm without incident. AVS offered.     Next appointment:  7/3/24 @ 3426

## 2024-06-18 ENCOUNTER — EVALUATION (OUTPATIENT)
Dept: PHYSICAL THERAPY | Facility: CLINIC | Age: 64
End: 2024-06-18
Payer: COMMERCIAL

## 2024-06-18 DIAGNOSIS — M17.12 PRIMARY OSTEOARTHRITIS OF LEFT KNEE: Primary | ICD-10-CM

## 2024-06-18 PROCEDURE — 97110 THERAPEUTIC EXERCISES: CPT

## 2024-06-18 PROCEDURE — 97161 PT EVAL LOW COMPLEX 20 MIN: CPT

## 2024-06-18 PROCEDURE — 97112 NEUROMUSCULAR REEDUCATION: CPT

## 2024-06-18 NOTE — PROGRESS NOTES
PT Evaluation     Today's date: 2024  Patient name: Mariola Dos Santos  : 1960  MRN: [id number]  Referring provider: Gopal Zepeda DO  Dx:   Encounter Diagnosis     ICD-10-CM    1. Primary osteoarthritis of left knee  M17.12 Ambulatory Referral to Physical Therapy          Start Time: 930  Stop Time: 1015  Total time in clinic (min): 45 minutes    Assessment  Impairments: abnormal gait, abnormal or restricted ROM, activity intolerance, impaired physical strength, lacks appropriate home exercise program, pain with function and weight-bearing intolerance    Assessment details: Patient is a 64 y/o female reporting to physical therapy with L knee pain. Upon examination, patient demonstrates impairments of increased pain, increased edema, decreased ROM, and decreased strength of her L knee which are resulting in functional limitations of her performance of ADL's/self-care and household activities. PT plan of care will focus on pain and edema reduction, ROM, and strengthening to improve above listed impairments to be able to improve her level of function. Patient is a good candidate for and would benefit from skilled physical therapy to improve above listed impairments to facilitate a return to her prior level of function.     Understanding of Dx/Px/POC: good     Prognosis: good    Goals  ST weeks  1. Patient's subjective reported pain levels at worst will decrease by at least 50% for improved tolerance to daily activities.  2. Patient will become independent with daily activities.     LT weeks  1. Patient's L knee edema measurements will improve to that of her R knee for increased tolerance to daily activities.   2. Patient's FOTO score will improve from a 36 upon initial evaluation to a 53 or better indicating improvements in her performance of functional activities.     Plan  Patient would benefit from: PT eval and skilled physical therapy  Planned modality interventions: cryotherapy, thermotherapy:  hydrocollator packs and unattended electrical stimulation    Planned therapy interventions: IASTM, joint mobilization, kinesiology taping, manual therapy, patient/caregiver education, nerve gliding, neuromuscular re-education, strengthening, stretching, therapeutic activities, therapeutic exercise, functional ROM exercises, flexibility, home exercise program, balance/weight bearing training and gait training    Frequency: 2x week  Duration in weeks: 8  Plan of Care beginning date: 2024  Plan of Care expiration date: 2024  Treatment plan discussed with: patient      Subjective Evaluation    History of Present Illness  Mechanism of injury: Patient is a 64 y/o female presenting to outpatient physical therapy today with complaints of chronic L knee pain that is worsening. She notes her pain affects the anterior, medial and posterior L knee and is accompanied by swelling and a feeling of pressure.  Patient reports difficulties with walking for long periods, staying asleep, and all daily activities secondary to her knee pain. Patient notes the pain feels some relief with use of salon pas, ice, and compression wrap. She notes at times she has to use a cane and walker when her pain gets bad enough. Patient denies experiencing any radicular symptoms. Patient states her goals for physical therapy are to reduce her pain and swelling to be able to go hiking and to get in enough visits to obtain an MRI.      Quality of life: good    Patient Goals  Patient goals for therapy: decreased pain, increased motion, increased strength, independence with ADLs/IADLs and return to sport/leisure activities    Pain  Current pain ratin  At best pain ratin  At worst pain rating: 10  Quality: throbbing and needle-like        Objective     Tenderness   Left Knee   Tenderness in the lateral joint line, medial joint line, popliteal fossa and quadriceps tendon.     Active Range of Motion   Left Knee   Flexion: 80 degrees with  "pain  Extension: 5 degrees with pain    Right Knee   Flexion: 140 degrees   Extension: 0 degrees     Passive Range of Motion   Left Knee   Flexion: 120 degrees with pain    Mobility   Patellar Mobility:   Left Knee   Hypomobile: left medial, left lateral, left superior and left inferior    Right Knee   Hypomobile: medial, lateral, superior and inferior     Strength/Myotome Testing     Left Hip   Planes of Motion   Flexion: 4-  Abduction: 4  Adduction: 4-    Right Hip   Planes of Motion   Flexion: 4-  Abduction: 4  Adduction: 4-    Left Knee   Flexion: 4  Extension: 4    Right Knee   Flexion: 4  Extension: 4    Left Ankle/Foot   Dorsiflexion: 4+  Plantar flexion: 4+    Right Ankle/Foot   Dorsiflexion: 4+  Plantar flexion: 4+    Swelling     Left Knee Girth Measurement (cm)   Joint line: 34.5 cm  10 cm above joint line: 36 cm  10 cm below joint line: 31.5 cm    Right Knee Girth Measurement (cm)   Joint line: 31.5 cm  10 cm above joint line: 36 cm  10 cm below joint line: 30.5 cm             Precautions: Hx hypotension, Fibromyalgia, peripheral neuropathy     POC expires Unit limit Auth Expiration date PT/OT + Visit Limit?   8/13 N/A Pending  N/A                 Visit/Unit Tracking  AUTH Status:  Date 6/18              Pending Used 1               Remaining                  FOTO:       Manuals 6/18                                                                Neuro Re-Ed             Pt education regarding HEP, POC, and dx 5'            Ankle pumps  3x10                                                                             Ther Ex             Heel slides  3x10            Long sitting calf stretch  10\" x 10 L LE                                                                                           Ther Activity             STS 2x5                         Gait Training                                       Modalities                                            "

## 2024-06-21 ENCOUNTER — TELEPHONE (OUTPATIENT)
Age: 64
End: 2024-06-21

## 2024-06-21 NOTE — TELEPHONE ENCOUNTER
Spoke to patient directly and rescheduled 7/11 follow up to 7/25. Patient has hopeline's number if rescheduling is needed.

## 2024-06-24 ENCOUNTER — OFFICE VISIT (OUTPATIENT)
Dept: PHYSICAL THERAPY | Facility: CLINIC | Age: 64
End: 2024-06-24
Payer: COMMERCIAL

## 2024-06-24 DIAGNOSIS — M17.12 PRIMARY OSTEOARTHRITIS OF LEFT KNEE: Primary | ICD-10-CM

## 2024-06-24 PROCEDURE — 97110 THERAPEUTIC EXERCISES: CPT

## 2024-06-24 PROCEDURE — 97530 THERAPEUTIC ACTIVITIES: CPT

## 2024-06-24 PROCEDURE — 97112 NEUROMUSCULAR REEDUCATION: CPT

## 2024-06-24 NOTE — PROGRESS NOTES
"Daily Note     Today's date: 2024  Patient name: Mariola Dos Santos  : 1960  MRN: [id number]  Referring provider: Gopal Zepeda DO  Dx:   Encounter Diagnosis     ICD-10-CM    1. Primary osteoarthritis of left knee  M17.12           Start Time: 930  Stop Time: 1015  Total time in clinic (min): 45 minutes    Subjective: Patient reports to physical therapy today stating notes her HEP has been going well her pain is a 6/10 today.         Objective: See treatment diary below      Assessment: Tolerated treatment well. Patient demonstrated fatigue post treatment, exhibited good technique with therapeutic exercises, and would benefit from continued PT. Patient provided good effort during performance of exercises. Added quad sets, SLR, nu step, standing hip abduction, standing hip extension, and step ups this visit to which patient noted some challenge, but responded well. Visual and verbal cues provided to ensure correct form during performance of exercises. Will continue to assess patient tolerance and progress next visit, as able.         Plan: Continue per plan of care.      Precautions: Hx hypotension, Fibromyalgia, peripheral neuropathy     POC expires Unit limit Auth Expiration date PT/OT + Visit Limit?    N/A 24 N/A                 Visit/Unit Tracking  AUTH Status:  Date              12 visits Used 1 2              Remaining  11 10               FOTO:       Manuals            L patellar mobs   TB Gr I & II            L knee PROM   TB flex                                      Neuro Re-Ed             Pt education regarding HEP, POC, and dx 5' 5'           Ankle pumps  3x10 3x10           Standing hip abduction   2x10 b/l           Standing hip extension  2x10 b/l           Quad sets   2x10 L LE                                      Ther Ex             Heel slides  3x10 3x10           Long sitting calf stretch  10\" x 10 L LE  10\" x 10 L LE            SLR  2x10 L LE            Mini " squats                                                     Nu step for L knee ROM and LE strength  5' seat 10 arms 10            Ther Activity             STS 2x5 2x10           Step ups   2x10 L LE            Gait Training                                       Modalities

## 2024-07-03 ENCOUNTER — HOSPITAL ENCOUNTER (OUTPATIENT)
Dept: INFUSION CENTER | Facility: CLINIC | Age: 64
Discharge: HOME | End: 2024-07-03
Payer: COMMERCIAL

## 2024-07-03 DIAGNOSIS — E53.8 B12 DEFICIENCY: Primary | ICD-10-CM

## 2024-07-03 PROCEDURE — 96372 THER/PROPH/DIAG INJ SC/IM: CPT

## 2024-07-03 RX ORDER — CYANOCOBALAMIN 1000 UG/ML
1000 INJECTION, SOLUTION INTRAMUSCULAR; SUBCUTANEOUS ONCE
OUTPATIENT
Start: 2024-07-31

## 2024-07-03 RX ORDER — CYANOCOBALAMIN 1000 UG/ML
1000 INJECTION, SOLUTION INTRAMUSCULAR; SUBCUTANEOUS ONCE
Status: COMPLETED | OUTPATIENT
Start: 2024-07-03 | End: 2024-07-03

## 2024-07-03 RX ADMIN — CYANOCOBALAMIN 1000 MCG: 1000 INJECTION, SOLUTION INTRAMUSCULAR; SUBCUTANEOUS at 14:36

## 2024-07-03 NOTE — PROGRESS NOTES
Patient arrives to infusion center for B-12 injection. Patient has no complaints today. Injection administered in right arm without any incident. AVS offered.     Next appointment reviewed with patient on 7/31/24 at 1230pm.   Walked out of infusion clinic with no difficulties.

## 2024-07-05 ENCOUNTER — APPOINTMENT (EMERGENCY)
Dept: RADIOLOGY | Facility: HOSPITAL | Age: 64
End: 2024-07-05
Payer: COMMERCIAL

## 2024-07-05 ENCOUNTER — HOSPITAL ENCOUNTER (EMERGENCY)
Facility: HOSPITAL | Age: 64
Discharge: HOME | End: 2024-07-05
Attending: EMERGENCY MEDICINE
Payer: COMMERCIAL

## 2024-07-05 VITALS
DIASTOLIC BLOOD PRESSURE: 62 MMHG | SYSTOLIC BLOOD PRESSURE: 123 MMHG | RESPIRATION RATE: 17 BRPM | HEART RATE: 86 BPM | OXYGEN SATURATION: 94 % | TEMPERATURE: 98.3 F

## 2024-07-05 DIAGNOSIS — M79.676 TOE PAIN: ICD-10-CM

## 2024-07-05 DIAGNOSIS — S92.919A TOE FRACTURE: Primary | ICD-10-CM

## 2024-07-05 PROCEDURE — 73630 X-RAY EXAM OF FOOT: CPT

## 2024-07-05 PROCEDURE — 99284 EMERGENCY DEPT VISIT MOD MDM: CPT

## 2024-07-05 PROCEDURE — 99283 EMERGENCY DEPT VISIT LOW MDM: CPT

## 2024-07-05 RX ORDER — ACETAMINOPHEN 325 MG/1
650 TABLET ORAL ONCE
Status: COMPLETED | OUTPATIENT
Start: 2024-07-05 | End: 2024-07-05

## 2024-07-05 RX ADMIN — ACETAMINOPHEN 650 MG: 325 TABLET ORAL at 22:52

## 2024-07-05 NOTE — Clinical Note
Mariola Dos Santos was seen and treated in our emergency department on 7/5/2024.                Diagnosis:     Mariola  may return to work on return date.    She may return on this date: 07/06/2024    Limitations as needed     If you have any questions or concerns, please don't hesitate to call.      Wanda Brown PA-C    ______________________________           _______________          _______________  Hospital Representative                              Date                                Time

## 2024-07-06 NOTE — DISCHARGE INSTRUCTIONS
Nonweightbearing until you see podiatry.  Follow-up with podiatry, referral placed.    Return to the emergency room for any worsening symptoms.

## 2024-07-06 NOTE — ED PROVIDER NOTES
History  Chief Complaint   Patient presents with    Toe Injury     C/o R great toe injury when getting out of bed. States it got caught on something & she heard it snap.      Patient is a 63-year-old female presenting today for right great toe injury.  Reports she was getting out of bed and tripped, reports she heard a crack to her right great toe. Reports injury occurred 07/03. Denies headstrike, loss of consciousness.  Denies any other trauma or injury.  Reports pain to her right great toe. Associated blister and bruising to the right great toe. Patient able to ambulate, gait intact. Denies any other symptoms.  No medication for symptom relief.  Patient up-to-date with tetanus.          Prior to Admission Medications   Prescriptions Last Dose Informant Patient Reported? Taking?   DULoxetine (CYMBALTA) 30 mg delayed release capsule  Self No No   Sig: Take 1 capsule (30 mg total) by mouth every morning   DULoxetine (CYMBALTA) 60 mg delayed release capsule  Self No No   Sig: Take 1 capsule (60 mg total) by mouth daily at bedtime   albuterol (PROVENTIL HFA,VENTOLIN HFA) 90 mcg/act inhaler  Self No No   Sig: Inhale 2 puffs every 6 (six) hours as needed for wheezing or shortness of breath   calcium carbonate-vitamin D 250 mg-3.125 mcg per tablet  Self No No   Sig: Take 1 tablet by mouth daily   gabapentin (NEURONTIN) 100 mg capsule  Self Yes No   Sig: Take 100 mg by mouth if needed   ibuprofen (MOTRIN) 200 mg tablet  Self No No   Sig: Take 2 tablets (400 mg total) by mouth every 6 (six) hours as needed for mild pain   levothyroxine 100 mcg tablet  Self No No   Sig: Take 1 tablet (100 mcg total) by mouth daily in the early morning   naproxen (Naprosyn) 500 mg tablet   No No   Sig: Take 1 tablet (500 mg total) by mouth 2 (two) times a day with meals      Facility-Administered Medications: None       Past Medical History:   Diagnosis Date    Anxiety     Breast cancer (HCC) 2017    right    Cancer (HCC)     right breast  dx  04/2017    Cervical disc disorder of cervicothoracic region     Cervical radiculopathy     Cervicalgia     Cognitive impairment, mild, so stated     Depression     Major,recurrent    Diplopia     last assessed 12/23/13    Disc degeneration, lumbar     Disease of thyroid gland     hx of goiter, surgery done 1978    Edema of foot     Esophageal reflux     Fatigue     Fibromyalgia     Hemorrhage, anal or rectal     History of radiation therapy     Hx of radiation therapy     Treatments: Course C1, Plan ID Energy Fractions Dose per Fraction (cGy)total Dose delivered(cGy)elapsed days. R Breast 6x 28/28 180 5,040 39, R breast e 12E 5/5 200 1,000 7 Treatment dates: 7/20/17-09/05/17    Hx of radiation therapy     33 treatments    Hypotension     Hypothyroidism     Insomnia     Lab test positive for detection of COVID-19 virus 10/14/2022    Memory loss     Monoclonal gammopathy of undetermined significance     Myalgia     last assessed 07/08/16    Myositis     last assessed 07/08/16    Neck stiffness     Neuralgia     Neuritis     Pain syndrome, chronic     Palpitations     Paresthesia     Peripheral neuropathy     Polyneuropathy     Pseudodementia     Radiculopathy     12/23/13    Restless leg syndrome     Sacroiliitis (HCC)     SLE (systemic lupus erythematosus) (HCC)     last assessed 12/23/13    Tachycardia     Viral warts     Vitamin B deficiency     Vitamin D deficiency     Vitamin D deficiency     Wears glasses        Past Surgical History:   Procedure Laterality Date    BLADDER SURGERY      bladder prolapse    BREAST BIOPSY Right     BREAST SURGERY Right     breast bx    COLONOSCOPY      age 50 normal    HYSTERECTOMY  2009    vaginal hysterectomy, rectocele repair    [place] MASTECTOMY PARTIAL Right 5/24/2017    Procedure:  (NUCLEAR MED. 8 AM., NEEDLE LOCAL TO FOLLOW 8:30 AM.) SEGMENTAL MASTECTOMY BREAST NEEDLE PLACEMENT,  SENTINAL LYMPH NODE BIOPSY;  Surgeon: Mckay Lockett MD;  Location: [facility];  Service: General     REPAIR RECTOCELE      THYROID LOBECTOMY Left     TONSILLECTOMY      US BREAST NEEDLE LOC RIGHT Right 2017    US GUIDED BREAST BIOPSY RIGHT COMPLETE Right 2017    US GUIDED THYROID BIOPSY  2019       Family History   Problem Relation Age of Onset    Heart disease Mother         CHF    Heart failure Mother     Cirrhosis Father     Esophageal cancer Brother 65    Lung cancer Brother     No Known Problems Brother     No Known Problems Brother     No Known Problems Brother      I have reviewed and agree with the history as documented.    E-Cigarette/Vaping    E-Cigarette Use Never User      E-Cigarette/Vaping Substances    Nicotine No     THC No     CBD No     Flavoring No     Other No     Unknown No      Social History     Tobacco Use    Smoking status: Former     Current packs/day: 0.00     Average packs/day: 1.5 packs/day for 5.0 years (7.5 ttl pk-yrs)     Types: Cigarettes     Start date:      Quit date:      Years since quittin.5    Smokeless tobacco: Never    Tobacco comments:     Former quit 30 years ago   Vaping Use    Vaping status: Never Used   Substance Use Topics    Alcohol use: Not Currently     Alcohol/week: 1.0 standard drink of alcohol     Types: 1 Glasses of wine per week    Drug use: No       Review of Systems   Constitutional:  Negative for chills and fever.   HENT:  Negative for ear pain and sore throat.    Eyes:  Negative for pain and visual disturbance.   Respiratory:  Negative for cough and shortness of breath.    Cardiovascular:  Negative for chest pain and palpitations.   Gastrointestinal:  Negative for abdominal pain and vomiting.   Genitourinary:  Negative for dysuria and hematuria.   Musculoskeletal:  Positive for arthralgias. Negative for back pain.   Skin:  Positive for color change. Negative for rash.   Neurological:  Negative for seizures and syncope.   Psychiatric/Behavioral:  Negative for confusion.    All other systems reviewed and are  negative.      Physical Exam  Physical Exam  Vitals and nursing note reviewed.   Constitutional:       General: She is not in acute distress.     Appearance: She is well-developed.   HENT:      Head: Normocephalic and atraumatic.   Eyes:      Conjunctiva/sclera: Conjunctivae normal.   Cardiovascular:      Rate and Rhythm: Normal rate and regular rhythm.      Pulses: Normal pulses.      Heart sounds: No murmur heard.  Pulmonary:      Effort: Pulmonary effort is normal. No respiratory distress.      Breath sounds: Normal breath sounds.   Musculoskeletal:         General: No swelling.      Cervical back: Neck supple.        Feet:    Skin:     General: Skin is warm and dry.      Capillary Refill: Capillary refill takes less than 2 seconds.   Neurological:      Mental Status: She is alert.   Psychiatric:         Mood and Affect: Mood normal.         Vital Signs  ED Triage Vitals   Temperature Pulse Respirations Blood Pressure SpO2   07/05/24 2134 07/05/24 2134 07/05/24 2134 07/05/24 2134 07/05/24 2134   98.3 °F (36.8 °C) 86 17 123/62 94 %      Temp Source Heart Rate Source Patient Position - Orthostatic VS BP Location FiO2 (%)   07/05/24 2134 07/05/24 2134 -- 07/05/24 2134 --   Oral Monitor  Right arm       Pain Score       07/05/24 2252       4           Vitals:    07/05/24 2134   BP: 123/62   Pulse: 86         Visual Acuity      ED Medications  Medications   acetaminophen (TYLENOL) tablet 650 mg (650 mg Oral Given 7/5/24 2252)       Diagnostic Studies  Results Reviewed       None                   XR foot 3+ vw right   ED Interpretation by Wanda Brown PA-C (07/05 2245)   Fracture and soft tissue swelling of great toe phalanx                 Procedures  Procedures         ED Course                               SBIRT 22yo+      Flowsheet Row Most Recent Value   Initial Alcohol Screen: US AUDIT-C     1. How often do you have a drink containing alcohol? 0 Filed at: 07/05/2024 2136   2. How many drinks containing  alcohol do you have on a typical day you are drinking?  0 Filed at: 07/05/2024 2136   3b. FEMALE Any Age, or MALE 65+: How often do you have 4 or more drinks on one occassion? 0 Filed at: 07/05/2024 2136   Audit-C Score 0 Filed at: 07/05/2024 2136   TSERING: How many times in the past year have you...    Used an illegal drug or used a prescription medication for non-medical reasons? Never Filed at: 07/05/2024 2136                      Medical Decision Making  63-year-old female presenting for right toe pain after tripping and hearing a crack on 07/03.  Vital signs stable.  On exam, ecchymosis, edema, tenderness and overlying blister present to right great toe.  No other acute physical exam findings.  Foot x-ray consistent with fracture and soft tissue swelling of great toe phalanx per my interpretation.  Patient given medication for symptoms and julieta tape applied by nursing.  Patient advised to be nonweightbearing until follow-up with podiatry, referral placed.  Provided patient education and discussed return precautions.  Patient to follow-up with her PCP and return to the emergency room for any worsening symptoms.  Patient understands and agrees to discharge plan.    Amount and/or Complexity of Data Reviewed  Radiology: ordered and independent interpretation performed.    Risk  OTC drugs.             Disposition  Final diagnoses:   Toe fracture   Toe pain     Time reflects when diagnosis was documented in both MDM as applicable and the Disposition within this note       Time User Action Codes Description Comment    7/5/2024 11:04 PM Wanda Brown Add [S92.919A] Toe fracture     7/5/2024 11:05 PM Wanda Brown Add [M79.676] Toe pain           ED Disposition       ED Disposition   Discharge    Condition   Stable    Date/Time   Fri Jul 5, 2024 5502    Comment   Mariola Dos Santos discharge to home/self care.                   Follow-up Information       Follow up With Specialties Details Why Contact Info Additional  Information    Bryanna Zavala PA-C Family Medicine, Physician Assistant   [address] [facility] [id number] [phone]       [facility] Emergency Department Emergency Medicine Go to  If symptoms worsen [address] [facility] [id number] [phone] [facility] Emergency Department, [address] [place], [place], [address]    [facility]   [address] [facility] [facility] [id number] [phone] [facility] [address] [place], [address] [phone]            Discharge Medication List as of 7/5/2024 11:06 PM        CONTINUE these medications which have NOT CHANGED    Details   albuterol (PROVENTIL HFA,VENTOLIN HFA) 90 mcg/act inhaler Inhale 2 puffs every 6 (six) hours as needed for wheezing or shortness of breath, Starting Thu 10/6/2022, Print      calcium carbonate-vitamin D 250 mg-3.125 mcg per tablet Take 1 tablet by mouth daily, Starting Fri 11/18/2022, Normal      !! DULoxetine (CYMBALTA) 30 mg delayed release capsule Take 1 capsule (30 mg total) by mouth every morning, Starting Thu 5/23/2024, Normal      !! DULoxetine (CYMBALTA) 60 mg delayed release capsule Take 1 capsule (60 mg total) by mouth daily at bedtime, Starting Thu 5/23/2024, Normal      gabapentin (NEURONTIN) 100 mg capsule Take 100 mg by mouth if needed, Historical Med      ibuprofen (MOTRIN) 200 mg tablet Take 2 tablets (400 mg total) by mouth every 6 (six) hours as needed for mild pain, Starting Tue 5/28/2024, Normal      levothyroxine 100 mcg tablet Take 1 tablet (100 mcg total) by mouth daily in the early morning, Starting Wed 3/13/2024, Normal      naproxen (Naprosyn) 500 mg tablet Take 1 tablet (500 mg total) by mouth 2 (two) times a day with meals, Starting Tue 6/4/2024, Normal       !! - Potential duplicate medications found. Please discuss with provider.              PDMP Review          Value Time User    PDMP Reviewed  Yes 5/27/2022 12:57 PM RAFAT Daly            ED Provider  Electronically Signed by             Wanda Brown PA-C  07/06/24 0608

## 2024-07-07 DIAGNOSIS — E53.8 B12 DEFICIENCY: Primary | ICD-10-CM

## 2024-07-23 ENCOUNTER — APPOINTMENT (OUTPATIENT)
Dept: LAB | Facility: HOSPITAL | Age: 64
End: 2024-07-23
Payer: COMMERCIAL

## 2024-07-23 DIAGNOSIS — E53.8 B12 DEFICIENCY: ICD-10-CM

## 2024-07-23 DIAGNOSIS — Z17.0 MALIGNANT NEOPLASM OF RIGHT BREAST IN FEMALE, ESTROGEN RECEPTOR POSITIVE, UNSPECIFIED SITE OF BREAST (HCC): ICD-10-CM

## 2024-07-23 DIAGNOSIS — M79.7 FIBROMYALGIA: ICD-10-CM

## 2024-07-23 DIAGNOSIS — D47.2 MONOCLONAL GAMMOPATHY OF UNDETERMINED SIGNIFICANCE: ICD-10-CM

## 2024-07-23 DIAGNOSIS — C50.911 MALIGNANT NEOPLASM OF RIGHT BREAST IN FEMALE, ESTROGEN RECEPTOR POSITIVE, UNSPECIFIED SITE OF BREAST (HCC): ICD-10-CM

## 2024-07-23 LAB
ALBUMIN SERPL BCG-MCNC: 4 G/DL (ref 3.5–5)
ALP SERPL-CCNC: 59 U/L (ref 34–104)
ALT SERPL W P-5'-P-CCNC: 6 U/L (ref 7–52)
ANION GAP SERPL CALCULATED.3IONS-SCNC: 8 MMOL/L (ref 4–13)
AST SERPL W P-5'-P-CCNC: 12 U/L (ref 13–39)
BASOPHILS # BLD AUTO: 0.03 THOUSANDS/ÂΜL (ref 0–0.1)
BASOPHILS NFR BLD AUTO: 1 % (ref 0–1)
BILIRUB SERPL-MCNC: 0.66 MG/DL (ref 0.2–1)
BUN SERPL-MCNC: 15 MG/DL (ref 5–25)
CALCIUM SERPL-MCNC: 8.8 MG/DL (ref 8.4–10.2)
CHLORIDE SERPL-SCNC: 105 MMOL/L (ref 96–108)
CO2 SERPL-SCNC: 28 MMOL/L (ref 21–32)
CREAT SERPL-MCNC: 0.8 MG/DL (ref 0.6–1.3)
EOSINOPHIL # BLD AUTO: 0.13 THOUSAND/ÂΜL (ref 0–0.61)
EOSINOPHIL NFR BLD AUTO: 3 % (ref 0–6)
ERYTHROCYTE [DISTWIDTH] IN BLOOD BY AUTOMATED COUNT: 12.2 % (ref 11.6–15.1)
FOLATE SERPL-MCNC: 9.3 NG/ML
GFR SERPL CREATININE-BSD FRML MDRD: 78 ML/MIN/1.73SQ M
GLUCOSE SERPL-MCNC: 100 MG/DL (ref 65–140)
HCT VFR BLD AUTO: 41.1 % (ref 34.8–46.1)
HGB BLD-MCNC: 14 G/DL (ref 11.5–15.4)
IGA SERPL-MCNC: 1177 MG/DL (ref 66–433)
IGG SERPL-MCNC: 891 MG/DL (ref 635–1741)
IGM SERPL-MCNC: 28 MG/DL (ref 45–281)
IMM GRANULOCYTES # BLD AUTO: 0.01 THOUSAND/UL (ref 0–0.2)
IMM GRANULOCYTES NFR BLD AUTO: 0 % (ref 0–2)
LYMPHOCYTES # BLD AUTO: 1.14 THOUSANDS/ÂΜL (ref 0.6–4.47)
LYMPHOCYTES NFR BLD AUTO: 26 % (ref 14–44)
MCH RBC QN AUTO: 30.6 PG (ref 26.8–34.3)
MCHC RBC AUTO-ENTMCNC: 34.1 G/DL (ref 31.4–37.4)
MCV RBC AUTO: 90 FL (ref 82–98)
MONOCYTES # BLD AUTO: 0.38 THOUSAND/ÂΜL (ref 0.17–1.22)
MONOCYTES NFR BLD AUTO: 9 % (ref 4–12)
NEUTROPHILS # BLD AUTO: 2.77 THOUSANDS/ÂΜL (ref 1.85–7.62)
NEUTS SEG NFR BLD AUTO: 61 % (ref 43–75)
NRBC BLD AUTO-RTO: 0 /100 WBCS
PLATELET # BLD AUTO: 269 THOUSANDS/UL (ref 149–390)
PMV BLD AUTO: 9.3 FL (ref 8.9–12.7)
POTASSIUM SERPL-SCNC: 3.8 MMOL/L (ref 3.5–5.3)
PROT SERPL-MCNC: 7.5 G/DL (ref 6.4–8.4)
RBC # BLD AUTO: 4.58 MILLION/UL (ref 3.81–5.12)
SODIUM SERPL-SCNC: 141 MMOL/L (ref 135–147)
VIT B12 SERPL-MCNC: 146 PG/ML (ref 180–914)
WBC # BLD AUTO: 4.46 THOUSAND/UL (ref 4.31–10.16)

## 2024-07-23 PROCEDURE — 85025 COMPLETE CBC W/AUTO DIFF WBC: CPT

## 2024-07-23 PROCEDURE — 82607 VITAMIN B-12: CPT

## 2024-07-23 PROCEDURE — 83521 IG LIGHT CHAINS FREE EACH: CPT

## 2024-07-23 PROCEDURE — 84165 PROTEIN E-PHORESIS SERUM: CPT

## 2024-07-23 PROCEDURE — 82784 ASSAY IGA/IGD/IGG/IGM EACH: CPT

## 2024-07-23 PROCEDURE — 82746 ASSAY OF FOLIC ACID SERUM: CPT

## 2024-07-23 PROCEDURE — 36415 COLL VENOUS BLD VENIPUNCTURE: CPT

## 2024-07-23 PROCEDURE — 80053 COMPREHEN METABOLIC PANEL: CPT

## 2024-07-24 ENCOUNTER — HOSPITAL ENCOUNTER (OUTPATIENT)
Dept: RADIOLOGY | Facility: HOSPITAL | Age: 64
Discharge: HOME | End: 2024-07-24
Attending: PODIATRIST
Payer: COMMERCIAL

## 2024-07-24 ENCOUNTER — OFFICE VISIT (OUTPATIENT)
Dept: PODIATRY | Facility: CLINIC | Age: 64
End: 2024-07-24
Payer: COMMERCIAL

## 2024-07-24 VITALS
SYSTOLIC BLOOD PRESSURE: 105 MMHG | OXYGEN SATURATION: 98 % | WEIGHT: 141 LBS | BODY MASS INDEX: 21.37 KG/M2 | DIASTOLIC BLOOD PRESSURE: 79 MMHG | HEIGHT: 68 IN | HEART RATE: 76 BPM

## 2024-07-24 DIAGNOSIS — S92.919A TOE FRACTURE: ICD-10-CM

## 2024-07-24 DIAGNOSIS — S92.424A CLOSED NONDISPLACED FRACTURE OF DISTAL PHALANX OF RIGHT GREAT TOE, INITIAL ENCOUNTER: ICD-10-CM

## 2024-07-24 DIAGNOSIS — M79.676 TOE PAIN: ICD-10-CM

## 2024-07-24 DIAGNOSIS — S92.424A CLOSED NONDISPLACED FRACTURE OF DISTAL PHALANX OF RIGHT GREAT TOE, INITIAL ENCOUNTER: Primary | ICD-10-CM

## 2024-07-24 LAB
ALBUMIN SERPL ELPH-MCNC: 3.72 G/DL (ref 3.2–5.1)
ALBUMIN SERPL ELPH-MCNC: 53.9 % (ref 48–70)
ALPHA1 GLOB SERPL ELPH-MCNC: 0.22 G/DL (ref 0.15–0.47)
ALPHA1 GLOB SERPL ELPH-MCNC: 3.2 % (ref 1.8–7)
ALPHA2 GLOB SERPL ELPH-MCNC: 0.63 G/DL (ref 0.42–1.04)
ALPHA2 GLOB SERPL ELPH-MCNC: 9.2 % (ref 5.9–14.9)
BETA GLOB ABNORMAL SERPL ELPH-MCNC: 0.41 G/DL (ref 0.31–0.57)
BETA1 GLOB SERPL ELPH-MCNC: 6 % (ref 4.7–7.7)
BETA2 GLOB SERPL ELPH-MCNC: 16.2 % (ref 3.1–7.9)
BETA2+GAMMA GLOB SERPL ELPH-MCNC: 1.12 G/DL (ref 0.2–0.58)
GAMMA GLOB ABNORMAL SERPL ELPH-MCNC: 0.79 G/DL (ref 0.4–1.66)
GAMMA GLOB SERPL ELPH-MCNC: 11.5 % (ref 6.9–22.3)
IGG/ALB SER: 1.17 {RATIO} (ref 1.1–1.8)
KAPPA LC FREE SER-MCNC: 24.7 MG/L (ref 3.3–19.4)
KAPPA LC FREE/LAMBDA FREE SER: 1.69 {RATIO} (ref 0.26–1.65)
LAMBDA LC FREE SERPL-MCNC: 14.6 MG/L (ref 5.7–26.3)
PROT PATTERN SERPL ELPH-IMP: ABNORMAL
PROT SERPL-MCNC: 6.9 G/DL (ref 6.4–8.2)

## 2024-07-24 PROCEDURE — 84165 PROTEIN E-PHORESIS SERUM: CPT | Performed by: STUDENT IN AN ORGANIZED HEALTH CARE EDUCATION/TRAINING PROGRAM

## 2024-07-24 PROCEDURE — 99203 OFFICE O/P NEW LOW 30 MIN: CPT | Performed by: PODIATRIST

## 2024-07-24 PROCEDURE — 73630 X-RAY EXAM OF FOOT: CPT

## 2024-07-24 NOTE — PROGRESS NOTES
Assessment/Plan:     The patient's clinical examination today is significant for mild localized edema along the body of the proximal phalanx of the right great toe.  There is mild tenderness with palpation.  There are no open lesions.  Neurovascular status is intact.    Repeat x-rays of the patient's right foot taken today were personally reviewed and interpreted.  There were paired to her prior films from July 5, 2024.  Alignment of the fracture sites remains unchanged.  There is intra-articular involvement at the level of the IPJ.  There is evidence of callus formation.    The patient is doing fairly well from a clinical standpoint.  She does note some mild tenderness at the fracture site still.  This is commensurate with the healing fracture.  I did offer her an open toed surgical shoe which she politely declines.  She does find her and that her sneakers are comfortable.  She does ambulate with assistance of a cane.    Recommend follow-up in 3 to 4 weeks for repeat x-rays of the patient's right foot.     Diagnoses and all orders for this visit:    Closed nondisplaced fracture of distal phalanx of right great toe, initial encounter  -     XR foot 3+ vw right; Future    Toe fracture  -     Ambulatory Referral to Podiatry    Toe pain  -     Ambulatory Referral to Podiatry          Subjective:     Patient ID: Mariola Dos Santos is a 63 y.o. female.    The patient Zentz today for her initial consultation with [facility] podiatry group for follow-up of a right great toe fracture.  The initial injury occurred around July 5, 2024 when she presented to the ED.  She states that she was getting out of bed and got her foot caught on something and felt a pop in her right great toe.  Subsequent x-rays revealed a fracture.  Evan splinting and nonweightbearing status was advised.  She was instructed to follow-up with podiatry.  She is currently ambulating in sneakers and utilizing a cane and for assistance.  Her pain is  relatively mild and is localized to the area of the fracture site.      PAST MEDICAL HISTORY:  Past Medical History:   Diagnosis Date    Anxiety     Breast cancer (HCC) 2017    right    Cancer (HCC)     right breast  dx 04/2017    Cervical disc disorder of cervicothoracic region     Cervical radiculopathy     Cervicalgia     Cognitive impairment, mild, so stated     Depression     Major,recurrent    Diplopia     last assessed 12/23/13    Disc degeneration, lumbar     Disease of thyroid gland     hx of goiter, surgery done 1978    Edema of foot     Esophageal reflux     Fatigue     Fibromyalgia     Hemorrhage, anal or rectal     History of radiation therapy     Hx of radiation therapy     Treatments: Course C1, Plan ID Energy Fractions Dose per Fraction (cGy)total Dose delivered(cGy)elapsed days. R Breast 6x 28/28 180 5,040 39, R breast e 12E 5/5 200 1,000 7 Treatment dates: 7/20/17-09/05/17    Hx of radiation therapy     33 treatments    Hypotension     Hypothyroidism     Insomnia     Lab test positive for detection of COVID-19 virus 10/14/2022    Memory loss     Monoclonal gammopathy of undetermined significance     Myalgia     last assessed 07/08/16    Myositis     last assessed 07/08/16    Neck stiffness     Neuralgia     Neuritis     Pain syndrome, chronic     Palpitations     Paresthesia     Peripheral neuropathy     Polyneuropathy     Pseudodementia     Radiculopathy     12/23/13    Restless leg syndrome     Sacroiliitis (HCC)     SLE (systemic lupus erythematosus) ([facility])     last assessed 12/23/13    Tachycardia     Viral warts     Vitamin B deficiency     Vitamin D deficiency     Vitamin D deficiency     Wears glasses        PAST SURGICAL HISTORY:  Past Surgical History:   Procedure Laterality Date    BLADDER SURGERY      bladder prolapse    BREAST BIOPSY Right     BREAST SURGERY Right     breast bx    COLONOSCOPY      age 50 normal    HYSTERECTOMY  2009    vaginal hysterectomy, rectocele repair    [place]  MASTECTOMY PARTIAL Right 5/24/2017    Procedure:  (NUCLEAR MED. 8 AM., NEEDLE LOCAL TO FOLLOW 8:30 AM.) SEGMENTAL MASTECTOMY BREAST NEEDLE PLACEMENT,  SENTINAL LYMPH NODE BIOPSY;  Surgeon: Mckay Lockett MD;  Location: [facility];  Service: General    REPAIR RECTOCELE      THYROID LOBECTOMY Left 1978    TONSILLECTOMY      US BREAST NEEDLE LOC RIGHT Right 5/24/2017    US GUIDED BREAST BIOPSY RIGHT COMPLETE Right 4/20/2017    US GUIDED THYROID BIOPSY  4/25/2019        ALLERGIES:  Patient has no known allergies.    MEDICATIONS:  Current Outpatient Medications   Medication Sig Dispense Refill    albuterol (PROVENTIL HFA,VENTOLIN HFA) 90 mcg/act inhaler Inhale 2 puffs every 6 (six) hours as needed for wheezing or shortness of breath 6.7 g 0    calcium carbonate-vitamin D 250 mg-3.125 mcg per tablet Take 1 tablet by mouth daily 60 tablet 3    DULoxetine (CYMBALTA) 30 mg delayed release capsule Take 1 capsule (30 mg total) by mouth every morning 90 capsule 1    DULoxetine (CYMBALTA) 60 mg delayed release capsule Take 1 capsule (60 mg total) by mouth daily at bedtime 90 capsule 1    gabapentin (NEURONTIN) 100 mg capsule Take 100 mg by mouth if needed      ibuprofen (MOTRIN) 200 mg tablet Take 2 tablets (400 mg total) by mouth every 6 (six) hours as needed for mild pain 30 tablet 3    levothyroxine 100 mcg tablet Take 1 tablet (100 mcg total) by mouth daily in the early morning 90 tablet 1    naproxen (Naprosyn) 500 mg tablet Take 1 tablet (500 mg total) by mouth 2 (two) times a day with meals 60 tablet 0     No current facility-administered medications for this visit.       SOCIAL HISTORY:  Social History     Socioeconomic History    Marital status:      Spouse name: None    Number of children: None    Years of education: None    Highest education level: None   Occupational History    Occupation: Disabled due to Fibromyalgia    Occupation: Unemployed   Tobacco Use    Smoking status: Former     Current packs/day:  0.00     Average packs/day: 1.5 packs/day for 5.0 years (7.5 ttl pk-yrs)     Types: Cigarettes     Start date: 1970     Quit date: 1975     Years since quittin.5    Smokeless tobacco: Never    Tobacco comments:     Former quit 30 years ago   Vaping Use    Vaping status: Never Used   Substance and Sexual Activity    Alcohol use: Not Currently     Alcohol/week: 1.0 standard drink of alcohol     Types: 1 Glasses of wine per week    Drug use: No    Sexual activity: Yes     Partners: Male   Other Topics Concern    None   Social History Narrative    None     Social Determinants of Health     Financial Resource Strain: Low Risk  (2023)    [facility]    Overall Financial Resource Strain (CARDIA)     Difficulty of Paying Living Expenses: Not hard at all   Food Insecurity: No Food Insecurity (3/12/2024)    Hunger Vital Sign     Worried About Running Out of Food in the Last Year: Never true     Ran Out of Food in the Last Year: Never true   Transportation Needs: No Transportation Needs (3/12/2024)    PRAPARE - Transportation     Lack of Transportation (Medical): No     Lack of Transportation (Non-Medical): No   Physical Activity: Sufficiently Active (2019)    Exercise Vital Sign     Days of Exercise per Week: 5 days     Minutes of Exercise per Session: 30 min   Stress: No Stress Concern Present (2022)    [employer] - Occupational Stress Questionnaire     Feeling of Stress : Only a little   Social Connections: Unknown (2024)    Received from [facility]    Social Innotrieve     How often do you feel lonely or isolated from those around you? (Adult - for ages 18 years and over): Not on file   Intimate Partner Violence: Not At Risk (2023)    [facility]    Humiliation, Afraid, Rape, and Kick questionnaire     Fear of Current or Ex-Partner: No      Emotionally Abused: No     Physically Abused: No     Sexually Abused: No   Housing Stability: Low Risk  (3/12/2024)    Housing Stability Vital Sign     Unable to Pay for Housing in the Last Year: No     Number of Times Moved in the Last Year: 1     Homeless in the Last Year: No        Review of Systems   Constitutional: Negative.    HENT: Negative.     Eyes: Negative.    Respiratory: Negative.     Cardiovascular: Negative.    Endocrine: Negative.    Musculoskeletal: Negative.    Neurological: Negative.    Hematological: Negative.    Psychiatric/Behavioral: Negative.           Objective:     Physical Exam  Constitutional:       Appearance: Normal appearance.   HENT:      Head: Normocephalic and atraumatic.      Nose: Nose normal.   Cardiovascular:      Pulses:           Dorsalis pedis pulses are 2+ on the right side.        Posterior tibial pulses are 2+ on the right side.   Pulmonary:      Effort: Pulmonary effort is normal.   Feet:      Right foot:      Skin integrity: Skin integrity normal.      Comments: The patient's clinical examination today is significant for mild localized edema along the body of the proximal phalanx of the right great toe.  There is mild tenderness with palpation.  There are no open lesions.  Neurovascular status is intact.    Skin:     General: Skin is warm.      Capillary Refill: Capillary refill takes less than 2 seconds.   Neurological:      General: No focal deficit present.      Mental Status: She is alert and oriented to person, place, and time.   Psychiatric:         Mood and Affect: Mood normal.         Behavior: Behavior normal.         Thought Content: Thought content normal.

## 2024-07-25 ENCOUNTER — TELEPHONE (OUTPATIENT)
Dept: HEMATOLOGY ONCOLOGY | Facility: CLINIC | Age: 64
End: 2024-07-25

## 2024-07-25 ENCOUNTER — OFFICE VISIT (OUTPATIENT)
Dept: HEMATOLOGY ONCOLOGY | Facility: CLINIC | Age: 64
End: 2024-07-25
Payer: COMMERCIAL

## 2024-07-25 VITALS
HEART RATE: 81 BPM | BODY MASS INDEX: 22.81 KG/M2 | DIASTOLIC BLOOD PRESSURE: 70 MMHG | TEMPERATURE: 98.5 F | SYSTOLIC BLOOD PRESSURE: 118 MMHG | WEIGHT: 150.5 LBS | RESPIRATION RATE: 18 BRPM | OXYGEN SATURATION: 96 % | HEIGHT: 68 IN

## 2024-07-25 DIAGNOSIS — D50.8 IRON DEFICIENCY ANEMIA SECONDARY TO INADEQUATE DIETARY IRON INTAKE: ICD-10-CM

## 2024-07-25 DIAGNOSIS — D47.2 MONOCLONAL GAMMOPATHY OF UNDETERMINED SIGNIFICANCE: Primary | ICD-10-CM

## 2024-07-25 DIAGNOSIS — E53.8 B12 DEFICIENCY: Primary | ICD-10-CM

## 2024-07-25 DIAGNOSIS — E53.8 B12 DEFICIENCY: ICD-10-CM

## 2024-07-25 DIAGNOSIS — C50.911 MALIGNANT NEOPLASM OF RIGHT BREAST IN FEMALE, ESTROGEN RECEPTOR POSITIVE, UNSPECIFIED SITE OF BREAST (HCC): ICD-10-CM

## 2024-07-25 DIAGNOSIS — Z17.0 MALIGNANT NEOPLASM OF RIGHT BREAST IN FEMALE, ESTROGEN RECEPTOR POSITIVE, UNSPECIFIED SITE OF BREAST (HCC): ICD-10-CM

## 2024-07-25 PROCEDURE — 99214 OFFICE O/P EST MOD 30 MIN: CPT | Performed by: INTERNAL MEDICINE

## 2024-07-25 PROCEDURE — G2211 COMPLEX E/M VISIT ADD ON: HCPCS | Performed by: INTERNAL MEDICINE

## 2024-07-25 RX ORDER — CYANOCOBALAMIN 1000 UG/ML
1000 INJECTION, SOLUTION INTRAMUSCULAR; SUBCUTANEOUS ONCE
OUTPATIENT
Start: 2024-09-04

## 2024-07-25 RX ORDER — CYANOCOBALAMIN 1000 UG/ML
1000 INJECTION, SOLUTION INTRAMUSCULAR; SUBCUTANEOUS ONCE
Status: CANCELLED | OUTPATIENT
Start: 2024-07-31

## 2024-07-25 NOTE — PROGRESS NOTES
Hematology/Oncology Outpatient Follow- up Note  Mariola Dos Santos 63 y.o. female MRN: @ Encounter: [id number]        Date:  7/25/2024        Assessment / Plan:    1 IgA kappa MGUS  2 history of stage I breast cancer right breast presently stable diagnosed 2017.  3 IgA kappa MGUS  4 osteoarthritis left knee with limitation in range of motion and pain  5 restless leg syndrome  Plan: She will increase her B12 to 1000 mcg weekly x 4 to be followed by q. 2 weeks x 8.  She will return in 3 months she will get an SPEP serum free light chain serum immunoglobulins CBC CMP iron iron-binding ferritin B12 and folate levels.        HPI: 63-year-old female here for follow-up.  She has osteoarthritis of left knee.  She had a right breast cancer removed.  This was in 2017.  She is known to have an IgA kappa MGUS.  Slow rising IgA level at 1100.  She is not anemic thrombocytopenic calcium normal renal function normal.  Bone not a problem although we will need to investigate that further.  She has no low-level B12.  She has not responded well to B12 shots surprisingly.  Her B12 level was only 147.  But we elected to do and we will order is 1000 mcg B12 weekly x 4 followed by every 2 weeks x 8.  Will repeat her levels in 3 months.  Also she has restless leg syndrome and her physician and neurology recommend she have iron levels done we will plan to do that.  Her left knee is giving her a lot of problems and she is followed by apparently orthopedics and needs to continue follow-up.      Interval History: Note from 4/11/2024:  Assessment / Plan:    1 vitamin B12 deficiency  2 history of stage I breast cancer presently stable  3 MGUS IgA kappa  Plan patient will receive B12 she will get 1000 mcg weekly x 4 followed by monthly B12.  She will get a SPEP serum free light chain serum immunoglobulins CBC CMP.  She will get a follow-up B12 folate level.  She will return for follow-up in 3 months.  HPI: 63-year-old female followed with a  history of breast cancer T1b N0 M0 ER/TN positive H ER 21+ negative diagnosed 2017 treated with lumpectomy radiation therapy and hormonal therapy with tamoxifen now discontinued.  She is feeling fairly well regarding that.  Also has a history of mild neutropenia.  She has a normal folate level and low B12 level.  Her ferritin level is normal at 101 and leukemia lymphoma flow cytometry is negative.  PERRI is negative.  CT scan chest abdomen pelvis showed no visceral metastatic disease and an external left iliac lymph node become smaller and not measurable.  Her white count is normal platelets are normal of note her MCV is up to 112.  Her CA 27.29 is in normal range.  Her CMP is also negative.  In 12/13/2023 she had monoclonal gammopathy identifies an IgA kappa similar to priors.  Interval History: Note from 12/13/2023:  PRIMARY HEMATOLOGIC/ONCOLOGIC DIAGNOSIS:  Invasive carcinoma of the right breast Stage IA- pT1b, pN0(sn), G1. ER/PgR 90-95% Positive; HER2 by IHC 1+ Negative. Dx 2017  Neutropenia  ASSESSMENT/PLAN:  1.Invasive carcinoma of the right breast Stage IA- pT1b, pN0(sn), G1. ER/PgR 90-95% Positive; HER2 by IHC 1+ Negative. Dx 2017. On Surveillance. Mammogram 11/29/23--no mammographic evidence of malignancy. Mammogram in 1 year ordered.   2. Neutropenia. Labs ordered for further work-up. Has been on Cymbalta and Neurontin for years. No recurrent infections.   3. MGUS. Labs pending. No anemia/hypercalcemia/bone pain/renal dysfunction.  F/u in 4m. Labs prior.       Cancer Staging:  Cancer Staging   Malignant neoplasm of right female breast (HCC)  Staging form: Breast, AJCC 8th Edition  - Clinical stage from 5/24/2017: Stage IA (cT1b, cN0, cM0, G1, ER+, TN+, HER2-) - Signed by Opal Frazier PA-C on 5/1/2022  Stage prefix: Initial diagnosis  Method of lymph node assessment: Axillary lymph node dissection  Nuclear grade: G2  Mitotic count score: Score 1  Histologic grading system: 3 grade system  HER2-IHC  interpretation: Negative  HER2-IHC value: Score 1+      Molecular Testing:     Previous Hematologic/ Oncologic History:    Oncology History Overview Note   Returns for follow up post right breast radiation completed on 9/5/17. The pt was last seen in radiation on 10/15/20.     10/15/20 - Hem Mynor Michael  Pt to continue on Tamoxifen  Breast exam was benign    Upcoming:       Intraductal carcinoma of right breast (Resolved)   4/20/2017 Biopsy    Right breast biopsy:  Invasive breast carcinoma/invasive ductal carcinoma.  Grade 1.  ER and FL 90-95% positive, Her2 negative     5/24/2017 Initial Diagnosis    Intraductal carcinoma of right breast      Surgery    Segmental mastectomy with sentinel node biopsy  Invasive mammary.  Grade 1       5/24/2017 -  Cancer Staged    Pathologic  Stage IA - pT1b, pN0 (sn), G1     7/20/2017 - 9/5/2017 Radiation    dose of 5040 cGy in 20 daily fractions to the right breast with an additional 1000 cGy to the lumpectomy cavity.     7/2017 -  Hormone Therapy    Tamoxifen     Malignant neoplasm of right female breast (HCC)   5/24/2017 -  Cancer Staged    Staging form: Breast, AJCC 8th Edition  - Clinical stage from 5/24/2017: Stage IA (cT1b, cN0, cM0, G1, ER+, FL+, HER2-) - Signed by Opal Frazier PA-C on 5/1/2022  Stage prefix: Initial diagnosis  Method of lymph node assessment: Axillary lymph node dissection  Nuclear grade: G2  Mitotic count score: Score 1  Histologic grading system: 3 grade system  HER2-IHC interpretation: Negative  HER2-IHC value: Score 1+       5/7/2019 Initial Diagnosis    Malignant neoplasm of right female breast (HCC)         Current Hematologic/ Oncologic Treatment:       Cycle 1         Test Results:    Imaging: XR foot 3+ vw right    Result Date: 7/24/2024  Narrative: RIGHT FOOT INDICATION:   Nondisplaced fracture of distal phalanx of right great toe, initial encounter for closed fracture.  COMPARISON: July 5, 2024 VIEWS:  XR FOOT 3+ VW RIGHT Images: 3 FINDINGS:  As noted on prior exam there is fracture of the proximal phalanx of the great toe with no significant displacement or angulation. Moderate interval healing is noted. Mild degenerative changes of the first metatarsal phalangeal joint are noted. No lytic or blastic osseous lesion. Soft tissues are unremarkable.     Impression: Moderate interval healing of fracture of proximal phalanx of the great toe. Degenerative changes at the first metatarsal phalangeal joint noted. Electronically signed: 07/24/2024 03:17 PM Darci Ordonez MD    XR foot 3+ vw right    Result Date: 7/6/2024  Narrative: XR FOOT 3+ VW RIGHT INDICATION: Patient presents with right great toe injury following fall on 7/3/2024. COMPARISON: None FINDINGS: Nondisplaced comminuted fracture of the first proximal phalanx with intra-articular extension to the interphalangeal joint. Plantar and retrocalcaneal enthesophyte. No lytic or blastic osseous lesion. Unremarkable soft tissues.     Impression: Nondisplaced comminuted fracture of the first proximal phalanx with intra-articular extension to the distal phalangeal joint. This report is concordant with the preliminary interpretation that is documented in the electronic medical record (EPIC). I have personally reviewed this study including all images.  / EDMUNDO Resident: AHMET Gordon I, the attending radiologist, have reviewed the images and agree with the final report above. Workstation performed: [id number]             Labs:   Lab Results   Component Value Date    WBC 4.46 07/23/2024    HGB 14.0 07/23/2024    HCT 41.1 07/23/2024    MCV 90 07/23/2024     07/23/2024     Lab Results   Component Value Date     02/12/2014    K 3.8 07/23/2024     07/23/2024    CO2 28 07/23/2024    ANIONGAP 6.6 (L) 02/12/2014    BUN 15 07/23/2024    CREATININE 0.80 07/23/2024    GLUCOSE 82 02/12/2014    GLUF 101 (H) 03/12/2024    CALCIUM 8.8 07/23/2024    CORRECTEDCA 9.2 12/05/2022    AST 12 (L) 07/23/2024    ALT 6 (L)  "07/23/2024    ALKPHOS 59 07/23/2024    PROT 7.5 02/12/2014    BILITOT 0.4 02/12/2014    EGFR 78 07/23/2024         Lab Results   Component Value Date    SPEP See Comment 07/23/2024       No results found for: \"PSA\"    No results found for: \"CEA\"    No results found for: \"\"    No results found for: \"AFP\"    Lab Results   Component Value Date    IRON 130 04/09/2024    TIBC 254 04/09/2024    FERRITIN 101 04/09/2024       Lab Results   Component Value Date    MBHLQAPP15 146 (L) 07/23/2024         ROS: Review of Systems   Constitutional: Negative.    Eyes: Negative.    Respiratory: Negative.     Cardiovascular: Negative.    Gastrointestinal: Negative.    Endocrine: Negative.    Genitourinary: Negative.    Musculoskeletal: Negative.    Skin: Negative.    Allergic/Immunologic: Negative.    Neurological: Negative.    Hematological: Negative.      Pain decreased range of motion left knee using a cane because of the pain in terms of walking    Current Medications: Reviewed  Allergies: Reviewed  PMH/FH/SH:  Reviewed      Physical Exam:    Body surface area is 1.81 meters squared.    Wt Readings from Last 3 Encounters:   07/25/24 68.3 kg (150 lb 8 oz)   07/24/24 64 kg (141 lb)   06/04/24 63.3 kg (139 lb 9.6 oz)        Temp Readings from Last 3 Encounters:   07/25/24 98.5 °F (36.9 °C) (Temporal)   07/05/24 98.3 °F (36.8 °C) (Oral)   06/04/24 98.4 °F (36.9 °C)        BP Readings from Last 3 Encounters:   07/25/24 118/70   07/24/24 105/79   07/05/24 123/62         Pulse Readings from Last 3 Encounters:   07/25/24 81   07/24/24 76   07/05/24 86     @LASTSAO2(3)@      Physical Exam  Constitutional:       Appearance: Normal appearance. She is normal weight.   HENT:      Head: Normocephalic and atraumatic.   Eyes:      Extraocular Movements: Extraocular movements intact.      Conjunctiva/sclera: Conjunctivae normal.      Pupils: Pupils are equal, round, and reactive to light.   Cardiovascular:      Rate and Rhythm: Normal rate " and regular rhythm.      Heart sounds: Normal heart sounds.   Pulmonary:      Effort: Pulmonary effort is normal.      Breath sounds: Normal breath sounds.   Abdominal:      General: Abdomen is flat. Bowel sounds are normal.      Palpations: Abdomen is soft.   Musculoskeletal:         General: Normal range of motion.      Cervical back: Normal range of motion and neck supple.   Skin:     General: Skin is warm and dry.   Neurological:      General: No focal deficit present.      Mental Status: She is alert and oriented to person, place, and time. Mental status is at baseline.     Pain with decreased range of motion of left knee.  Left breast no masses right breast no masses but scarring at approximately 3 to 5:00 healed.  No axillary adenopathy either side      Goals and Barriers:  Current Goal: Prolong Survival from Cancer.   Barriers: None.      Patient's Capacity to Self Care:  Patient is able to self care.    Code Status: [unfilled]  Advance Directive and Living Will:      Power of :

## 2024-07-25 NOTE — TELEPHONE ENCOUNTER
Existing pt Beatty    Plan being changed to weekly b12 x 4 then to every other week x 8  Thank you

## 2024-07-29 ENCOUNTER — OFFICE VISIT (OUTPATIENT)
Dept: OBGYN CLINIC | Facility: CLINIC | Age: 64
End: 2024-07-29
Payer: COMMERCIAL

## 2024-07-29 VITALS
OXYGEN SATURATION: 97 % | HEART RATE: 92 BPM | TEMPERATURE: 98.2 F | DIASTOLIC BLOOD PRESSURE: 64 MMHG | HEIGHT: 68 IN | BODY MASS INDEX: 21.4 KG/M2 | RESPIRATION RATE: 18 BRPM | SYSTOLIC BLOOD PRESSURE: 97 MMHG | WEIGHT: 141.2 LBS

## 2024-07-29 DIAGNOSIS — M17.12 PRIMARY OSTEOARTHRITIS OF LEFT KNEE: Primary | ICD-10-CM

## 2024-07-29 PROCEDURE — 20610 DRAIN/INJ JOINT/BURSA W/O US: CPT | Performed by: FAMILY MEDICINE

## 2024-07-29 PROCEDURE — 99213 OFFICE O/P EST LOW 20 MIN: CPT | Performed by: FAMILY MEDICINE

## 2024-07-29 RX ORDER — LIDOCAINE HYDROCHLORIDE 10 MG/ML
4 INJECTION, SOLUTION INFILTRATION; PERINEURAL
Status: COMPLETED | OUTPATIENT
Start: 2024-07-29 | End: 2024-07-29

## 2024-07-29 RX ORDER — TRIAMCINOLONE ACETONIDE 40 MG/ML
40 INJECTION, SUSPENSION INTRA-ARTICULAR; INTRAMUSCULAR
Status: COMPLETED | OUTPATIENT
Start: 2024-07-29 | End: 2024-07-29

## 2024-07-29 RX ADMIN — TRIAMCINOLONE ACETONIDE 40 MG: 40 INJECTION, SUSPENSION INTRA-ARTICULAR; INTRAMUSCULAR at 10:15

## 2024-07-29 RX ADMIN — LIDOCAINE HYDROCHLORIDE 4 ML: 10 INJECTION, SOLUTION INFILTRATION; PERINEURAL at 10:15

## 2024-07-29 NOTE — PROGRESS NOTES
Subjective:    Chief Complaint   Patient presents with    Left Knee - Pain, Follow-up, Swelling, Clicking       Mariola Dos Santos is a 63 y.o. female complains of left knee pain. Onset of the symptoms was several months ago.  Mechanism of injury:  none reported . Aggravating factors: going up and down stairs, walking , and weight bearing. Treatment to date: avoidance of offending activity, ice, prescription NSAIDS which are ineffective, PT which was ineffective, and rest. Symptoms have progressed to a point and plateaued.        The following portions were reviewed and updated as needed: allergies, current medications, past medical history, past social history, past surgical history and problem list.    Review of Systems   Constitutional: Negative for fever.   HENT: Negative for dental problem and headaches.    Eyes: Negative for vision loss.   Respiratory: Negative for cough and shortness of breath.    Cardiovascular: Negative for leg swelling and palpitations.   Gastrointestinal: Negative for constipation and diarrhea.   Genitourinary: Negative for bladder incontinence and difficulty urinating.   Musculoskeletal: Negative for back pain and difficulty walking.   Skin: Negative for rash and ulcer.   Neurological: Negative for dizziness and headaches.   Hem/Lymph/Immuno: Negative for blood clots. Does not bruise/bleed easily.   Psychiatric/Behavioral: Negative for confusion.         Objective:  General: no acute distress, non toxic, AAO x3   Skin: no skin changes, no rashes, no wounds or laceration  Vasculature: normal cap refill, no LE edema, normal popliteal and dorsalis pedis pulse  Neurologic:   Musculoskeletal: left  KNEE EXAM  Gait: limping gait positive, able to weight bear without difficulty  Inspection: No gross deformity, no redness or warmth   Effusion: negative   Medial joint line TTP: +  Lateral joint line TTP: negative  ROM: Full flexion and extension. Pain with flexoin  Chidi's: negative,    Instability to varus/valgus stress: negative  Anterior Drawer: negative   Lachman's test: negative  Posterior Drawer: negative  Crepitus: negative            Imaging:       Assessment/Plan:\  1. Primary osteoarthritis of left knee    63-year-old female patient presenting for a follow-up visit for left knee osteoarthritis.  Unfortunately she has not improved with NSAID medication and physical therapy.  A corticosteroid injection was provided for patient's left knee joint at today's visit.  She was advised to follow-up if symptoms recur or fail to improve

## 2024-07-31 ENCOUNTER — HOSPITAL ENCOUNTER (OUTPATIENT)
Dept: INFUSION CENTER | Facility: CLINIC | Age: 64
Discharge: HOME | End: 2024-07-31
Payer: COMMERCIAL

## 2024-07-31 DIAGNOSIS — E53.8 B12 DEFICIENCY: Primary | ICD-10-CM

## 2024-07-31 PROCEDURE — 96372 THER/PROPH/DIAG INJ SC/IM: CPT

## 2024-07-31 RX ORDER — CYANOCOBALAMIN 1000 UG/ML
1000 INJECTION, SOLUTION INTRAMUSCULAR; SUBCUTANEOUS ONCE
Status: COMPLETED | OUTPATIENT
Start: 2024-07-31 | End: 2024-07-31

## 2024-07-31 RX ORDER — CYANOCOBALAMIN 1000 UG/ML
1000 INJECTION, SOLUTION INTRAMUSCULAR; SUBCUTANEOUS ONCE
OUTPATIENT
Start: 2024-08-07

## 2024-07-31 RX ADMIN — CYANOCOBALAMIN 1000 MCG: 1000 INJECTION, SOLUTION INTRAMUSCULAR; SUBCUTANEOUS at 12:37

## 2024-07-31 NOTE — PROGRESS NOTES
Pt presents for B12 injection. Pt offers no complaints. Injection administered in R arm, tolerated well. AVS declined. Next appointment 8/7 12pm.

## 2024-08-12 DIAGNOSIS — E53.8 B12 DEFICIENCY: Primary | ICD-10-CM

## 2024-08-12 RX ORDER — CYANOCOBALAMIN 1000 UG/ML
1000 INJECTION, SOLUTION INTRAMUSCULAR; SUBCUTANEOUS ONCE
Status: CANCELLED | OUTPATIENT
Start: 2024-08-14

## 2024-08-13 ENCOUNTER — OFFICE VISIT (OUTPATIENT)
Dept: PODIATRY | Facility: CLINIC | Age: 64
End: 2024-08-13
Payer: COMMERCIAL

## 2024-08-13 ENCOUNTER — HOSPITAL ENCOUNTER (OUTPATIENT)
Dept: RADIOLOGY | Facility: HOSPITAL | Age: 64
Discharge: HOME | End: 2024-08-13
Attending: PODIATRIST
Payer: COMMERCIAL

## 2024-08-13 VITALS
SYSTOLIC BLOOD PRESSURE: 102 MMHG | HEIGHT: 68 IN | DIASTOLIC BLOOD PRESSURE: 70 MMHG | OXYGEN SATURATION: 99 % | WEIGHT: 142 LBS | BODY MASS INDEX: 21.52 KG/M2 | HEART RATE: 85 BPM

## 2024-08-13 DIAGNOSIS — S92.424A CLOSED NONDISPLACED FRACTURE OF DISTAL PHALANX OF RIGHT GREAT TOE, INITIAL ENCOUNTER: ICD-10-CM

## 2024-08-13 DIAGNOSIS — S92.424D CLOSED NONDISPLACED FRACTURE OF DISTAL PHALANX OF RIGHT GREAT TOE WITH ROUTINE HEALING, SUBSEQUENT ENCOUNTER: Primary | ICD-10-CM

## 2024-08-13 PROCEDURE — 73630 X-RAY EXAM OF FOOT: CPT

## 2024-08-13 PROCEDURE — 99213 OFFICE O/P EST LOW 20 MIN: CPT | Performed by: PODIATRIST

## 2024-08-13 NOTE — PROGRESS NOTES
Assessment/Plan:     The patient's clinical examination today is significant for some mild residual edema of the right great toe.  There is no tenderness palpation of the right hallux.  There is no tenderness with passive range of motion of the IPJ nor the first MTPJ of the right foot.  Pedal pulses are palpable.    [facility] x-rays of the patient's right foot taken today were personally reviewed and interpreted.  Findings were significant for stable alignment of the fracture site of the proximal phalanx of the right great toe.  There is increased callus formation noted compared to her prior films.    The patient is doing well from a clinical standpoint.  She notes no pain at all to her right foot at this point in time.  She is ambulating in a pair of sneakers without any pain or discomfort.  Her main issue today is bilateral lateral knee pain, left worse than the right.  She does follow with orthopedics for this.    From a podiatric standpoint, she can follow-up with me on an as-needed basis.  She may resume shoe gear as tolerated.     Diagnoses and all orders for this visit:    Closed nondisplaced fracture of distal phalanx of right great toe with routine healing, subsequent encounter  -     XR foot 3+ vw right; Future          Subjective:     Patient ID: Mariola Dos Santos is a 63 y.o. female.    The patient presents today for follow-up of a right great toe fracture sustained back in early July.  She has been doing well and is ambulating in sneakers without any difficulty.  She notes no pain to her right foot.  She does still note some mild swelling in her right great toe.      PAST MEDICAL HISTORY:  Past Medical History:   Diagnosis Date    Anxiety     Breast cancer (HCC) 2017    right    Cancer (HCC)     right breast  dx 04/2017    Cervical disc disorder of cervicothoracic region     Cervical radiculopathy     Cervicalgia     Cognitive impairment, mild, so stated     Depression     Major,recurrent    Diplopia      last assessed 12/23/13    Disc degeneration, lumbar     Disease of thyroid gland     hx of goiter, surgery done 1978    Edema of foot     Esophageal reflux     Fatigue     Fibromyalgia     Hemorrhage, anal or rectal     History of radiation therapy     Hx of radiation therapy     Treatments: Course C1, Plan ID Energy Fractions Dose per Fraction (cGy)total Dose delivered(cGy)elapsed days. R Breast 6x 28/28 180 5,040 39, R breast e 12E 5/5 200 1,000 7 Treatment dates: 7/20/17-09/05/17    Hx of radiation therapy     33 treatments    Hypotension     Hypothyroidism     Insomnia     Lab test positive for detection of COVID-19 virus 10/14/2022    Memory loss     Monoclonal gammopathy of undetermined significance     Myalgia     last assessed 07/08/16    Myositis     last assessed 07/08/16    Neck stiffness     Neuralgia     Neuritis     Pain syndrome, chronic     Palpitations     Paresthesia     Peripheral neuropathy     Polyneuropathy     Pseudodementia     Radiculopathy     12/23/13    Restless leg syndrome     Sacroiliitis (HCC)     SLE (systemic lupus erythematosus) (HCC)     last assessed 12/23/13    Tachycardia     Viral warts     Vitamin B deficiency     Vitamin D deficiency     Vitamin D deficiency     Wears glasses        PAST SURGICAL HISTORY:  Past Surgical History:   Procedure Laterality Date    BLADDER SURGERY      bladder prolapse    BREAST BIOPSY Right     BREAST SURGERY Right     breast bx    COLONOSCOPY      age 50 normal    HYSTERECTOMY  2009    vaginal hysterectomy, rectocele repair    AK MASTECTOMY PARTIAL Right 5/24/2017    Procedure:  (NUCLEAR MED. 8 AM., NEEDLE LOCAL TO FOLLOW 8:30 AM.) SEGMENTAL MASTECTOMY BREAST NEEDLE PLACEMENT,  SENTINAL LYMPH NODE BIOPSY;  Surgeon: Mckay Lockett MD;  Location[facility];  Service: General    REPAIR RECTOCELE      THYROID LOBECTOMY Left 1978    TONSILLECTOMY      US BREAST NEEDLE LOC RIGHT Right 5/24/2017    US GUIDED BREAST BIOPSY RIGHT COMPLETE Right  2017    US GUIDED THYROID BIOPSY  2019        ALLERGIES:  Patient has no known allergies.    MEDICATIONS:  Current Outpatient Medications   Medication Sig Dispense Refill    albuterol (PROVENTIL HFA,VENTOLIN HFA) 90 mcg/act inhaler Inhale 2 puffs every 6 (six) hours as needed for wheezing or shortness of breath 6.7 g 0    DULoxetine (CYMBALTA) 30 mg delayed release capsule Take 1 capsule (30 mg total) by mouth every morning 90 capsule 1    DULoxetine (CYMBALTA) 60 mg delayed release capsule Take 1 capsule (60 mg total) by mouth daily at bedtime 90 capsule 1    gabapentin (NEURONTIN) 100 mg capsule Take 100 mg by mouth if needed      levothyroxine 100 mcg tablet Take 1 tablet (100 mcg total) by mouth daily in the early morning 90 tablet 1    ibuprofen (MOTRIN) 200 mg tablet Take 2 tablets (400 mg total) by mouth every 6 (six) hours as needed for mild pain (Patient not taking: Reported on 2024) 30 tablet 3    naproxen (Naprosyn) 500 mg tablet Take 1 tablet (500 mg total) by mouth 2 (two) times a day with meals (Patient not taking: Reported on 2024) 60 tablet 0     No current facility-administered medications for this visit.       SOCIAL HISTORY:  Social History     Socioeconomic History    Marital status:      Spouse name: None    Number of children: None    Years of education: None    Highest education level: None   Occupational History    Occupation: Disabled due to Fibromyalgia    Occupation: Unemployed   Tobacco Use    Smoking status: Former     Current packs/day: 0.00     Average packs/day: 1.5 packs/day for 5.0 years (7.5 ttl pk-yrs)     Types: Cigarettes     Start date:      Quit date: 1975     Years since quittin.6    Smokeless tobacco: Never    Tobacco comments:     Former quit 30 years ago   Vaping Use    Vaping status: Never Used   Substance and Sexual Activity    Alcohol use: Not Currently     Alcohol/week: 1.0 standard drink of alcohol     Types: 1 Glasses of wine per  week    Drug use: No    Sexual activity: Yes     Partners: Male   Other Topics Concern    None   Social History Narrative    None     Social Determinants of Health     Financial Resource Strain: Low Risk  (5/6/2023)    [facility]    Overall Financial Resource Strain (CARDIA)     Difficulty of Paying Living Expenses: Not hard at all   Food Insecurity: No Food Insecurity (3/12/2024)    Hunger Vital Sign     Worried About Running Out of Food in the Last Year: Never true     Ran Out of Food in the Last Year: Never true   Transportation Needs: No Transportation Needs (3/12/2024)    PRAPARE - Transportation     Lack of Transportation (Medical): No     Lack of Transportation (Non-Medical): No   Physical Activity: Sufficiently Active (8/6/2019)    Exercise Vital Sign     Days of Exercise per Week: 5 days     Minutes of Exercise per Session: 30 min   Stress: No Stress Concern Present (11/1/2022)    [employer] - Occupational Stress Questionnaire     Feeling of Stress : Only a little   Social Connections: Unknown (6/18/2024)    Received from [employer] [employer]    Social Connections     How often do you feel lonely or isolated from those around you? (Adult - for ages 18 years and over): Not on file   Intimate Partner Violence: Not At Risk (5/6/2023)    [facility]    Humiliation, Afraid, Rape, and Kick questionnaire     Fear of Current or Ex-Partner: No     Emotionally Abused: No     Physically Abused: No     Sexually Abused: No   Housing Stability: Low Risk  (3/12/2024)    Housing Stability Vital Sign     Unable to Pay for Housing in the Last Year: No     Number of Times Moved in the Last Year: 1     Homeless in the Last Year: No        Review of Systems   Constitutional: Negative.    HENT: Negative.     Eyes: Negative.    Respiratory: Negative.     Cardiovascular: Negative.     Endocrine: Negative.    Musculoskeletal: Negative.    Neurological: Negative.    Hematological: Negative.    Psychiatric/Behavioral: Negative.           Objective:     Physical Exam  Constitutional:       Appearance: Normal appearance.   HENT:      Head: Normocephalic and atraumatic.      Nose: Nose normal.   Cardiovascular:      Pulses:           Dorsalis pedis pulses are 2+ on the right side.        Posterior tibial pulses are 2+ on the right side.   Pulmonary:      Effort: Pulmonary effort is normal.   Feet:      Right foot:      Skin integrity: Skin integrity normal.      Comments: The patient's clinical examination today is significant for some mild residual edema of the right great toe.  There is no tenderness palpation of the right hallux.  There is no tenderness with passive range of motion of the IPJ nor the first MTPJ of the right foot.  Pedal pulses are palpable.  Skin:     General: Skin is warm.      Capillary Refill: Capillary refill takes less than 2 seconds.   Neurological:      General: No focal deficit present.      Mental Status: She is alert and oriented to person, place, and time.   Psychiatric:         Mood and Affect: Mood normal.         Behavior: Behavior normal.         Thought Content: Thought content normal.

## 2024-08-19 DIAGNOSIS — E53.8 B12 DEFICIENCY: Primary | ICD-10-CM

## 2024-08-19 RX ORDER — CYANOCOBALAMIN 1000 UG/ML
1000 INJECTION, SOLUTION INTRAMUSCULAR; SUBCUTANEOUS ONCE
Status: CANCELLED | OUTPATIENT
Start: 2024-08-21

## 2024-08-21 ENCOUNTER — HOSPITAL ENCOUNTER (OUTPATIENT)
Dept: INFUSION CENTER | Facility: CLINIC | Age: 64
Discharge: HOME | End: 2024-08-21
Payer: COMMERCIAL

## 2024-08-21 DIAGNOSIS — E53.8 B12 DEFICIENCY: Primary | ICD-10-CM

## 2024-08-21 PROCEDURE — 96372 THER/PROPH/DIAG INJ SC/IM: CPT

## 2024-08-21 RX ORDER — CYANOCOBALAMIN 1000 UG/ML
1000 INJECTION, SOLUTION INTRAMUSCULAR; SUBCUTANEOUS ONCE
Status: COMPLETED | OUTPATIENT
Start: 2024-08-21 | End: 2024-08-21

## 2024-08-21 RX ORDER — CYANOCOBALAMIN 1000 UG/ML
1000 INJECTION, SOLUTION INTRAMUSCULAR; SUBCUTANEOUS ONCE
Status: CANCELLED | OUTPATIENT
Start: 2024-08-28

## 2024-08-21 RX ADMIN — CYANOCOBALAMIN 1000 MCG: 1000 INJECTION, SOLUTION INTRAMUSCULAR; SUBCUTANEOUS at 12:07

## 2024-08-21 NOTE — PROGRESS NOTES
Pt to clinic for B12 injection. Pt offers no complaints. Tolerated injection in R deltoid without complications. AVS declined. Pt aware of next appointment on 8/28/24 at 1200.

## 2024-08-28 ENCOUNTER — HOSPITAL ENCOUNTER (OUTPATIENT)
Dept: INFUSION CENTER | Facility: CLINIC | Age: 64
Discharge: HOME | End: 2024-08-28
Payer: COMMERCIAL

## 2024-08-28 DIAGNOSIS — E53.8 B12 DEFICIENCY: Primary | ICD-10-CM

## 2024-08-28 PROCEDURE — 96372 THER/PROPH/DIAG INJ SC/IM: CPT

## 2024-08-28 RX ORDER — CYANOCOBALAMIN 1000 UG/ML
1000 INJECTION, SOLUTION INTRAMUSCULAR; SUBCUTANEOUS ONCE
Status: COMPLETED | OUTPATIENT
Start: 2024-08-28 | End: 2024-08-28

## 2024-08-28 RX ORDER — CYANOCOBALAMIN 1000 UG/ML
1000 INJECTION, SOLUTION INTRAMUSCULAR; SUBCUTANEOUS ONCE
OUTPATIENT
Start: 2024-09-04

## 2024-08-28 RX ADMIN — CYANOCOBALAMIN 1000 MCG: 1000 INJECTION, SOLUTION INTRAMUSCULAR; SUBCUTANEOUS at 11:53

## 2024-08-28 NOTE — PROGRESS NOTES
Pt presents for B12 injection. Pt offers no complaints. Injection administered in R deltoid, tolerated well. AVS declined. Next appointment 9/4 at 1230.

## 2024-09-04 ENCOUNTER — HOSPITAL ENCOUNTER (OUTPATIENT)
Dept: INFUSION CENTER | Facility: CLINIC | Age: 64
Discharge: HOME | End: 2024-09-04
Payer: COMMERCIAL

## 2024-09-04 DIAGNOSIS — E53.8 B12 DEFICIENCY: Primary | ICD-10-CM

## 2024-09-04 PROCEDURE — 96372 THER/PROPH/DIAG INJ SC/IM: CPT

## 2024-09-04 RX ORDER — CYANOCOBALAMIN 1000 UG/ML
1000 INJECTION, SOLUTION INTRAMUSCULAR; SUBCUTANEOUS ONCE
Status: CANCELLED | OUTPATIENT
Start: 2024-09-04

## 2024-09-04 RX ORDER — CYANOCOBALAMIN 1000 UG/ML
1000 INJECTION, SOLUTION INTRAMUSCULAR; SUBCUTANEOUS ONCE
Status: COMPLETED | OUTPATIENT
Start: 2024-09-04 | End: 2024-09-04

## 2024-09-04 RX ADMIN — CYANOCOBALAMIN 1000 MCG: 1000 INJECTION, SOLUTION INTRAMUSCULAR; SUBCUTANEOUS at 12:10

## 2024-09-04 NOTE — PROGRESS NOTES
Patient to clinic for B12 injection. Offers no complaints at this time. Injection given in the right deltoid. Aware of next appointment on 9/18/24 at 12 pm. AVS declined.

## 2024-09-18 ENCOUNTER — HOSPITAL ENCOUNTER (OUTPATIENT)
Dept: INFUSION CENTER | Facility: CLINIC | Age: 64
Discharge: HOME | End: 2024-09-18
Payer: COMMERCIAL

## 2024-09-18 DIAGNOSIS — E53.8 B12 DEFICIENCY: Primary | ICD-10-CM

## 2024-09-18 PROCEDURE — 96372 THER/PROPH/DIAG INJ SC/IM: CPT

## 2024-09-18 RX ORDER — CYANOCOBALAMIN 1000 UG/ML
1000 INJECTION, SOLUTION INTRAMUSCULAR; SUBCUTANEOUS ONCE
Status: CANCELLED | OUTPATIENT
Start: 2024-10-02

## 2024-09-18 RX ORDER — CYANOCOBALAMIN 1000 UG/ML
1000 INJECTION, SOLUTION INTRAMUSCULAR; SUBCUTANEOUS ONCE
Status: COMPLETED | OUTPATIENT
Start: 2024-09-18 | End: 2024-09-18

## 2024-09-18 RX ADMIN — CYANOCOBALAMIN 1000 MCG: 1000 INJECTION, SOLUTION INTRAMUSCULAR; SUBCUTANEOUS at 11:54

## 2024-09-18 NOTE — PROGRESS NOTES
Pt presents for B12 injection. Pt offers no complaints. Injection placed in right arm, tolerated well. AVS declined. Next appointment reviewed on 10/2 at 12pm.

## 2024-09-21 ENCOUNTER — HOSPITAL ENCOUNTER (OUTPATIENT)
Dept: MRI IMAGING | Facility: HOSPITAL | Age: 64
Discharge: HOME | End: 2024-09-21

## 2024-09-21 DIAGNOSIS — M25.562 LEFT KNEE PAIN, UNSPECIFIED CHRONICITY: ICD-10-CM

## 2024-09-25 LAB — COLOGUARD RESULT REPORTABLE: NORMAL

## 2024-10-02 ENCOUNTER — HOSPITAL ENCOUNTER (OUTPATIENT)
Dept: INFUSION CENTER | Facility: CLINIC | Age: 64
Discharge: HOME | End: 2024-10-02
Payer: COMMERCIAL

## 2024-10-02 DIAGNOSIS — E53.8 B12 DEFICIENCY: Primary | ICD-10-CM

## 2024-10-02 DIAGNOSIS — E03.9 HYPOTHYROIDISM, UNSPECIFIED TYPE: ICD-10-CM

## 2024-10-02 PROCEDURE — 96372 THER/PROPH/DIAG INJ SC/IM: CPT

## 2024-10-02 RX ORDER — CYANOCOBALAMIN 1000 UG/ML
1000 INJECTION, SOLUTION INTRAMUSCULAR; SUBCUTANEOUS ONCE
OUTPATIENT
Start: 2024-10-16

## 2024-10-02 RX ORDER — CYANOCOBALAMIN 1000 UG/ML
1000 INJECTION, SOLUTION INTRAMUSCULAR; SUBCUTANEOUS ONCE
Status: COMPLETED | OUTPATIENT
Start: 2024-10-02 | End: 2024-10-02

## 2024-10-02 RX ORDER — LEVOTHYROXINE SODIUM 100 UG/1
100 TABLET ORAL
Qty: 90 TABLET | Refills: 1 | Status: SHIPPED | OUTPATIENT
Start: 2024-10-02

## 2024-10-02 RX ADMIN — CYANOCOBALAMIN 1000 MCG: 1000 INJECTION, SOLUTION INTRAMUSCULAR; SUBCUTANEOUS at 12:15

## 2024-10-02 NOTE — TELEPHONE ENCOUNTER
Refill request for levothyroxine 100 mcg tablet to [facility].

## 2024-10-02 NOTE — PROGRESS NOTES
Patient arrives to infusion center for B-12 injection. Patient offers no complaints today. Injection administered in right arm without incident. AVS offered.     Next appointment:  10/16/24 @ 1200

## 2024-10-16 ENCOUNTER — TELEPHONE (OUTPATIENT)
Dept: NEUROLOGY | Facility: CLINIC | Age: 64
End: 2024-10-16

## 2024-10-16 ENCOUNTER — HOSPITAL ENCOUNTER (OUTPATIENT)
Dept: INFUSION CENTER | Facility: CLINIC | Age: 64
Discharge: HOME | End: 2024-10-16
Payer: COMMERCIAL

## 2024-10-16 DIAGNOSIS — E53.8 B12 DEFICIENCY: Primary | ICD-10-CM

## 2024-10-16 PROCEDURE — 96372 THER/PROPH/DIAG INJ SC/IM: CPT

## 2024-10-16 RX ORDER — CYANOCOBALAMIN 1000 UG/ML
1000 INJECTION, SOLUTION INTRAMUSCULAR; SUBCUTANEOUS ONCE
Status: COMPLETED | OUTPATIENT
Start: 2024-10-16 | End: 2024-10-16

## 2024-10-16 RX ORDER — CYANOCOBALAMIN 1000 UG/ML
1000 INJECTION, SOLUTION INTRAMUSCULAR; SUBCUTANEOUS ONCE
Status: CANCELLED | OUTPATIENT
Start: 2024-10-30 | Stop reason: HOSPADM

## 2024-10-16 RX ADMIN — CYANOCOBALAMIN 1000 MCG: 1000 INJECTION, SOLUTION INTRAMUSCULAR; SUBCUTANEOUS at 13:02

## 2024-10-16 NOTE — PROGRESS NOTES
Pt to clinic for B12 injection, offers no complaints today, tolerated injection in right arm without complications, aware of next appointment on 10/30 at 12pm, avs declined.

## 2024-10-16 NOTE — TELEPHONE ENCOUNTER
Provider will not be in the office on 11/25. Appointment has been cancelled and LVM for patient to reschedule.

## 2024-10-17 ENCOUNTER — HOSPITAL ENCOUNTER (OUTPATIENT)
Dept: MRI IMAGING | Facility: HOSPITAL | Age: 64
End: 2024-10-17
Payer: COMMERCIAL

## 2024-10-17 PROCEDURE — 73721 MRI JNT OF LWR EXTRE W/O DYE: CPT

## 2024-10-23 ENCOUNTER — VBI (OUTPATIENT)
Dept: ADMINISTRATIVE | Facility: OTHER | Age: 64
End: 2024-10-23

## 2024-10-23 NOTE — TELEPHONE ENCOUNTER
10/23/24 10:48 AM     Chart reviewed for CRC: Colonoscopy was/were submitted to the patient's insurance.     Lorie Hu MA   [facility]

## 2024-10-29 ENCOUNTER — TELEPHONE (OUTPATIENT)
Dept: HEMATOLOGY ONCOLOGY | Facility: CLINIC | Age: 64
End: 2024-10-29

## 2024-10-29 DIAGNOSIS — D47.2 MONOCLONAL GAMMOPATHY OF UNDETERMINED SIGNIFICANCE: ICD-10-CM

## 2024-10-29 DIAGNOSIS — E53.8 B12 DEFICIENCY: Primary | ICD-10-CM

## 2024-10-29 DIAGNOSIS — D47.2 MGUS (MONOCLONAL GAMMOPATHY OF UNKNOWN SIGNIFICANCE): ICD-10-CM

## 2024-10-29 DIAGNOSIS — D50.8 IRON DEFICIENCY ANEMIA SECONDARY TO INADEQUATE DIETARY IRON INTAKE: ICD-10-CM

## 2024-10-29 DIAGNOSIS — D53.9 NUTRITIONAL ANEMIA, UNSPECIFIED: ICD-10-CM

## 2024-10-29 NOTE — TELEPHONE ENCOUNTER
Called and spoke to Mariola who is aware that she has to do her labs and agreed to go tomorrow so they're back in time for her appt.

## 2024-10-30 ENCOUNTER — HOSPITAL ENCOUNTER (OUTPATIENT)
Dept: INFUSION CENTER | Facility: CLINIC | Age: 64
Discharge: HOME | End: 2024-10-30
Payer: COMMERCIAL

## 2024-10-30 ENCOUNTER — APPOINTMENT (OUTPATIENT)
Dept: LAB | Facility: HOSPITAL | Age: 64
End: 2024-10-30
Payer: COMMERCIAL

## 2024-10-30 DIAGNOSIS — D53.9 NUTRITIONAL ANEMIA, UNSPECIFIED: ICD-10-CM

## 2024-10-30 DIAGNOSIS — D47.2 MGUS (MONOCLONAL GAMMOPATHY OF UNKNOWN SIGNIFICANCE): ICD-10-CM

## 2024-10-30 DIAGNOSIS — E53.8 B12 DEFICIENCY: Primary | ICD-10-CM

## 2024-10-30 DIAGNOSIS — E53.8 B12 DEFICIENCY: ICD-10-CM

## 2024-10-30 DIAGNOSIS — D50.8 IRON DEFICIENCY ANEMIA SECONDARY TO INADEQUATE DIETARY IRON INTAKE: ICD-10-CM

## 2024-10-30 DIAGNOSIS — D47.2 MONOCLONAL GAMMOPATHY OF UNDETERMINED SIGNIFICANCE: ICD-10-CM

## 2024-10-30 LAB
ALBUMIN SERPL BCG-MCNC: 3.9 G/DL (ref 3.5–5)
ALP SERPL-CCNC: 56 U/L (ref 34–104)
ALT SERPL W P-5'-P-CCNC: 7 U/L (ref 7–52)
ANION GAP SERPL CALCULATED.3IONS-SCNC: 9 MMOL/L (ref 4–13)
AST SERPL W P-5'-P-CCNC: 11 U/L (ref 13–39)
BASOPHILS # BLD AUTO: 0.03 THOUSANDS/ΜL (ref 0–0.1)
BASOPHILS NFR BLD AUTO: 1 % (ref 0–1)
BILIRUB SERPL-MCNC: 0.52 MG/DL (ref 0.2–1)
BUN SERPL-MCNC: 14 MG/DL (ref 5–25)
CALCIUM SERPL-MCNC: 8.4 MG/DL (ref 8.4–10.2)
CHLORIDE SERPL-SCNC: 105 MMOL/L (ref 96–108)
CO2 SERPL-SCNC: 27 MMOL/L (ref 21–32)
CREAT SERPL-MCNC: 0.71 MG/DL (ref 0.6–1.3)
EOSINOPHIL # BLD AUTO: 0.16 THOUSAND/ΜL (ref 0–0.61)
EOSINOPHIL NFR BLD AUTO: 3 % (ref 0–6)
ERYTHROCYTE [DISTWIDTH] IN BLOOD BY AUTOMATED COUNT: 12.5 % (ref 11.6–15.1)
FERRITIN SERPL-MCNC: 53 NG/ML (ref 11–307)
FOLATE SERPL-MCNC: 9.9 NG/ML
GFR SERPL CREATININE-BSD FRML MDRD: 89 ML/MIN/1.73SQ M
GLUCOSE P FAST SERPL-MCNC: 106 MG/DL (ref 65–99)
HCT VFR BLD AUTO: 39.2 % (ref 34.8–46.1)
HGB BLD-MCNC: 13 G/DL (ref 11.5–15.4)
IGA SERPL-MCNC: 1155 MG/DL (ref 66–433)
IGG SERPL-MCNC: 808 MG/DL (ref 635–1741)
IGM SERPL-MCNC: 26 MG/DL (ref 45–281)
IMM GRANULOCYTES # BLD AUTO: 0.01 THOUSAND/UL (ref 0–0.2)
IMM GRANULOCYTES NFR BLD AUTO: 0 % (ref 0–2)
IRON SATN MFR SERPL: 29 % (ref 15–50)
IRON SERPL-MCNC: 76 UG/DL (ref 50–212)
LYMPHOCYTES # BLD AUTO: 1.13 THOUSANDS/ΜL (ref 0.6–4.47)
LYMPHOCYTES NFR BLD AUTO: 22 % (ref 14–44)
MCH RBC QN AUTO: 30.1 PG (ref 26.8–34.3)
MCHC RBC AUTO-ENTMCNC: 33.2 G/DL (ref 31.4–37.4)
MCV RBC AUTO: 91 FL (ref 82–98)
MONOCYTES # BLD AUTO: 0.4 THOUSAND/ΜL (ref 0.17–1.22)
MONOCYTES NFR BLD AUTO: 8 % (ref 4–12)
NEUTROPHILS # BLD AUTO: 3.46 THOUSANDS/ΜL (ref 1.85–7.62)
NEUTS SEG NFR BLD AUTO: 66 % (ref 43–75)
NRBC BLD AUTO-RTO: 0 /100 WBCS
PLATELET # BLD AUTO: 267 THOUSANDS/UL (ref 149–390)
PMV BLD AUTO: 9.4 FL (ref 8.9–12.7)
POTASSIUM SERPL-SCNC: 3.3 MMOL/L (ref 3.5–5.3)
PROT SERPL-MCNC: 7 G/DL (ref 6.4–8.4)
RBC # BLD AUTO: 4.32 MILLION/UL (ref 3.81–5.12)
SODIUM SERPL-SCNC: 141 MMOL/L (ref 135–147)
TIBC SERPL-MCNC: 259 UG/DL (ref 250–450)
UIBC SERPL-MCNC: 183 UG/DL (ref 155–355)
VIT B12 SERPL-MCNC: 227 PG/ML (ref 180–914)
WBC # BLD AUTO: 5.19 THOUSAND/UL (ref 4.31–10.16)

## 2024-10-30 PROCEDURE — 85025 COMPLETE CBC W/AUTO DIFF WBC: CPT

## 2024-10-30 PROCEDURE — 84165 PROTEIN E-PHORESIS SERUM: CPT

## 2024-10-30 PROCEDURE — 82784 ASSAY IGA/IGD/IGG/IGM EACH: CPT

## 2024-10-30 PROCEDURE — 83550 IRON BINDING TEST: CPT

## 2024-10-30 PROCEDURE — 80053 COMPREHEN METABOLIC PANEL: CPT

## 2024-10-30 PROCEDURE — 36415 COLL VENOUS BLD VENIPUNCTURE: CPT

## 2024-10-30 PROCEDURE — 96372 THER/PROPH/DIAG INJ SC/IM: CPT

## 2024-10-30 PROCEDURE — 82728 ASSAY OF FERRITIN: CPT

## 2024-10-30 PROCEDURE — 83540 ASSAY OF IRON: CPT

## 2024-10-30 PROCEDURE — 82607 VITAMIN B-12: CPT

## 2024-10-30 PROCEDURE — 82746 ASSAY OF FOLIC ACID SERUM: CPT

## 2024-10-30 PROCEDURE — 83521 IG LIGHT CHAINS FREE EACH: CPT

## 2024-10-30 RX ORDER — CYANOCOBALAMIN 1000 UG/ML
1000 INJECTION, SOLUTION INTRAMUSCULAR; SUBCUTANEOUS ONCE
Status: COMPLETED | OUTPATIENT
Start: 2024-10-30 | End: 2024-10-30

## 2024-10-30 RX ORDER — CYANOCOBALAMIN 1000 UG/ML
1000 INJECTION, SOLUTION INTRAMUSCULAR; SUBCUTANEOUS ONCE
Status: CANCELLED | OUTPATIENT
Start: 2024-11-13

## 2024-10-30 RX ORDER — CYANOCOBALAMIN 1000 UG/ML
1000 INJECTION, SOLUTION INTRAMUSCULAR; SUBCUTANEOUS ONCE
Status: CANCELLED | OUTPATIENT
Start: 2024-10-30

## 2024-10-30 RX ADMIN — CYANOCOBALAMIN 1000 MCG: 1000 INJECTION, SOLUTION INTRAMUSCULAR; SUBCUTANEOUS at 11:48

## 2024-10-30 NOTE — PROGRESS NOTES
Patient here at the clinic for B12 injections, offers no complaints at this time. Tolerated injection in the Right arm, without complications.   AVS declined.   Aware of next appointment on 11/13/24 at 12.   Walked out of clinic without incident.

## 2024-10-31 LAB
ALBUMIN SERPL ELPH-MCNC: 3.79 G/DL (ref 3.2–5.1)
ALBUMIN SERPL ELPH-MCNC: 54.1 % (ref 48–70)
ALPHA1 GLOB SERPL ELPH-MCNC: 0.22 G/DL (ref 0.15–0.47)
ALPHA1 GLOB SERPL ELPH-MCNC: 3.2 % (ref 1.8–7)
ALPHA2 GLOB SERPL ELPH-MCNC: 0.63 G/DL (ref 0.42–1.04)
ALPHA2 GLOB SERPL ELPH-MCNC: 9 % (ref 5.9–14.9)
BETA GLOB ABNORMAL SERPL ELPH-MCNC: 0.41 G/DL (ref 0.31–0.57)
BETA1 GLOB SERPL ELPH-MCNC: 5.9 % (ref 4.7–7.7)
BETA2 GLOB SERPL ELPH-MCNC: 11.3 % (ref 3.1–7.9)
BETA2+GAMMA GLOB SERPL ELPH-MCNC: 0.79 G/DL (ref 0.2–0.58)
GAMMA GLOB ABNORMAL SERPL ELPH-MCNC: 1.16 G/DL (ref 0.4–1.66)
GAMMA GLOB SERPL ELPH-MCNC: 16.5 % (ref 6.9–22.3)
IGG/ALB SER: 1.18 {RATIO} (ref 1.1–1.8)
KAPPA LC FREE SER-MCNC: 21.7 MG/L (ref 3.3–19.4)
KAPPA LC FREE/LAMBDA FREE SER: 1.49 {RATIO} (ref 0.26–1.65)
LAMBDA LC FREE SERPL-MCNC: 14.6 MG/L (ref 5.7–26.3)
M PROTEIN 1 MFR SERPL ELPH: 8.5 %
M PROTEIN 1 SERPL ELPH-MCNC: 0.6 G/DL
PROT PATTERN SERPL ELPH-IMP: ABNORMAL
PROT SERPL-MCNC: 7 G/DL (ref 6.4–8.2)

## 2024-10-31 PROCEDURE — 84165 PROTEIN E-PHORESIS SERUM: CPT | Performed by: PATHOLOGY

## 2024-11-04 ENCOUNTER — OFFICE VISIT (OUTPATIENT)
Dept: HEMATOLOGY ONCOLOGY | Facility: CLINIC | Age: 64
End: 2024-11-04
Payer: COMMERCIAL

## 2024-11-04 ENCOUNTER — TELEPHONE (OUTPATIENT)
Dept: OBGYN CLINIC | Facility: CLINIC | Age: 64
End: 2024-11-04

## 2024-11-04 VITALS
WEIGHT: 142.5 LBS | HEART RATE: 88 BPM | RESPIRATION RATE: 18 BRPM | TEMPERATURE: 98.2 F | SYSTOLIC BLOOD PRESSURE: 112 MMHG | BODY MASS INDEX: 21.6 KG/M2 | DIASTOLIC BLOOD PRESSURE: 78 MMHG | HEIGHT: 68 IN | OXYGEN SATURATION: 100 %

## 2024-11-04 DIAGNOSIS — D47.2 MONOCLONAL GAMMOPATHY OF UNDETERMINED SIGNIFICANCE: Primary | ICD-10-CM

## 2024-11-04 PROCEDURE — 99211 OFF/OP EST MAY X REQ PHY/QHP: CPT | Performed by: INTERNAL MEDICINE

## 2024-11-04 NOTE — TELEPHONE ENCOUNTER
Call to this patient no answer left a message to relay Dr Zepeda's message and given call back number to schedule her appointment with Dr Zepeda, and if she has any further questions

## 2024-11-04 NOTE — PROGRESS NOTES
Mariola Dos Santos  1960  [facility] [place]  [address]  [place] [id number]    Chief Complaint   Patient presents with    Follow-up     Came for F/U of MGUS , no major complain         Oncology History Overview Note   Returns for follow up post right breast radiation completed on 9/5/17. The pt was last seen in radiation on 10/15/20.     10/15/20 - [facility]  Pt to continue on Tamoxifen  Breast exam was benign    Upcoming:       Intraductal carcinoma of right breast (Resolved)   4/20/2017 Biopsy    Right breast biopsy:  Invasive breast carcinoma/invasive ductal carcinoma.  Grade 1.  ER and AL 90-95% positive, Her2 negative     5/24/2017 Initial Diagnosis    Intraductal carcinoma of right breast      Surgery    Segmental mastectomy with sentinel node biopsy  Invasive mammary.  Grade 1       5/24/2017 -  Cancer Staged    Pathologic  Stage IA - pT1b, pN0 (sn), G1     7/20/2017 - 9/5/2017 Radiation    dose of 5040 cGy in 20 daily fractions to the right breast with an additional 1000 cGy to the lumpectomy cavity.     7/2017 -  Hormone Therapy    Tamoxifen     Malignant neoplasm of right female breast (HCC)   5/24/2017 -  Cancer Staged    Staging form: Breast, AJCC 8th Edition  - Clinical stage from 5/24/2017: Stage IA (cT1b, cN0, cM0, G1, ER+, AL+, HER2-) - Signed by Opal Frazier PA-C on 5/1/2022  Stage prefix: Initial diagnosis  Method of lymph node assessment: Axillary lymph node dissection  Nuclear grade: G2  Mitotic count score: Score 1  Histologic grading system: 3 grade system  HER2-IHC interpretation: Negative  HER2-IHC value: Score 1+       5/7/2019 Initial Diagnosis    Malignant neoplasm of right female breast (HCC)         History of Present Illness:  63-year-old female here for follow-up. She has osteoarthritis of left knee. She had a right breast cancer removed. This was in 2017. She is known to have an IgA kappa MGUS. Slow  rising IgA level at 1100. She is not anemic thrombocytopenic calcium normal renal function normal.     Review of Systems     Knee pain     Patient Active Problem List   Diagnosis    History of right breast cancer    Restless leg syndrome    Fibromyalgia    Disease of thyroid gland    Cognitive impairment, mild, so stated    Hypotension    Recurrent major depressive disorder, in full remission (HCC)    B12 deficiency    Monoclonal gammopathy of undetermined significance    Pain syndrome, chronic    Vitamin D deficiency    Tachycardia    Current severe episode of major depressive disorder without psychotic features (HCC)    Hypothyroidism    Malignant neoplasm of right female breast (HCC)    Rib pain    Internal nasal lesion    Cervical radiculopathy at C7    COVID-19    Frequent falls    Iron deficiency anemia secondary to inadequate dietary iron intake     Past Medical History:   Diagnosis Date    Anxiety     Breast cancer (HCC) 2017    right    Cancer (HCC)     right breast  dx 04/2017    Cervical disc disorder of cervicothoracic region     Cervical radiculopathy     Cervicalgia     Cognitive impairment, mild, so stated     Depression     Major,recurrent    Diplopia     last assessed 12/23/13    Disc degeneration, lumbar     Disease of thyroid gland     hx of goiter, surgery done 1978    Edema of foot     Esophageal reflux     Fatigue     Fibromyalgia     Hemorrhage, anal or rectal     History of radiation therapy     Hx of radiation therapy     Treatments: Course C1, Plan ID Energy Fractions Dose per Fraction (cGy)total Dose delivered(cGy)elapsed days. R Breast 6x 28/28 180 5,040 39, R breast e 12E 5/5 200 1,000 7 Treatment dates: 7/20/17-09/05/17    Hx of radiation therapy     33 treatments    Hypotension     Hypothyroidism     Insomnia     Lab test positive for detection of COVID-19 virus 10/14/2022    Memory loss     Monoclonal gammopathy of undetermined significance     Myalgia     last assessed 07/08/16     Myositis     last assessed 07/08/16    Neck stiffness     Neuralgia     Neuritis     Pain syndrome, chronic     Palpitations     Paresthesia     Peripheral neuropathy     Polyneuropathy     Pseudodementia     Radiculopathy     12/23/13    Restless leg syndrome     Sacroiliitis (HCC)     SLE (systemic lupus erythematosus) (HCC)     last assessed 12/23/13    Tachycardia     Viral warts     Vitamin B deficiency     Vitamin D deficiency     Vitamin D deficiency     Wears glasses      Past Surgical History:   Procedure Laterality Date    BLADDER SURGERY      bladder prolapse    BREAST BIOPSY Right     BREAST SURGERY Right     breast bx    COLONOSCOPY      age 50 normal    HYSTERECTOMY  2009    vaginal hysterectomy, rectocele repair    NH MASTECTOMY PARTIAL Right 5/24/2017    Procedure:  (NUCLEAR MED. 8 AM., NEEDLE LOCAL TO FOLLOW 8:30 AM.) SEGMENTAL MASTECTOMY BREAST NEEDLE PLACEMENT,  SENTINAL LYMPH NODE BIOPSY;  Surgeon: Mckay Lockett MD;  Location: [facility];  Service: General    REPAIR RECTOCELE      THYROID LOBECTOMY Left 1978    TONSILLECTOMY      US BREAST NEEDLE LOC RIGHT Right 5/24/2017    US GUIDED BREAST BIOPSY RIGHT COMPLETE Right 4/20/2017    US GUIDED THYROID BIOPSY  4/25/2019     Family History   Problem Relation Age of Onset    Heart disease Mother         CHF    Heart failure Mother     Cirrhosis Father     Esophageal cancer Brother 65    Lung cancer Brother     No Known Problems Brother     No Known Problems Brother     No Known Problems Brother      Social History     Socioeconomic History    Marital status:      Spouse name: Not on file    Number of children: Not on file    Years of education: Not on file    Highest education level: Not on file   Occupational History    Occupation: Disabled due to Fibromyalgia    Occupation: Unemployed   Tobacco Use    Smoking status: Former     Current packs/day: 0.00     Average packs/day: 1.5 packs/day for 5.0 years (7.5 ttl pk-yrs)     Types: Cigarettes      Start date:      Quit date:      Years since quittin.8    Smokeless tobacco: Never    Tobacco comments:     Former quit 30 years ago   Vaping Use    Vaping status: Never Used   Substance and Sexual Activity    Alcohol use: Not Currently     Alcohol/week: 1.0 standard drink of alcohol     Types: 1 Glasses of wine per week    Drug use: No    Sexual activity: Yes     Partners: Male   Other Topics Concern    Not on file   Social History Narrative    Not on file     Social Determinants of Health     Financial Resource Strain: Low Risk  (2023)    [facility]    Overall Financial Resource Strain (CARDIA)     Difficulty of Paying Living Expenses: Not hard at all   Food Insecurity: No Food Insecurity (3/12/2024)    Nursing - Inadequate Food Risk Classification     Worried About Running Out of Food in the Last Year: Never true     Ran Out of Food in the Last Year: Never true     Ran Out of Food in the Last Year: Not on file   Transportation Needs: No Transportation Needs (3/12/2024)    PRAPARE - Transportation     Lack of Transportation (Medical): No     Lack of Transportation (Non-Medical): No   Physical Activity: Sufficiently Active (2019)    Exercise Vital Sign     Days of Exercise per Week: 5 days     Minutes of Exercise per Session: 30 min   Stress: No Stress Concern Present (2022)    [employer] - Occupational Stress Questionnaire     Feeling of Stress : Only a little   Social Connections: Unknown (2024)    Received from [facility]    Social Connections     How often do you feel lonely or isolated from those around you? (Adult - for ages 18 years and over): Not on file   Intimate Partner Violence: Not At Risk (2023)    [facility] from [facility], [facility]    Humiliation, Afraid, Rape, and Kick questionnaire     Fear of Current or Ex-Partner: No      "Emotionally Abused: No     Physically Abused: No     Sexually Abused: No   Housing Stability: Low Risk  (3/12/2024)    Housing Stability Vital Sign     Unable to Pay for Housing in the Last Year: No     Number of Times Moved in the Last Year: 1     Homeless in the Last Year: No       Current Outpatient Medications:     albuterol (PROVENTIL HFA,VENTOLIN HFA) 90 mcg/act inhaler, Inhale 2 puffs every 6 (six) hours as needed for wheezing or shortness of breath, Disp: 6.7 g, Rfl: 0    DULoxetine (CYMBALTA) 30 mg delayed release capsule, Take 1 capsule (30 mg total) by mouth every morning, Disp: 90 capsule, Rfl: 1    DULoxetine (CYMBALTA) 60 mg delayed release capsule, Take 1 capsule (60 mg total) by mouth daily at bedtime, Disp: 90 capsule, Rfl: 1    gabapentin (NEURONTIN) 100 mg capsule, Take 100 mg by mouth if needed, Disp: , Rfl:     ibuprofen (MOTRIN) 200 mg tablet, Take 2 tablets (400 mg total) by mouth every 6 (six) hours as needed for mild pain (Patient not taking: Reported on 7/29/2024), Disp: 30 tablet, Rfl: 3    levothyroxine 100 mcg tablet, Take 1 tablet (100 mcg total) by mouth daily in the early morning, Disp: 90 tablet, Rfl: 1    naproxen (Naprosyn) 500 mg tablet, Take 1 tablet (500 mg total) by mouth 2 (two) times a day with meals (Patient not taking: Reported on 7/29/2024), Disp: 60 tablet, Rfl: 0  No Known Allergies  Vitals:    11/04/24 1238   BP: 112/78   Pulse: 88   Resp: 18   Temp: 98.2 °F (36.8 °C)   SpO2: 100%         /78 (BP Location: Right arm, Patient Position: Sitting, Cuff Size: Adult)   Pulse 88   Temp 98.2 °F (36.8 °C) (Temporal)   Resp 18   Ht 5' 8\" (1.727 m)   Wt 64.6 kg (142 lb 8 oz)   SpO2 100%   BMI 21.67 kg/m²     General Appearance:    Alert, cooperative, no distress, appears stated age   Head:    Normocephalic, without obvious abnormality, atraumatic   Eyes:    PERRL, conjunctiva/corneas clear, EOM's intact, fundi     benign, both eyes        Ears:    Normal TM's and " external ear canals, both ears   Nose:   Nares normal, septum midline, mucosa normal, no drainage    or sinus tenderness   Throat:   Lips, mucosa, and tongue normal; teeth and gums normal   Neck:   Supple, symmetrical, trachea midline, no adenopathy;        thyroid:  No enlargement/tenderness/nodules; no carotid    bruit or JVD   Back:     Symmetric, no curvature, ROM normal, no CVA tenderness   Lungs:     Clear to auscultation bilaterally, respirations unlabored   Chest wall:    No tenderness or deformity   Heart:    Regular rate and rhythm, S1 and S2 normal, no murmur, rub    or gallop   Abdomen:     Soft, non-tender, bowel sounds active all four quadrants,     no masses, no organomegaly           Extremities:   Extremities normal, atraumatic, no cyanosis or edema   Pulses:   2+ and symmetric all extremities   Skin:   Skin color, texture, turgor normal, no rashes or lesions   Lymph nodes:   Cervical, supraclavicular, and axillary nodes normal   Neurologic:   CNII-XII intact. Normal strength, sensation and reflexes       throughout          Performance Status: ECOG/Zubrod/WHO: 1 - Symptomatic but completely ambulatory    Labs:    ntains abnormal data Immunoglobulin free LT chains blood  Order: [id number]   Status: Final result       Visible to patient: No (inaccessible in [facility])       Next appt: 11/08/2024 at 08:15 AM in Orthopedic Surgery (Surjit Ochoa DO)       Dx: MGUS (monoclonal gammopathy of unknow...    0 Result Notes            Component  Ref Range & Units 10/30/24 11:36 AM 7/23/24 11:35 AM 12/13/23 12:54 PM 12/5/22 11:01 AM 5/2/22 10:39 AM 10/28/21  8:57 AM 10/15/20 10:44 AM   Ig Kappa Free Light Chain  3.3 - 19.4 mg/L 21.7 High  24.7 High  26.7 High  25.7 High  23.2 High  27.1 High  25.3 High    Ig Lambda Free Light Chain  5.7 - 26.3 mg/L 14.6 14.6 13.4 14.0 14.8 14.4 14.8   Kappa/Lambda FluidC Ratio  0.26 - 1.65 1.49 1.69 High  1.99 High  1.84 High  1.57 1.88 High  1.71 High         IgG,  IgA, IgM  Order: [id number]   Status: Final result       Visible to patient: No (inaccessible in [facility])       Next appt: 11/08/2024 at 08:15 AM in Orthopedic Surgery (Surjit Ochoa DO)       Dx: MGUS (monoclonal gammopathy of unknow...    0 Result Notes           Component  Ref Range & Units 10/30/24 11:36 AM 7/23/24 11:35 AM 10/28/21  8:57 AM 10/15/20 10:44 AM 4/2/19  4:23 PM 11/8/17  3:50 PM   IGA  66 - 433 mg/dL 1,155 High  1,177 High  958.0 High  R 942.0 High  R 901.0 High  R 1,000.0 High  R   IGG  635 - 1,741 mg/dL 808 891 983.0 R 1,080.0 R 1,050.0 R 1,260.0 R   IGM  45 - 281 mg/dL 26 Low  28 Low  28.0 Low  R 31.0 Low  R 23.0 Low  R 36.0 Low  R      Protein electrophoresis, serum  Order: [id number]   Status: Final result       Visible to patient: No (inaccessible in [facility])       Next appt: 11/08/2024 at 08:15 AM in Orthopedic Surgery (Surjit Ochoa DO)       Dx: MGUS (monoclonal gammopathy of unknow...    0 Result Notes            Component  Ref Range & Units 10/30/24 11:36 AM 10/30/24 11:36 AM 7/23/24 11:35 AM 7/23/24 11:35 AM 3/12/24 10:07 AM 12/13/23 12:54 PM 12/13/23 12:54 PM   A/G Ratio  1.10 - 1.80 1.18  1.17   1.28    Albumin Electrophoresis  48.0 - 70.0 % 54.1  53.9   56.2    Albumin CONC  3.20 - 5.10 g/dl 3.79  3.72   3.88    Alpha 1  1.8 - 7.0 % 3.2  3.2   3.5    ALPHA 1 CONC  0.15 - 0.47 g/dL 0.22  0.22   0.24    Alpha 2  5.9 - 14.9 % 9.0  9.2   8.2    ALPHA 2 CONC  0.42 - 1.04 g/dL 0.63  0.63   0.57    Beta-1  4.7 - 7.7 % 5.9  6.0   6.3    BETA 1 CONC  0.31 - 0.57 g/dL 0.41  0.41   0.43    Beta-2  3.1 - 7.9 % 11.3 High   16.2 High    11.2 High     BETA 2 CONC  0.20 - 0.58 g/dL 0.79 High   1.12 High    0.77 High     Gamma Globulin  6.9 - 22.3 % 16.5  11.5   14.6    GAMMA CONC  0.40 - 1.66 g/dL 1.16  0.79   1.01    M Peak ID 1  % 8.50         M PEAK 1 CONC  g/dL 0.60         Total Protein  6.4 - 8.2 g/dL 7.0 7.0 R 6.9 7.5 R 7.2 R 6.9 7.6 R   SPEP Interpretation See  Comment                Imaging  MRI knee left wo contrast    Result Date: 10/22/2024  Narrative: MRI LEFT KNEE INDICATION:   M25.562: Pain in left knee. COMPARISON: None. TECHNIQUE:    Multiplanar/multisequence MR of the left knee was performed. FINDINGS: SUBCUTANEOUS TISSUES: Normal JOINT EFFUSION: None. BAKER'S CYST: There is trace amount of fluid in Baker's cyst. MENISCI: Large horizontal undersurface tear of the medial meniscus posterior horn and body (series 5 images 16-19.) Lateral meniscus is intact. CRUCIATE LIGAMENTS: Intact. EXTENSOR APPARATUS: Intact. COLLATERAL LIGAMENTS: Intact. ARTICULAR SURFACES: Normal. BONES: Normal. MUSCULATURE:  Intact.     Impression: Large horizontal undersurface tear of the medial meniscus posterior horn and body (series 5 images 16-19.) Workstation performed: [id number]     I reviewed the above laboratory and imaging data.      Discussion/Summary:    1 IgA kappa MGUS  2 history of stage I breast cancer right breast presently stable diagnosed 2017.  3 IgA kappa MGUS      Will repeat lab in 6 months     F/U in 6 months       Emmett Rajan MD

## 2024-11-12 ENCOUNTER — OFFICE VISIT (OUTPATIENT)
Dept: OBGYN CLINIC | Facility: CLINIC | Age: 64
End: 2024-11-12
Payer: COMMERCIAL

## 2024-11-12 VITALS
TEMPERATURE: 98.3 F | HEART RATE: 91 BPM | WEIGHT: 142 LBS | SYSTOLIC BLOOD PRESSURE: 95 MMHG | OXYGEN SATURATION: 97 % | RESPIRATION RATE: 18 BRPM | HEIGHT: 68 IN | BODY MASS INDEX: 21.52 KG/M2 | DIASTOLIC BLOOD PRESSURE: 64 MMHG

## 2024-11-12 DIAGNOSIS — S83.242A TEAR OF MEDIAL MENISCUS OF LEFT KNEE, CURRENT, UNSPECIFIED TEAR TYPE, INITIAL ENCOUNTER: Primary | ICD-10-CM

## 2024-11-12 DIAGNOSIS — M17.12 PRIMARY OSTEOARTHRITIS OF LEFT KNEE: ICD-10-CM

## 2024-11-12 PROCEDURE — 99213 OFFICE O/P EST LOW 20 MIN: CPT | Performed by: FAMILY MEDICINE

## 2024-11-12 NOTE — PROGRESS NOTES
Subjective:    Chief Complaint   Patient presents with    Left Knee - Follow-up, Pain, Numbness, Tingling, Swelling       Mariola Dos Santos is a 64 y.o. female complains of left knee pain. Onset of the symptoms was several months ago.  Mechanism of injury:  none reported . Aggravating factors: going up and down stairs, walking , and weight bearing. Treatment to date: avoidance of offending activity, corticosteroid injection which was somewhat effective, ice, prescription NSAIDS which are ineffective, PT which was ineffective, and rest. Symptoms have progressed to a point and plateaued.        The following portions were reviewed and updated as needed: allergies, current medications, past medical history, past social history, past surgical history and problem list.    Review of Systems   Constitutional: Negative for fever.   HENT: Negative for dental problem and headaches.    Eyes: Negative for vision loss.   Respiratory: Negative for cough and shortness of breath.    Cardiovascular: Negative for leg swelling and palpitations.   Gastrointestinal: Negative for constipation and diarrhea.   Genitourinary: Negative for bladder incontinence and difficulty urinating.   Musculoskeletal: Negative for back pain and difficulty walking.   Skin: Negative for rash and ulcer.   Neurological: Negative for dizziness and headaches.   Hem/Lymph/Immuno: Negative for blood clots. Does not bruise/bleed easily.   Psychiatric/Behavioral: Negative for confusion.         Objective:  General: no acute distress, non toxic, AAO x3   Skin: no skin changes, no rashes, no wounds or laceration  Vasculature: normal cap refill, no LE edema, normal popliteal and dorsalis pedis pulse  Neurologic:   Musculoskeletal: left  KNEE EXAM  Gait: limping gait positive, able to weight bear without difficulty  Inspection: No gross deformity, no redness or warmth   Effusion: negative   Medial joint line TTP: +  Lateral joint line TTP: negative  ROM: Full  flexion and extension. Pain with flexoin  Chidi's: negative,   Instability to varus/valgus stress: negative  Anterior Drawer: negative   Lachman's test: negative  Posterior Drawer: negative  Crepitus: negative            Imaging:       Assessment/Plan:\  1. Tear of medial meniscus of left knee, current, unspecified tear type, initial encounter  MRI results were reviewed with the patient which reveals a large meniscus tear affecting the medial meniscus.  Clinical impression is the patient is symptomatic from medial meniscus tear and unfortunately has not had much response to conservative treatment in the form of physical therapy, oral NSAID medication and corticosteroid injection therapy.  Given her ongoing pain symptoms I did recommend that the patient follow-up with orthopedic surgery to discuss if surgical intervention would be indicated.  Referral was placed for Dr. Wei.

## 2024-11-13 ENCOUNTER — HOSPITAL ENCOUNTER (OUTPATIENT)
Dept: INFUSION CENTER | Facility: CLINIC | Age: 64
Discharge: HOME | End: 2024-11-13
Payer: COMMERCIAL

## 2024-11-13 DIAGNOSIS — E53.8 B12 DEFICIENCY: Primary | ICD-10-CM

## 2024-11-13 RX ORDER — CYANOCOBALAMIN 1000 UG/ML
1000 INJECTION, SOLUTION INTRAMUSCULAR; SUBCUTANEOUS ONCE
Status: COMPLETED | OUTPATIENT
Start: 2024-11-13 | End: 2024-11-13

## 2024-11-13 RX ORDER — CYANOCOBALAMIN 1000 UG/ML
1000 INJECTION, SOLUTION INTRAMUSCULAR; SUBCUTANEOUS ONCE
OUTPATIENT
Start: 2024-11-27

## 2024-11-13 RX ADMIN — CYANOCOBALAMIN 1000 MCG: 1000 INJECTION, SOLUTION INTRAMUSCULAR; SUBCUTANEOUS at 11:47

## 2024-11-13 NOTE — PROGRESS NOTES
Patient to clinic for B12 Injection, offering no complaints.  Injection administered into right deltoid.  Patient tolerated well.  AVS declined.  Aware of next appt 11/27 at 12 PM.

## 2024-11-13 NOTE — PLAN OF CARE
Problem: RESPIRATORY - ADULT  Goal: Achieves optimal ventilation and oxygenation  Description: INTERVENTIONS:  - Assess for changes in respiratory status  - Assess for changes in mentation and behavior  - Position to facilitate oxygenation and minimize respiratory effort  - Oxygen administered by appropriate delivery if ordered  - Initiate smoking cessation education as indicated  - Encourage broncho-pulmonary hygiene including cough, deep breathe, Incentive Spirometry  - Assess the need for suctioning and aspirate as needed  - Assess and instruct to report SOB or any respiratory difficulty  - Respiratory Therapy support as indicated  Outcome: Progressing     Problem: METABOLIC, FLUID AND ELECTROLYTES - ADULT  Goal: Electrolytes maintained within normal limits  Description: INTERVENTIONS:  - Monitor labs and assess patient for signs and symptoms of electrolyte imbalances  - Administer electrolyte replacement as ordered  - Monitor response to electrolyte replacements, including repeat lab results as appropriate  - Instruct patient on fluid and nutrition as appropriate  Outcome: Progressing     Problem: HEMATOLOGIC - ADULT  Goal: Maintains hematologic stability  Description: INTERVENTIONS  - Assess for signs and symptoms of bleeding or hemorrhage  - Monitor labs  - Administer supportive blood products/factors as ordered and appropriate  Outcome: Progressing

## 2024-11-16 ENCOUNTER — VBI (OUTPATIENT)
Dept: ADMINISTRATIVE | Facility: OTHER | Age: 64
End: 2024-11-16

## 2024-11-16 NOTE — TELEPHONE ENCOUNTER
11/16/24 10:46 AM     Chart reviewed for Colon Cancer Screening was/were submitted to the patient's insurance.     Lorie Hu MA   [facility]

## 2024-11-18 ENCOUNTER — OFFICE VISIT (OUTPATIENT)
Dept: OBGYN CLINIC | Facility: CLINIC | Age: 64
End: 2024-11-18
Payer: COMMERCIAL

## 2024-11-18 VITALS
WEIGHT: 140 LBS | HEART RATE: 79 BPM | DIASTOLIC BLOOD PRESSURE: 73 MMHG | SYSTOLIC BLOOD PRESSURE: 114 MMHG | HEIGHT: 68 IN | BODY MASS INDEX: 21.22 KG/M2 | OXYGEN SATURATION: 97 % | RESPIRATION RATE: 18 BRPM

## 2024-11-18 DIAGNOSIS — S83.242A TEAR OF MEDIAL MENISCUS OF LEFT KNEE, CURRENT, UNSPECIFIED TEAR TYPE, INITIAL ENCOUNTER: ICD-10-CM

## 2024-11-18 PROCEDURE — 99213 OFFICE O/P EST LOW 20 MIN: CPT | Performed by: STUDENT IN AN ORGANIZED HEALTH CARE EDUCATION/TRAINING PROGRAM

## 2024-11-18 NOTE — PROGRESS NOTES
ASSESSMENT/PLAN:    Diagnoses and all orders for this visit:    Tear of medial meniscus of left knee, current, unspecified tear type, initial encounter  -     Ambulatory Referral to Orthopedic Surgery  -     Injection Procedure Prior Authorization; Future      Discussed history, exam, and imaging with patient. Presentation most consistent with left knee medial meniscus tear and we will plan for non-operative management at this time.  Discussed oral/topical medication regimen. Will plan for continued use of over the counter medications tylenol and ibuprofen in addition to previously prescribed medications.  Discussed injections as a pain adjunct. Patient had recent CSI with Dr Zepeda. Discussed that we can continue CSI if she receives relief but would need to be 3 months apart. Also discussed visco injection which patient would like to attempt. Order placed for insurance authorization.  Discussed rehabilitation efforts. Will plan for continued weight bearing as tolerated and activity modifications with recommendation to avoid painful activities such as prolonged ambulation or other higher impact activities. Continue HEP.  MRI of the left knee discussed at today's visit.  Follow-up with me for visco injection once authorized.  _____________________________________________________  CHIEF COMPLAINT:  Chief Complaint   Patient presents with    Left Knee - Numbness, Pain, Swelling, Tingling     Pain behind the knee, patient states that she can not put both knees together and has a hard time bending the knee       SUBJECTIVE:  Mariola Dos Santos is a 64 y.o. year old female who presents for evaluation of left knee pain. The issue began a few months ago with no injury or trauma. She notes pain throughout the knee and is unable to localize a specific area where pain is the worst. Pain has been constant but she notes it is worst at night and when she ambulates for prolonged periods of time such as when she goes grocery shopping.  She was referred from Dr Zepeda to discuss possible surgical interventions as she has attempted conservative treatments without relief including injections and physical therapy. She denies numbness or tingling.    PAST MEDICAL HISTORY:  Past Medical History:   Diagnosis Date    Anxiety     Breast cancer (HCC) 2017    right    Cancer (HCC)     right breast  dx 04/2017    Cervical disc disorder of cervicothoracic region     Cervical radiculopathy     Cervicalgia     Cognitive impairment, mild, so stated     Depression     Major,recurrent    Diplopia     last assessed 12/23/13    Disc degeneration, lumbar     Disease of thyroid gland     hx of goiter, surgery done 1978    Edema of foot     Esophageal reflux     Fatigue     Fibromyalgia     Hemorrhage, anal or rectal     History of radiation therapy     Hx of radiation therapy     Treatments: Course C1, Plan ID Energy Fractions Dose per Fraction (cGy)total Dose delivered(cGy)elapsed days. R Breast 6x 28/28 180 5,040 39, R breast e 12E 5/5 200 1,000 7 Treatment dates: 7/20/17-09/05/17    Hx of radiation therapy     33 treatments    Hypotension     Hypothyroidism     Insomnia     Lab test positive for detection of COVID-19 virus 10/14/2022    Memory loss     Monoclonal gammopathy of undetermined significance     Myalgia     last assessed 07/08/16    Myositis     last assessed 07/08/16    Neck stiffness     Neuralgia     Neuritis     Pain syndrome, chronic     Palpitations     Paresthesia     Peripheral neuropathy     Polyneuropathy     Pseudodementia     Radiculopathy     12/23/13    Restless leg syndrome     Sacroiliitis (HCC)     SLE (systemic lupus erythematosus) ([facility])     last assessed 12/23/13    Tachycardia     Viral warts     Vitamin B deficiency     Vitamin D deficiency     Vitamin D deficiency     Wears glasses        PAST SURGICAL HISTORY:  Past Surgical History:   Procedure Laterality Date    BLADDER SURGERY      bladder prolapse    BREAST BIOPSY Right     BREAST  SURGERY Right     breast bx    COLONOSCOPY      age 50 normal    HYSTERECTOMY  2009    vaginal hysterectomy, rectocele repair    [place] MASTECTOMY PARTIAL Right 2017    Procedure:  (NUCLEAR MED. 8 AM., NEEDLE LOCAL TO FOLLOW 8:30 AM.) SEGMENTAL MASTECTOMY BREAST NEEDLE PLACEMENT,  SENTINAL LYMPH NODE BIOPSY;  Surgeon: Mckay Lockett MD;  Location: [facility];  Service: General    REPAIR RECTOCELE      THYROID LOBECTOMY Left 1978    TONSILLECTOMY      US BREAST NEEDLE LOC RIGHT Right 2017    US GUIDED BREAST BIOPSY RIGHT COMPLETE Right 2017    US GUIDED THYROID BIOPSY  2019       FAMILY HISTORY:  Family History   Problem Relation Age of Onset    Heart disease Mother         CHF    Heart failure Mother     Cirrhosis Father     Esophageal cancer Brother 65    Lung cancer Brother     No Known Problems Brother     No Known Problems Brother     No Known Problems Brother        SOCIAL HISTORY:  Social History     Tobacco Use    Smoking status: Former     Current packs/day: 0.00     Average packs/day: 1.5 packs/day for 5.0 years (7.5 ttl pk-yrs)     Types: Cigarettes     Start date:      Quit date:      Years since quittin.9    Smokeless tobacco: Never    Tobacco comments:     Former quit 30 years ago   Vaping Use    Vaping status: Never Used   Substance Use Topics    Alcohol use: Not Currently     Alcohol/week: 1.0 standard drink of alcohol     Types: 1 Glasses of wine per week    Drug use: No       MEDICATIONS:    Current Outpatient Medications:     DULoxetine (CYMBALTA) 30 mg delayed release capsule, Take 1 capsule (30 mg total) by mouth every morning, Disp: 90 capsule, Rfl: 1    DULoxetine (CYMBALTA) 60 mg delayed release capsule, Take 1 capsule (60 mg total) by mouth daily at bedtime, Disp: 90 capsule, Rfl: 1    gabapentin (NEURONTIN) 100 mg capsule, Take 100 mg by mouth if needed, Disp: , Rfl:     levothyroxine 100 mcg tablet, Take 1 tablet (100 mcg total) by mouth daily in the early  "morning, Disp: 90 tablet, Rfl: 1    ALLERGIES:  No Known Allergies    Review of systems:   Constitutional: Negative for fatigue, fever or loss of apetite.   HENT: Negative.    Respiratory: Negative for shortness of breath, dyspnea.    Cardiovascular: Negative for chest pain/tightness.   Gastrointestinal: Negative for abdominal pain, N/V.   Endocrine: Negative for cold/heat intolerance, unexplained weight loss/gain.   Genitourinary: Negative for flank pain, dysuria, hematuria.   Musculoskeletal: As in HPI   Skin: Negative for rash.    Neurological: Negative for numbness tingling  Psychiatric/Behavioral: Negative for agitation.  _____________________________________________________  PHYSICAL EXAMINATION:    Blood pressure 114/73, pulse 79, resp. rate 18, height 5' 8\" (1.727 m), weight 63.5 kg (140 lb), SpO2 97%, not currently breastfeeding.    General: well developed and well nourished, alert, oriented times 3, and appears comfortable  HEENT: Benign, normocephalic, atraumatic  Cardiovascular: regular rate    Pulmonary: No wheezing or stridor  Abdomen: Soft, Nontender  Skin: No masses, erythema, lacerations, fluctation, ulcerations  Neurovascular: as per MSK exam below    MUSCULOSKELETAL EXAMINATION:    Left knee  Ambulates with normal gait  No bruising, swelling, deformity  TTP medial and lateral joint line  Passive ROM 0 - 110 with pain at terminal flexion, without crepitus  Negative Sharri's, negative Caitlin's  Stable to varus/valgus stress at 0 and 30 degrees  Normal Lachman  Negative Anterior Drawer, negative Posterior Drawer  5/5 quad, 5/5 hamstring strength  SILT all exposed distal distributions  2+ PT pulse      _____________________________________________________  STUDIES REVIEWED:  Images personally reviewed by me today:    MRI of the left knee taken on 10/17/24 demonstrates horizontal tear of posterior medial meniscus.Lateral meniscus, ACL, PCL, LCL, MCL are intact.    Xrays of the left knee taken on " 5/17/24 demonstrate small joint effusion without signs of significant joint space narrowing. No acute fracture or dislocation is demonstrated.       Scribe Attestation      I,:  Ag Cotton PA-C am acting as a scribe while in the presence of the attending physician.:       I,:  Hi Wei MD personally performed the services described in this documentation    as scribed in my presence.:

## 2024-11-27 ENCOUNTER — HOSPITAL ENCOUNTER (OUTPATIENT)
Dept: INFUSION CENTER | Facility: CLINIC | Age: 64
Discharge: HOME | End: 2024-11-27
Payer: COMMERCIAL

## 2024-11-27 DIAGNOSIS — E53.8 B12 DEFICIENCY: Primary | ICD-10-CM

## 2024-11-27 PROCEDURE — 96372 THER/PROPH/DIAG INJ SC/IM: CPT

## 2024-11-27 RX ORDER — CYANOCOBALAMIN 1000 UG/ML
1000 INJECTION, SOLUTION INTRAMUSCULAR; SUBCUTANEOUS ONCE
Status: COMPLETED | OUTPATIENT
Start: 2024-11-27 | End: 2024-11-27

## 2024-11-27 RX ORDER — CYANOCOBALAMIN 1000 UG/ML
1000 INJECTION, SOLUTION INTRAMUSCULAR; SUBCUTANEOUS ONCE
Status: CANCELLED | OUTPATIENT
Start: 2024-12-11

## 2024-11-27 RX ADMIN — CYANOCOBALAMIN 1000 MCG: 1000 INJECTION, SOLUTION INTRAMUSCULAR; SUBCUTANEOUS at 12:23

## 2024-11-27 NOTE — PROGRESS NOTES
Mariola Dos Santos presents for a B12 injection, offers no complaints. Tolerated injection in right deltoid well with no complications. Band-aid placed.    Mariola Dos Santos is aware of future appt on 12/11 at 12 pm,.     AVS printed and given to Mariola Dos Santos:

## 2024-11-29 ENCOUNTER — HOSPITAL ENCOUNTER (OUTPATIENT)
Dept: MAMMOGRAPHY | Facility: CLINIC | Age: 64
End: 2024-11-29
Payer: COMMERCIAL

## 2024-11-29 ENCOUNTER — PROCEDURE VISIT (OUTPATIENT)
Dept: OBGYN CLINIC | Facility: CLINIC | Age: 64
End: 2024-11-29
Payer: COMMERCIAL

## 2024-11-29 VITALS — BODY MASS INDEX: 21.22 KG/M2 | HEIGHT: 68 IN | WEIGHT: 140 LBS

## 2024-11-29 VITALS
HEART RATE: 76 BPM | BODY MASS INDEX: 21.22 KG/M2 | SYSTOLIC BLOOD PRESSURE: 107 MMHG | DIASTOLIC BLOOD PRESSURE: 72 MMHG | WEIGHT: 140 LBS | HEIGHT: 68 IN

## 2024-11-29 DIAGNOSIS — Z17.0 MALIGNANT NEOPLASM OF OVERLAPPING SITES OF RIGHT BREAST IN FEMALE, ESTROGEN RECEPTOR POSITIVE (HCC): ICD-10-CM

## 2024-11-29 DIAGNOSIS — Z17.0 MALIGNANT NEOPLASM OF RIGHT BREAST IN FEMALE, ESTROGEN RECEPTOR POSITIVE, UNSPECIFIED SITE OF BREAST (HCC): ICD-10-CM

## 2024-11-29 DIAGNOSIS — Z12.31 ENCOUNTER FOR SCREENING MAMMOGRAM FOR BREAST CANCER: ICD-10-CM

## 2024-11-29 DIAGNOSIS — S83.242A TEAR OF MEDIAL MENISCUS OF LEFT KNEE, CURRENT, UNSPECIFIED TEAR TYPE, INITIAL ENCOUNTER: Primary | ICD-10-CM

## 2024-11-29 DIAGNOSIS — C50.911 MALIGNANT NEOPLASM OF RIGHT BREAST IN FEMALE, ESTROGEN RECEPTOR POSITIVE, UNSPECIFIED SITE OF BREAST (HCC): ICD-10-CM

## 2024-11-29 DIAGNOSIS — M17.12 PRIMARY OSTEOARTHRITIS OF LEFT KNEE: ICD-10-CM

## 2024-11-29 DIAGNOSIS — C50.811 MALIGNANT NEOPLASM OF OVERLAPPING SITES OF RIGHT BREAST IN FEMALE, ESTROGEN RECEPTOR POSITIVE (HCC): ICD-10-CM

## 2024-11-29 PROCEDURE — 77067 SCR MAMMO BI INCL CAD: CPT

## 2024-11-29 PROCEDURE — 77063 BREAST TOMOSYNTHESIS BI: CPT

## 2024-11-29 PROCEDURE — 20610 DRAIN/INJ JOINT/BURSA W/O US: CPT

## 2024-11-29 NOTE — PROGRESS NOTES
ASSESSMENT/PLAN:    Diagnoses and all orders for this visit:    Tear of medial meniscus of left knee, current, unspecified tear type, initial encounter  -     Large joint arthrocentesis: L knee    Primary osteoarthritis of left knee      Patient presents for Durolane visco injection  Discussed oral/topical medication regimen. Will plan for continued use of over the counter medications tylenol and ibuprofen in addition to previously prescribed medications.  Discussed injections as a pain adjunct. Visco injection given at today's visit. Discussed that repeat visco supplementation could be considered at 6 months.  Discussed rehabilitation efforts. Will plan for continued weight bearing as tolerated and activity modifications with recommendation to avoid painful activities such as prolonged ambulation or other higher impact activities. Continue HEP.  Follow-up with me in 3 months.  _____________________________________________________  CHIEF COMPLAINT:  Chief Complaint   Patient presents with    Left Knee - Follow-up, Numbness     Visco Injection       SUBJECTIVE:  Mariola Dos Santos is a 64 y.o. year old female who presents for visco injection into the left knee. She notes no changes since her last visit and continues to have pain throughout the knee worst with prolonged ambulation. She denies numbness or tingling.    PAST MEDICAL HISTORY:  Past Medical History:   Diagnosis Date    Anxiety     Breast cancer (HCC) 2017    right    Cancer (HCC)     right breast  dx 04/2017    Cervical disc disorder of cervicothoracic region     Cervical radiculopathy     Cervicalgia     Cognitive impairment, mild, so stated     Depression     Major,recurrent    Diplopia     last assessed 12/23/13    Disc degeneration, lumbar     Disease of thyroid gland     hx of goiter, surgery done 1978    Edema of foot     Esophageal reflux     Fatigue     Fibromyalgia     Hemorrhage, anal or rectal     History of radiation therapy     Hx of radiation  therapy     Treatments: Course C1, Plan ID Energy Fractions Dose per Fraction (cGy)total Dose delivered(cGy)elapsed days. R Breast 6x 28/28 180 5,040 39, R breast e 12E 5/5 200 1,000 7 Treatment dates: 7/20/17-09/05/17    Hx of radiation therapy     33 treatments    Hypotension     Hypothyroidism     Insomnia     Lab test positive for detection of COVID-19 virus 10/14/2022    Memory loss     Monoclonal gammopathy of undetermined significance     Myalgia     last assessed 07/08/16    Myositis     last assessed 07/08/16    Neck stiffness     Neuralgia     Neuritis     Pain syndrome, chronic     Palpitations     Paresthesia     Peripheral neuropathy     Polyneuropathy     Pseudodementia     Radiculopathy     12/23/13    Restless leg syndrome     Sacroiliitis (HCC)     SLE (systemic lupus erythematosus) (HCC)     last assessed 12/23/13    Tachycardia     Viral warts     Vitamin B deficiency     Vitamin D deficiency     Vitamin D deficiency     Wears glasses        PAST SURGICAL HISTORY:  Past Surgical History:   Procedure Laterality Date    BLADDER SURGERY      bladder prolapse    BREAST BIOPSY Right     BREAST SURGERY Right     breast bx    COLONOSCOPY      age 50 normal    HYSTERECTOMY  2009    vaginal hysterectomy, rectocele repair    [place] MASTECTOMY PARTIAL Right 5/24/2017    Procedure:  (NUCLEAR MED. 8 AM., NEEDLE LOCAL TO FOLLOW 8:30 AM.) SEGMENTAL MASTECTOMY BREAST NEEDLE PLACEMENT,  SENTINAL LYMPH NODE BIOPSY;  Surgeon: Mckay Lockett MD;  Location: [facility];  Service: General    REPAIR RECTOCELE      THYROID LOBECTOMY Left 1978    TONSILLECTOMY      US BREAST NEEDLE LOC RIGHT Right 5/24/2017    US GUIDED BREAST BIOPSY RIGHT COMPLETE Right 4/20/2017    US GUIDED THYROID BIOPSY  4/25/2019       FAMILY HISTORY:  Family History   Problem Relation Age of Onset    Heart disease Mother         CHF    Heart failure Mother     Cirrhosis Father     Esophageal cancer Brother 65    Lung cancer Brother     No Known  "Problems Brother     No Known Problems Brother     No Known Problems Brother        SOCIAL HISTORY:  Social History     Tobacco Use    Smoking status: Former     Current packs/day: 0.00     Average packs/day: 1.5 packs/day for 5.0 years (7.5 ttl pk-yrs)     Types: Cigarettes     Start date:      Quit date:      Years since quittin.9    Smokeless tobacco: Never    Tobacco comments:     Former quit 30 years ago   Vaping Use    Vaping status: Never Used   Substance Use Topics    Alcohol use: Not Currently     Alcohol/week: 1.0 standard drink of alcohol     Types: 1 Glasses of wine per week    Drug use: No       MEDICATIONS:    Current Outpatient Medications:     DULoxetine (CYMBALTA) 30 mg delayed release capsule, Take 1 capsule (30 mg total) by mouth every morning, Disp: 90 capsule, Rfl: 1    DULoxetine (CYMBALTA) 60 mg delayed release capsule, Take 1 capsule (60 mg total) by mouth daily at bedtime, Disp: 90 capsule, Rfl: 1    gabapentin (NEURONTIN) 100 mg capsule, Take 100 mg by mouth if needed, Disp: , Rfl:     levothyroxine 100 mcg tablet, Take 1 tablet (100 mcg total) by mouth daily in the early morning, Disp: 90 tablet, Rfl: 1    ALLERGIES:  No Known Allergies    Review of systems:   Constitutional: Negative for fatigue, fever or loss of apetite.   HENT: Negative.    Respiratory: Negative for shortness of breath, dyspnea.    Cardiovascular: Negative for chest pain/tightness.   Gastrointestinal: Negative for abdominal pain, N/V.   Endocrine: Negative for cold/heat intolerance, unexplained weight loss/gain.   Genitourinary: Negative for flank pain, dysuria, hematuria.   Musculoskeletal: As in HPI   Skin: Negative for rash.    Neurological: Negative for numbness tingling  Psychiatric/Behavioral: Negative for agitation.  _____________________________________________________  PHYSICAL EXAMINATION:    Blood pressure 107/72, pulse 76, height 5' 8\" (1.727 m), weight 63.5 kg (140 lb), not currently " breastfeeding.    General: well developed and well nourished, alert, oriented times 3, and appears comfortable  HEENT: Benign, normocephalic, atraumatic  Cardiovascular: regular rate    Pulmonary: No wheezing or stridor  Abdomen: Soft, Nontender  Skin: No masses, erythema, lacerations, fluctation, ulcerations  Neurovascular: as per MSK exam below    MUSCULOSKELETAL EXAMINATION:    Left knee  Ambulates with normal gait  No bruising, swelling, deformity  TTP medial and lateral joint line  Passive ROM 0 - 110 with pain at terminal flexion, without crepitus  SILT all exposed distal distributions  2+ PT pulse      Large joint arthrocentesis: L knee  Universal Protocol:  Consent: Verbal consent obtained.  Consent given by: patient  Timeout called at: 11/29/2024 9:50 AM.  Patient understanding: patient states understanding of the procedure being performed  Patient identity confirmed: verbally with patient  Supporting Documentation  Indications: pain   Procedure Details  Location: knee - L knee  Preparation: Patient was prepped and draped in the usual sterile fashion  Needle size: 22 G  Ultrasound guidance: no  Approach: anterolateral  Medications administered: 3 mL sodium hyaluronate 60 MG/3ML    Patient tolerance: patient tolerated the procedure well with no immediate complications  Dressing:  Sterile dressing applied        _____________________________________________________  STUDIES REVIEWED:  Images personally reviewed by me today: no new images reviewed at today's visit.    Ag Cotton PA-C

## 2024-12-06 ENCOUNTER — HOSPITAL ENCOUNTER (EMERGENCY)
Facility: HOSPITAL | Age: 64
Discharge: HOME | End: 2024-12-06
Attending: EMERGENCY MEDICINE
Payer: COMMERCIAL

## 2024-12-06 VITALS
HEART RATE: 92 BPM | RESPIRATION RATE: 20 BRPM | TEMPERATURE: 97.7 F | DIASTOLIC BLOOD PRESSURE: 53 MMHG | OXYGEN SATURATION: 95 % | SYSTOLIC BLOOD PRESSURE: 110 MMHG

## 2024-12-06 DIAGNOSIS — R05.9 COUGH: Primary | ICD-10-CM

## 2024-12-06 LAB
FLUAV AG UPPER RESP QL IA.RAPID: NEGATIVE
FLUBV AG UPPER RESP QL IA.RAPID: NEGATIVE
SARS-COV+SARS-COV-2 AG RESP QL IA.RAPID: NEGATIVE

## 2024-12-06 PROCEDURE — 99284 EMERGENCY DEPT VISIT MOD MDM: CPT

## 2024-12-06 PROCEDURE — 99284 EMERGENCY DEPT VISIT MOD MDM: CPT | Performed by: NURSE PRACTITIONER

## 2024-12-06 PROCEDURE — 87811 SARS-COV-2 COVID19 W/OPTIC: CPT | Performed by: NURSE PRACTITIONER

## 2024-12-06 PROCEDURE — 87804 INFLUENZA ASSAY W/OPTIC: CPT | Performed by: NURSE PRACTITIONER

## 2024-12-06 RX ORDER — AMOXICILLIN 500 MG/1
500 CAPSULE ORAL 3 TIMES DAILY
Qty: 21 CAPSULE | Refills: 0 | Status: SHIPPED | OUTPATIENT
Start: 2024-12-06 | End: 2024-12-13

## 2024-12-06 NOTE — ED TRIAGE NOTES
"Ambulatory w/complaint of cough and cold symptoms which started \"the other day\"; took Delsym w/some relief; took Tylenol last night w/some relief; admits to sick contacts w/same complaints (also a pt in ED); denies N/V, fever and/or diarrhea;   "

## 2024-12-06 NOTE — ED PROVIDER NOTES
Time reflects when diagnosis was documented in both MDM as applicable and the Disposition within this note       Time User Action Codes Description Comment    12/6/2024 12:03 PM Conner Kumar Add [R05.9] Cough           ED Disposition       ED Disposition   Discharge    Condition   Stable    Date/Time   Fri Dec 6, 2024 12:03 PM    Comment   Mariola Dos Santos discharge to home/self care.                   Assessment & Plan       Medical Decision Making  Flu COVID-negative.  Reports of cough and fever.  Will treat empirically.    Amount and/or Complexity of Data Reviewed  Labs: ordered.    Risk  Prescription drug management.             Medications - No data to display    ED Risk Strat Scores                   Identification of Seniors at Risk      Flowsheet Row Most Recent Value   (ISAR) Identification of Seniors at Risk    Before the illness or injury that brought you to the Emergency, did you need someone to help you on a regular basis? 0 Filed at: 12/06/2024 1108   In the last 24 hours, have you needed more help than usual? 0 Filed at: 12/06/2024 1108   Have you been hospitalized for one or more nights during the past 6 months? 0 Filed at: 12/06/2024 1108   In general, do you see well? 0 Filed at: 12/06/2024 1108   In general, do you have serious problems with your memory? 0 Filed at: 12/06/2024 1108   Do you take more than three different medications every day? 1 Filed at: 12/06/2024 1108   ISAR Score 1 Filed at: 12/06/2024 1108                SBIRT 20yo+      Flowsheet Row Most Recent Value   Initial Alcohol Screen: US AUDIT-C     1. How often do you have a drink containing alcohol? 0 Filed at: 12/06/2024 1108   2. How many drinks containing alcohol do you have on a typical day you are drinking?  0 Filed at: 12/06/2024 1108   3b. FEMALE Any Age, or MALE 65+: How often do you have 4 or more drinks on one occassion? 0 Filed at: 12/06/2024 1108   Audit-C Score 0 Filed at: 12/06/2024 1108   TSERING: How many times in  "the past year have you...    Used an illegal drug or used a prescription medication for non-medical reasons? Never Filed at: 12/06/2024 1108                            History of Present Illness       Chief Complaint   Patient presents with    Cough     Ambulatory w/complaint of cough and cold symptoms which started \"the other day\"; took Delsym w/some relief; took Tylenol last night w/some relief; admits to sick contacts w/same complaints (also a pt in ED); denies N/V, fever and/or diarrhea;     Cold Like Symptoms       Past Medical History:   Diagnosis Date    Anxiety     Breast cancer (HCC) 2017    right    Cancer (HCC)     right breast  dx 04/2017    Cervical disc disorder of cervicothoracic region     Cervical radiculopathy     Cervicalgia     Cognitive impairment, mild, so stated     Depression     Major,recurrent    Diplopia     last assessed 12/23/13    Disc degeneration, lumbar     Disease of thyroid gland     hx of goiter, surgery done 1978    Edema of foot     Esophageal reflux     Fatigue     Fibromyalgia     Hemorrhage, anal or rectal     History of radiation therapy     Hx of radiation therapy     Treatments: Course C1, Plan ID Energy Fractions Dose per Fraction (cGy)total Dose delivered(cGy)elapsed days. R Breast 6x 28/28 180 5,040 39, R breast e 12E 5/5 200 1,000 7 Treatment dates: 7/20/17-09/05/17    Hx of radiation therapy     33 treatments    Hypotension     Hypothyroidism     Insomnia     Lab test positive for detection of COVID-19 virus 10/14/2022    Memory loss     Monoclonal gammopathy of undetermined significance     Myalgia     last assessed 07/08/16    Myositis     last assessed 07/08/16    Neck stiffness     Neuralgia     Neuritis     Pain syndrome, chronic     Palpitations     Paresthesia     Peripheral neuropathy     Polyneuropathy     Pseudodementia     Radiculopathy     12/23/13    Restless leg syndrome     Sacroiliitis (HCC)     SLE (systemic lupus erythematosus) ([facility])     last " assessed 13    Tachycardia     Viral warts     Vitamin B deficiency     Vitamin D deficiency     Vitamin D deficiency     Wears glasses       Past Surgical History:   Procedure Laterality Date    BLADDER SURGERY      bladder prolapse    BREAST BIOPSY Right     BREAST SURGERY Right     breast bx    COLONOSCOPY      age 50 normal    HYSTERECTOMY  2009    vaginal hysterectomy, rectocele repair    SD MASTECTOMY PARTIAL Right 2017    Procedure:  (NUCLEAR MED. 8 AM., NEEDLE LOCAL TO FOLLOW 8:30 AM.) SEGMENTAL MASTECTOMY BREAST NEEDLE PLACEMENT,  SENTINAL LYMPH NODE BIOPSY;  Surgeon: Mckay Lockett MD;  Location: [facility];  Service: General    REPAIR RECTOCELE      THYROID LOBECTOMY Left 1978    TONSILLECTOMY      US BREAST NEEDLE LOC RIGHT Right 2017    US GUIDED BREAST BIOPSY RIGHT COMPLETE Right 2017    US GUIDED THYROID BIOPSY  2019      Family History   Problem Relation Age of Onset    Heart disease Mother         CHF    Heart failure Mother     Cirrhosis Father     Esophageal cancer Brother 65    Lung cancer Brother     No Known Problems Brother     No Known Problems Brother     No Known Problems Brother     Breast cancer Neg Hx       Social History     Tobacco Use    Smoking status: Former     Current packs/day: 0.00     Average packs/day: 1.5 packs/day for 5.0 years (7.5 ttl pk-yrs)     Types: Cigarettes     Start date:      Quit date:      Years since quittin.9    Smokeless tobacco: Never    Tobacco comments:     Former quit 30 years ago   Vaping Use    Vaping status: Never Used   Substance Use Topics    Alcohol use: Not Currently     Alcohol/week: 1.0 standard drink of alcohol     Types: 1 Glasses of wine per week    Drug use: No      E-Cigarette/Vaping    E-Cigarette Use Never User       E-Cigarette/Vaping Substances    Nicotine No     THC No     CBD No     Flavoring No     Other No     Unknown No       I have reviewed and agree with the history as documented.      64-year-old female presenting to the emergency department with her significant other with reports of COVID exposure.  She feels generally unwell generalized body aches occasional cough.  She has been using acetaminophen with interval relief.      Cough  Associated symptoms: no chest pain, no diaphoresis, no ear pain, no eye discharge, no fever, no headaches, no rash, no shortness of breath, no sore throat and no wheezing        Review of Systems   Constitutional:  Negative for diaphoresis, fatigue and fever.   HENT:  Negative for congestion, ear pain, nosebleeds and sore throat.    Eyes:  Negative for photophobia, pain, discharge and visual disturbance.   Respiratory:  Positive for cough. Negative for choking, chest tightness, shortness of breath and wheezing.    Cardiovascular:  Negative for chest pain and palpitations.   Gastrointestinal:  Negative for abdominal distention, abdominal pain, diarrhea and vomiting.   Genitourinary:  Negative for dysuria, flank pain and frequency.   Musculoskeletal:  Positive for arthralgias. Negative for back pain, gait problem and joint swelling.   Skin:  Negative for color change and rash.   Neurological:  Negative for dizziness, syncope and headaches.   Psychiatric/Behavioral:  Negative for behavioral problems and confusion. The patient is not nervous/anxious.    All other systems reviewed and are negative.          Objective       ED Triage Vitals   Temperature Pulse Blood Pressure Respirations SpO2 Patient Position - Orthostatic VS   12/06/24 1104 12/06/24 1104 12/06/24 1104 12/06/24 1104 12/06/24 1104 12/06/24 1104   97.7 °F (36.5 °C) 92 110/53 20 95 % Sitting      Temp Source Heart Rate Source BP Location FiO2 (%) Pain Score    12/06/24 1104 12/06/24 1104 12/06/24 1104 -- 12/06/24 1107    Temporal Monitor Left arm  No Pain      Vitals      Date and Time Temp Pulse SpO2 Resp BP Pain Score FACES Pain Rating User   12/06/24 1107 -- -- -- -- -- No Pain -- TB   12/06/24 1104  97.7 °F (36.5 °C) 92 95 % 20 110/53 -- -- [facility]            Physical Exam  Vitals and nursing note reviewed.   Constitutional:       General: She is not in acute distress.     Appearance: She is well-developed. She is not ill-appearing or toxic-appearing.   HENT:      Head: Normocephalic and atraumatic.      Nose: No rhinorrhea.      Mouth/Throat:      Mouth: Mucous membranes are moist.      Dentition: Normal dentition.   Eyes:      General:         Right eye: No discharge.         Left eye: No discharge.   Cardiovascular:      Rate and Rhythm: Normal rate and regular rhythm.   Pulmonary:      Effort: Pulmonary effort is normal. No accessory muscle usage or respiratory distress.   Abdominal:      General: There is no distension.      Tenderness: There is no guarding.   Musculoskeletal:         General: Normal range of motion.      Cervical back: Normal range of motion and neck supple. No rigidity.   Skin:     General: Skin is warm and dry.   Neurological:      Mental Status: She is alert and oriented to person, place, and time.      Coordination: Coordination normal.   Psychiatric:         Behavior: Behavior is cooperative.         Results Reviewed       Procedure Component Value Units Date/Time    FLU/COVID Rapid Antigen (30 min. TAT) - Preferred screening test in ED [[id number]]  (Normal) Collected: 12/06/24 1133    Lab Status: Final result Specimen: Nares from Nose Updated: 12/06/24 1155     SARS COV Rapid Antigen Negative     Influenza A Rapid Antigen Negative     Influenza B Rapid Antigen Negative    Narrative:      This test has been performed using the [employer] Margarita 2 FLU+SARS Antigen test under the Emergency Use Authorization (EUA). This test has been validated by the  and verified by the performing laboratory. The Margarita uses lateral flow immunofluorescent sandwich assay to detect SARS-COV, Influenza A and Influenza B Antigen.     The Quidel Margarita 2 SARS Antigen test does not differentiate between  SARS-CoV and SARS-CoV-2.     Negative results are presumptive and may be confirmed with a molecular assay, if necessary, for patient management. Negative results do not rule out SARS-CoV-2 or influenza infection and should not be used as the sole basis for treatment or patient management decisions. A negative test result may occur if the level of antigen in a sample is below the limit of detection of this test.     Positive results are indicative of the presence of viral antigens, but do not rule out bacterial infection or co-infection with other viruses.     All test results should be used as an adjunct to clinical observations and other information available to the provider.    FOR PEDIATRIC PATIENTS - copy/paste COVID Guidelines URL to browser: [email or web address]            No orders to display       Procedures    ED Medication and Procedure Management   Prior to Admission Medications   Prescriptions Last Dose Informant Patient Reported? Taking?   DULoxetine (CYMBALTA) 30 mg delayed release capsule  Self No No   Sig: Take 1 capsule (30 mg total) by mouth every morning   DULoxetine (CYMBALTA) 60 mg delayed release capsule  Self No No   Sig: Take 1 capsule (60 mg total) by mouth daily at bedtime   gabapentin (NEURONTIN) 100 mg capsule  Self Yes No   Sig: Take 100 mg by mouth if needed   levothyroxine 100 mcg tablet  Self No No   Sig: Take 1 tablet (100 mcg total) by mouth daily in the early morning      Facility-Administered Medications: None     Discharge Medication List as of 12/6/2024 12:03 PM        START taking these medications    Details   amoxicillin (AMOXIL) 500 mg capsule Take 1 capsule (500 mg total) by mouth 3 (three) times a day for 7 days, Starting Fri 12/6/2024, Until Fri 12/13/2024, Normal           CONTINUE these medications which have NOT CHANGED    Details   !! DULoxetine (CYMBALTA) 30 mg delayed release capsule Take 1 capsule (30 mg total)  by mouth every morning, Starting Thu 5/23/2024, Normal      !! DULoxetine (CYMBALTA) 60 mg delayed release capsule Take 1 capsule (60 mg total) by mouth daily at bedtime, Starting Thu 5/23/2024, Normal      gabapentin (NEURONTIN) 100 mg capsule Take 100 mg by mouth if needed, Historical Med      levothyroxine 100 mcg tablet Take 1 tablet (100 mcg total) by mouth daily in the early morning, Starting Wed 10/2/2024, Normal       !! - Potential duplicate medications found. Please discuss with provider.        No discharge procedures on file.  ED SEPSIS DOCUMENTATION   Time reflects when diagnosis was documented in both MDM as applicable and the Disposition within this note       Time User Action Codes Description Comment    12/6/2024 12:03 PM Conner Kumar Add [R05.9] Cough                  RAFAT Mccann  12/06/24 [id number]

## 2024-12-07 ENCOUNTER — VBI (OUTPATIENT)
Dept: ADMINISTRATIVE | Facility: OTHER | Age: 64
End: 2024-12-07

## 2024-12-07 NOTE — TELEPHONE ENCOUNTER
12/07/24 11:46 AM     Chart reviewed for CRC: Colonoscopy was/were not submitted to the patient's insurance.     Lorie Hu MA   [facility]

## 2024-12-11 ENCOUNTER — HOSPITAL ENCOUNTER (OUTPATIENT)
Dept: INFUSION CENTER | Facility: CLINIC | Age: 64
Discharge: HOME | End: 2024-12-11
Payer: COMMERCIAL

## 2024-12-11 DIAGNOSIS — E53.8 B12 DEFICIENCY: Primary | ICD-10-CM

## 2024-12-11 PROCEDURE — 96372 THER/PROPH/DIAG INJ SC/IM: CPT

## 2024-12-11 RX ORDER — CYANOCOBALAMIN 1000 UG/ML
1000 INJECTION, SOLUTION INTRAMUSCULAR; SUBCUTANEOUS ONCE
OUTPATIENT
Start: 2024-12-25

## 2024-12-11 RX ORDER — CYANOCOBALAMIN 1000 UG/ML
1000 INJECTION, SOLUTION INTRAMUSCULAR; SUBCUTANEOUS ONCE
Status: COMPLETED | OUTPATIENT
Start: 2024-12-11 | End: 2024-12-11

## 2024-12-11 RX ADMIN — CYANOCOBALAMIN 1000 MCG: 1000 INJECTION, SOLUTION INTRAMUSCULAR; SUBCUTANEOUS at 11:54

## 2024-12-11 NOTE — PROGRESS NOTES
Pt presents for B12 injection. Pt offers no complaints. Injection administered in RUE, tolerated well. AVS declined. Next appointment 12/26 at 1200.

## 2024-12-23 DIAGNOSIS — E53.8 B12 DEFICIENCY: Primary | ICD-10-CM

## 2024-12-23 RX ORDER — CYANOCOBALAMIN 1000 UG/ML
1000 INJECTION, SOLUTION INTRAMUSCULAR; SUBCUTANEOUS ONCE
Status: CANCELLED | OUTPATIENT
Start: 2024-12-26

## 2024-12-23 RX ORDER — CYANOCOBALAMIN 1000 UG/ML
1000 INJECTION, SOLUTION INTRAMUSCULAR; SUBCUTANEOUS ONCE
Status: CANCELLED | OUTPATIENT
Start: 2024-12-25

## 2024-12-26 ENCOUNTER — HOSPITAL ENCOUNTER (OUTPATIENT)
Dept: INFUSION CENTER | Facility: CLINIC | Age: 64
Discharge: HOME | End: 2024-12-26
Payer: COMMERCIAL

## 2024-12-26 DIAGNOSIS — E53.8 B12 DEFICIENCY: Primary | ICD-10-CM

## 2024-12-26 PROCEDURE — 96372 THER/PROPH/DIAG INJ SC/IM: CPT

## 2024-12-26 RX ORDER — CYANOCOBALAMIN 1000 UG/ML
1000 INJECTION, SOLUTION INTRAMUSCULAR; SUBCUTANEOUS ONCE
Status: CANCELLED | OUTPATIENT
Start: 2025-01-09

## 2024-12-26 RX ORDER — CYANOCOBALAMIN 1000 UG/ML
1000 INJECTION, SOLUTION INTRAMUSCULAR; SUBCUTANEOUS ONCE
Status: COMPLETED | OUTPATIENT
Start: 2024-12-26 | End: 2024-12-26

## 2024-12-26 RX ADMIN — CYANOCOBALAMIN 1000 MCG: 1000 INJECTION, SOLUTION INTRAMUSCULAR; SUBCUTANEOUS at 11:45

## 2024-12-26 NOTE — PROGRESS NOTES
Pt presents for B12 injection. Pt offers no complaints. Injection administered in R deltoid, tolerated well. AVS declined. Next appointment 1/8/2025 at 1:30 PM.

## 2025-01-01 ENCOUNTER — HOSPITAL ENCOUNTER (EMERGENCY)
Facility: HOSPITAL | Age: 65
Discharge: HOME | End: 2025-01-01
Payer: COMMERCIAL

## 2025-01-01 VITALS
HEART RATE: 70 BPM | OXYGEN SATURATION: 96 % | TEMPERATURE: 98 F | BODY MASS INDEX: 21.45 KG/M2 | RESPIRATION RATE: 16 BRPM | WEIGHT: 141.09 LBS | SYSTOLIC BLOOD PRESSURE: 109 MMHG | DIASTOLIC BLOOD PRESSURE: 57 MMHG

## 2025-01-01 DIAGNOSIS — J10.1 INFLUENZA A: Primary | ICD-10-CM

## 2025-01-01 LAB
ALBUMIN SERPL BCG-MCNC: 3.8 G/DL (ref 3.5–5)
ALP SERPL-CCNC: 49 U/L (ref 34–104)
ALT SERPL W P-5'-P-CCNC: 7 U/L (ref 7–52)
ANION GAP SERPL CALCULATED.3IONS-SCNC: 7 MMOL/L (ref 4–13)
AST SERPL W P-5'-P-CCNC: 12 U/L (ref 13–39)
BASOPHILS # BLD AUTO: 0.02 THOUSANDS/ΜL (ref 0–0.1)
BASOPHILS NFR BLD AUTO: 1 % (ref 0–1)
BILIRUB SERPL-MCNC: 0.38 MG/DL (ref 0.2–1)
BUN SERPL-MCNC: 13 MG/DL (ref 5–25)
CALCIUM SERPL-MCNC: 8.4 MG/DL (ref 8.4–10.2)
CHLORIDE SERPL-SCNC: 107 MMOL/L (ref 96–108)
CO2 SERPL-SCNC: 25 MMOL/L (ref 21–32)
CREAT SERPL-MCNC: 0.68 MG/DL (ref 0.6–1.3)
EOSINOPHIL # BLD AUTO: 0.03 THOUSAND/ΜL (ref 0–0.61)
EOSINOPHIL NFR BLD AUTO: 1 % (ref 0–6)
ERYTHROCYTE [DISTWIDTH] IN BLOOD BY AUTOMATED COUNT: 12.3 % (ref 11.6–15.1)
FLUAV RNA RESP QL NAA+PROBE: POSITIVE
FLUBV RNA RESP QL NAA+PROBE: NEGATIVE
GFR SERPL CREATININE-BSD FRML MDRD: 92 ML/MIN/1.73SQ M
GLUCOSE SERPL-MCNC: 100 MG/DL (ref 65–140)
HCT VFR BLD AUTO: 39 % (ref 34.8–46.1)
HGB BLD-MCNC: 13.1 G/DL (ref 11.5–15.4)
IMM GRANULOCYTES # BLD AUTO: 0.02 THOUSAND/UL (ref 0–0.2)
IMM GRANULOCYTES NFR BLD AUTO: 1 % (ref 0–2)
LIPASE SERPL-CCNC: 47 U/L (ref 11–82)
LYMPHOCYTES # BLD AUTO: 0.29 THOUSANDS/ΜL (ref 0.6–4.47)
LYMPHOCYTES NFR BLD AUTO: 7 % (ref 14–44)
MCH RBC QN AUTO: 30.5 PG (ref 26.8–34.3)
MCHC RBC AUTO-ENTMCNC: 33.6 G/DL (ref 31.4–37.4)
MCV RBC AUTO: 91 FL (ref 82–98)
MONOCYTES # BLD AUTO: 0.64 THOUSAND/ΜL (ref 0.17–1.22)
MONOCYTES NFR BLD AUTO: 15 % (ref 4–12)
NEUTROPHILS # BLD AUTO: 3.32 THOUSANDS/ΜL (ref 1.85–7.62)
NEUTS SEG NFR BLD AUTO: 75 % (ref 43–75)
NRBC BLD AUTO-RTO: 0 /100 WBCS
PLATELET # BLD AUTO: 186 THOUSANDS/UL (ref 149–390)
PMV BLD AUTO: 9.3 FL (ref 8.9–12.7)
POTASSIUM SERPL-SCNC: 3.8 MMOL/L (ref 3.5–5.3)
PROT SERPL-MCNC: 7 G/DL (ref 6.4–8.4)
RBC # BLD AUTO: 4.29 MILLION/UL (ref 3.81–5.12)
RSV RNA RESP QL NAA+PROBE: NEGATIVE
SARS-COV-2 RNA RESP QL NAA+PROBE: NEGATIVE
SODIUM SERPL-SCNC: 139 MMOL/L (ref 135–147)
WBC # BLD AUTO: 4.32 THOUSAND/UL (ref 4.31–10.16)

## 2025-01-01 PROCEDURE — 85025 COMPLETE CBC W/AUTO DIFF WBC: CPT

## 2025-01-01 PROCEDURE — 0241U HB NFCT DS VIR RESP RNA 4 TRGT: CPT

## 2025-01-01 PROCEDURE — 96375 TX/PRO/DX INJ NEW DRUG ADDON: CPT

## 2025-01-01 PROCEDURE — 80053 COMPREHEN METABOLIC PANEL: CPT

## 2025-01-01 PROCEDURE — 99284 EMERGENCY DEPT VISIT MOD MDM: CPT | Performed by: NURSE PRACTITIONER

## 2025-01-01 PROCEDURE — 99284 EMERGENCY DEPT VISIT MOD MDM: CPT

## 2025-01-01 PROCEDURE — 36415 COLL VENOUS BLD VENIPUNCTURE: CPT

## 2025-01-01 PROCEDURE — 83690 ASSAY OF LIPASE: CPT

## 2025-01-01 PROCEDURE — 96365 THER/PROPH/DIAG IV INF INIT: CPT

## 2025-01-01 RX ORDER — KETOROLAC TROMETHAMINE 30 MG/ML
15 INJECTION, SOLUTION INTRAMUSCULAR; INTRAVENOUS ONCE
Status: COMPLETED | OUTPATIENT
Start: 2025-01-01 | End: 2025-01-01

## 2025-01-01 RX ORDER — OSELTAMIVIR PHOSPHATE 75 MG/1
75 CAPSULE ORAL EVERY 12 HOURS
Qty: 10 CAPSULE | Refills: 0 | Status: SHIPPED | OUTPATIENT
Start: 2025-01-01 | End: 2025-01-06

## 2025-01-01 RX ORDER — ACETAMINOPHEN 325 MG/1
975 TABLET ORAL ONCE
Status: COMPLETED | OUTPATIENT
Start: 2025-01-01 | End: 2025-01-01

## 2025-01-01 RX ORDER — SODIUM CHLORIDE, SODIUM GLUCONATE, SODIUM ACETATE, POTASSIUM CHLORIDE, MAGNESIUM CHLORIDE, SODIUM PHOSPHATE, DIBASIC, AND POTASSIUM PHOSPHATE .53; .5; .37; .037; .03; .012; .00082 G/100ML; G/100ML; G/100ML; G/100ML; G/100ML; G/100ML; G/100ML
1000 INJECTION, SOLUTION INTRAVENOUS ONCE
Status: COMPLETED | OUTPATIENT
Start: 2025-01-01 | End: 2025-01-01

## 2025-01-01 RX ADMIN — ACETAMINOPHEN 975 MG: 325 TABLET, FILM COATED ORAL at 07:37

## 2025-01-01 RX ADMIN — KETOROLAC TROMETHAMINE 15 MG: 30 INJECTION, SOLUTION INTRAMUSCULAR at 07:37

## 2025-01-01 RX ADMIN — SODIUM CHLORIDE, SODIUM GLUCONATE, SODIUM ACETATE, POTASSIUM CHLORIDE, MAGNESIUM CHLORIDE, SODIUM PHOSPHATE, DIBASIC, AND POTASSIUM PHOSPHATE 1000 ML: .53; .5; .37; .037; .03; .012; .00082 INJECTION, SOLUTION INTRAVENOUS at 07:37

## 2025-01-01 NOTE — ED PROVIDER NOTES
Time reflects when diagnosis was documented in both MDM as applicable and the Disposition within this note       Time User Action Codes Description Comment    1/1/2025  7:36 AM Conner Kumar Add [J10.1] Influenza A           ED Disposition       ED Disposition   Discharge    Condition   Stable    Date/Time   Wed Jan 1, 2025  7:36 AM    Comment   Mariola Dos Santos discharge to home/self care.                   Assessment & Plan       Medical Decision Making  Influenza positive within the window of Tamiflu administration.  Provided prescription for this.    Risk  OTC drugs.  Prescription drug management.             Medications   multi-electrolyte (ISOLYTE-S PH 7.4) bolus 1,000 mL (0 mL Intravenous Stopped 1/1/25 0830)   ketorolac (TORADOL) injection 15 mg (15 mg Intravenous Given 1/1/25 0737)   acetaminophen (TYLENOL) tablet 975 mg (975 mg Oral Given 1/1/25 0737)       ED Risk Strat Scores                                              History of Present Illness       Chief Complaint   Patient presents with    Vomiting     C/o nausea and vomiting for 2 days.        Past Medical History:   Diagnosis Date    Anxiety     Breast cancer (HCC) 2017    right    Cancer (HCC)     right breast  dx 04/2017    Cervical disc disorder of cervicothoracic region     Cervical radiculopathy     Cervicalgia     Cognitive impairment, mild, so stated     Depression     Major,recurrent    Diplopia     last assessed 12/23/13    Disc degeneration, lumbar     Disease of thyroid gland     hx of goiter, surgery done 1978    Edema of foot     Esophageal reflux     Fatigue     Fibromyalgia     Hemorrhage, anal or rectal     History of radiation therapy     Hx of radiation therapy     Treatments: Course C1, Plan ID Energy Fractions Dose per Fraction (cGy)total Dose delivered(cGy)elapsed days. R Breast 6x 28/28 180 5,040 39, R breast e 12E 5/5 200 1,000 7 Treatment dates: 7/20/17-09/05/17    Hx of radiation therapy     33 treatments    Hypotension      Hypothyroidism     Insomnia     Lab test positive for detection of COVID-19 virus 10/14/2022    Memory loss     Monoclonal gammopathy of undetermined significance     Myalgia     last assessed 07/08/16    Myositis     last assessed 07/08/16    Neck stiffness     Neuralgia     Neuritis     Pain syndrome, chronic     Palpitations     Paresthesia     Peripheral neuropathy     Polyneuropathy     Pseudodementia     Radiculopathy     12/23/13    Restless leg syndrome     Sacroiliitis (HCC)     SLE (systemic lupus erythematosus) (HCC)     last assessed 12/23/13    Tachycardia     Viral warts     Vitamin B deficiency     Vitamin D deficiency     Vitamin D deficiency     Wears glasses       Past Surgical History:   Procedure Laterality Date    BLADDER SURGERY      bladder prolapse    BREAST BIOPSY Right     BREAST SURGERY Right     breast bx    COLONOSCOPY      age 50 normal    HYSTERECTOMY  2009    vaginal hysterectomy, rectocele repair    MA MASTECTOMY PARTIAL Right 5/24/2017    Procedure:  (NUCLEAR MED. 8 AM., NEEDLE LOCAL TO FOLLOW 8:30 AM.) SEGMENTAL MASTECTOMY BREAST NEEDLE PLACEMENT,  SENTINAL LYMPH NODE BIOPSY;  Surgeon: Mckay Lockett MD;  Location: [facility] OR;  Service: General    REPAIR RECTOCELE      THYROID LOBECTOMY Left 1978    TONSILLECTOMY      US BREAST NEEDLE LOC RIGHT Right 5/24/2017    US GUIDED BREAST BIOPSY RIGHT COMPLETE Right 4/20/2017    US GUIDED THYROID BIOPSY  4/25/2019      Family History   Problem Relation Age of Onset    Heart disease Mother         CHF    Heart failure Mother     Cirrhosis Father     Esophageal cancer Brother 65    Lung cancer Brother     No Known Problems Brother     No Known Problems Brother     No Known Problems Brother     Breast cancer Neg Hx       Social History     Tobacco Use    Smoking status: Former     Current packs/day: 0.00     Average packs/day: 1.5 packs/day for 5.0 years (7.5 ttl pk-yrs)     Types: Cigarettes     Start date: 1970     Quit date: 1975      Years since quittin.0    Smokeless tobacco: Never    Tobacco comments:     Former quit 30 years ago   Vaping Use    Vaping status: Never Used   Substance Use Topics    Alcohol use: Not Currently     Alcohol/week: 1.0 standard drink of alcohol     Types: 1 Glasses of wine per week    Drug use: No      E-Cigarette/Vaping    E-Cigarette Use Never User       E-Cigarette/Vaping Substances    Nicotine No     THC No     CBD No     Flavoring No     Other No     Unknown No       I have reviewed and agree with the history as documented.     64-year-old female presenting to the emergency department with chief complaint of generalized bodyaches chills cough bilateral ear pain.  She has had the symptoms for less than 1 day.  She reports her significant other has the same symptoms for the last few days.  Patient denies any chest pain no shortness of breath no difficulty urinating no diarrhea.          Review of Systems   Constitutional:  Positive for chills and fever. Negative for diaphoresis and fatigue.   HENT:  Negative for congestion, ear pain, nosebleeds and sore throat.    Eyes:  Negative for photophobia, pain, discharge and visual disturbance.   Respiratory:  Positive for cough. Negative for choking, chest tightness, shortness of breath and wheezing.    Cardiovascular:  Negative for chest pain and palpitations.   Gastrointestinal:  Negative for abdominal distention, abdominal pain, diarrhea and vomiting.   Genitourinary:  Negative for dysuria, flank pain and frequency.   Musculoskeletal:  Positive for arthralgias. Negative for back pain, gait problem and joint swelling.   Skin:  Negative for color change and rash.   Neurological:  Negative for dizziness, syncope and headaches.   Psychiatric/Behavioral:  Negative for behavioral problems and confusion. The patient is not nervous/anxious.    All other systems reviewed and are negative.          Objective       ED Triage Vitals   Temperature Pulse Blood Pressure  Respirations SpO2 Patient Position - Orthostatic VS   01/01/25 0552 01/01/25 0552 01/01/25 0552 01/01/25 0552 01/01/25 0552 01/01/25 0552   98 °F (36.7 °C) 95 117/58 18 95 % Sitting      Temp Source Heart Rate Source BP Location FiO2 (%) Pain Score    01/01/25 0552 01/01/25 0552 01/01/25 0552 -- 01/01/25 0737    Oral Monitor Left arm  9      Vitals      Date and Time Temp Pulse SpO2 Resp BP Pain Score FACES Pain Rating User   01/01/25 0800 -- 70 96 % 16 109/57 -- -- LM   01/01/25 0737 -- -- -- -- -- 9 -- LM   01/01/25 0630 -- 79 95 % -- 107/57 -- -- KB   01/01/25 0615 -- 84 96 % 18 115/59 -- -- KB   01/01/25 0552 98 °F (36.7 °C) 95 95 % 18 117/58 -- -- TE            Physical Exam  Vitals and nursing note reviewed.   Constitutional:       General: She is not in acute distress.     Appearance: She is well-developed. She is not ill-appearing or toxic-appearing.   HENT:      Head: Normocephalic and atraumatic.      Nose: No rhinorrhea.      Mouth/Throat:      Mouth: Mucous membranes are moist.      Dentition: Normal dentition.   Eyes:      General:         Right eye: No discharge.         Left eye: No discharge.   Cardiovascular:      Rate and Rhythm: Normal rate and regular rhythm.   Pulmonary:      Effort: Pulmonary effort is normal. No accessory muscle usage or respiratory distress.   Abdominal:      General: There is no distension.      Tenderness: There is no guarding.   Musculoskeletal:         General: Normal range of motion.      Cervical back: Normal range of motion and neck supple. No rigidity.   Skin:     General: Skin is warm and dry.   Neurological:      Mental Status: She is alert and oriented to person, place, and time.      Coordination: Coordination normal.   Psychiatric:         Behavior: Behavior is cooperative.         Results Reviewed       Procedure Component Value Units Date/Time    FLU/RSV/COVID - if FLU/RSV clinically relevant (2hr TAT) [[id number]]  (Abnormal) Collected: 01/01/25 0604    Lab  Status: Final result Specimen: Nares from Nose Updated: 01/01/25 0655     SARS-CoV-2 Negative     INFLUENZA A PCR Positive     INFLUENZA B PCR Negative     RSV PCR Negative    Narrative:      This test has been performed using the CoV-2/Flu/RSV plus assay on the [employer] platform. This test has been validated by the  and verified by the performing laboratory.     This test is designed to amplify and detect the following: nucleocapsid (N), envelope (E), and RNA-dependent RNA polymerase (RdRP) genes of the SARS-CoV-2 genome; matrix (M), basic polymerase (PB2), and acidic protein (PA) segments of the influenza A genome; matrix (M) and non-structural protein (NS) segments of the influenza B genome, and the nucleocapsid genes of RSV A and RSV B.     Positive results are indicative of the presence of Flu A, Flu B, RSV, and/or SARS-CoV-2 RNA. Positive results for SARS-CoV-2 or suspected novel influenza should be reported to state, local, or federal health departments according to local reporting requirements.      All results should be assessed in conjunction with clinical presentation and other laboratory markers for clinical management.     FOR PEDIATRIC PATIENTS - copy/paste COVID Guidelines URL to browser: [email or web address]       Comprehensive metabolic panel [[id number]]  (Abnormal) Collected: 01/01/25 0604    Lab Status: Final result Specimen: Blood from Arm, Right Updated: 01/01/25 0635     Sodium 139 mmol/L      Potassium 3.8 mmol/L      Chloride 107 mmol/L      CO2 25 mmol/L      ANION GAP 7 mmol/L      BUN 13 mg/dL      Creatinine 0.68 mg/dL      Glucose 100 mg/dL      Calcium 8.4 mg/dL      AST 12 U/L      ALT 7 U/L      Alkaline Phosphatase 49 U/L      Total Protein 7.0 g/dL      Albumin 3.8 g/dL      Total Bilirubin 0.38 mg/dL      eGFR 92 ml/min/1.73sq m     Narrative:      National Kid[employer] Disease Foundation guidelines for Chronic  Kidney Disease (CKD):     Stage 1 with normal or high GFR (GFR > 90 mL/min/1.73 square meters)    Stage 2 Mild CKD (GFR = 60-89 mL/min/1.73 square meters)    Stage 3A Moderate CKD (GFR = 45-59 mL/min/1.73 square meters)    Stage 3B Moderate CKD (GFR = 30-44 mL/min/1.73 square meters)    Stage 4 Severe CKD (GFR = 15-29 mL/min/1.73 square meters)    Stage 5 End Stage CKD (GFR <15 mL/min/1.73 square meters)  Note: GFR calculation is accurate only with a steady state creatinine    Lipase [[id number]]  (Normal) Collected: 01/01/25 0604    Lab Status: Final result Specimen: Blood from Arm, Right Updated: 01/01/25 0635     Lipase 47 u/L     CBC and differential [[id number]]  (Abnormal) Collected: 01/01/25 0604    Lab Status: Final result Specimen: Blood from Arm, Right Updated: 01/01/25 0617     WBC 4.32 Thousand/uL      RBC 4.29 Million/uL      Hemoglobin 13.1 g/dL      Hematocrit 39.0 %      MCV 91 fL      MCH 30.5 pg      MCHC 33.6 g/dL      RDW 12.3 %      MPV 9.3 fL      Platelets 186 Thousands/uL      nRBC 0 /100 WBCs      Segmented % 75 %      Immature Grans % 1 %      Lymphocytes % 7 %      Monocytes % 15 %      Eosinophils Relative 1 %      Basophils Relative 1 %      Absolute Neutrophils 3.32 Thousands/µL      Absolute Immature Grans 0.02 Thousand/uL      Absolute Lymphocytes 0.29 Thousands/µL      Absolute Monocytes 0.64 Thousand/µL      Eosinophils Absolute 0.03 Thousand/µL      Basophils Absolute 0.02 Thousands/µL             No orders to display       Procedures    ED Medication and Procedure Management   Prior to Admission Medications   Prescriptions Last Dose Informant Patient Reported? Taking?   DULoxetine (CYMBALTA) 30 mg delayed release capsule  Self No No   Sig: Take 1 capsule (30 mg total) by mouth every morning   DULoxetine (CYMBALTA) 60 mg delayed release capsule  Self No No   Sig: Take 1 capsule (60 mg total) by mouth daily at bedtime   gabapentin (NEURONTIN) 100 mg capsule  Self Yes No   Sig: Take  100 mg by mouth if needed   levothyroxine 100 mcg tablet  Self No No   Sig: Take 1 tablet (100 mcg total) by mouth daily in the early morning      Facility-Administered Medications: None     Discharge Medication List as of 1/1/2025  7:37 AM        START taking these medications    Details   oseltamivir (TAMIFLU) 75 mg capsule Take 1 capsule (75 mg total) by mouth every 12 (twelve) hours for 5 days, Starting Wed 1/1/2025, Until Mon 1/6/2025, Normal           CONTINUE these medications which have NOT CHANGED    Details   !! DULoxetine (CYMBALTA) 30 mg delayed release capsule Take 1 capsule (30 mg total) by mouth every morning, Starting Thu 5/23/2024, Normal      !! DULoxetine (CYMBALTA) 60 mg delayed release capsule Take 1 capsule (60 mg total) by mouth daily at bedtime, Starting Thu 5/23/2024, Normal      gabapentin (NEURONTIN) 100 mg capsule Take 100 mg by mouth if needed, Historical Med      levothyroxine 100 mcg tablet Take 1 tablet (100 mcg total) by mouth daily in the early morning, Starting Wed 10/2/2024, Normal       !! - Potential duplicate medications found. Please discuss with provider.        No discharge procedures on file.  ED SEPSIS DOCUMENTATION   Time reflects when diagnosis was documented in both MDM as applicable and the Disposition within this note       Time User Action Codes Description Comment    1/1/2025  7:36 AM Conner Kumar Add [J10.1] Influenza A                  RAFAT Mccann  01/01/25 2281

## 2025-01-03 ENCOUNTER — TELEPHONE (OUTPATIENT)
Dept: INFUSION CENTER | Facility: CLINIC | Age: 65
End: 2025-01-03

## 2025-01-03 NOTE — TELEPHONE ENCOUNTER
Patient called inquiring about when her next appointment for B-12 is. Patient informed that her appointment is on 1/8/25 @ 2195

## 2025-01-07 ENCOUNTER — TELEPHONE (OUTPATIENT)
Dept: HEMATOLOGY ONCOLOGY | Facility: CLINIC | Age: 65
End: 2025-01-07

## 2025-01-07 NOTE — TELEPHONE ENCOUNTER
----- Message from Lexie MATT sent at 1/7/2025  9:42 AM EST -----  Pt is scheduled with us on 1/8/25, There are currently no orders. Has something changed?

## 2025-01-07 NOTE — TELEPHONE ENCOUNTER
Per 7/25 note & infusion telephone scheduling encounter, patient to get weekly B12 x4 and then every other week x8    Weekly B12 given on the following dates:  7/31 8/21 8/28 9/4    (There seems to have been some cancellations/no shows in between the 7/31 and 8/21)    Every other week B12 given on the following dates:  9/18  10/2  10/16  10/30  11/13  11/27  12/11  12/26    From my understanding, patient's B12 is completed

## 2025-01-08 ENCOUNTER — HOSPITAL ENCOUNTER (OUTPATIENT)
Dept: INFUSION CENTER | Facility: CLINIC | Age: 65
Discharge: HOME | End: 2025-01-08

## 2025-01-30 DIAGNOSIS — M79.7 FIBROMYALGIA: ICD-10-CM

## 2025-01-30 RX ORDER — DULOXETIN HYDROCHLORIDE 60 MG/1
60 CAPSULE, DELAYED RELEASE ORAL
Qty: 90 CAPSULE | Refills: 0 | Status: SHIPPED | OUTPATIENT
Start: 2025-01-30

## 2025-01-30 RX ORDER — DULOXETIN HYDROCHLORIDE 30 MG/1
30 CAPSULE, DELAYED RELEASE ORAL EVERY MORNING
Qty: 90 CAPSULE | Refills: 0 | Status: SHIPPED | OUTPATIENT
Start: 2025-01-30

## 2025-01-30 NOTE — TELEPHONE ENCOUNTER
Reason for call:   [x] Refill   [] Prior Auth  [] Other:     Office:   [x] PCP/Provider - Billy Arroyo,   [] Specialty/Provider -     Medication: DULoxetine (CYMBALTA) 30 mg and DULoxetine (CYMBALTA) 60 MG    [facility]    Does the patient have enough for 3 days?   [x] Yes   [] No - Send as HP to POD

## 2025-02-21 ENCOUNTER — TELEPHONE (OUTPATIENT)
Dept: NEUROLOGY | Facility: CLINIC | Age: 65
End: 2025-02-21

## 2025-02-28 ENCOUNTER — OFFICE VISIT (OUTPATIENT)
Dept: NEUROLOGY | Facility: CLINIC | Age: 65
End: 2025-02-28
Payer: COMMERCIAL

## 2025-02-28 VITALS
BODY MASS INDEX: 21.45 KG/M2 | SYSTOLIC BLOOD PRESSURE: 108 MMHG | OXYGEN SATURATION: 98 % | DIASTOLIC BLOOD PRESSURE: 76 MMHG | HEART RATE: 77 BPM | HEIGHT: 68 IN

## 2025-02-28 DIAGNOSIS — D47.2 MONOCLONAL GAMMOPATHY OF UNDETERMINED SIGNIFICANCE: ICD-10-CM

## 2025-02-28 DIAGNOSIS — E53.8 B12 DEFICIENCY: ICD-10-CM

## 2025-02-28 DIAGNOSIS — M79.7 FIBROMYALGIA: ICD-10-CM

## 2025-02-28 DIAGNOSIS — M54.12 CERVICAL RADICULOPATHY AT C7: Primary | ICD-10-CM

## 2025-02-28 PROCEDURE — 99214 OFFICE O/P EST MOD 30 MIN: CPT | Performed by: PSYCHIATRY & NEUROLOGY

## 2025-02-28 NOTE — PROGRESS NOTES
Neurology Ambulatory Visit  Name: Mariola Dos Santos       : 1960       MRN: [id number]   Encounter Provider: Billy Arroyo MD   Encounter Date: 2025  Encounter department: [facility]      Mariola Dos Santos is a 64 y.o. female.       Assessment & Plan  Cervical radiculopathy at C7    Orders:    Ambulatory referral to Spine & Pain Management; Future  Patient has multiple cervical spondylotic degenerative changes most pronounced at C3-C4 given that she continues to have pain and has tried physical therapy in the past without much relief she is already on Cymbalta, and Neurontin she has been taking Neurontin as needed advised her that she can take it at nighttime on a daily basis I am going to have her see a pain specialist to see if she would be candidate for any cervical epidural injections patient is not keen to see a spine surgeon,    She is in follow-up with the hematologist oncologist regarding her B12 deficiency and is on infusion and also follows up with them for her MGUS.      Fibromyalgia       Patient is on Cymbalta and Neurontin is also in follow-up with the physiatrist, she is has a left knee pain and has a minuscule tear in the left knee and is in follow-up with the orthopedic surgeon  B12 deficiency       Management as per heme-onc  Monoclonal gammopathy of undetermined significance         Management as per heme-onc    Subjective:  Chief Complaint   Patient presents with    Cervical radiculopathy at C7       HISTORY OF PRESENT ILLNESS    Patient is here in follow-up for her chronic pain syndrome related to her fibromyalgia, restless leg symptoms and cervical radiculopathy she has followed up with Dr. Cody physiatrist since her last visit she tells me that she continues to have neck pain and is 6-7 on 10 radiating into her arms she has had an MRI of the C-spine that shows that she has cervical DJD she is not keen to see a spine surgeon she has seen a physical  therapist in the past without much relief she is on Cymbalta 90 mg a day and is on Neurontin as needed instead of taking it every night she denies any bowel and bladder care incontinence she is also in follow-up with rheumatology regarding her fibromyalgia and also has history of MGUS and stage I breast cancer and vitamin B12 deficiency and does follow-up with the hematologist oncologist and is on vitamin B12 infusions, she recently had an MRI of the left knee and has a minuscule tear and is in follow-up with an orthopedic surgeon, no other complaints.             Past Medical History:    Past Medical History:   Diagnosis Date    Anxiety     Breast cancer (HCC) 2017    right    Cancer (HCC)     right breast  dx 04/2017    Cervical disc disorder of cervicothoracic region     Cervical radiculopathy     Cervicalgia     Cognitive impairment, mild, so stated     Depression     Major,recurrent    Diplopia     last assessed 12/23/13    Disc degeneration, lumbar     Disease of thyroid gland     hx of goiter, surgery done 1978    Edema of foot     Esophageal reflux     Fatigue     Fibromyalgia     Hemorrhage, anal or rectal     History of radiation therapy     Hx of radiation therapy     Treatments: Course C1, Plan ID Energy Fractions Dose per Fraction (cGy)total Dose delivered(cGy)elapsed days. R Breast 6x 28/28 180 5,040 39, R breast e 12E 5/5 200 1,000 7 Treatment dates: 7/20/17-09/05/17    Hx of radiation therapy     33 treatments    Hypotension     Hypothyroidism     Insomnia     Lab test positive for detection of COVID-19 virus 10/14/2022    Memory loss     Monoclonal gammopathy of undetermined significance     Myalgia     last assessed 07/08/16    Myositis     last assessed 07/08/16    Neck stiffness     Neuralgia     Neuritis     Pain syndrome, chronic     Palpitations     Paresthesia     Peripheral neuropathy     Polyneuropathy     Pseudodementia     Radiculopathy     12/23/13    Restless leg syndrome      Sacroiliitis (HCC)     SLE (systemic lupus erythematosus) (HCC)     last assessed 13    Tachycardia     Viral warts     Vitamin B deficiency     Vitamin D deficiency     Vitamin D deficiency     Wears glasses      Past Surgical History:   Procedure Laterality Date    BLADDER SURGERY      bladder prolapse    BREAST BIOPSY Right     BREAST SURGERY Right     breast bx    COLONOSCOPY      age 50 normal    HYSTERECTOMY  2009    vaginal hysterectomy, rectocele repair    [place] MASTECTOMY PARTIAL Right 2017    Procedure:  (NUCLEAR MED. 8 AM., NEEDLE LOCAL TO FOLLOW 8:30 AM.) SEGMENTAL MASTECTOMY BREAST NEEDLE PLACEMENT,  SENTINAL LYMPH NODE BIOPSY;  Surgeon: Mckay Lockett MD;  Location: [facility];  Service: General    REPAIR RECTOCELE      THYROID LOBECTOMY Left     TONSILLECTOMY      US BREAST NEEDLE LOC RIGHT Right 2017    US GUIDED BREAST BIOPSY RIGHT COMPLETE Right 2017    US GUIDED THYROID BIOPSY  2019       Family History:  Family History   Problem Relation Age of Onset    Heart disease Mother         CHF    Heart failure Mother     Cirrhosis Father     Esophageal cancer Brother 65    Lung cancer Brother     No Known Problems Brother     No Known Problems Brother     No Known Problems Brother     Breast cancer Neg Hx        Social History:  Social History     Tobacco Use    Smoking status: Former     Current packs/day: 0.00     Average packs/day: 1.5 packs/day for 5.0 years (7.5 ttl pk-yrs)     Types: Cigarettes     Start date:      Quit date:      Years since quittin.1    Smokeless tobacco: Never    Tobacco comments:     Former quit 30 years ago   Vaping Use    Vaping status: Never Used   Substance Use Topics    Alcohol use: Not Currently     Alcohol/week: 1.0 standard drink of alcohol     Types: 1 Glasses of wine per week    Drug use: No      Living situation:    Work:      Allergies:  No Known Allergies    Medications:    Current Outpatient Medications:     DULoxetine  "(CYMBALTA) 30 mg delayed release capsule, Take 1 capsule (30 mg total) by mouth every morning, Disp: 90 capsule, Rfl: 0    DULoxetine (CYMBALTA) 60 mg delayed release capsule, Take 1 capsule (60 mg total) by mouth daily at bedtime, Disp: 90 capsule, Rfl: 0    gabapentin (NEURONTIN) 100 mg capsule, Take 100 mg by mouth if needed, Disp: , Rfl:     levothyroxine 100 mcg tablet, Take 1 tablet (100 mcg total) by mouth daily in the early morning, Disp: 90 tablet, Rfl: 1    Objective:  /76 (BP Location: Right arm, Patient Position: Sitting, Cuff Size: Standard)   Pulse 77   Ht 5' 8\" (1.727 m)   SpO2 98%   BMI 21.45 kg/m²      Labs  I have reviewed pertinent labs:  CBC:   Lab Results   Component Value Date    WBC 4.32 01/01/2025    RBC 4.29 01/01/2025    HGB 13.1 01/01/2025    HCT 39.0 01/01/2025    MCV 91 01/01/2025     01/01/2025    MCH 30.5 01/01/2025    MCHC 33.6 01/01/2025    RDW 12.3 01/01/2025    MPV 9.3 01/01/2025    NEUTROABS 3.32 01/01/2025     CMP:   Lab Results   Component Value Date    SODIUM 139 01/01/2025    K 3.8 01/01/2025     01/01/2025    CO2 25 01/01/2025    AGAP 7 01/01/2025    BUN 13 01/01/2025    CREATININE 0.68 01/01/2025    GLUC 100 01/01/2025    GLUF 106 (H) 10/30/2024    CALCIUM 8.4 01/01/2025    AST 12 (L) 01/01/2025    ALT 7 01/01/2025    ALKPHOS 49 01/01/2025    TP 7.0 01/01/2025    ALB 3.8 01/01/2025    TBILI 0.38 01/01/2025    EGFR 92 01/01/2025     Vitamin B12   Lab Results   Component Value Date    PNFNCUYB88 227 10/30/2024     Folate   Lab Results   Component Value Date    FOLATE 9.9 10/30/2024              General Exam  GENERAL APPEARANCE:  No distress, alert, interactive and cooperative.  CARDIOVASCULAR: Warm and well perfused  LUNGS: normal work of breathing on room air  EXTREMITIES: no peripheral edema     Neurologic Exam  MENTAL STATE:  Orientation was normal to time, place and person without aphasia or apraxia. Recent and remote memory was intact.  Attention " span and concentration were normal. Language testing was normal for comprehension, repetition, expression, and naming.     CRANIAL NERVES:  CN 2       visual fields full to confrontation.  CN 3, 4, 6  Pupils round, 4 mm in diameter, equally reactive to light. Lids symmetric; no ptosis. EOMs normal alignment, full range. No nystagmus.  CN 5  Facial sensation intact bilaterally.  CN 7  Normal and symmetric facial strength.   CN 8  Hearing intact to finger rub bilaterally.  CN 9  Palate elevates symmetrically.  CN 11  Normal strength of shoulder shrug and neck turning.  CN 12  Tongue midline, with normal bulk and strength.     MOTOR:  Motor exam was normal. Muscle bulk and tone were normal in both upper and lower extremities. Muscle strength was 5/5 in distal and proximal muscles in both upper and lower extremities. No fasciculations, tremor or other abnormal movements were present.     REFLEXES:  RIGHT UE   LEFT UE  BR:2              BR:2    Biceps:2      Biceps:2    Triceps:2     Triceps:2       RIGHT LLE   LEFT LLE    Knee:2           Knee:2    Ankle:2         Ankle:2    COORDINATION:   Coordination exam was normal. In both upper extremities, finger-nose-finger was intact without dysmetria or overshoot.      GAIT:  Gait was normal. Station was stable with a normal base.  Paraspinal muscle tenderness in the cervical area.    ROS:  Review of Systems   Constitutional:  Negative for appetite change, fatigue and fever.   HENT: Negative.  Negative for hearing loss, tinnitus, trouble swallowing and voice change.    Eyes: Negative.  Negative for photophobia, pain and visual disturbance.   Respiratory: Negative.  Negative for shortness of breath.    Cardiovascular: Negative.  Negative for palpitations.   Gastrointestinal: Negative.  Negative for nausea and vomiting.   Endocrine: Negative.  Negative for cold intolerance.   Genitourinary: Negative.  Negative for dysuria, frequency and urgency.   Musculoskeletal:  Negative for  back pain, gait problem, myalgias, neck pain and neck stiffness.   Skin: Negative.  Negative for rash.   Allergic/Immunologic: Negative.    Neurological:  Positive for numbness. Negative for dizziness, tremors, seizures, syncope, facial asymmetry, speech difficulty, weakness, light-headedness and headaches.   Hematological: Negative.  Does not bruise/bleed easily.   Psychiatric/Behavioral: Negative.  Negative for confusion, hallucinations and sleep disturbance.    All other systems reviewed and are negative.      I have reviewed the past medical history, surgical history, social and family history, current medications, allergies vitals, review of systems, and updated this information as appropriate today.    Administrative Statements   The total amount of time spent with the patient and on chart review and documentation was  20 minutes. Issues addressed during this clinic visit included overall management, medication counseling or monitoring, and counseling and coordination of care.         Billy Arroyo MD

## 2025-02-28 NOTE — ASSESSMENT & PLAN NOTE
Orders:    Ambulatory referral to Spine & Pain Management; Future  Patient has multiple cervical spondylotic degenerative changes most pronounced at C3-C4 given that she continues to have pain and has tried physical therapy in the past without much relief she is already on Cymbalta, and Neurontin she has been taking Neurontin as needed advised her that she can take it at nighttime on a daily basis I am going to have her see a pain specialist to see if she would be candidate for any cervical epidural injections patient is not keen to see a spine surgeon,    She is in follow-up with the hematologist oncologist regarding her B12 deficiency and is on infusion and also follows up with them for her MGUS.

## 2025-03-03 ENCOUNTER — OFFICE VISIT (OUTPATIENT)
Dept: OBGYN CLINIC | Facility: CLINIC | Age: 65
End: 2025-03-03
Payer: COMMERCIAL

## 2025-03-03 VITALS — HEIGHT: 68 IN | BODY MASS INDEX: 21.07 KG/M2 | RESPIRATION RATE: 18 BRPM | WEIGHT: 139 LBS

## 2025-03-03 DIAGNOSIS — S83.242D TEAR OF MEDIAL MENISCUS OF LEFT KNEE, CURRENT, UNSPECIFIED TEAR TYPE, SUBSEQUENT ENCOUNTER: Primary | ICD-10-CM

## 2025-03-03 PROCEDURE — 20610 DRAIN/INJ JOINT/BURSA W/O US: CPT | Performed by: STUDENT IN AN ORGANIZED HEALTH CARE EDUCATION/TRAINING PROGRAM

## 2025-03-03 PROCEDURE — 99213 OFFICE O/P EST LOW 20 MIN: CPT | Performed by: STUDENT IN AN ORGANIZED HEALTH CARE EDUCATION/TRAINING PROGRAM

## 2025-03-03 RX ORDER — LIDOCAINE HYDROCHLORIDE 10 MG/ML
4 INJECTION, SOLUTION INFILTRATION; PERINEURAL
Status: COMPLETED | OUTPATIENT
Start: 2025-03-03 | End: 2025-03-03

## 2025-03-03 RX ORDER — BUPIVACAINE HYDROCHLORIDE 2.5 MG/ML
4 INJECTION, SOLUTION INFILTRATION; PERINEURAL
Status: COMPLETED | OUTPATIENT
Start: 2025-03-03 | End: 2025-03-03

## 2025-03-03 RX ORDER — TRIAMCINOLONE ACETONIDE 40 MG/ML
80 INJECTION, SUSPENSION INTRA-ARTICULAR; INTRAMUSCULAR
Status: COMPLETED | OUTPATIENT
Start: 2025-03-03 | End: 2025-03-03

## 2025-03-03 RX ADMIN — LIDOCAINE HYDROCHLORIDE 4 ML: 10 INJECTION, SOLUTION INFILTRATION; PERINEURAL at 10:15

## 2025-03-03 RX ADMIN — BUPIVACAINE HYDROCHLORIDE 4 ML: 2.5 INJECTION, SOLUTION INFILTRATION; PERINEURAL at 10:15

## 2025-03-03 RX ADMIN — TRIAMCINOLONE ACETONIDE 80 MG: 40 INJECTION, SUSPENSION INTRA-ARTICULAR; INTRAMUSCULAR at 10:15

## 2025-03-03 NOTE — PROGRESS NOTES
ASSESSMENT/PLAN:    Assessment & Plan  Tear of medial meniscus of left knee, current, unspecified tear type, subsequent encounter    Discussed history, exam, and imaging with patient. Presentation most consistent with medial meniscus tear of left knee in the setting of osteoarthritic change and we will plan for nonoperative management at this time.  Discussed oral/topical medication regimen. Will plan for OTC NSAIDs/Tylenol as needed  Discussed injections as a pain adjunct. Minimal relief from Visco injection performed 11/29/2024.  Patient was offered, and accepted, Kenalog/Marcaine/lidocaine injection(s) to the left knee for relief of pain and inflammation.    Patient tolerated treatment(s) well   No imaging reviewed at today's visit  Discussed rehabilitation efforts. Will plan for self-guided HEP as patient expresses interest in pursuing formal physical therapy at this time  Follow-up with me 3 months for reevaluation and consideration for surgical intervention via left knee arthroscopy with potential meniscectomy  Orders:    Large joint arthrocentesis: L knee         _____________________________________________________  CHIEF COMPLAINT:  Chief Complaint   Patient presents with    Left Knee - Follow-up, Clicking, Locking, Numbness, Pain     Patient states that the injection didn't do her any good. Feeling numbness and inflamed.       SUBJECTIVE:  Mariola Dos Santos is a 64 y.o. year old female who presents for evaluation of medial meniscus tear of left knee in the setting of osteoarthritis. She was last seen regards this issue on 11/29/2024, at which time she received a viscosupplementation injection. Patient states that she experienced minimal relief following that injection. On today's presentation she reports continued mechanical catching, sharp medial knee pain, and feelings of instability. She reports occasional swelling and feelings of heat radiating from the left knee. She denies any ken locking,  associated bruising, numbness, or tingling.     Localization: medial knee  Radiation: none  Exacerbating movements: knee flexion, pivoting  Positions of comfort: non WB  Attempted oral/topical medications:  Attempted injections: visco supplementation injections 11/29/2025  Attempted physical therapy: yes    PAST MEDICAL HISTORY:  Past Medical History:   Diagnosis Date    Anxiety     Breast cancer (HCC) 2017    right    Cancer (HCC)     right breast  dx 04/2017    Cervical disc disorder of cervicothoracic region     Cervical radiculopathy     Cervicalgia     Cognitive impairment, mild, so stated     Depression     Major,recurrent    Diplopia     last assessed 12/23/13    Disc degeneration, lumbar     Disease of thyroid gland     hx of goiter, surgery done 1978    Edema of foot     Esophageal reflux     Fatigue     Fibromyalgia     Hemorrhage, anal or rectal     History of radiation therapy     Hx of radiation therapy     Treatments: Course C1, Plan ID Energy Fractions Dose per Fraction (cGy)total Dose delivered(cGy)elapsed days. R Breast 6x 28/28 180 5,040 39, R breast e 12E 5/5 200 1,000 7 Treatment dates: 7/20/17-09/05/17    Hx of radiation therapy     33 treatments    Hypotension     Hypothyroidism     Insomnia     Lab test positive for detection of COVID-19 virus 10/14/2022    Memory loss     Monoclonal gammopathy of undetermined significance     Myalgia     last assessed 07/08/16    Myositis     last assessed 07/08/16    Neck stiffness     Neuralgia     Neuritis     Pain syndrome, chronic     Palpitations     Paresthesia     Peripheral neuropathy     Polyneuropathy     Pseudodementia     Radiculopathy     12/23/13    Restless leg syndrome     Sacroiliitis (HCC)     SLE (systemic lupus erythematosus) ([facility])     last assessed 12/23/13    Tachycardia     Viral warts     Vitamin B deficiency     Vitamin D deficiency     Vitamin D deficiency     Wears glasses        PAST SURGICAL HISTORY:  Past Surgical History:    Procedure Laterality Date    BLADDER SURGERY      bladder prolapse    BREAST BIOPSY Right     BREAST SURGERY Right     breast bx    COLONOSCOPY      age 50 normal    HYSTERECTOMY  2009    vaginal hysterectomy, rectocele repair    [place] MASTECTOMY PARTIAL Right 2017    Procedure:  (NUCLEAR MED. 8 AM., NEEDLE LOCAL TO FOLLOW 8:30 AM.) SEGMENTAL MASTECTOMY BREAST NEEDLE PLACEMENT,  SENTINAL LYMPH NODE BIOPSY;  Surgeon: Mckay Lockett MD;  Location: [facility];  Service: General    REPAIR RECTOCELE      THYROID LOBECTOMY Left     TONSILLECTOMY      US BREAST NEEDLE LOC RIGHT Right 2017    US GUIDED BREAST BIOPSY RIGHT COMPLETE Right 2017    US GUIDED THYROID BIOPSY  2019       FAMILY HISTORY:  Family History   Problem Relation Age of Onset    Heart disease Mother         CHF    Heart failure Mother     Cirrhosis Father     Esophageal cancer Brother 65    Lung cancer Brother     No Known Problems Brother     No Known Problems Brother     No Known Problems Brother     Breast cancer Neg Hx        SOCIAL HISTORY:  Social History     Tobacco Use    Smoking status: Former     Current packs/day: 0.00     Average packs/day: 1.5 packs/day for 5.0 years (7.5 ttl pk-yrs)     Types: Cigarettes     Start date:      Quit date:      Years since quittin.2    Smokeless tobacco: Never    Tobacco comments:     Former quit 30 years ago   Vaping Use    Vaping status: Never Used   Substance Use Topics    Alcohol use: Not Currently     Alcohol/week: 1.0 standard drink of alcohol     Types: 1 Glasses of wine per week    Drug use: No       MEDICATIONS:    Current Outpatient Medications:     DULoxetine (CYMBALTA) 30 mg delayed release capsule, Take 1 capsule (30 mg total) by mouth every morning, Disp: 90 capsule, Rfl: 0    DULoxetine (CYMBALTA) 60 mg delayed release capsule, Take 1 capsule (60 mg total) by mouth daily at bedtime, Disp: 90 capsule, Rfl: 0    gabapentin (NEURONTIN) 100 mg capsule, Take 100 mg  "by mouth if needed, Disp: , Rfl:     levothyroxine 100 mcg tablet, Take 1 tablet (100 mcg total) by mouth daily in the early morning, Disp: 90 tablet, Rfl: 1    ALLERGIES:  No Known Allergies    Review of systems:   Constitutional: Negative for fatigue, fever or loss of apetite.   HENT: Negative.    Respiratory: Negative for shortness of breath, dyspnea.    Cardiovascular: Negative for chest pain/tightness.   Gastrointestinal: Negative for abdominal pain, N/V.   Endocrine: Negative for cold/heat intolerance, unexplained weight loss/gain.   Genitourinary: Negative for flank pain, dysuria, hematuria.   Musculoskeletal: As in HPI   Skin: Negative for rash.    Neurological: Negative for numbness tingling  Psychiatric/Behavioral: Negative for agitation.  _____________________________________________________  PHYSICAL EXAMINATION:    Resp. rate 18, height 5' 8\" (1.727 m), weight 63 kg (139 lb), not currently breastfeeding.    General: well developed and well nourished, alert, oriented times 3, and appears comfortable  HEENT: Benign, normocephalic, atraumatic  Cardiovascular: Normal rate, distal pulses intact    Pulmonary: No wheezing or stridor  Abdomen: Soft, Nontender  Skin: No masses, erythema, lacerations, fluctation, ulcerations  Neurovascular: as per MS[facility] exam below    MUSCULOSKELETAL EXAMINATION:  Left Knee  Ambulates with steady gait, uses no assistive device  No bruising, swelling, deformity  TTP over medial joint line, non-tender over lateral joint line  Passive ROM 0-140, with mild crepitus  Negative Sharri's, negative Caitlin's  Stable to varus/valgus stress at 0 and 30 degrees  Neg Lachman  Negative anterior Drawer, negative posterior Drawer  4/5 quad, 4/5 hamstring strength  SILT all exposed distal distributions  2+ PT pulse        _____________________________________________________  STUDIES REVIEWED:  No new imaging reviewed this visit    Previous imaging:  MRI of the left knee taken on 10/17/24 " "demonstrates horizontal tear of posterior medial meniscus.Lateral meniscus, ACL, PCL, LCL, MCL are intact.     Xrays of the left knee taken on 5/17/24 demonstrate small joint effusion without signs of significant joint space narrowing. No acute fracture or dislocation is demonstrated.      Large joint arthrocentesis: L knee  Universal Protocol:  Consent: Verbal consent obtained.  Risks and benefits: risks, benefits and alternatives were discussed  Consent given by: patient  Time out: Immediately prior to procedure a \"time out\" was called to verify the correct patient, procedure, equipment, support staff and site/side marked as required.  Timeout called at: 3/3/2025 10:35 AM.  Patient understanding: patient states understanding of the procedure being performed  Site marked: the operative site was marked  Patient identity confirmed: verbally with patient  Supporting Documentation  Indications: pain and joint swelling   Procedure Details  Location: knee - L knee  Preparation: Patient was prepped and draped in the usual sterile fashion  Needle gauge: 21G.  Ultrasound guidance: no  Medications administered: 4 mL bupivacaine 0.25 %; 4 mL lidocaine 1 %; 80 mg triamcinolone acetonide 40 mg/mL    Patient tolerance: patient tolerated the procedure well with no immediate complications  Dressing:  Sterile dressing applied              Scribe Attestation      I,:  Kyle Petit am acting as a scribe while in the presence of the attending physician.:       I,:  Hi Wei MD personally performed the services described in this documentation    as scribed in my presence.:            "

## 2025-04-08 ENCOUNTER — VBI (OUTPATIENT)
Dept: ADMINISTRATIVE | Facility: OTHER | Age: 65
End: 2025-04-08

## 2025-04-08 NOTE — TELEPHONE ENCOUNTER
04/08/25 8:17 AM     Chart reviewed for CRC: Colonoscopy was/were not submitted to the patient's insurance.     Lorie Hu MA   [facility]

## 2025-04-23 DIAGNOSIS — E78.2 MIXED HYPERLIPIDEMIA: ICD-10-CM

## 2025-04-23 DIAGNOSIS — E03.9 HYPOTHYROIDISM, UNSPECIFIED TYPE: ICD-10-CM

## 2025-04-23 DIAGNOSIS — E03.9 HYPOTHYROIDISM, UNSPECIFIED TYPE: Primary | ICD-10-CM

## 2025-04-23 RX ORDER — LEVOTHYROXINE SODIUM 100 UG/1
100 TABLET ORAL
Qty: 30 TABLET | Refills: 0 | Status: SHIPPED | OUTPATIENT
Start: 2025-04-23

## 2025-04-23 NOTE — TELEPHONE ENCOUNTER
Medication: levothyroxine 100 mcg tablet     Dose/Frequency: Take 1 tablet (100 mcg total) by mouth daily in the early morning     Quantity: 90    Pharmacy: [facility]     Office:   [x] PCP/Provider -   [] Speciality/Provider -     Does the patient have enough for 3 days?   [] Yes   [] No - Send as HP to POD

## 2025-04-29 DIAGNOSIS — D47.2 MONOCLONAL GAMMOPATHY OF UNDETERMINED SIGNIFICANCE: Primary | ICD-10-CM

## 2025-04-30 ENCOUNTER — TELEPHONE (OUTPATIENT)
Dept: HEMATOLOGY ONCOLOGY | Facility: CLINIC | Age: 65
End: 2025-04-30

## 2025-04-30 NOTE — TELEPHONE ENCOUNTER
LMOM for Mariola regarding labs that need to be completed within the next day or two for upcoming appt as they may not result in time. Provided hope line # in case pt may need to r/s.

## 2025-05-02 ENCOUNTER — APPOINTMENT (OUTPATIENT)
Dept: LAB | Facility: HOSPITAL | Age: 65
End: 2025-05-02
Attending: PHYSICIAN ASSISTANT
Payer: COMMERCIAL

## 2025-05-02 DIAGNOSIS — D47.2 MONOCLONAL GAMMOPATHY OF UNDETERMINED SIGNIFICANCE: ICD-10-CM

## 2025-05-02 LAB
ALBUMIN SERPL BCG-MCNC: 4 G/DL (ref 3.5–5)
ALP SERPL-CCNC: 51 U/L (ref 34–104)
ALT SERPL W P-5'-P-CCNC: 8 U/L (ref 7–52)
ANION GAP SERPL CALCULATED.3IONS-SCNC: 5 MMOL/L (ref 4–13)
AST SERPL W P-5'-P-CCNC: 12 U/L (ref 13–39)
BASOPHILS # BLD AUTO: 0.03 THOUSANDS/ÂΜL (ref 0–0.1)
BASOPHILS NFR BLD AUTO: 1 % (ref 0–1)
BILIRUB SERPL-MCNC: 0.52 MG/DL (ref 0.2–1)
BUN SERPL-MCNC: 13 MG/DL (ref 5–25)
CALCIUM SERPL-MCNC: 8.6 MG/DL (ref 8.4–10.2)
CHLORIDE SERPL-SCNC: 104 MMOL/L (ref 96–108)
CO2 SERPL-SCNC: 31 MMOL/L (ref 21–32)
CREAT SERPL-MCNC: 0.75 MG/DL (ref 0.6–1.3)
EOSINOPHIL # BLD AUTO: 0.16 THOUSAND/ÂΜL (ref 0–0.61)
EOSINOPHIL NFR BLD AUTO: 3 % (ref 0–6)
ERYTHROCYTE [DISTWIDTH] IN BLOOD BY AUTOMATED COUNT: 12.8 % (ref 11.6–15.1)
GFR SERPL CREATININE-BSD FRML MDRD: 83 ML/MIN/1.73SQ M
GLUCOSE P FAST SERPL-MCNC: 93 MG/DL (ref 65–99)
HCT VFR BLD AUTO: 41.2 % (ref 34.8–46.1)
HGB BLD-MCNC: 13.6 G/DL (ref 11.5–15.4)
IGA SERPL-MCNC: 1203 MG/DL (ref 66–433)
IGG SERPL-MCNC: 842 MG/DL (ref 635–1741)
IGM SERPL-MCNC: 25 MG/DL (ref 45–281)
IMM GRANULOCYTES # BLD AUTO: 0.02 THOUSAND/UL (ref 0–0.2)
IMM GRANULOCYTES NFR BLD AUTO: 0 % (ref 0–2)
LYMPHOCYTES # BLD AUTO: 1.33 THOUSANDS/ÂΜL (ref 0.6–4.47)
LYMPHOCYTES NFR BLD AUTO: 26 % (ref 14–44)
MCH RBC QN AUTO: 30.4 PG (ref 26.8–34.3)
MCHC RBC AUTO-ENTMCNC: 33 G/DL (ref 31.4–37.4)
MCV RBC AUTO: 92 FL (ref 82–98)
MONOCYTES # BLD AUTO: 0.48 THOUSAND/ÂΜL (ref 0.17–1.22)
MONOCYTES NFR BLD AUTO: 9 % (ref 4–12)
NEUTROPHILS # BLD AUTO: 3.11 THOUSANDS/ÂΜL (ref 1.85–7.62)
NEUTS SEG NFR BLD AUTO: 61 % (ref 43–75)
NRBC BLD AUTO-RTO: 0 /100 WBCS
PLATELET # BLD AUTO: 259 THOUSANDS/UL (ref 149–390)
PMV BLD AUTO: 9 FL (ref 8.9–12.7)
POTASSIUM SERPL-SCNC: 4 MMOL/L (ref 3.5–5.3)
PROT SERPL-MCNC: 7 G/DL (ref 6.4–8.4)
RBC # BLD AUTO: 4.48 MILLION/UL (ref 3.81–5.12)
SODIUM SERPL-SCNC: 140 MMOL/L (ref 135–147)
WBC # BLD AUTO: 5.13 THOUSAND/UL (ref 4.31–10.16)

## 2025-05-02 PROCEDURE — 36415 COLL VENOUS BLD VENIPUNCTURE: CPT

## 2025-05-02 PROCEDURE — 85025 COMPLETE CBC W/AUTO DIFF WBC: CPT

## 2025-05-02 PROCEDURE — 82784 ASSAY IGA/IGD/IGG/IGM EACH: CPT

## 2025-05-02 PROCEDURE — 83521 IG LIGHT CHAINS FREE EACH: CPT

## 2025-05-02 PROCEDURE — 84165 PROTEIN E-PHORESIS SERUM: CPT

## 2025-05-02 PROCEDURE — 80053 COMPREHEN METABOLIC PANEL: CPT

## 2025-05-02 PROCEDURE — 86334 IMMUNOFIX E-PHORESIS SERUM: CPT

## 2025-05-03 LAB
KAPPA LC FREE SER-MCNC: 26.3 MG/L (ref 3.3–19.4)
KAPPA LC FREE/LAMBDA FREE SER: 1.77 {RATIO} (ref 0.26–1.65)
LAMBDA LC FREE SERPL-MCNC: 14.9 MG/L (ref 5.7–26.3)

## 2025-05-05 LAB
ALBUMIN SERPL ELPH-MCNC: 3.57 G/DL (ref 3.2–5.1)
ALBUMIN SERPL ELPH-MCNC: 53.3 % (ref 48–70)
ALPHA1 GLOB SERPL ELPH-MCNC: 0.24 G/DL (ref 0.15–0.47)
ALPHA1 GLOB SERPL ELPH-MCNC: 3.6 % (ref 1.8–7)
ALPHA2 GLOB SERPL ELPH-MCNC: 0.64 G/DL (ref 0.42–1.04)
ALPHA2 GLOB SERPL ELPH-MCNC: 9.5 % (ref 5.9–14.9)
BETA GLOB ABNORMAL SERPL ELPH-MCNC: 0.42 G/DL (ref 0.31–0.57)
BETA1 GLOB SERPL ELPH-MCNC: 6.3 % (ref 4.7–7.7)
BETA2 GLOB SERPL ELPH-MCNC: 10.9 % (ref 3.1–7.9)
BETA2+GAMMA GLOB SERPL ELPH-MCNC: 0.73 G/DL (ref 0.2–0.58)
GAMMA GLOB ABNORMAL SERPL ELPH-MCNC: 1.1 G/DL (ref 0.4–1.66)
GAMMA GLOB SERPL ELPH-MCNC: 16.4 % (ref 6.9–22.3)
IGG/ALB SER: 1.14 {RATIO} (ref 1.1–1.8)
M PROTEIN 1 SERPL ELPH-MCNC: 0.48 G/DL
PROT SERPL-MCNC: 6.7 G/DL (ref 6.4–8.4)

## 2025-05-05 PROCEDURE — 86334 IMMUNOFIX E-PHORESIS SERUM: CPT | Performed by: STUDENT IN AN ORGANIZED HEALTH CARE EDUCATION/TRAINING PROGRAM

## 2025-05-05 PROCEDURE — 84165 PROTEIN E-PHORESIS SERUM: CPT | Performed by: STUDENT IN AN ORGANIZED HEALTH CARE EDUCATION/TRAINING PROGRAM

## 2025-05-07 ENCOUNTER — APPOINTMENT (OUTPATIENT)
Dept: LAB | Facility: HOSPITAL | Age: 65
End: 2025-05-07
Payer: COMMERCIAL

## 2025-05-07 ENCOUNTER — RESULTS FOLLOW-UP (OUTPATIENT)
Age: 65
End: 2025-05-07

## 2025-05-07 ENCOUNTER — OFFICE VISIT (OUTPATIENT)
Dept: HEMATOLOGY ONCOLOGY | Facility: CLINIC | Age: 65
End: 2025-05-07
Payer: COMMERCIAL

## 2025-05-07 VITALS
TEMPERATURE: 98.5 F | HEART RATE: 75 BPM | HEIGHT: 68 IN | OXYGEN SATURATION: 98 % | RESPIRATION RATE: 17 BRPM | BODY MASS INDEX: 20.76 KG/M2 | SYSTOLIC BLOOD PRESSURE: 110 MMHG | DIASTOLIC BLOOD PRESSURE: 74 MMHG | WEIGHT: 137 LBS

## 2025-05-07 DIAGNOSIS — D47.2: Primary | ICD-10-CM

## 2025-05-07 DIAGNOSIS — E03.9 HYPOTHYROIDISM, UNSPECIFIED TYPE: ICD-10-CM

## 2025-05-07 DIAGNOSIS — M25.462 EFFUSION OF LEFT KNEE: ICD-10-CM

## 2025-05-07 DIAGNOSIS — Z85.3 PERSONAL HISTORY OF MALIGNANT NEOPLASM OF BREAST: ICD-10-CM

## 2025-05-07 DIAGNOSIS — E78.2 MIXED HYPERLIPIDEMIA: ICD-10-CM

## 2025-05-07 LAB
BASOPHILS # BLD AUTO: 0.03 THOUSANDS/ÂΜL (ref 0–0.1)
BASOPHILS NFR BLD AUTO: 1 % (ref 0–1)
CHOLEST SERPL-MCNC: 174 MG/DL (ref ?–200)
EOSINOPHIL # BLD AUTO: 0.14 THOUSAND/ÂΜL (ref 0–0.61)
EOSINOPHIL NFR BLD AUTO: 3 % (ref 0–6)
ERYTHROCYTE [DISTWIDTH] IN BLOOD BY AUTOMATED COUNT: 12.7 % (ref 11.6–15.1)
HCT VFR BLD AUTO: 42.5 % (ref 34.8–46.1)
HDLC SERPL-MCNC: 57 MG/DL
HGB BLD-MCNC: 13.8 G/DL (ref 11.5–15.4)
IMM GRANULOCYTES # BLD AUTO: 0.01 THOUSAND/UL (ref 0–0.2)
IMM GRANULOCYTES NFR BLD AUTO: 0 % (ref 0–2)
LDLC SERPL CALC-MCNC: 88 MG/DL (ref 0–100)
LYMPHOCYTES # BLD AUTO: 1.29 THOUSANDS/ÂΜL (ref 0.6–4.47)
LYMPHOCYTES NFR BLD AUTO: 23 % (ref 14–44)
MCH RBC QN AUTO: 30.5 PG (ref 26.8–34.3)
MCHC RBC AUTO-ENTMCNC: 32.5 G/DL (ref 31.4–37.4)
MCV RBC AUTO: 94 FL (ref 82–98)
MONOCYTES # BLD AUTO: 0.47 THOUSAND/ÂΜL (ref 0.17–1.22)
MONOCYTES NFR BLD AUTO: 8 % (ref 4–12)
NEUTROPHILS # BLD AUTO: 3.76 THOUSANDS/ÂΜL (ref 1.85–7.62)
NEUTS SEG NFR BLD AUTO: 65 % (ref 43–75)
NONHDLC SERPL-MCNC: 117 MG/DL
NRBC BLD AUTO-RTO: 0 /100 WBCS
PLATELET # BLD AUTO: 285 THOUSANDS/UL (ref 149–390)
PMV BLD AUTO: 9.6 FL (ref 8.9–12.7)
RBC # BLD AUTO: 4.53 MILLION/UL (ref 3.81–5.12)
TRIGL SERPL-MCNC: 146 MG/DL (ref ?–150)
TSH SERPL DL<=0.05 MIU/L-ACNC: 3.75 UIU/ML (ref 0.45–4.5)
WBC # BLD AUTO: 5.7 THOUSAND/UL (ref 4.31–10.16)

## 2025-05-07 PROCEDURE — 99214 OFFICE O/P EST MOD 30 MIN: CPT | Performed by: PHYSICIAN ASSISTANT

## 2025-05-07 PROCEDURE — 80061 LIPID PANEL: CPT

## 2025-05-07 PROCEDURE — 84443 ASSAY THYROID STIM HORMONE: CPT

## 2025-05-07 PROCEDURE — 36415 COLL VENOUS BLD VENIPUNCTURE: CPT

## 2025-05-07 PROCEDURE — 85025 COMPLETE CBC W/AUTO DIFF WBC: CPT

## 2025-05-07 NOTE — TELEPHONE ENCOUNTER
Left detailed message for patient and also requested patient to call back to schedule Annual wellness visit.

## 2025-05-07 NOTE — PROGRESS NOTES
"Name: Mariola Dos Santos      : 1960      MRN: [id number]  Encounter Provider: Shari Merritt PA-C  Encounter Date: 2025   Encounter department: [facility] [place]  :  Assessment & Plan  IgA monoclonal gammopathy of undetermined significance (MGUS)  SPEP m spike 0.48 IgA kappa   IgA 1203, IgG 842, IgM 25   sFLCR 1.77   CBC and CMP are unremarkable     High-intermediate risk MGUS (IgA type, SFLCR >1.65)   Overall her labs have been fairly stable without CRAB s/s.  She is complaining of left knee pain which has been going on for a couple years. More likely arthritis   Per UTD, Bone marrow biopsy is indicated in \"individuals with IgA MGUS of any size and most individuals with an abnormal serum FLC ratio.\"   We discussed bone marrow biopsy to evaluate for smoldering myeloma versus myeloma or continued observation.  Patient would like to proceed with bone marrow biopsy. IR consult placed. We reviewed process and risks   Check CT myeloma scan to complete work up   Orders:    IR biopsy bone marrow; Future    Tissue Exam; Future    CT low dose whole body myeloma scan; Future    Personal history of malignant neoplasm of breast  Last mammogram 2024  PURNIMA, continue annual screening mammogram   DXA  showed osteopenia.  Recommend repeating every 2 years, discuss with PCP   She denies new breast masses  Breast exam at next office visit          Assessment & Plan        Return in about 2 months (around 2025).    History of Present Illness   Chief Complaint   Patient presents with    Follow-up     History of Present Illness  64-year-old female with history of right breast cancer s/p lumpectomy and radiation therapy and IgA MGUS who presents as follow-up.    Patient was noted to have abnormal mammogram 2017.  FNA was performed which showed ER/LA positive, HER2 negative invasive breast carcinoma.  She underwent lumpectomy 2017 for stage Ia breast cancer and " went on to receive radiation therapy from July to September 2017.  She was started on tamoxifen around this time which she continued for 5 years through 2022.    She was also noted to have IgA MGUS around 04/2017.  Unclear why this was initially obtained.    Oncology History   Cancer Staging   Malignant neoplasm of right female breast (HCC)  Staging form: Breast, AJCC 8th Edition  - Clinical stage from 5/24/2017: Stage IA (cT1b, cN0, cM0, G1, ER+, SC+, HER2-) - Signed by Opal Frazier PA-C on 5/1/2022  Stage prefix: Initial diagnosis  Method of lymph node assessment: Axillary lymph node dissection  Nuclear grade: G2  Mitotic count score: Score 1  Histologic grading system: 3 grade system  HER2-IHC interpretation: Negative  HER2-IHC value: Score 1+  Oncology History Overview Note   Returns for follow up post right breast radiation completed on 9/5/17. The pt was last seen in radiation on 10/15/20.     10/15/20 - Hem Onc, [place]  Pt to continue on Tamoxifen  Breast exam was benign    Upcoming:       Intraductal carcinoma of right breast (Resolved)   4/20/2017 Biopsy    Right breast biopsy:  Invasive breast carcinoma/invasive ductal carcinoma.  Grade 1.  ER and SC 90-95% positive, Her2 negative     5/24/2017 Initial Diagnosis    Intraductal carcinoma of right breast      Surgery    Segmental mastectomy with sentinel node biopsy  Invasive mammary.  Grade 1       5/24/2017 -  Cancer Staged    Pathologic  Stage IA - pT1b, pN0 (sn), G1     7/20/2017 - 9/5/2017 Radiation    dose of 5040 cGy in 20 daily fractions to the right breast with an additional 1000 cGy to the lumpectomy cavity.     7/2017 -  Hormone Therapy    Tamoxifen     Malignant neoplasm of right female breast (HCC)   5/24/2017 -  Cancer Staged    Staging form: Breast, AJCC 8th Edition  - Clinical stage from 5/24/2017: Stage IA (cT1b, cN0, cM0, G1, ER+, SC+, HER2-) - Signed by Opal Frazier PA-C on 5/1/2022  Stage prefix: Initial diagnosis  Method of lymph  "node assessment: Axillary lymph node dissection  Nuclear grade: G2  Mitotic count score: Score 1  Histologic grading system: 3 grade system  HER2-IHC interpretation: Negative  HER2-IHC value: Score 1+       5/7/2019 Initial Diagnosis    Malignant neoplasm of right female breast (HCC)        Pertinent Medical History     05/07/25: Left knee pain, chronic.      Review of Systems   Constitutional:  Negative for activity change, fatigue, fever and unexpected weight change.        No night sweats    Respiratory:  Negative for shortness of breath.    Cardiovascular:  Negative for chest pain.   Gastrointestinal:  Negative for abdominal pain, nausea and vomiting.   Musculoskeletal:         Left knee pain      Skin:  Negative for rash.   All other systems reviewed and are negative.          Objective   /74 (BP Location: Left arm, Patient Position: Sitting, Cuff Size: Adult)   Pulse 75   Temp 98.5 °F (36.9 °C) (Temporal)   Resp 17   Ht 5' 8\" (1.727 m)   Wt 62.1 kg (137 lb)   SpO2 98%   BMI 20.83 kg/m²     Pain Screening:  Pain Score: 0-No pain  ECOG   0  Physical Exam  Vitals reviewed.   HENT:      Head: Normocephalic.   Cardiovascular:      Rate and Rhythm: Normal rate and regular rhythm.   Pulmonary:      Effort: Pulmonary effort is normal.      Breath sounds: Normal breath sounds.   Abdominal:      Palpations: Abdomen is soft.      Tenderness: There is no abdominal tenderness.   Musculoskeletal:      Cervical back: Neck supple.   Skin:     Findings: No rash.   Neurological:      Mental Status: She is alert.       Physical Exam      Results    Labs: I have reviewed the following labs:  Lab Results   Component Value Date/Time    WBC 5.13 05/02/2025 09:51 AM    RBC 4.48 05/02/2025 09:51 AM    Hemoglobin 13.6 05/02/2025 09:51 AM    Hematocrit 41.2 05/02/2025 09:51 AM    MCV 92 05/02/2025 09:51 AM    MCH 30.4 05/02/2025 09:51 AM    RDW 12.8 05/02/2025 09:51 AM    Platelets 259 05/02/2025 09:51 AM    Segmented % 61 " 05/02/2025 09:51 AM    Lymphocytes % 26 05/02/2025 09:51 AM    Monocytes % 9 05/02/2025 09:51 AM    Eosinophils Relative 3 05/02/2025 09:51 AM    Basophils Relative 1 05/02/2025 09:51 AM    Immature Grans % 0 05/02/2025 09:51 AM    Absolute Neutrophils 3.11 05/02/2025 09:51 AM     Lab Results   Component Value Date/Time    Potassium 4.0 05/02/2025 09:51 AM    Chloride 104 05/02/2025 09:51 AM    CO2 31 05/02/2025 09:51 AM    BUN 13 05/02/2025 09:51 AM    Creatinine 0.75 05/02/2025 09:51 AM    Glucose, Fasting 93 05/02/2025 09:51 AM    Calcium 8.6 05/02/2025 09:51 AM    AST 12 (L) 05/02/2025 09:51 AM    ALT 8 05/02/2025 09:51 AM    Alkaline Phosphatase 51 05/02/2025 09:51 AM    Total Protein 6.7 05/02/2025 09:51 AM    Total Protein 7.0 05/02/2025 09:51 AM    Albumin 4.0 05/02/2025 09:51 AM    Total Bilirubin 0.52 05/02/2025 09:51 AM    eGFR 83 05/02/2025 09:51 AM

## 2025-05-07 NOTE — H&P (VIEW-ONLY)
"Name: Mariola Dos Santos      : 1960      MRN: [id number]  Encounter Provider: Shari Merritt PA-C  Encounter Date: 2025   Encounter department: [facility] [place]  :  Assessment & Plan  IgA monoclonal gammopathy of undetermined significance (MGUS)  SPEP m spike 0.48 IgA kappa   IgA 1203, IgG 842, IgM 25   sFLCR 1.77   CBC and CMP are unremarkable     High-intermediate risk MGUS (IgA type, SFLCR >1.65)   Overall her labs have been fairly stable without CRAB s/s.  She is complaining of left knee pain which has been going on for a couple years. More likely arthritis   Per UTD, Bone marrow biopsy is indicated in \"individuals with IgA MGUS of any size and most individuals with an abnormal serum FLC ratio.\"   We discussed bone marrow biopsy to evaluate for smoldering myeloma versus myeloma or continued observation.  Patient would like to proceed with bone marrow biopsy. IR consult placed. We reviewed process and risks   Check CT myeloma scan to complete work up   Orders:    IR biopsy bone marrow; Future    Tissue Exam; Future    CT low dose whole body myeloma scan; Future    Personal history of malignant neoplasm of breast  Last mammogram 2024  PURNIMA, continue annual screening mammogram   DXA  showed osteopenia.  Recommend repeating every 2 years, discuss with PCP   She denies new breast masses  Breast exam at next office visit          Assessment & Plan        Return in about 2 months (around 2025).    History of Present Illness   Chief Complaint   Patient presents with    Follow-up     History of Present Illness  64-year-old female with history of right breast cancer s/p lumpectomy and radiation therapy and IgA MGUS who presents as follow-up.    Patient was noted to have abnormal mammogram 2017.  FNA was performed which showed ER/[place] positive, HER2 negative invasive breast carcinoma.  She underwent lumpectomy 2017 for stage Ia breast cancer and " went on to receive radiation therapy from July to September 2017.  She was started on tamoxifen around this time which she continued for 5 years through 2022.    She was also noted to have IgA MGUS around 04/2017.  Unclear why this was initially obtained.    Oncology History   Cancer Staging   Malignant neoplasm of right female breast (HCC)  Staging form: Breast, AJCC 8th Edition  - Clinical stage from 5/24/2017: Stage IA (cT1b, cN0, cM0, G1, ER+, CO+, HER2-) - Signed by Opal Frazier PA-C on 5/1/2022  Stage prefix: Initial diagnosis  Method of lymph node assessment: Axillary lymph node dissection  Nuclear grade: G2  Mitotic count score: Score 1  Histologic grading system: 3 grade system  HER2-IHC interpretation: Negative  HER2-IHC value: Score 1+  Oncology History Overview Note   Returns for follow up post right breast radiation completed on 9/5/17. The pt was last seen in radiation on 10/15/20.     10/15/20 - Hem Onc, [place]  Pt to continue on Tamoxifen  Breast exam was benign    Upcoming:       Intraductal carcinoma of right breast (Resolved)   4/20/2017 Biopsy    Right breast biopsy:  Invasive breast carcinoma/invasive ductal carcinoma.  Grade 1.  ER and CO 90-95% positive, Her2 negative     5/24/2017 Initial Diagnosis    Intraductal carcinoma of right breast      Surgery    Segmental mastectomy with sentinel node biopsy  Invasive mammary.  Grade 1       5/24/2017 -  Cancer Staged    Pathologic  Stage IA - pT1b, pN0 (sn), G1     7/20/2017 - 9/5/2017 Radiation    dose of 5040 cGy in 20 daily fractions to the right breast with an additional 1000 cGy to the lumpectomy cavity.     7/2017 -  Hormone Therapy    Tamoxifen     Malignant neoplasm of right female breast (HCC)   5/24/2017 -  Cancer Staged    Staging form: Breast, AJCC 8th Edition  - Clinical stage from 5/24/2017: Stage IA (cT1b, cN0, cM0, G1, ER+, CO+, HER2-) - Signed by Opal Frazier PA-C on 5/1/2022  Stage prefix: Initial diagnosis  Method of lymph  "node assessment: Axillary lymph node dissection  Nuclear grade: G2  Mitotic count score: Score 1  Histologic grading system: 3 grade system  HER2-IHC interpretation: Negative  HER2-IHC value: Score 1+       5/7/2019 Initial Diagnosis    Malignant neoplasm of right female breast (HCC)        Pertinent Medical History     05/07/25: Left knee pain, chronic.      Review of Systems   Constitutional:  Negative for activity change, fatigue, fever and unexpected weight change.        No night sweats    Respiratory:  Negative for shortness of breath.    Cardiovascular:  Negative for chest pain.   Gastrointestinal:  Negative for abdominal pain, nausea and vomiting.   Musculoskeletal:         Left knee pain      Skin:  Negative for rash.   All other systems reviewed and are negative.          Objective   /74 (BP Location: Left arm, Patient Position: Sitting, Cuff Size: Adult)   Pulse 75   Temp 98.5 °F (36.9 °C) (Temporal)   Resp 17   Ht 5' 8\" (1.727 m)   Wt 62.1 kg (137 lb)   SpO2 98%   BMI 20.83 kg/m²     Pain Screening:  Pain Score: 0-No pain  ECOG   0  Physical Exam  Vitals reviewed.   HENT:      Head: Normocephalic.   Cardiovascular:      Rate and Rhythm: Normal rate and regular rhythm.   Pulmonary:      Effort: Pulmonary effort is normal.      Breath sounds: Normal breath sounds.   Abdominal:      Palpations: Abdomen is soft.      Tenderness: There is no abdominal tenderness.   Musculoskeletal:      Cervical back: Neck supple.   Skin:     Findings: No rash.   Neurological:      Mental Status: She is alert.       Physical Exam      Results    Labs: I have reviewed the following labs:  Lab Results   Component Value Date/Time    WBC 5.13 05/02/2025 09:51 AM    RBC 4.48 05/02/2025 09:51 AM    Hemoglobin 13.6 05/02/2025 09:51 AM    Hematocrit 41.2 05/02/2025 09:51 AM    MCV 92 05/02/2025 09:51 AM    MCH 30.4 05/02/2025 09:51 AM    RDW 12.8 05/02/2025 09:51 AM    Platelets 259 05/02/2025 09:51 AM    Segmented % 61 " 05/02/2025 09:51 AM    Lymphocytes % 26 05/02/2025 09:51 AM    Monocytes % 9 05/02/2025 09:51 AM    Eosinophils Relative 3 05/02/2025 09:51 AM    Basophils Relative 1 05/02/2025 09:51 AM    Immature Grans % 0 05/02/2025 09:51 AM    Absolute Neutrophils 3.11 05/02/2025 09:51 AM     Lab Results   Component Value Date/Time    Potassium 4.0 05/02/2025 09:51 AM    Chloride 104 05/02/2025 09:51 AM    CO2 31 05/02/2025 09:51 AM    BUN 13 05/02/2025 09:51 AM    Creatinine 0.75 05/02/2025 09:51 AM    Glucose, Fasting 93 05/02/2025 09:51 AM    Calcium 8.6 05/02/2025 09:51 AM    AST 12 (L) 05/02/2025 09:51 AM    ALT 8 05/02/2025 09:51 AM    Alkaline Phosphatase 51 05/02/2025 09:51 AM    Total Protein 6.7 05/02/2025 09:51 AM    Total Protein 7.0 05/02/2025 09:51 AM    Albumin 4.0 05/02/2025 09:51 AM    Total Bilirubin 0.52 05/02/2025 09:51 AM    eGFR 83 05/02/2025 09:51 AM

## 2025-05-07 NOTE — TELEPHONE ENCOUNTER
----- Message from Bryanna Zavala PA-C sent at 5/7/2025  3:20 PM EDT -----  Lab work stable.  Patient is due for Medicare annual wellness visit, please schedule appointment.

## 2025-05-08 DIAGNOSIS — M79.7 FIBROMYALGIA: ICD-10-CM

## 2025-05-08 NOTE — TELEPHONE ENCOUNTER
Reason for call:   [x] Refill   [] Prior Auth  [] Other:     Office:   [] PCP/Provider -   [x] Specialty/Provider - Billy Arroyo,     Medication: (CYMBALTA) 30 mg   Dose/Frequency: Take 1 capsule (30 mg total) by mouth every morning,   Quantity: 90    (CYMBALTA) 60 mg   Take 1 capsule (60 mg total) by mouth daily at bedtime   #90    Pharmacy: [facility]    Local Pharmacy   Does the patient have enough for 3 days?   [] Yes   [] No - Send as HP to POD    Mail Away Pharmacy   Does the patient have enough for 10 days?   [x] Yes   [] No - Send as HP to POD

## 2025-05-09 RX ORDER — DULOXETIN HYDROCHLORIDE 60 MG/1
60 CAPSULE, DELAYED RELEASE ORAL
Qty: 90 CAPSULE | Refills: 1 | Status: SHIPPED | OUTPATIENT
Start: 2025-05-09

## 2025-05-09 RX ORDER — DULOXETIN HYDROCHLORIDE 30 MG/1
30 CAPSULE, DELAYED RELEASE ORAL EVERY MORNING
Qty: 90 CAPSULE | Refills: 1 | Status: SHIPPED | OUTPATIENT
Start: 2025-05-09

## 2025-05-13 DIAGNOSIS — E03.9 HYPOTHYROIDISM, UNSPECIFIED TYPE: ICD-10-CM

## 2025-05-13 RX ORDER — LEVOTHYROXINE SODIUM 100 UG/1
100 TABLET ORAL
Qty: 90 TABLET | Refills: 1 | Status: SHIPPED | OUTPATIENT
Start: 2025-05-13 | End: 2025-05-16 | Stop reason: SDUPTHER

## 2025-05-13 NOTE — TELEPHONE ENCOUNTER
Patient warm transferred and is in process of scheduling Medicare Annual Visit. Blood work obtained and stable as per pcp. Inquiring on a 90 day supply.     Reason for call:   [x] Refill   [] Prior Auth  [] Other:     Office:   [x] PCP/Provider - Bryanna Zavala PA-C   [] Specialty/Provider -     Medication: levothyroxine 100 mcg tablet /  Take 1 tablet (100 mcg total) by mouth daily in the early morning    Pharmacy: [facility] - [facility] - [address] [place] [address]     Mail Away Pharmacy   Does the patient have enough for 10 days?   [] Yes   [x] No - Send as HP to POD

## 2025-05-16 ENCOUNTER — OFFICE VISIT (OUTPATIENT)
Age: 65
End: 2025-05-16
Payer: COMMERCIAL

## 2025-05-16 VITALS
WEIGHT: 136.2 LBS | BODY MASS INDEX: 20.64 KG/M2 | OXYGEN SATURATION: 95 % | HEIGHT: 68 IN | SYSTOLIC BLOOD PRESSURE: 124 MMHG | TEMPERATURE: 97.2 F | RESPIRATION RATE: 18 BRPM | DIASTOLIC BLOOD PRESSURE: 72 MMHG | HEART RATE: 71 BPM

## 2025-05-16 DIAGNOSIS — E55.9 VITAMIN D DEFICIENCY: ICD-10-CM

## 2025-05-16 DIAGNOSIS — F32.2 CURRENT SEVERE EPISODE OF MAJOR DEPRESSIVE DISORDER WITHOUT PSYCHOTIC FEATURES, UNSPECIFIED WHETHER RECURRENT (HCC): ICD-10-CM

## 2025-05-16 DIAGNOSIS — E03.9 HYPOTHYROIDISM, UNSPECIFIED TYPE: ICD-10-CM

## 2025-05-16 DIAGNOSIS — G89.4 PAIN SYNDROME, CHRONIC: ICD-10-CM

## 2025-05-16 DIAGNOSIS — Z85.3 HISTORY OF RIGHT BREAST CANCER: ICD-10-CM

## 2025-05-16 DIAGNOSIS — Z00.00 MEDICARE ANNUAL WELLNESS VISIT, SUBSEQUENT: Primary | ICD-10-CM

## 2025-05-16 DIAGNOSIS — D47.2 MONOCLONAL GAMMOPATHY OF UNDETERMINED SIGNIFICANCE: ICD-10-CM

## 2025-05-16 DIAGNOSIS — M79.7 FIBROMYALGIA: ICD-10-CM

## 2025-05-16 DIAGNOSIS — R26.89 BALANCE PROBLEM: ICD-10-CM

## 2025-05-16 DIAGNOSIS — Z00.00 HEALTHCARE MAINTENANCE: ICD-10-CM

## 2025-05-16 DIAGNOSIS — C50.811 MALIGNANT NEOPLASM OF OVERLAPPING SITES OF RIGHT BREAST IN FEMALE, ESTROGEN RECEPTOR POSITIVE (HCC): ICD-10-CM

## 2025-05-16 DIAGNOSIS — Z17.0 MALIGNANT NEOPLASM OF OVERLAPPING SITES OF RIGHT BREAST IN FEMALE, ESTROGEN RECEPTOR POSITIVE (HCC): ICD-10-CM

## 2025-05-16 PROCEDURE — G0439 PPPS, SUBSEQ VISIT: HCPCS

## 2025-05-16 PROCEDURE — 99214 OFFICE O/P EST MOD 30 MIN: CPT

## 2025-05-16 PROCEDURE — G2211 COMPLEX E/M VISIT ADD ON: HCPCS

## 2025-05-16 RX ORDER — LEVOTHYROXINE SODIUM 100 UG/1
100 TABLET ORAL
Qty: 90 TABLET | Refills: 1 | Status: SHIPPED | OUTPATIENT
Start: 2025-05-16
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
## 2025-05-16 NOTE — ASSESSMENT & PLAN NOTE
Patient currently on Cymbalta for fibromyalgia and also depression.  She takes 30 mg in the morning and 60 mg at night.  Encouraged her to continue taking this.  Offered to place a therapy referral, but she declines.  Depression Screening Follow-up Plan: Patient's depression screening was positive with a PHQ-9 score of 13. Patient with underlying depression and was advised to continue current medications as prescribed.

## 2025-05-16 NOTE — ASSESSMENT & PLAN NOTE
Monitored by heme-onc.  Continue follow-up with heme-onc.  Orders:  •  CBC and differential; Future

## 2025-05-16 NOTE — ASSESSMENT & PLAN NOTE
TSH normal. Continue levothyroxine 100 mcg daily.   Orders:  •  levothyroxine 100 mcg tablet; Take 1 tablet (100 mcg total) by mouth daily in the early morning  •  TSH, 3rd generation with Free T4 reflex; Future

## 2025-05-16 NOTE — ASSESSMENT & PLAN NOTE
Recommend checking vitamin D level prior to 6-month follow-up  Orders:  •  Vitamin D 25 hydroxy; Future

## 2025-05-16 NOTE — PATIENT INSTRUCTIONS
Medicare Preventive Visit Patient Instructions  Thank you for completing your Welcome to Medicare Visit or Medicare Annual Wellness Visit today. Your next wellness visit will be due in one year (5/17/2026).  The screening/preventive services that you may require over the next 5-10 years are detailed below. Some tests may not apply to you based off risk factors and/or age. Screening tests ordered at today's visit but not completed yet may show as past due. Also, please note that scanned in results may not display below.  Preventive Screenings:  Service Recommendations Previous Testing/Comments   Colorectal Cancer Screening  * Colonoscopy    * Fecal Occult Blood Test (FOBT)/Fecal Immunochemical Test (FIT)  * Fecal DNA/Cologuard Test  * Flexible Sigmoidoscopy Age: 45-75 years old   Colonoscopy: every 10 years (may be performed more frequently if at higher risk)  OR  FOBT/FIT: every 1 year  OR  Cologuard: every 3 years  OR  Sigmoidoscopy: every 5 years  Screening may be recommended earlier than age 45 if at higher risk for colorectal cancer. Also, an individualized decision between you and your healthcare provider will decide whether screening between the ages of 76-85 would be appropriate. Colonoscopy: Not on file  FOBT/FIT: Not on file  Cologuard: 04/17/2024  Sigmoidoscopy: Not on file    Screening Current     Breast Cancer Screening Age: 40+ years old  Frequency: every 1-2 years  Not required if history of left and right mastectomy Mammogram: 11/29/2024    History Breast Cancer   Cervical Cancer Screening Between the ages of 21-29, pap smear recommended once every 3 years.   Between the ages of 30-65, can perform pap smear with HPV co-testing every 5 years.   Recommendations may differ for women with a history of total hysterectomy, cervical cancer, or abnormal pap smears in past. Pap Smear: Not on file    Screening Not Indicated   Hepatitis C Screening Once for adults born between 1945 and 1965  More frequently in  patients at high risk for Hepatitis C Hep C Antibody: 01/17/2023    Screening Current   Diabetes Screening 1-2 times per year if you're at risk for diabetes or have pre-diabetes Fasting glucose: 93 mg/dL (5/2/2025)  A1C: No results in last 5 years (No results in last 5 years)  Screening Current   Cholesterol Screening Once every 5 years if you don't have a lipid disorder. May order more often based on risk factors. Lipid panel: 05/07/2025    Screening Not Indicated  History Lipid Disorder     Other Preventive Screenings Covered by Medicare:  Abdominal Aortic Aneurysm (AAA) Screening: covered once if your at risk. You're considered to be at risk if you have a family history of AAA.  Lung Cancer Screening: covers low dose CT scan once per year if you meet all of the following conditions: (1) Age 55-77; (2) No signs or symptoms of lung cancer; (3) Current smoker or have quit smoking within the last 15 years; (4) You have a tobacco smoking history of at least 20 pack years (packs per day multiplied by number of years you smoked); (5) You get a written order from a healthcare provider.  Glaucoma Screening: covered annually if you're considered high risk: (1) You have diabetes OR (2) Family history of glaucoma OR (3)  aged 50 and older OR (4)  American aged 65 and older  Osteoporosis Screening: covered every 2 years if you meet one of the following conditions: (1) You're estrogen deficient and at risk for osteoporosis based off medical history and other findings; (2) Have a vertebral abnormality; (3) On glucocorticoid therapy for more than 3 months; (4) Have primary hyperparathyroidism; (5) On osteoporosis medications and need to assess response to drug therapy.   Last bone density test (DXA Scan): 11/18/2022.  HIV Screening: covered annually if you're between the age of 15-65. Also covered annually if you are younger than 15 and older than 65 with risk factors for HIV infection. For pregnant  patients, it is covered up to 3 times per pregnancy.    Immunizations:  Immunization Recommendations   Influenza Vaccine Annual influenza vaccination during flu season is recommended for all persons aged >= 6 months who do not have contraindications   Pneumococcal Vaccine   * Pneumococcal conjugate vaccine = PCV13 (Prevnar 13), PCV15 (Vaxneuvance), PCV20 (Prevnar 20)  * Pneumococcal polysaccharide vaccine = PPSV23 (Pneumovax) Adults 19-63 yo with certain risk factors or if 65+ yo  If never received any pneumonia vaccine: recommend Prevnar 20 (PCV20)  Give PCV20 if previously received 1 dose of PCV13 or PPSV23   Hepatitis B Vaccine 3 dose series if at intermediate or high risk (ex: diabetes, end stage renal disease, liver disease)   Respiratory syncytial virus (RSV) Vaccine - COVERED BY MEDICARE PART D  * RSVPreF3 (Arexvy) CDC recommends that adults 60 years of age and older may receive a single dose of RSV vaccine using shared clinical decision-making (SCDM)   Tetanus (Td) Vaccine - COST NOT COVERED BY MEDICARE PART B Following completion of primary series, a booster dose should be given every 10 years to maintain immunity against tetanus. Td may also be given as tetanus wound prophylaxis.   Tdap Vaccine - COST NOT COVERED BY MEDICARE PART B Recommended at least once for all adults. For pregnant patients, recommended with each pregnancy.   Shingles Vaccine (Shingrix) - COST NOT COVERED BY MEDICARE PART B  2 shot series recommended in those 19 years and older who have or will have weakened immune systems or those 50 years and older     Health Maintenance Due:      Topic Date Due   • Colorectal Cancer Screening  04/17/2025   • Breast Cancer Screening: Mammogram  11/29/2025   • HIV Screening  Completed   • Hepatitis C Screening  Completed     Immunizations Due:      Topic Date Due   • Pneumococcal Vaccine: 65+ Years (1 of 1 - PCV) Never done   • COVID-19 Vaccine (4 - 2024-25 season) 09/01/2024     Advance Directives    What are advance directives?  Advance directives are legal documents that state your wishes and plans for medical care. These plans are made ahead of time in case you lose your ability to make decisions for yourself. Advance directives can apply to any medical decision, such as the treatments you want, and if you want to donate organs.   What are the types of advance directives?  There are many types of advance directives, and each state has rules about how to use them. You may choose a combination of any of the following:  Living will:  This is a written record of the treatment you want. You can also choose which treatments you do not want, which to limit, and which to stop at a certain time. This includes surgery, medicine, IV fluid, and tube feedings.   Durable power of  for healthcare (DPAHC):  This is a written record that states who you want to make healthcare choices for you when you are unable to make them for yourself. This person, called a proxy, is usually a family member or a friend. You may choose more than 1 proxy.  Do not resuscitate (DNR) order:  A DNR order is used in case your heart stops beating or you stop breathing. It is a request not to have certain forms of treatment, such as CPR. A DNR order may be included in other types of advance directives.  Medical directive:  This covers the care that you want if you are in a coma, near death, or unable to make decisions for yourself. You can list the treatments you want for each condition. Treatment may include pain medicine, surgery, blood transfusions, dialysis, IV or tube feedings, and a ventilator (breathing machine).  Values history:  This document has questions about your views, beliefs, and how you feel and think about life. This information can help others choose the care that you would choose.  Why are advance directives important?  An advance directive helps you control your care. Although spoken wishes may be used, it is better to  have your wishes written down. Spoken wishes can be misunderstood, or not followed. Treatments may be given even if you do not want them. An advance directive may make it easier for your family to make difficult choices about your care.   Fall Prevention    Fall prevention  includes ways to make your home and other areas safer. It also includes ways you can move more carefully to prevent a fall. Health conditions that cause changes in your blood pressure, vision, or muscle strength and coordination may increase your risk for falls. Medicines may also increase your risk for falls if they make you dizzy, weak, or sleepy.   Fall prevention tips:   Stand or sit up slowly.    Use assistive devices as directed.    Wear shoes that fit well and have soles that .    Wear a personal alarm.    Stay active.    Manage your medical conditions.    Home Safety Tips:  Add items to prevent falls in the bathroom.    Keep paths clear.    Install bright lights in your home.    Keep items you use often on shelves within reach.    Paint or place reflective tape on the edges of your stairs.     © Copyright [employer] 2018 Information is for End User's use only and may not be sold, redistributed or otherwise used for commercial purposes. All illustrations and images included in CareNotes® are the copyrighted property of [employer] or [employer]

## 2025-05-16 NOTE — ASSESSMENT & PLAN NOTE
Managed with Cymbalta and Neurontin as prescribed by neurology.  Continue follow-up with neurology.

## 2025-05-16 NOTE — ASSESSMENT & PLAN NOTE
Neurology previously provided pain management referral.  Recommend the patient establish with pain management

## 2025-06-03 ENCOUNTER — OFFICE VISIT (OUTPATIENT)
Dept: OBGYN CLINIC | Facility: CLINIC | Age: 65
End: 2025-06-03
Payer: COMMERCIAL

## 2025-06-03 ENCOUNTER — APPOINTMENT (OUTPATIENT)
Dept: RADIOLOGY | Facility: CLINIC | Age: 65
End: 2025-06-03
Payer: COMMERCIAL

## 2025-06-03 VITALS — HEIGHT: 68 IN | BODY MASS INDEX: 20.73 KG/M2 | WEIGHT: 136.8 LBS

## 2025-06-03 DIAGNOSIS — S83.242D TEAR OF MEDIAL MENISCUS OF LEFT KNEE, CURRENT, UNSPECIFIED TEAR TYPE, SUBSEQUENT ENCOUNTER: Primary | ICD-10-CM

## 2025-06-03 DIAGNOSIS — S83.242D TEAR OF MEDIAL MENISCUS OF LEFT KNEE, CURRENT, UNSPECIFIED TEAR TYPE, SUBSEQUENT ENCOUNTER: ICD-10-CM

## 2025-06-03 PROCEDURE — 99213 OFFICE O/P EST LOW 20 MIN: CPT | Performed by: STUDENT IN AN ORGANIZED HEALTH CARE EDUCATION/TRAINING PROGRAM

## 2025-06-03 PROCEDURE — 73564 X-RAY EXAM KNEE 4 OR MORE: CPT

## 2025-06-03 NOTE — PROGRESS NOTES
[facility] Follow-up Note    Patient Name:  Mariola Dos Santos  MRN:  [id number]  Date of last visit: 3/3/25    Assessment/Plan:     Assessment & Plan  Tear of medial meniscus of left knee, current, unspecified tear type, subsequent encounter  Activity recommendations: Continue activity as tolerated.  Physical Therapy: Recommend continued therapy for strengthening about the knee.  Patient defers formal therapy.  Imaging recommendations: New x-rays reviewed at today's visit.  Discussed injections: Patient notes no improvement with Visco injection performed 11/29/2024 or corticosteroid injection provided 3/3/2025  Brace recommendations: Offered Ronnie pull brace at today's visit, which patient defers.  Medication recommendations: Continue use of oral over-the-counter analgesics as needed -she notes she generally avoids these and she does not think that they will make a difference and does not want to deal with adverse health sequela.  Patient and her partner feel that surgery is needed in order to improve her symptoms.  Notably, they feel that arthroscopic intervention would be the best course of action.  I discussed at length the literature surrounding partial meniscectomy in the setting of arthritic change.  I do not believe personally that it will give her a large degree of relief.  She has diffuse pain throughout the knee as well as notable atrophy and weakness on examination.  A large component of her symptoms are likely secondary to deconditioning and to a smaller extent the arthritic change that she has developed to her knee.  If she does progress to warranting surgical intervention, I feel that a knee replacement would be the option that would give the more predictable outcome.  She would like to talk to someone about this and as such a referral was placed to Dr. Ochoa at today's visit for consideration of partial medial meniscectomy versus TKA.     Return with Dr. Ochoa.      Subjective  "  Mariola Dos Santos with PMH of hypothyroidism, cervical radiculopathy, MDD, iron deficiency anemia, history of right breast cancer returns for follow-up of left knee pain, approximately 3 month(s) since last visit. At that time, in brief the plan was for: Continued nonoperative management with oral over-the-counter analgesics as needed, consideration of repeat injections.    Currently patient presents noting: She continues to have pain throughout the left knee.  She notes that corticosteroid injection provided 3/3/2025 provided no relief of pain.  She notes that pain is all throughout the day, but worst to the medial aspect.  She also notes continued intermittent swelling and well as feelings of heat radiating in the left knee.  She notes intermittent clicking with range of motion, but denies any mechanical locking of the knee.  She notes that pain is worse with pivoting movements and with prolonged ambulation.  She also notes pain at nighttime.      Objective     Ht 5' 8\" (1.727 m)   Wt 62.1 kg (136 lb 12.8 oz)   BMI 20.80 kg/m²     Left Knee exam  Ambulates with mild antalgic gait without use of assistive device  No bruising, swelling, deformity  TTP medial joint line, non-tender over lateral joint line  Passive ROM 0-140, with mild crepitus  Negative Sharri's, negative Caitlin's  Stable to varus/valgus stress at 0 and 30 degrees  Negative Lachman  Negative anterior Drawer, negative posterior Drawer  4-/5 quad, 4-/5 hamstring strength  SILT all exposed distal distributions  2+ PT pulse      Data Review     I have personally reviewed pertinent films in PACS:    Xrays of the left knee taken on 6/3/2025 demonstrate mild to moderate medial compartment osteoarthritis as evidenced by mild to moderate joint space narrowing, no significant osteophyte formation.  No acute fracture dislocations demonstrated.    Pertinent PMH/Meds/Allergies reviewed as listed below:  Past Medical History[1] Current Medications[2] " Allergies[3]     Scribe Attestation      I,:  Ag Cotton PA-C am acting as a scribe while in the presence of the attending physician.:       I,:  Hi Wei MD personally performed the services described in this documentation    as scribed in my presence.:                    [1]   Past Medical History:  Diagnosis Date    Anxiety     Breast cancer (HCC) 2017    right    Cancer (HCC)     right breast  dx 04/2017    Cervical disc disorder of cervicothoracic region     Cervical radiculopathy     Cervicalgia     Cognitive impairment, mild, so stated     Depression     Major,recurrent    Diplopia     last assessed 12/23/13    Disc degeneration, lumbar     Disease of thyroid gland     hx of goiter, surgery done 1978    Edema of foot     Esophageal reflux     Fatigue     Fibromyalgia     Hemorrhage, anal or rectal     History of radiation therapy     Hx of radiation therapy     Treatments: Course C1, Plan ID Energy Fractions Dose per Fraction (cGy)total Dose delivered(cGy)elapsed days. R Breast 6x 28/28 180 5,040 39, R breast e 12E 5/5 200 1,000 7 Treatment dates: 7/20/17-09/05/17    Hx of radiation therapy     33 treatments    Hypotension     Hypothyroidism     Insomnia     Lab test positive for detection of COVID-19 virus 10/14/2022    Memory loss     Monoclonal gammopathy of undetermined significance     Myalgia     last assessed 07/08/16    Myositis     last assessed 07/08/16    Neck stiffness     Neuralgia     Neuritis     Pain syndrome, chronic     Palpitations     Paresthesia     Peripheral neuropathy     Polyneuropathy     Pseudodementia     Radiculopathy     12/23/13    Restless leg syndrome     Sacroiliitis (HCC)     SLE (systemic lupus erythematosus) ([facility])     last assessed 12/23/13    Tachycardia     Torn meniscus     left knee    Viral warts     Vitamin B deficiency     Vitamin D deficiency     Vitamin D deficiency     Wears glasses    [2]   Current Outpatient Medications:     DULoxetine (CYMBALTA) 30  mg delayed release capsule, Take 1 capsule (30 mg total) by mouth every morning, Disp: 90 capsule, Rfl: 1    DULoxetine (CYMBALTA) 60 mg delayed release capsule, Take 1 capsule (60 mg total) by mouth daily at bedtime, Disp: 90 capsule, Rfl: 1    gabapentin (NEURONTIN) 100 mg capsule, Take 100 mg by mouth if needed, Disp: , Rfl:     levothyroxine 100 mcg tablet, Take 1 tablet (100 mcg total) by mouth daily in the early morning, Disp: 90 tablet, Rfl: 1  [3] No Known Allergies

## 2025-06-04 ENCOUNTER — HOSPITAL ENCOUNTER (OUTPATIENT)
Dept: NON INVASIVE DIAGNOSTICS | Facility: HOSPITAL | Age: 65
Discharge: HOME | End: 2025-06-04
Attending: PHYSICIAN ASSISTANT
Payer: COMMERCIAL

## 2025-06-04 VITALS
HEIGHT: 68 IN | TEMPERATURE: 98.6 F | SYSTOLIC BLOOD PRESSURE: 123 MMHG | BODY MASS INDEX: 20.79 KG/M2 | OXYGEN SATURATION: 96 % | HEART RATE: 55 BPM | WEIGHT: 137.2 LBS | RESPIRATION RATE: 19 BRPM | DIASTOLIC BLOOD PRESSURE: 59 MMHG

## 2025-06-04 DIAGNOSIS — D47.2: ICD-10-CM

## 2025-06-04 LAB
ERYTHROCYTE [DISTWIDTH] IN BLOOD BY AUTOMATED COUNT: 12.8 % (ref 11.6–15.1)
HCT VFR BLD AUTO: 38.3 % (ref 34.8–46.1)
HGB BLD-MCNC: 13.1 G/DL (ref 11.5–15.4)
MCH RBC QN AUTO: 31.3 PG (ref 26.8–34.3)
MCHC RBC AUTO-ENTMCNC: 34.2 G/DL (ref 31.4–37.4)
MCV RBC AUTO: 91 FL (ref 82–98)
NRBC BLD AUTO-RTO: 0 /100 WBCS
PLATELET # BLD AUTO: 246 THOUSANDS/UL (ref 149–390)
PMV BLD AUTO: 8.9 FL (ref 8.9–12.7)
RBC # BLD AUTO: 4.19 MILLION/UL (ref 3.81–5.12)
WBC # BLD AUTO: 6.12 THOUSAND/UL (ref 4.31–10.16)

## 2025-06-04 PROCEDURE — C1830 POWER BONE MARROW BX NEEDLE: HCPCS

## 2025-06-04 PROCEDURE — 88184 FLOWCYTOMETRY/ TC 1 MARKER: CPT | Performed by: PHYSICIAN ASSISTANT

## 2025-06-04 PROCEDURE — 88237 TISSUE CULTURE BONE MARROW: CPT | Performed by: PHYSICIAN ASSISTANT

## 2025-06-04 PROCEDURE — 88264 CHROMOSOME ANALYSIS 20-25: CPT | Performed by: PHYSICIAN ASSISTANT

## 2025-06-04 PROCEDURE — 85007 BL SMEAR W/DIFF WBC COUNT: CPT | Performed by: NURSE PRACTITIONER

## 2025-06-04 PROCEDURE — 38222 DX BONE MARROW BX & ASPIR: CPT

## 2025-06-04 PROCEDURE — 88185 FLOWCYTOMETRY/TC ADD-ON: CPT | Performed by: PHYSICIAN ASSISTANT

## 2025-06-04 PROCEDURE — 99152 MOD SED SAME PHYS/QHP 5/>YRS: CPT

## 2025-06-04 RX ORDER — FENTANYL CITRATE 50 UG/ML
INJECTION, SOLUTION INTRAMUSCULAR; INTRAVENOUS AS NEEDED
Status: COMPLETED | OUTPATIENT
Start: 2025-06-04 | End: 2025-06-04

## 2025-06-04 RX ORDER — MIDAZOLAM HYDROCHLORIDE 2 MG/2ML
INJECTION, SOLUTION INTRAMUSCULAR; INTRAVENOUS AS NEEDED
Status: COMPLETED | OUTPATIENT
Start: 2025-06-04 | End: 2025-06-04

## 2025-06-04 RX ORDER — LIDOCAINE WITH 8.4% SOD BICARB 0.9%(10ML)
SYRINGE (ML) INJECTION AS NEEDED
Status: COMPLETED | OUTPATIENT
Start: 2025-06-04 | End: 2025-06-04

## 2025-06-04 RX ADMIN — MIDAZOLAM HYDROCHLORIDE 1 MG: 1 INJECTION, SOLUTION INTRAMUSCULAR; INTRAVENOUS at 09:16

## 2025-06-04 RX ADMIN — MIDAZOLAM HYDROCHLORIDE 1 MG: 1 INJECTION, SOLUTION INTRAMUSCULAR; INTRAVENOUS at 09:07

## 2025-06-04 RX ADMIN — FENTANYL CITRATE 50 MCG: 50 INJECTION, SOLUTION INTRAMUSCULAR; INTRAVENOUS at 09:16

## 2025-06-04 RX ADMIN — Medication 10 ML: at 09:17

## 2025-06-04 RX ADMIN — FENTANYL CITRATE 50 MCG: 50 INJECTION, SOLUTION INTRAMUSCULAR; INTRAVENOUS at 09:07

## 2025-06-04 NOTE — BRIEF OP NOTE (RAD/CATH)
IR BIOPSY BONE MARROW Procedure Note    PATIENT NAME: Mariola Dos Santos  : 1960  MRN: [id number]    Pre-op Diagnosis:   1. IgA monoclonal gammopathy of undetermined significance (MGUS)      Post-op Diagnosis:   1. IgA monoclonal gammopathy of undetermined significance (MGUS)        Provider:   RAFAT Mcintyre    Assistants:   None     Estimated Blood Loss: Minimal    Findings: Bone marrow aspirate and biopsy    Specimens: 5 ml bone marrow and 1 bone core     Complications:  None immediate     Anesthesia: conscious sedation and local    RAFAT Mcintyre     Date: 2025  Time: 9:31 AM

## 2025-06-04 NOTE — INTERVAL H&P NOTE
"H&P reviewed. There have been no interval changes since the time the H&P was written.    /64   Pulse 59   Temp 98.6 °F (37 °C) (Temporal)   Resp 20   Ht 5' 8\" (1.727 m)   Wt 62.2 kg (137 lb 3.2 oz)   SpO2 98%   BMI 20.86 kg/m²     Prior imaging was reviewed.     64-year-old female with a history of right breast cancer status postlumpectomy and radiation therapy, history of IgA MGUS initially documented 4/2017.  She presents to interventional radiology for bone marrow biopsy for further evaluation of Immunoglobulin free LT chains noted on recent laboratory findings.    Informed written consent was obtained.    RAFAT Mcintyre   "

## 2025-06-05 ENCOUNTER — TELEPHONE (OUTPATIENT)
Dept: OBGYN CLINIC | Facility: CLINIC | Age: 65
End: 2025-06-05

## 2025-06-05 LAB
EOSINOPHIL # BLD AUTO: 0.12 THOUSAND/UL (ref 0–0.61)
EOSINOPHIL NFR BLD MANUAL: 2 % (ref 0–6)
LYMPHOCYTES # BLD AUTO: 1.96 THOUSAND/UL (ref 0.6–4.47)
LYMPHOCYTES # BLD AUTO: 32 %
MONOCYTES # BLD AUTO: 0.73 THOUSAND/UL (ref 0–1.22)
MONOCYTES NFR BLD AUTO: 12 % (ref 4–12)
NEUTS SEG # BLD: 3.3 THOUSAND/UL (ref 1.81–6.82)
NEUTS SEG NFR BLD AUTO: 54 %
PATHOLOGY REVIEW: YES
PLATELET BLD QL SMEAR: ADEQUATE
RBC MORPH BLD: NORMAL
TOTAL CELLS COUNTED SPEC: 100

## 2025-06-12 LAB
MISCELLANEOUS LAB TEST RESULT: NORMAL
SCAN RESULT: NORMAL

## 2025-06-15 ENCOUNTER — HOSPITAL ENCOUNTER (EMERGENCY)
Facility: HOSPITAL | Age: 65
Discharge: HOME | End: 2025-06-15
Attending: EMERGENCY MEDICINE | Admitting: EMERGENCY MEDICINE
Payer: COMMERCIAL

## 2025-06-15 VITALS
DIASTOLIC BLOOD PRESSURE: 59 MMHG | SYSTOLIC BLOOD PRESSURE: 108 MMHG | TEMPERATURE: 98 F | OXYGEN SATURATION: 100 % | RESPIRATION RATE: 18 BRPM | HEART RATE: 88 BPM

## 2025-06-15 DIAGNOSIS — E03.9 HYPOTHYROIDISM, UNSPECIFIED TYPE: ICD-10-CM

## 2025-06-15 DIAGNOSIS — R21 RASH: Primary | ICD-10-CM

## 2025-06-15 LAB
ANION GAP SERPL CALCULATED.3IONS-SCNC: 6 MMOL/L (ref 4–13)
BACTERIA UR QL AUTO: ABNORMAL /HPF
BASOPHILS # BLD AUTO: 0.04 THOUSANDS/ÂΜL (ref 0–0.1)
BASOPHILS NFR BLD AUTO: 1 % (ref 0–1)
BILIRUB UR QL STRIP: NEGATIVE
BUN SERPL-MCNC: 14 MG/DL (ref 5–25)
CALCIUM SERPL-MCNC: 9 MG/DL (ref 8.4–10.2)
CHLORIDE SERPL-SCNC: 105 MMOL/L (ref 96–108)
CLARITY UR: CLEAR
CO2 SERPL-SCNC: 29 MMOL/L (ref 21–32)
COLOR UR: YELLOW
CREAT SERPL-MCNC: 0.69 MG/DL (ref 0.6–1.3)
EOSINOPHIL # BLD AUTO: 0.2 THOUSAND/ÂΜL (ref 0–0.61)
EOSINOPHIL NFR BLD AUTO: 4 % (ref 0–6)
ERYTHROCYTE [DISTWIDTH] IN BLOOD BY AUTOMATED COUNT: 12.7 % (ref 11.6–15.1)
GFR SERPL CREATININE-BSD FRML MDRD: 91 ML/MIN/1.73SQ M
GLUCOSE SERPL-MCNC: 97 MG/DL (ref 65–140)
GLUCOSE UR STRIP-MCNC: NEGATIVE MG/DL
HCT VFR BLD AUTO: 39.4 % (ref 34.8–46.1)
HGB BLD-MCNC: 13.5 G/DL (ref 11.5–15.4)
HGB UR QL STRIP.AUTO: NEGATIVE
IMM GRANULOCYTES # BLD AUTO: 0.01 THOUSAND/UL (ref 0–0.2)
IMM GRANULOCYTES NFR BLD AUTO: 0 % (ref 0–2)
KETONES UR STRIP-MCNC: NEGATIVE MG/DL
LEUKOCYTE ESTERASE UR QL STRIP: ABNORMAL
LYMPHOCYTES # BLD AUTO: 1.48 THOUSANDS/ÂΜL (ref 0.6–4.47)
LYMPHOCYTES NFR BLD AUTO: 27 % (ref 14–44)
MCH RBC QN AUTO: 31.6 PG (ref 26.8–34.3)
MCHC RBC AUTO-ENTMCNC: 34.3 G/DL (ref 31.4–37.4)
MCV RBC AUTO: 92 FL (ref 82–98)
MONOCYTES # BLD AUTO: 0.54 THOUSAND/ÂΜL (ref 0.17–1.22)
MONOCYTES NFR BLD AUTO: 10 % (ref 4–12)
MUCOUS THREADS UR QL AUTO: ABNORMAL
NEUTROPHILS # BLD AUTO: 3.24 THOUSANDS/ÂΜL (ref 1.85–7.62)
NEUTS SEG NFR BLD AUTO: 58 % (ref 43–75)
NITRITE UR QL STRIP: NEGATIVE
NON-SQ EPI CELLS URNS QL MICRO: ABNORMAL /HPF
NRBC BLD AUTO-RTO: 0 /100 WBCS
PH UR STRIP.AUTO: 6 [PH]
PLATELET # BLD AUTO: 258 THOUSANDS/UL (ref 149–390)
PMV BLD AUTO: 8.7 FL (ref 8.9–12.7)
POTASSIUM SERPL-SCNC: 3.7 MMOL/L (ref 3.5–5.3)
PROT UR STRIP-MCNC: NEGATIVE MG/DL
RBC # BLD AUTO: 4.27 MILLION/UL (ref 3.81–5.12)
RBC #/AREA URNS AUTO: ABNORMAL /HPF
SODIUM SERPL-SCNC: 140 MMOL/L (ref 135–147)
SP GR UR STRIP.AUTO: 1.02 (ref 1–1.03)
UROBILINOGEN UR STRIP-ACNC: <2 MG/DL
WBC # BLD AUTO: 5.51 THOUSAND/UL (ref 4.31–10.16)
WBC #/AREA URNS AUTO: ABNORMAL /HPF

## 2025-06-15 PROCEDURE — 85025 COMPLETE CBC W/AUTO DIFF WBC: CPT | Performed by: EMERGENCY MEDICINE

## 2025-06-15 PROCEDURE — 80048 BASIC METABOLIC PNL TOTAL CA: CPT | Performed by: EMERGENCY MEDICINE

## 2025-06-15 PROCEDURE — 99284 EMERGENCY DEPT VISIT MOD MDM: CPT | Performed by: EMERGENCY MEDICINE

## 2025-06-15 PROCEDURE — 36415 COLL VENOUS BLD VENIPUNCTURE: CPT | Performed by: EMERGENCY MEDICINE

## 2025-06-15 PROCEDURE — 99283 EMERGENCY DEPT VISIT LOW MDM: CPT

## 2025-06-15 PROCEDURE — 81001 URINALYSIS AUTO W/SCOPE: CPT | Performed by: EMERGENCY MEDICINE

## 2025-06-15 RX ORDER — HYDROXYZINE HYDROCHLORIDE 25 MG/1
25 TABLET, FILM COATED ORAL ONCE
Status: COMPLETED | OUTPATIENT
Start: 2025-06-15 | End: 2025-06-15

## 2025-06-15 RX ORDER — HYDROXYZINE HYDROCHLORIDE 25 MG/1
25 TABLET, FILM COATED ORAL EVERY 6 HOURS PRN
Qty: 30 TABLET | Refills: 0 | Status: SHIPPED | OUTPATIENT
Start: 2025-06-15

## 2025-06-15 RX ADMIN — HYDROXYZINE HYDROCHLORIDE 25 MG: 25 TABLET, FILM COATED ORAL at 10:54

## 2025-06-15 NOTE — DISCHARGE INSTRUCTIONS
Atarax every 6 hours as needed for itching  Follow-up with your provider for the results of your bone marrow biopsy

## 2025-06-15 NOTE — ED PROVIDER NOTES
Time reflects when diagnosis was documented in both MDM as applicable and the Disposition within this note       Time User Action Codes Description Comment    6/15/2025 10:50 AM Raulito Meza [R21] Rash     6/15/2025 10:51 AM Raulito Meza [E03.9] Hypothyroidism, unspecified type           ED Disposition       ED Disposition   Discharge    Condition   Stable    Date/Time   Sun Dayron 15, 2025 10:49 AM    Comment   Mariola Dos Santos discharge to home/self care.                   Assessment & Plan       Medical Decision Making  Amount and/or Complexity of Data Reviewed  Labs: ordered.    Risk  Prescription drug management.      Medical decision making 64-year-old female presents to the emergency department with some rash on her left arm and some rash on her abdomen and some itching and irritation around her anus and vaginal area.  No obvious redness or erythema in her vaginal area on exam with nursing.  No perianal redness or inflammation.  Patient is currently awaiting results from bone marrow biopsy.  Discussed with patient, no sign of urinary tract infection at this time.  No sign of elevated white count.  No indication for admission or further diagnostic testing.  Discussed treatment with Atarax to help her symptoms.  Discussed follow-up with her primary care provider.  Again awaiting results of her bone marrow biopsy       Medications   hydrOXYzine HCL (ATARAX) tablet 25 mg (25 mg Oral Given 6/15/25 1054)       ED Risk Strat Scores                    (ISAR) Identification of Seniors at Risk  Before the illness or injury that brought you to the Emergency, did you need someone to help you on a regular basis?: 0  In the last 24 hours, have you needed more help than usual?: 0  Have you been hospitalized for one or more nights during the past 6 months?: 0  In general, do you see well?: 0  In general, do you have serious problems with your memory?: 0  Do you take more than three different medications every day?:  0  ISAR Score: 0            SBIRT 20yo+      Flowsheet Row Most Recent Value   Initial Alcohol Screen: US AUDIT-C     1. How often do you have a drink containing alcohol? 0 Filed at: 06/15/2025 1003   2. How many drinks containing alcohol do you have on a typical day you are drinking?  0 Filed at: 06/15/2025 1003   3b. FEMALE Any Age, or MALE 65+: How often do you have 4 or more drinks on one occassion? 0 Filed at: 06/15/2025 1003   Audit-C Score 0 Filed at: 06/15/2025 1003   TSERING: How many times in the past year have you...    Used an illegal drug or used a prescription medication for non-medical reasons? Never Filed at: 06/15/2025 1003              Diagnostic testing urinalysis showed some moderate leukocytes with dip but only 2-4 white cells.  There were epithelial cells.  White count was normal at 5.5 no sign of inflammation hemoglobin was 13 no sign of anemia.  Electrolytes are within normal limits with a normal BUN/creatinine sign of renal dysfunction.              History of Present Illness       Chief Complaint   Patient presents with    Possible UTI     C/o burning upon urination. States she had a bone marrow biopsy done 4 days ago.        Past Medical History[1]   Past Surgical History[2]   Family History[3]   Social History[4]   E-Cigarette/Vaping    E-Cigarette Use Never User       E-Cigarette/Vaping Substances    Nicotine No     THC No     CBD No     Flavoring No     Other No     Unknown No       I have reviewed and agree with the history as documented.     HPI patient is a 64-year-old female currently in workup for abnormal white cells according to the patient apparently has some lymphadenopathy in her right axilla and reports developed a rash on her left arm left shoulder somewhat circular nature itchy and then reports itching burning sensation around her vaginal orifice and around her anus.  Patient denies any fever or chills.  Denies any recent antibiotic use.  She reports had a bone marrow biopsy 4  days ago and has a small puncture type wound on her left pelvic rim.  She denies any redness or pain around that area.  She denies any vaginal bleeding.  She denies any dysuria.  She reports just a burning sensation in her skin.  Past medical history of breast cancer fibromyalgia currently in a workup for abnormal white cells recent bone marrow  Family hist noncontributory  Social history, former smoker no history of drug abuse    Review of Systems   Constitutional:  Negative for fever.   HENT:  Negative for congestion.    Eyes:  Negative for pain and redness.   Respiratory:  Negative for cough and shortness of breath.    Cardiovascular:  Negative for chest pain.   Gastrointestinal:  Negative for abdominal pain and vomiting.   Skin:  Positive for rash.   Reports itchy        Objective       ED Triage Vitals [06/15/25 0955]   Temperature Pulse Blood Pressure Respirations SpO2 Patient Position - Orthostatic VS   98 °F (36.7 °C) 88 108/59 18 100 % Sitting      Temp Source Heart Rate Source BP Location FiO2 (%) Pain Score    Temporal Monitor Left arm -- --      Vitals      Date and Time Temp Pulse SpO2 Resp BP Pain Score FACES Pain Rating User   06/15/25 0955 98 °F (36.7 °C) 88 100 % 18 108/59 -- -- MS            Physical Exam  Vitals and nursing note reviewed.   Constitutional:       Appearance: She is well-developed.   HENT:      Head: Normocephalic.      Right Ear: External ear normal.      Left Ear: External ear normal.      Nose: Nose normal.      Mouth/Throat:      Mouth: Mucous membranes are moist.      Pharynx: Oropharynx is clear.     Eyes:      General: Lids are normal.      Extraocular Movements: Extraocular movements intact.      Pupils: Pupils are equal, round, and reactive to light.       Cardiovascular:      Rate and Rhythm: Normal rate and regular rhythm.      Pulses: Normal pulses.      Heart sounds: Normal heart sounds.   Pulmonary:      Effort: Pulmonary effort is normal. No respiratory distress.       Breath sounds: Normal breath sounds.   Abdominal:      General: Abdomen is flat.   Genitourinary:     Comments: With the nurse present I examined the patient's perivaginal area there was no redness or warmth, no sign of cellulitis.  No viral lesions.  There was no redness or lesions around the anus.  Patient reported some itching around her anus.  No lesions.    Musculoskeletal:         General: No deformity. Normal range of motion.      Cervical back: Normal range of motion and neck supple.     Skin:     General: Skin is warm and dry.     Neurological:      Mental Status: She is alert and oriented to person, place, and time.     Psychiatric:         Mood and Affect: Mood normal.         Results Reviewed       Procedure Component Value Units Date/Time    Basic metabolic panel [[id number]] Collected: 06/15/25 1015    Lab Status: Final result Specimen: Blood from Arm, Right Updated: 06/15/25 1038     Sodium 140 mmol/L      Potassium 3.7 mmol/L      Chloride 105 mmol/L      CO2 29 mmol/L      ANION GAP 6 mmol/L      BUN 14 mg/dL      Creatinine 0.69 mg/dL      Glucose 97 mg/dL      Calcium 9.0 mg/dL      eGFR 91 ml/min/1.73sq m     Narrative:      National Kidney Disease Foundation guidelines for Chronic Kidney Disease (CKD):     Stage 1 with normal or high GFR (GFR > 90 mL/min/1.73 square meters)    Stage 2 Mild CKD (GFR = 60-89 mL/min/1.73 square meters)    Stage 3A Moderate CKD (GFR = 45-59 mL/min/1.73 square meters)    Stage 3B Moderate CKD (GFR = 30-44 mL/min/1.73 square meters)    Stage 4 Severe CKD (GFR = 15-29 mL/min/1.73 square meters)    Stage 5 End Stage CKD (GFR <15 mL/min/1.73 square meters)  Note: GFR calculation is accurate only with a steady state creatinine    Urine Microscopic [[id number]]  (Abnormal) Collected: 06/15/25 1015    Lab Status: Final result Specimen: Urine, Clean Catch Updated: 06/15/25 1027     RBC, UA 1-2 /hpf      WBC, UA 2-4 /hpf      Epithelial Cells Occasional /hpf      Bacteria, UA  None Seen /hpf      MUCUS THREADS Occasional    UA w Reflex to Microscopic w Reflex to Culture [[id number]]  (Abnormal) Collected: 06/15/25 1015    Lab Status: Final result Specimen: Urine, Clean Catch Updated: 06/15/25 1022     Color, UA Yellow     Clarity, UA Clear     Specific Gravity, UA 1.022     pH, UA 6.0     Leukocytes, UA Moderate     Nitrite, UA Negative     Protein, UA Negative mg/dl      Glucose, UA Negative mg/dl      Ketones, UA Negative mg/dl      Urobilinogen, UA <2.0 mg/dl      Bilirubin, UA Negative     Occult Blood, UA Negative    CBC and differential [[id number]]  (Abnormal) Collected: 06/15/25 1015    Lab Status: Final result Specimen: Blood from Arm, Right Updated: 06/15/25 1020     WBC 5.51 Thousand/uL      RBC 4.27 Million/uL      Hemoglobin 13.5 g/dL      Hematocrit 39.4 %      MCV 92 fL      MCH 31.6 pg      MCHC 34.3 g/dL      RDW 12.7 %      MPV 8.7 fL      Platelets 258 Thousands/uL      nRBC 0 /100 WBCs      Segmented % 58 %      Immature Grans % 0 %      Lymphocytes % 27 %      Monocytes % 10 %      Eosinophils Relative 4 %      Basophils Relative 1 %      Absolute Neutrophils 3.24 Thousands/µL      Absolute Immature Grans 0.01 Thousand/uL      Absolute Lymphocytes 1.48 Thousands/µL      Absolute Monocytes 0.54 Thousand/µL      Eosinophils Absolute 0.20 Thousand/µL      Basophils Absolute 0.04 Thousands/µL             No orders to display       Procedures    ED Medication and Procedure Management   Prior to Admission Medications   Prescriptions Last Dose Informant Patient Reported? Taking?   DULoxetine (CYMBALTA) 30 mg delayed release capsule  Self No No   Sig: Take 1 capsule (30 mg total) by mouth every morning   DULoxetine (CYMBALTA) 60 mg delayed release capsule  Self No No   Sig: Take 1 capsule (60 mg total) by mouth daily at bedtime   gabapentin (NEURONTIN) 100 mg capsule  Self Yes No   Sig: Take 100 mg by mouth if needed   levothyroxine 100 mcg tablet  Self No No   Sig: Take 1  tablet (100 mcg total) by mouth daily in the early morning      Facility-Administered Medications: None     Discharge Medication List as of 6/15/2025 10:51 AM        START taking these medications    Details   hydrOXYzine HCL (ATARAX) 25 mg tablet Take 1 tablet (25 mg total) by mouth every 6 (six) hours as needed for itching for up to 30 doses, Starting Sun 6/15/2025, Normal           CONTINUE these medications which have NOT CHANGED    Details   !! DULoxetine (CYMBALTA) 30 mg delayed release capsule Take 1 capsule (30 mg total) by mouth every morning, Starting Fri 5/9/2025, Normal      !! DULoxetine (CYMBALTA) 60 mg delayed release capsule Take 1 capsule (60 mg total) by mouth daily at bedtime, Starting Fri 5/9/2025, Normal      gabapentin (NEURONTIN) 100 mg capsule Take 100 mg by mouth if needed, Historical Med      levothyroxine 100 mcg tablet Take 1 tablet (100 mcg total) by mouth daily in the early morning, Starting Fri 5/16/2025, Normal       !! - Potential duplicate medications found. Please discuss with provider.        No discharge procedures on file.  ED SEPSIS DOCUMENTATION   Time reflects when diagnosis was documented in both MDM as applicable and the Disposition within this note       Time User Action Codes Description Comment    6/15/2025 10:50 AM Raulito Meza Add [R21] Rash     6/15/2025 10:51 AM Raulito Meza Add [E03.9] Hypothyroidism, unspecified type                      [1]   Past Medical History:  Diagnosis Date    Anxiety     Breast cancer (HCC) 2017    right    Cancer (HCC)     right breast  dx 04/2017    Cervical disc disorder of cervicothoracic region     Cervical radiculopathy     Cervicalgia     Cognitive impairment, mild, so stated     Depression     Major,recurrent    Diplopia     last assessed 12/23/13    Disc degeneration, lumbar     Disease of thyroid gland     hx of goiter, surgery done 1978    Edema of foot     Esophageal reflux     Fatigue     Fibromyalgia     Hemorrhage, anal or  rectal     History of radiation therapy     Hx of radiation therapy     Treatments: Course C1, Plan ID Energy Fractions Dose per Fraction (cGy)total Dose delivered(cGy)elapsed days. R Breast 6x 28/28 180 5,040 39, R breast e 12E 5/5 200 1,000 7 Treatment dates: 7/20/17-09/05/17    Hx of radiation therapy     33 treatments    Hypotension     Hypothyroidism     Insomnia     Lab test positive for detection of COVID-19 virus 10/14/2022    Memory loss     Monoclonal gammopathy of undetermined significance     Myalgia     last assessed 07/08/16    Myositis     last assessed 07/08/16    Neck stiffness     Neuralgia     Neuritis     Pain syndrome, chronic     Palpitations     Paresthesia     Peripheral neuropathy     Polyneuropathy     Pseudodementia     Radiculopathy     12/23/13    Restless leg syndrome     Sacroiliitis (HCC)     SLE (systemic lupus erythematosus) (HCC)     last assessed 12/23/13    Tachycardia     Torn meniscus     left knee    Viral warts     Vitamin B deficiency     Vitamin D deficiency     Vitamin D deficiency     Wears glasses    [2]   Past Surgical History:  Procedure Laterality Date    BLADDER SURGERY      bladder prolapse    BREAST BIOPSY Right     BREAST SURGERY Right     breast bx    COLONOSCOPY      age 50 normal    HYSTERECTOMY  2009    vaginal hysterectomy, rectocele repair    IR BIOPSY BONE MARROW  6/4/2025    SC MASTECTOMY PARTIAL Right 5/24/2017    Procedure:  (NUCLEAR MED. 8 AM., NEEDLE LOCAL TO FOLLOW 8:30 AM.) SEGMENTAL MASTECTOMY BREAST NEEDLE PLACEMENT,  SENTINAL LYMPH NODE BIOPSY;  Surgeon: Mckay Lockett MD;  Location: [facility];  Service: General    REPAIR RECTOCELE      THYROID LOBECTOMY Left 1978    TONSILLECTOMY      US BREAST NEEDLE LOC RIGHT Right 5/24/2017    US GUIDED BREAST BIOPSY RIGHT COMPLETE Right 4/20/2017    US GUIDED THYROID BIOPSY  4/25/2019   [3]   Family History  Problem Relation Name Age of Onset    Heart disease Mother          CHF    Heart failure Mother       Cirrhosis Father      Esophageal cancer Brother ankur 65    Lung cancer Brother ankur     No Known Problems Brother      No Known Problems Brother      No Known Problems Brother      Breast cancer Neg Hx     [4]   Social History  Tobacco Use    Smoking status: Former     Current packs/day: 0.00     Average packs/day: 1.5 packs/day for 5.0 years (7.5 ttl pk-yrs)     Types: Cigarettes     Start date:      Quit date:      Years since quittin.4    Smokeless tobacco: Never    Tobacco comments:     Former quit 30 years ago   Vaping Use    Vaping status: Never Used   Substance Use Topics    Alcohol use: Not Currently     Alcohol/week: 1.0 standard drink of alcohol     Types: 1 Glasses of wine per week    Drug use: No        Raulito Meza MD  06/15/25 [id number]

## 2025-06-17 LAB — SCAN RESULT: NORMAL

## 2025-06-23 ENCOUNTER — HOSPITAL ENCOUNTER (OUTPATIENT)
Dept: CT IMAGING | Facility: HOSPITAL | Age: 65
Discharge: HOME | End: 2025-06-23
Attending: PHYSICIAN ASSISTANT
Payer: COMMERCIAL

## 2025-06-23 DIAGNOSIS — D47.2: ICD-10-CM

## 2025-06-23 PROCEDURE — 76497 UNLISTED CT PROCEDURE: CPT

## 2025-06-27 ENCOUNTER — RESULTS FOLLOW-UP (OUTPATIENT)
Dept: HEMATOLOGY ONCOLOGY | Facility: CLINIC | Age: 65
End: 2025-06-27

## 2025-06-27 NOTE — TELEPHONE ENCOUNTER
Attempted to call patient and left voicemail regarding regarding results. In message made aware that  CT was normal and Shari did review.  Hope line number was left to return our call at their earliest convenience.

## 2025-06-27 NOTE — TELEPHONE ENCOUNTER
----- Message from Shari Merritt PA-C sent at 6/27/2025  8:58 AM EDT -----  CT scan is normal   ----- Message -----  From: Interface, Radiology Results In  Sent: 6/27/2025   7:22 AM EDT  To: Shari Merritt PA-C

## 2025-07-03 ENCOUNTER — TELEPHONE (OUTPATIENT)
Age: 65
End: 2025-07-03

## 2025-07-03 ENCOUNTER — HOSPITAL ENCOUNTER (EMERGENCY)
Facility: HOSPITAL | Age: 65
Discharge: HOME | End: 2025-07-03
Attending: EMERGENCY MEDICINE | Admitting: EMERGENCY MEDICINE
Payer: COMMERCIAL

## 2025-07-03 VITALS
OXYGEN SATURATION: 98 % | TEMPERATURE: 97.6 F | DIASTOLIC BLOOD PRESSURE: 55 MMHG | SYSTOLIC BLOOD PRESSURE: 111 MMHG | BODY MASS INDEX: 20.62 KG/M2 | HEART RATE: 77 BPM | HEIGHT: 68 IN | WEIGHT: 136.02 LBS | RESPIRATION RATE: 16 BRPM

## 2025-07-03 DIAGNOSIS — R52 PAIN: Primary | ICD-10-CM

## 2025-07-03 DIAGNOSIS — N94.89 VAGINAL BURNING: ICD-10-CM

## 2025-07-03 DIAGNOSIS — R10.2 VAGINAL PAIN: Primary | ICD-10-CM

## 2025-07-03 PROCEDURE — 99284 EMERGENCY DEPT VISIT MOD MDM: CPT | Performed by: EMERGENCY MEDICINE

## 2025-07-03 PROCEDURE — 99282 EMERGENCY DEPT VISIT SF MDM: CPT

## 2025-07-03 RX ORDER — MICONAZOLE NITRATE 2 G/100G
CREAM TOPICAL 2 TIMES DAILY
Qty: 56 G | Refills: 0 | Status: SHIPPED | OUTPATIENT
Start: 2025-07-03 | End: 2025-07-03

## 2025-07-03 RX ORDER — MICONAZOLE NITRATE 2 G/100G
CREAM TOPICAL 2 TIMES DAILY
Qty: 56 G | Refills: 0 | Status: SHIPPED | OUTPATIENT
Start: 2025-07-03

## 2025-07-03 NOTE — TELEPHONE ENCOUNTER
Patient and SO-Alok, calling to speak with Shari Merritt PA-C.  Patient states she is in a lot of pain and she also wants to discuss results  please call Alok [phone]

## 2025-07-03 NOTE — TELEPHONE ENCOUNTER
Attempted to return phone call to Alok, no answer. Per communication consent, ok to leave VM. SHELTON left inquiring which specific results he is asking about. I confirmed that the CT results came back negative and that the pathology from the biopsy is still pending, that Shari will review at patient's upcoming appt. I instructed him to take patient to ER if she is in significant pain, for pain control and to return our call if he has any additional questions or concerns at this time.

## 2025-07-03 NOTE — DISCHARGE INSTRUCTIONS
Use the antifungal medication as per the instructions.  Continue with ice as it helps.  Use unscented lotions, soaps, detergents, etc. to help decrease any irritation that these may be causing.  Make sure the area is completely dry before you begin to dress after showers.  Wear breathable underwear, and change them when they get wet.  Follow-up with gynecology for further evaluation and management.

## 2025-07-03 NOTE — TELEPHONE ENCOUNTER
Received a phone call from patient's SO, Alok.  Alok was returning a phone call from Lila.  Alok did not listen to voicemail message that was left.  Reviewed note from Lila.  Alok stated that pain medications are not providing any relief and patient is very uncomfortable.  Advised Alok that patient should go to ED to be evaluated.  Alok stated that he will take her to [place] location.

## 2025-07-03 NOTE — ED PROVIDER NOTES
"Time reflects when diagnosis was documented in both MDM as applicable and the Disposition within this note       Time User Action Codes Description Comment    7/3/2025  4:23 PM Negra Angel [R10.2] Vaginal pain     7/3/2025  4:23 PM Negra Angel [N94.89] Vaginal burning           ED Disposition       ED Disposition   Discharge    Condition   Good    Date/Time   Thu Jul 3, 2025  4:24 PM    Comment   Mariola Dos Santos discharge to home/self care.             Assessment & Plan       Medical Decision Making  This is a 64-year-old female who presents here today for follow-up visit of vaginal burning.  She says she was seen here recently for the same, was prescribed a \"anti-itch\" medication that did not help her symptoms.  She says it is still burning, waxing and waning throughout the day.  She is applied ice which helps some.  She has not tried anything else for her symptoms.  She denies any rashes, lesions, injuries to the area, vaginal bleeding or discharge.  She says she did initially have some burning with urination which resolved.  She denies any other rashes or lesions anywhere else, any other areas of skin burning, new lotions, soaps, detergents, personal care products.  She was seen here 6/15, had moderate leukocytes on urine but 2-4 white blood cells and no bacteria.  She had no leukocytosis and otherwise normal labs.  She has not followed up with anybody since then.    Review of systems: Otherwise negative unless stated as above    She is well-appearing, no acute distress.  She has minimal redness on exam, but no discrete lesions or other abnormalities.  Exam is otherwise unremarkable.  This could be mild candidal vaginitis versus contact dermatitis from personal care products, versus other unspecified etiology.  I discussed with her additional symptomatic management and need to follow-up with gynecology for further evaluation, and she expresses understanding with this " "plan.    Problems Addressed:  Vaginal burning: acute illness or injury  Vaginal pain: acute illness or injury    Amount and/or Complexity of Data Reviewed  External Data Reviewed: labs and notes.             Medications - No data to display    ED Risk Strat Scores                    No data recorded                            History of Present Illness       Chief Complaint   Patient presents with    Vaginal Pain     Pt reports that she has been having \"really intense burning in my vagina\" Denies itching, denies burning with urination. Worse at night and early morning. Was seen here for the same recently.        Past Medical History[1]   Past Surgical History[2]   Family History[3]   Social History[4]   E-Cigarette/Vaping    E-Cigarette Use Never User       E-Cigarette/Vaping Substances    Nicotine No     THC No     CBD No     Flavoring No     Other No     Unknown No       I have reviewed and agree with the history as documented.       Vaginal Pain      Review of Systems   Genitourinary:  Positive for vaginal pain.           Objective       ED Triage Vitals [07/03/25 1545]   Temperature Pulse Blood Pressure Respirations SpO2 Patient Position - Orthostatic VS   97.6 °F (36.4 °C) 77 111/55 16 98 % Sitting      Temp Source Heart Rate Source BP Location FiO2 (%) Pain Score    Temporal Monitor Left arm -- --      Vitals      Date and Time Temp Pulse SpO2 Resp BP Pain Score FACES Pain Rating User   07/03/25 1545 97.6 °F (36.4 °C) 77 98 % 16 111/55 -- -- CO            Physical Exam  Vitals and nursing note reviewed. Exam conducted with a chaperone present (SO in room during exam).   Constitutional:       General: She is not in acute distress.     Appearance: She is well-developed.   HENT:      Head: Normocephalic and atraumatic.     Eyes:      Conjunctiva/sclera: Conjunctivae normal.      Pupils: Pupils are equal, round, and reactive to light.     Neck:      Trachea: No tracheal deviation.     Cardiovascular:      Rate and " Rhythm: Normal rate and regular rhythm.      Pulses:           Radial pulses are 2+ on the right side.   Pulmonary:      Effort: Pulmonary effort is normal. No respiratory distress.   Abdominal:      Palpations: Abdomen is soft.      Tenderness: There is no abdominal tenderness.   Genitourinary:     Labia:         Right: No tenderness or lesion.         Left: No tenderness or lesion.       Cervix: No cervical motion tenderness.      Comments: Bimanual exam only; no blood or discharge on gloved fingers. Mild redness to outer edge of labia minora bilaterally without tenderness, lesions, discharge    Musculoskeletal:      Cervical back: Normal range of motion.     Skin:     General: Skin is warm and dry.     Neurological:      Mental Status: She is alert and oriented to person, place, and time.      GCS: GCS eye subscore is 4. GCS verbal subscore is 5. GCS motor subscore is 6.     Psychiatric:         Mood and Affect: Mood and affect normal.         Behavior: Behavior normal.         Results Reviewed       None            No orders to display       Procedures    ED Medication and Procedure Management   Prior to Admission Medications   Prescriptions Last Dose Informant Patient Reported? Taking?   DULoxetine (CYMBALTA) 30 mg delayed release capsule  Self No No   Sig: Take 1 capsule (30 mg total) by mouth every morning   DULoxetine (CYMBALTA) 60 mg delayed release capsule  Self No No   Sig: Take 1 capsule (60 mg total) by mouth daily at bedtime   gabapentin (NEURONTIN) 100 mg capsule  Self Yes No   Sig: Take 100 mg by mouth if needed   hydrOXYzine HCL (ATARAX) 25 mg tablet   No No   Sig: Take 1 tablet (25 mg total) by mouth every 6 (six) hours as needed for itching for up to 30 doses   levothyroxine 100 mcg tablet  Self No No   Sig: Take 1 tablet (100 mcg total) by mouth daily in the early morning      Facility-Administered Medications: None     Patient's Medications   Discharge Prescriptions    MICONAZOLE 2 % CREAM     Apply topically 2 (two) times a day Apply to vaginal area twice daily for 10 days, or until burning resolves       Start Date: 7/3/2025  End Date: --       Order Dose: --       Quantity: 56 g    Refills: 0       ED SEPSIS DOCUMENTATION   Time reflects when diagnosis was documented in both MDM as applicable and the Disposition within this note       Time User Action Codes Description Comment    7/3/2025  4:23 PM Negra Angel [R10.2] Vaginal pain     7/3/2025  4:23 PM Negra Angel [N94.89] Vaginal burning                    [1]   Past Medical History:  Diagnosis Date    Anxiety     Breast cancer (HCC) 2017    right    Cancer (HCC)     right breast  dx 04/2017    Cervical disc disorder of cervicothoracic region     Cervical radiculopathy     Cervicalgia     Cognitive impairment, mild, so stated     Depression     Major,recurrent    Diplopia     last assessed 12/23/13    Disc degeneration, lumbar     Disease of thyroid gland     hx of goiter, surgery done 1978    Edema of foot     Esophageal reflux     Fatigue     Fibromyalgia     Hemorrhage, anal or rectal     History of radiation therapy     Hx of radiation therapy     Treatments: Course C1, Plan ID Energy Fractions Dose per Fraction (cGy)total Dose delivered(cGy)elapsed days. R Breast 6x 28/28 180 5,040 39, R breast e 12E 5/5 200 1,000 7 Treatment dates: 7/20/17-09/05/17    Hx of radiation therapy     33 treatments    Hypotension     Hypothyroidism     Insomnia     Lab test positive for detection of COVID-19 virus 10/14/2022    Memory loss     Monoclonal gammopathy of undetermined significance     Myalgia     last assessed 07/08/16    Myositis     last assessed 07/08/16    Neck stiffness     Neuralgia     Neuritis     Pain syndrome, chronic     Palpitations     Paresthesia     Peripheral neuropathy     Polyneuropathy     Pseudodementia     Radiculopathy     12/23/13    Restless leg syndrome     Sacroiliitis (HCC)     SLE  (systemic lupus erythematosus) (HCC)     last assessed 13    Tachycardia     Torn meniscus     left knee    Viral warts     Vitamin B deficiency     Vitamin D deficiency     Vitamin D deficiency     Wears glasses    [2]   Past Surgical History:  Procedure Laterality Date    BLADDER SURGERY      bladder prolapse    BREAST BIOPSY Right     BREAST SURGERY Right     breast bx    COLONOSCOPY      age 50 normal    HYSTERECTOMY  2009    vaginal hysterectomy, rectocele repair    IR BIOPSY BONE MARROW  2025    [place] MASTECTOMY PARTIAL Right 2017    Procedure:  (NUCLEAR MED. 8 AM., NEEDLE LOCAL TO FOLLOW 8:30 AM.) SEGMENTAL MASTECTOMY BREAST NEEDLE PLACEMENT,  SENTINAL LYMPH NODE BIOPSY;  Surgeon: Mckay Lockett MD;  Location: [facility];  Service: General    REPAIR RECTOCELE      THYROID LOBECTOMY Left 1978    TONSILLECTOMY      US BREAST NEEDLE LOC RIGHT Right 2017    US GUIDED BREAST BIOPSY RIGHT COMPLETE Right 2017    US GUIDED THYROID BIOPSY  2019   [3]   Family History  Problem Relation Name Age of Onset    Heart disease Mother          CHF    Heart failure Mother      Cirrhosis Father      Esophageal cancer Brother ankur 65    Lung cancer Brother ankur     No Known Problems Brother      No Known Problems Brother      No Known Problems Brother      Breast cancer Neg Hx     [4]   Social History  Tobacco Use    Smoking status: Former     Current packs/day: 0.00     Average packs/day: 1.5 packs/day for 5.0 years (7.5 ttl pk-yrs)     Types: Cigarettes     Start date:      Quit date:      Years since quittin.5    Smokeless tobacco: Never    Tobacco comments:     Former quit 30 years ago   Vaping Use    Vaping status: Never Used   Substance Use Topics    Alcohol use: Not Currently     Alcohol/week: 1.0 standard drink of alcohol     Types: 1 Glasses of wine per week    Drug use: No        Negra Angel MD  [phone]

## 2025-07-07 ENCOUNTER — OFFICE VISIT (OUTPATIENT)
Dept: HEMATOLOGY ONCOLOGY | Facility: CLINIC | Age: 65
End: 2025-07-07
Payer: COMMERCIAL

## 2025-07-07 VITALS
TEMPERATURE: 98.1 F | OXYGEN SATURATION: 99 % | DIASTOLIC BLOOD PRESSURE: 70 MMHG | SYSTOLIC BLOOD PRESSURE: 110 MMHG | WEIGHT: 134 LBS | HEIGHT: 68 IN | BODY MASS INDEX: 20.31 KG/M2 | RESPIRATION RATE: 18 BRPM | HEART RATE: 96 BPM

## 2025-07-07 DIAGNOSIS — Z85.3 PERSONAL HISTORY OF MALIGNANT NEOPLASM OF BREAST: ICD-10-CM

## 2025-07-07 DIAGNOSIS — M85.88 OTHER SPECIFIED DISORDERS OF BONE DENSITY AND STRUCTURE, OTHER SITE: ICD-10-CM

## 2025-07-07 DIAGNOSIS — D47.2 SMOLDERING MULTIPLE MYELOMA: Primary | ICD-10-CM

## 2025-07-07 LAB
BASOPHILS # BLD AUTO: 0.06 THOUSAND/UL (ref 0–0.1)
BASOPHILS NFR MAR MANUAL: 1 % (ref 0–1)
EOSINOPHIL # BLD AUTO: 0.12 THOUSAND/UL (ref 0–0.61)
EOSINOPHIL NFR BLD MANUAL: 2 % (ref 0–6)
LYMPHOCYTES # BLD AUTO: 1.96 THOUSAND/UL (ref 0.6–4.47)
LYMPHOCYTES # BLD AUTO: 26 %
MONOCYTES # BLD AUTO: 0.67 THOUSAND/UL (ref 0–1.22)
MONOCYTES NFR BLD AUTO: 11 % (ref 4–12)
NEUTS SEG # BLD: 3.3 THOUSAND/UL (ref 1.81–6.82)
NEUTS SEG NFR BLD AUTO: 54 %
PATHOLOGY REVIEW: YES
PLATELET BLD QL SMEAR: ADEQUATE
RBC MORPH BLD: NORMAL
TOTAL CELLS COUNTED SPEC: 100
VARIANT LYMPHS # BLD AUTO: 6 % (ref 0–0)

## 2025-07-07 PROCEDURE — 99214 OFFICE O/P EST MOD 30 MIN: CPT | Performed by: PHYSICIAN ASSISTANT

## 2025-07-07 NOTE — PROGRESS NOTES
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  13q and gain of chromosome 11/11q by FISH. Standard risk by FISH.  Patient educated bone marrow biopsy findings are consistent with smoldering myeloma.  She follows within low risk category, discussed possible disease progression which is around 10% of 2 years, 23% at 5 years and 53% at 10 years.  Low risk smoldering myeloma IgA type   Continued observation recommended with repeat labs again in 3 months.  If stable we will continue observation every 6 months.  We will check CT myeloma scan annually, due 2026.   Patient advised to notify our office if she develops any new bone pain.   Orders:    IgG, IgA, IgM; Future    Immunoglobulin free LT chains blood; Future    Protein electrophoresis, serum; Future    Comprehensive metabolic panel; Future    CBC and differential; Future    Personal history of malignant neoplasm of breast  Diagnosed , s/p lumpectomy, RT and 5 years of tamoxifen   Last mammogram 2024  PURNIMA, continue annual screening mammogram   She denies new breast masses  Breast exam completed today with no concerning findings.       Other specified disorders of bone density and structure, other site  History of tamoxifen therapy x 5 years  DXA  showed osteopenia.  Recommend repeating every 2 years. Overdue, order placed.   Orders:    DXA bone density spine hip and pelvis; Future      Assessment & Plan        Return in about 14 weeks (around 10/13/2025) for Office Visit.    History of Present Illness   Chief Complaint   Patient presents with    Follow-up      History of Present Illness  64-year-old female with history of right breast cancer s/p lumpectomy and radiation therapy and IgA MGUS who presents as follow-up.     Patient was noted to have abnormal mammogram 04/2017.  FNA was performed which showed ER/HI positive, HER2 negative invasive breast carcinoma.  She underwent lumpectomy 5/20/2017 for stage Ia breast cancer and went on to receive radiation therapy from July to September 2017.  She was started on tamoxifen around this time which she continued for 5 years through 2022.     She was also noted to have IgA MGUS around 04/2017.  Unclear why this was initially obtained.    Oncology History Overview Note   Returns for follow up post right breast radiation completed on 9/5/17. The pt was last seen in radiation on 10/15/20.     10/15/20 - Mynor Salinas  Pt to continue on Tamoxifen  Breast exam was benign    Upcoming:       Intraductal carcinoma of right breast (Resolved)   4/20/2017 Biopsy    Right breast biopsy:  Invasive breast carcinoma/invasive ductal carcinoma.  Grade 1.  ER and HI 90-95% positive, Her2 negative     5/24/2017 Initial Diagnosis    Intraductal carcinoma of right breast      Surgery    Segmental mastectomy with sentinel node biopsy  Invasive mammary.  Grade 1       5/24/2017 -  Cancer Staged    Pathologic  Stage IA - pT1b, pN0 (sn), G1     7/20/2017 - 9/5/2017 Radiation    dose of 5040 cGy in 20 daily fractions to the right breast with an additional 1000 cGy to the lumpectomy cavity.     7/2017 -  Hormone Therapy    Tamoxifen     Malignant neoplasm of right female breast (HCC)   5/24/2017 -  Cancer Staged    Staging form: Breast, AJCC 8th Edition  - Clinical stage from 5/24/2017: Stage IA (cT1b, cN0, cM0, G1, ER+, HI+, HER2-) - Signed by Opal Frazier PA-C on 5/1/2022  Stage prefix: Initial diagnosis  Method of lymph node assessment: Axillary lymph node dissection  Nuclear grade: G2  Mitotic count score: Score 1  Histologic grading  "system: 3 grade system  HER2-IHC interpretation: Negative  HER2-IHC value: Score 1+       5/7/2019 Initial Diagnosis    Malignant neoplasm of right female breast (HCC)           Pertinent Medical History   05/07/25: Left knee pain, chronic.     07/07/25: She is feeling anxious regarding bone marrow biopsy results otherwise has no acute complaints.     Review of Systems   All other systems reviewed and are negative.          Objective   /70 (BP Location: Right arm, Patient Position: Sitting, Cuff Size: Adult)   Pulse 96   Temp 98.1 °F (36.7 °C) (Temporal)   Resp 18   Ht 5' 8\" (1.727 m)   Wt 60.8 kg (134 lb)   SpO2 99%   BMI 20.37 kg/m²     Physical Exam  Vitals reviewed.   HENT:      Head: Normocephalic.     Cardiovascular:      Rate and Rhythm: Normal rate and regular rhythm.   Pulmonary:      Effort: Pulmonary effort is normal.      Breath sounds: Normal breath sounds.   Chest:        Comments: Induration, firmness inner lower quadrant of right breast below incision c/w radiation changes. No palpable mass  Abdominal:      Palpations: Abdomen is soft.      Tenderness: There is no abdominal tenderness.     Musculoskeletal:      Cervical back: Neck supple.   Lymphadenopathy:      Cervical: No cervical adenopathy.      Upper Body:      Right upper body: No supraclavicular or axillary adenopathy.      Left upper body: No supraclavicular or axillary adenopathy.     Skin:     Findings: No rash.     Neurological:      Mental Status: She is alert.       Physical Exam      Results    Labs: I have reviewed the following labs:  Results for orders placed or performed during the hospital encounter of 06/15/25   CBC and differential   Result Value Ref Range    WBC 5.51 4.31 - 10.16 Thousand/uL    RBC 4.27 3.81 - 5.12 Million/uL    Hemoglobin 13.5 11.5 - 15.4 g/dL    Hematocrit 39.4 34.8 - 46.1 %    MCV 92 82 - 98 fL    MCH 31.6 26.8 - 34.3 pg    MCHC 34.3 31.4 - 37.4 g/dL    RDW 12.7 11.6 - 15.1 %    MPV 8.7 (L) 8.9 " - 12.7 fL    Platelets 258 149 - 390 Thousands/uL    nRBC 0 /100 WBCs    Segmented % 58 43 - 75 %    Immature Grans % 0 0 - 2 %    Lymphocytes % 27 14 - 44 %    Monocytes % 10 4 - 12 %    Eosinophils Relative 4 0 - 6 %    Basophils Relative 1 0 - 1 %    Absolute Neutrophils 3.24 1.85 - 7.62 Thousands/µL    Absolute Immature Grans 0.01 0.00 - 0.20 Thousand/uL    Absolute Lymphocytes 1.48 0.60 - 4.47 Thousands/µL    Absolute Monocytes 0.54 0.17 - 1.22 Thousand/µL    Eosinophils Absolute 0.20 0.00 - 0.61 Thousand/µL    Basophils Absolute 0.04 0.00 - 0.10 Thousands/µL   Basic metabolic panel   Result Value Ref Range    Sodium 140 135 - 147 mmol/L    Potassium 3.7 3.5 - 5.3 mmol/L    Chloride 105 96 - 108 mmol/L    CO2 29 21 - 32 mmol/L    ANION GAP 6 4 - 13 mmol/L    BUN 14 5 - 25 mg/dL    Creatinine 0.69 0.60 - 1.30 mg/dL    Glucose 97 65 - 140 mg/dL    Calcium 9.0 8.4 - 10.2 mg/dL    eGFR 91 ml/min/1.73sq m   UA w Reflex to Microscopic w Reflex to Culture    Specimen: Urine, Clean Catch   Result Value Ref Range    Color, UA Yellow     Clarity, UA Clear     Specific Gravity, UA 1.022 1.003 - 1.030    pH, UA 6.0 4.5, 5.0, 5.5, 6.0, 6.5, 7.0, 7.5, 8.0    Leukocytes, UA Moderate (A) Negative    Nitrite, UA Negative Negative    Protein, UA Negative Negative mg/dl    Glucose, UA Negative Negative mg/dl    Ketones, UA Negative Negative mg/dl    Urobilinogen, UA <2.0 <2.0 mg/dl mg/dl    Bilirubin, UA Negative Negative    Occult Blood, UA Negative Negative   Urine Microscopic   Result Value Ref Range    RBC, UA 1-2 None Seen, 1-2 /hpf    WBC, UA 2-4 (A) None Seen, 1-2 /hpf    Epithelial Cells Occasional None Seen, Occasional /hpf    Bacteria, UA None Seen None Seen, Occasional /hpf    MUCUS THREADS Occasional (A) None Seen

## 2025-07-11 ENCOUNTER — OFFICE VISIT (OUTPATIENT)
Dept: OBGYN CLINIC | Facility: CLINIC | Age: 65
End: 2025-07-11
Payer: COMMERCIAL

## 2025-07-11 VITALS — WEIGHT: 129 LBS | BODY MASS INDEX: 19.55 KG/M2 | HEIGHT: 68 IN

## 2025-07-11 DIAGNOSIS — M25.562 CHRONIC PAIN OF LEFT KNEE: ICD-10-CM

## 2025-07-11 DIAGNOSIS — G89.29 CHRONIC PAIN OF LEFT KNEE: ICD-10-CM

## 2025-07-11 DIAGNOSIS — S83.242D TEAR OF MEDIAL MENISCUS OF LEFT KNEE, CURRENT, UNSPECIFIED TEAR TYPE, SUBSEQUENT ENCOUNTER: Primary | ICD-10-CM

## 2025-07-11 PROCEDURE — 99214 OFFICE O/P EST MOD 30 MIN: CPT | Performed by: ORTHOPAEDIC SURGERY

## 2025-07-11 NOTE — PROGRESS NOTES
"Name: Mariola Dos Santos      : 1960      MRN: [id number]  Encounter Provider: Surjit Ochoa DO  Encounter Date: 2025   Encounter department: [facility] [place]  :  Assessment & Plan  Tear of medial meniscus of left knee, current, unspecified tear type, subsequent encounter    Orders:    Ambulatory Referral to Orthopedic Surgery          Left knee ***  X-rays and MRI reviewed in office today with patient  ***  I will see the patient back ***    Chief Complaint     Left knee pain    History of the Present Illness   History of Present Illness {?Quick Links Encounters * My Last Note * Last Note in Specialty * Snapshot * Since Last Visit * History :33517}  HPI   Mariola Dos Santos is a 64 y.o. female with Left knee pain beginning fall  without known injury. She did seek out care with both Dr Zepeda and Dr Wei for nonoperative vs surgical management. ***    Review of Systems     Review of Systems   Constitutional:  Negative for chills and fever.   HENT:  Negative for congestion.    Respiratory:  Negative for cough, chest tightness and shortness of breath.    Cardiovascular:  Negative for chest pain and palpitations.   Gastrointestinal:  Negative for abdominal pain.   Endocrine: Negative for cold intolerance and heat intolerance.   Neurological:  Negative for syncope.   Psychiatric/Behavioral:  Negative for confusion.        Physical Exam   Objective {?Quick Links Trend Vitals * Enter New Vitals * Results Review * Timeline (Adult) * Labs * Imaging * Cardiology * Procedures * Lung Cancer Screening * Surgical eConsent :31715}  Ht 5' 8\" (1.727 m)   BMI 20.37 kg/m²        Left Knee  Range of motion from *** to ***.    There is *** crepitus with range of motion.   There is *** effusion.    There is *** tenderness over the ***.    There is ***/5 quadriceps strength and *** tone.    The patient is {able/unable:80931::\"able\"} to perform a straight leg raise.  " "  {NEGATIVE/POSITIVE:00775::\"negative\"} patellar grind test.  Anterior drawer tests is {NEGATIVE/POSITIVE:39944::\"negative\"}  {NEGATIVE/POSITIVE:45129::\"negative\"} Lachman Test.   Posterior drawer test is   {NEGATIVE/POSITIVE:39170::\"negative\"}   Varus stress testing reveals *** at 0 and 30 degrees   Valgus stress testing reveals *** at 0 and 30 degrees  Caitlin's testing is {NEGATIVE/POSITIVE:59570::\"negative\"}   The patient is neurovascular intact distally.      Eyes:  Anicteric sclerae.  Neck:  Supple.  Lungs:  Normal respiratory effort.  Cardiovascular:  Capillary refill is less than 2 seconds.  Skin:  Intact without erythema.  Neurologic:  Sensation grossly intact to light touch.  Psychiatric:  Mood and affect are appropriate.    Data Review     I have personally reviewed pertinent films in PACS, and my interpretation follows:    X-rays taken *** of {RIGHT/LEFT:37232}knee independently reviewed and demonstrate ***    Lab Results   Component Value Date/Time    HGBA1C 5.2 07/08/2016 11:58 AM    ESR 10 04/09/2024 11:48 AM    ESR 19 01/15/2015 11:44 AM    LYMEIGG 5.25 (H) 04/02/2019 04:23 PM       Past Medical History[1]    Past Surgical History[2]    Allergies[3]    Medications Ordered Prior to Encounter[4]    Social History[5]    Family History[6]          Procedures Performed     Procedures  ***      Lila Kennedy PA-C           [1]   Past Medical History:  Diagnosis Date    Anxiety     Breast cancer (HCC) 2017    right    Cancer (HCC)     right breast  dx 04/2017    Cervical disc disorder of cervicothoracic region     Cervical radiculopathy     Cervicalgia     Cognitive impairment, mild, so stated     Depression     Major,recurrent    Diplopia     last assessed 12/23/13    Disc degeneration, lumbar     Disease of thyroid gland     hx of goiter, surgery done 1978    Edema of foot     Esophageal reflux     Fatigue     Fibromyalgia     Hemorrhage, anal or rectal     History of radiation therapy     Hx of " radiation therapy     Treatments: Course C1, Plan ID Energy Fractions Dose per Fraction (cGy)total Dose delivered(cGy)elapsed days. R Breast 6x 28/28 180 5,040 39, R breast e 12E 5/5 200 1,000 7 Treatment dates: 7/20/17-09/05/17    Hx of radiation therapy     33 treatments    Hypotension     Hypothyroidism     Insomnia     Lab test positive for detection of COVID-19 virus 10/14/2022    Memory loss     Monoclonal gammopathy of undetermined significance     Myalgia     last assessed 07/08/16    Myositis     last assessed 07/08/16    Neck stiffness     Neuralgia     Neuritis     Pain syndrome, chronic     Palpitations     Paresthesia     Peripheral neuropathy     Polyneuropathy     Pseudodementia     Radiculopathy     12/23/13    Restless leg syndrome     Sacroiliitis (HCC)     SLE (systemic lupus erythematosus) (HCC)     last assessed 12/23/13    Tachycardia     Torn meniscus     left knee    Viral warts     Vitamin B deficiency     Vitamin D deficiency     Vitamin D deficiency     Wears glasses    [2]   Past Surgical History:  Procedure Laterality Date    BLADDER SURGERY      bladder prolapse    BREAST BIOPSY Right     BREAST SURGERY Right     breast bx    COLONOSCOPY      age 50 normal    HYSTERECTOMY  2009    vaginal hysterectomy, rectocele repair    IR BIOPSY BONE MARROW  6/4/2025    ME MASTECTOMY PARTIAL Right 5/24/2017    Procedure:  (NUCLEAR MED. 8 AM., NEEDLE LOCAL TO FOLLOW 8:30 AM.) SEGMENTAL MASTECTOMY BREAST NEEDLE PLACEMENT,  SENTINAL LYMPH NODE BIOPSY;  Surgeon: Mckay Lockett MD;  Location: [facility];  Service: General    REPAIR RECTOCELE      THYROID LOBECTOMY Left 1978    TONSILLECTOMY      US BREAST NEEDLE LOC RIGHT Right 5/24/2017    US GUIDED BREAST BIOPSY RIGHT COMPLETE Right 4/20/2017    US GUIDED THYROID BIOPSY  4/25/2019   [3] No Known Allergies  [4]   Current Outpatient Medications on File Prior to Visit   Medication Sig Dispense Refill    DULoxetine (CYMBALTA) 30 mg delayed release capsule  Take 1 capsule (30 mg total) by mouth every morning 90 capsule 1    DULoxetine (CYMBALTA) 60 mg delayed release capsule Take 1 capsule (60 mg total) by mouth daily at bedtime 90 capsule 1    gabapentin (NEURONTIN) 100 mg capsule Take 100 mg by mouth if needed      hydrOXYzine HCL (ATARAX) 25 mg tablet Take 1 tablet (25 mg total) by mouth every 6 (six) hours as needed for itching for up to 30 doses 30 tablet 0    levothyroxine 100 mcg tablet Take 1 tablet (100 mcg total) by mouth daily in the early morning 90 tablet 1    miconazole 2 % cream Apply topically 2 (two) times a day Apply to vaginal area twice daily for 10 days, or until burning resolves 56 g 0     No current facility-administered medications on file prior to visit.   [5]   Social History  Tobacco Use    Smoking status: Former     Current packs/day: 0.00     Average packs/day: 1.5 packs/day for 5.0 years (7.5 ttl pk-yrs)     Types: Cigarettes     Start date:      Quit date:      Years since quittin.5    Smokeless tobacco: Never    Tobacco comments:     Former quit 30 years ago   Vaping Use    Vaping status: Never Used   Substance Use Topics    Alcohol use: Not Currently     Alcohol/week: 1.0 standard drink of alcohol     Types: 1 Glasses of wine per week    Drug use: No   [6]   Family History  Problem Relation Name Age of Onset    Heart disease Mother          CHF    Heart failure Mother      Cirrhosis Father      Esophageal cancer Brother ankur 65    Lung cancer Brother ankur     No Known Problems Brother      No Known Problems Brother      No Known Problems Brother      Breast cancer Neg Hx

## 2025-07-11 NOTE — PROGRESS NOTES
Name: Mariola Dos Santos      : 1960      MRN: [id number]  Encounter Provider: Surjit Ochoa DO  Encounter Date: 2025   Encounter department: [facility] [place]  :  Assessment & Plan  Tear of medial meniscus of left knee, current, unspecified tear type, subsequent encounter  Discussed operative and nonoperative management. Patient declined physical therapy and repeat injections at this time. She also declined knee arthroscopy, partial medial menisectomy. She will follow up as needed.  Orders:    Ambulatory Referral to Orthopedic Surgery    Chronic pain of left knee  The patient would like to continue nonoperative treatment with activity modification and monitoring of symptoms.             Left knee degenerative medial meniscus tear and early arthritic changes  X-rays and MRI reviewed in office today with patient  Non operative management discussed including outpatient PT to work on strengthening, stretching and range of motion, activity modification, OTC topical and oral analgesics, and possible injection therapy.    Patient is not interested in another corticosteroid injection or physical therapy at this time  Surgical management was discussed by means of left knee arthroscopic partial medial menisectomy.  I do not recommend total knee arthroplasty.  Risks of surgical management including but not limited to anesthesia complications, infection, damage to nerves and blood vessels, DVT, PE, loss of life or limb, postoperative stiffness, recurrent meniscal tearing, posttraumatic arthritis, residual pain, need for subsequent surgery   Advised the patient this would be targeted medial pain, though residual pain from degenerative changes might persist.  We also discussed that this would not help her radicular symptoms in the distal lower extremity.   Patient would like to monitor her symptoms and does not want to move forward with surgery at this time  Follow up as  "needed    Chief Complaint     Left knee pain    History of the Present Illness   History of Present Illness   HPI   Mariola Dos Santos is a 64 y.o. female with Left knee pain secondary to medial meniscus tear. The patient was seeing Dr. Wei who recommended a home exercise plan and corticosteroid injection. She did not have relief following conservative management and wanted to try surgery. Dr. Wei did not recommend surgery secondary to weakness and arthritic changes in her knee, so he referred the patient to me for a second opinion. Patient states she did physical therapy for two weeks before she discontinued. She has tried CSI and visco injections and admits only a week or two of relief in symptoms. She admits pain shooting down her leg into her toes, and her toes go numb. She does describe locking in her knee causing her to almost fall. She does admit poor balance. History of breast cancer in 2017, currently being monitored for multiple myeloma.     Review of Systems     Review of Systems   Constitutional:  Negative for chills and fever.   HENT:  Negative for ear pain and sore throat.    Eyes:  Negative for pain and visual disturbance.   Respiratory:  Negative for cough and shortness of breath.    Cardiovascular:  Negative for chest pain and palpitations.   Gastrointestinal:  Negative for abdominal pain and vomiting.   Genitourinary:  Negative for dysuria and hematuria.   Musculoskeletal:  Negative for arthralgias and back pain.   Skin:  Negative for color change and rash.   Neurological:  Negative for seizures and syncope.   All other systems reviewed and are negative.      Physical Exam   Objective   Ht 5' 8\" (1.727 m)   Wt 58.5 kg (129 lb)   BMI 19.61 kg/m²        Left  Knee  Range of motion from 0 to 130.    There is no crepitus with range of motion.   There is no effusion.    There is mild tenderness over the medial joint line.    There is 4+/5 quadriceps strength and equal tone.    The patient is able " to perform a straight leg raise.    negative patellar grind test.  Varus stress testing reveals no instability at 0 and 30 degrees   Valgus stress testing reveals no instability at 0 and 30 degrees  Caitlin's testing is positive    The patient is neurovascular intact distally.    Lumbar spine  There is no midline tenderness present.    There is no paraspinal tenderness.    Sensation intact to light touch left L2 through S1 dermatomes.   Sensation intact to light touch right L2 through S1 dermatomes   Left Motor: 5/5 iliopsoas, 4+/5 quadriceps, 5/5 tibialis anterior, 5/5 extensor hallucis longus, and 5/5 gastrocsoleus.   Right Motor: 5/5 iliopsoas, 5/5 quadriceps, 5/5 tibialis anterior, 5/5 extensor hallucis longus, and 5/5 gastrocsoleus.   Negative clonus bilaterally.    Negative Babinski bilaterally  Straight leg raise test is negative Left   The patient is well perfused distally.    Eyes:  Anicteric sclerae.  Neck:  Supple.  Lungs:  Normal respiratory effort.  Cardiovascular:  Capillary refill is less than 2 seconds.  Skin:  Intact without erythema.  Neurologic:  Sensation grossly intact to light touch.  Psychiatric:  Mood and affect are appropriate.    Data Review     I have personally reviewed pertinent films in PACS, and my interpretation follows:    X-rays taken 6/3/2025 of Left knee independently reviewed and demonstrate mild medial joint space narrowing. No acute fracture or dislocation.    MRI left knee obtained 10/17/2025 demonstrates large, horizontal undersurface tear of the medial meniscus posterior horn and body    Office notes from Dr. Wei reviewed, most recently from 6/3/2025.      Lab Results   Component Value Date/Time    HGBA1C 5.2 07/08/2016 11:58 AM    ESR 10 04/09/2024 11:48 AM    ESR 19 01/15/2015 11:44 AM    LYMEIGG 5.25 (H) 04/02/2019 04:23 PM       Past Medical History[1]    Past Surgical History[2]    Allergies[3]    Medications Ordered Prior to Encounter[4]    Social  History[5]    Family History[6]          Procedures Performed     Procedures  None performed.      Surjit Ochoa DO    Scribe Attestation      I,:  Kylah Kat am acting as a scribe while in the presence of the attending physician.:       I,:  Surjit Ochoa DO personally performed the services described in this documentation    as scribed in my presence.:                  [1]   Past Medical History:  Diagnosis Date    Anxiety     Breast cancer (HCC) 2017    right    Cancer (HCC)     right breast  dx 04/2017    Cervical disc disorder of cervicothoracic region     Cervical radiculopathy     Cervicalgia     Cognitive impairment, mild, so stated     Depression     Major,recurrent    Diplopia     last assessed 12/23/13    Disc degeneration, lumbar     Disease of thyroid gland     hx of goiter, surgery done 1978    Edema of foot     Esophageal reflux     Fatigue     Fibromyalgia     Hemorrhage, anal or rectal     History of radiation therapy     Hx of radiation therapy     Treatments: Course C1, Plan ID Energy Fractions Dose per Fraction (cGy)total Dose delivered(cGy)elapsed days. R Breast 6x 28/28 180 5,040 39, R breast e 12E 5/5 200 1,000 7 Treatment dates: 7/20/17-09/05/17    Hx of radiation therapy     33 treatments    Hypotension     Hypothyroidism     Insomnia     Lab test positive for detection of COVID-19 virus 10/14/2022    Memory loss     Monoclonal gammopathy of undetermined significance     Myalgia     last assessed 07/08/16    Myositis     last assessed 07/08/16    Neck stiffness     Neuralgia     Neuritis     Pain syndrome, chronic     Palpitations     Paresthesia     Peripheral neuropathy     Polyneuropathy     Pseudodementia     Radiculopathy     12/23/13    Restless leg syndrome     Sacroiliitis (HCC)     SLE (systemic lupus erythematosus) ([facility])     last assessed 12/23/13    Tachycardia     Torn meniscus     left knee    Viral warts     Vitamin B deficiency     Vitamin D deficiency     Vitamin  D deficiency     Wears glasses    [2]   Past Surgical History:  Procedure Laterality Date    BLADDER SURGERY      bladder prolapse    BREAST BIOPSY Right     BREAST SURGERY Right     breast bx    COLONOSCOPY      age 50 normal    HYSTERECTOMY  2009    vaginal hysterectomy, rectocele repair    IR BIOPSY BONE MARROW  6/4/2025    [facility] MASTECTOMY PARTIAL Right 5/24/2017    Procedure:  (NUCLEAR MED. 8 AM., NEEDLE LOCAL TO FOLLOW 8:30 AM.) SEGMENTAL MASTECTOMY BREAST NEEDLE PLACEMENT,  SENTINAL LYMPH NODE BIOPSY;  Surgeon: Mckay Lockett MD;  Location: [facility];  Service: General    REPAIR RECTOCELE      THYROID LOBECTOMY Left 1978    TONSILLECTOMY      US BREAST NEEDLE LOC RIGHT Right 5/24/2017    US GUIDED BREAST BIOPSY RIGHT COMPLETE Right 4/20/2017    US GUIDED THYROID BIOPSY  4/25/2019   [3] No Known Allergies  [4]   Current Outpatient Medications on File Prior to Visit   Medication Sig Dispense Refill    DULoxetine (CYMBALTA) 30 mg delayed release capsule Take 1 capsule (30 mg total) by mouth every morning 90 capsule 1    DULoxetine (CYMBALTA) 60 mg delayed release capsule Take 1 capsule (60 mg total) by mouth daily at bedtime 90 capsule 1    gabapentin (NEURONTIN) 100 mg capsule Take 100 mg by mouth if needed      hydrOXYzine HCL (ATARAX) 25 mg tablet Take 1 tablet (25 mg total) by mouth every 6 (six) hours as needed for itching for up to 30 doses 30 tablet 0    levothyroxine 100 mcg tablet Take 1 tablet (100 mcg total) by mouth daily in the early morning 90 tablet 1    miconazole 2 % cream Apply topically 2 (two) times a day Apply to vaginal area twice daily for 10 days, or until burning resolves 56 g 0     No current facility-administered medications on file prior to visit.   [5]   Social History  Tobacco Use    Smoking status: Former     Current packs/day: 0.00     Average packs/day: 1.5 packs/day for 5.0 years (7.5 ttl pk-yrs)     Types: Cigarettes     Start date: 1970     Quit date: 1975     Years since  quittin.5    Smokeless tobacco: Never    Tobacco comments:     Former quit 30 years ago   Vaping Use    Vaping status: Never Used   Substance Use Topics    Alcohol use: Not Currently     Alcohol/week: 1.0 standard drink of alcohol     Types: 1 Glasses of wine per week    Drug use: No   [6]   Family History  Problem Relation Name Age of Onset    Heart disease Mother          CHF    Heart failure Mother      Cirrhosis Father      Esophageal cancer Brother ankur 65    Lung cancer Brother ankur     No Known Problems Brother      No Known Problems Brother      No Known Problems Brother      Breast cancer Neg Hx

## 2025-07-15 ENCOUNTER — OFFICE VISIT (OUTPATIENT)
Age: 65
End: 2025-07-15
Payer: COMMERCIAL

## 2025-07-15 VITALS — SYSTOLIC BLOOD PRESSURE: 96 MMHG | DIASTOLIC BLOOD PRESSURE: 58 MMHG

## 2025-07-15 DIAGNOSIS — N94.89 VAGINAL BURNING: Primary | ICD-10-CM

## 2025-07-15 PROBLEM — S22.43XA CLOSED FRACTURE OF MULTIPLE RIBS OF BOTH SIDES: Status: RESOLVED | Noted: 2023-05-07 | Resolved: 2025-07-15

## 2025-07-15 PROBLEM — C73 THYROID CANCER (HCC): Status: ACTIVE | Noted: 2023-05-07

## 2025-07-15 PROBLEM — S22.20XA CLOSED FRACTURE OF STERNUM: Status: RESOLVED | Noted: 2022-07-14 | Resolved: 2025-07-15

## 2025-07-15 PROBLEM — S22.20XA CLOSED FRACTURE OF STERNUM: Status: ACTIVE | Noted: 2022-07-14

## 2025-07-15 PROBLEM — U07.1 COVID-19: Status: RESOLVED | Noted: 2022-10-11 | Resolved: 2025-07-15

## 2025-07-15 PROBLEM — S22.43XA CLOSED FRACTURE OF MULTIPLE RIBS OF BOTH SIDES: Status: ACTIVE | Noted: 2023-05-07

## 2025-07-15 PROBLEM — S09.90XA INJURY OF HEAD: Status: RESOLVED | Noted: 2024-03-14 | Resolved: 2025-07-15

## 2025-07-15 PROBLEM — R07.81 RIB PAIN: Status: RESOLVED | Noted: 2019-12-19 | Resolved: 2025-07-15

## 2025-07-15 PROBLEM — S42.001A CLOSED FRACTURE OF RIGHT CLAVICLE: Status: RESOLVED | Noted: 2023-06-01 | Resolved: 2025-07-15

## 2025-07-15 PROBLEM — S22.39XA CLOSED FRACTURE OF RIB: Status: RESOLVED | Noted: 2023-06-01 | Resolved: 2025-07-15

## 2025-07-15 PROBLEM — S09.90XA INJURY OF HEAD: Status: ACTIVE | Noted: 2024-03-14

## 2025-07-15 PROBLEM — C50.911 MALIGNANT NEOPLASM OF UNSPECIFIED SITE OF RIGHT FEMALE BREAST (HCC): Status: ACTIVE | Noted: 2025-07-15

## 2025-07-15 PROBLEM — S92.411A: Status: RESOLVED | Noted: 2024-07-22 | Resolved: 2025-07-15

## 2025-07-15 PROBLEM — J38.3 VOCAL CORD DYSFUNCTION: Status: ACTIVE | Noted: 2023-06-01

## 2025-07-15 PROBLEM — R42 VERTIGO: Status: ACTIVE | Noted: 2022-07-14

## 2025-07-15 PROBLEM — G56.20 ULNAR NERVE ENTRAPMENT AT ELBOW: Status: ACTIVE | Noted: 2024-01-03

## 2025-07-15 PROBLEM — F32.A DEPRESSIVE DISORDER: Status: ACTIVE | Noted: 2022-07-14

## 2025-07-15 PROBLEM — J93.9 PNEUMOTHORAX: Status: ACTIVE | Noted: 2023-05-05

## 2025-07-15 PROBLEM — S92.411A: Status: ACTIVE | Noted: 2024-07-22

## 2025-07-15 PROBLEM — S22.39XA CLOSED FRACTURE OF RIB: Status: ACTIVE | Noted: 2023-06-01

## 2025-07-15 PROBLEM — S83.249A TEAR OF MEDIAL MENISCUS OF KNEE: Status: ACTIVE | Noted: 2024-10-28

## 2025-07-15 PROBLEM — S42.001A CLOSED FRACTURE OF RIGHT CLAVICLE: Status: ACTIVE | Noted: 2023-06-01

## 2025-07-15 PROCEDURE — 99203 OFFICE O/P NEW LOW 30 MIN: CPT | Performed by: NURSE PRACTITIONER

## 2025-07-16 ENCOUNTER — HOSPITAL ENCOUNTER (OUTPATIENT)
Age: 65
Discharge: HOME | End: 2025-07-16
Attending: PHYSICIAN ASSISTANT
Payer: COMMERCIAL

## 2025-07-16 VITALS — WEIGHT: 133 LBS | HEIGHT: 68 IN | BODY MASS INDEX: 20.16 KG/M2

## 2025-07-16 DIAGNOSIS — M85.88 OTHER SPECIFIED DISORDERS OF BONE DENSITY AND STRUCTURE, OTHER SITE: ICD-10-CM

## 2025-07-16 PROCEDURE — 77080 DXA BONE DENSITY AXIAL: CPT

## 2025-08-06 ENCOUNTER — RESULTS FOLLOW-UP (OUTPATIENT)
Dept: HEMATOLOGY ONCOLOGY | Facility: CLINIC | Age: 65
End: 2025-08-06

## 2025-08-12 ENCOUNTER — OFFICE VISIT (OUTPATIENT)
Age: 65
End: 2025-08-12
Payer: COMMERCIAL

## (undated) DEVICE — NEEDLE 25G X 1 1/2

## (undated) DEVICE — LIGACLIP APPLIER MEDIUM

## (undated) DEVICE — SUT MONOCRYL 4-0 PS-2 18 IN Y496G

## (undated) DEVICE — GLOVE SRG BIOGEL 7.5

## (undated) DEVICE — SUT VICRYL 3-0 SH 27 IN J416H

## (undated) DEVICE — SUT VICRYL 2-0 18 IN J905T

## (undated) DEVICE — JP PERF DRN SIL FLT 10MM FULL: Brand: CARDINAL HEALTH

## (undated) DEVICE — SPONGE GAUZE 4 X 8 12 PLY STRL LF

## (undated) DEVICE — TIBURON TRANSVERSE LAPAROTOMY SHEET: Brand: CONVERTORS

## (undated) DEVICE — 3000CC GUARDIAN II: Brand: GUARDIAN

## (undated) DEVICE — PROVE COVER: Brand: UNBRANDED

## (undated) DEVICE — 3M™ STERI-STRIP™ REINFORCED ADHESIVE SKIN CLOSURES, R1547, 1/2 IN X 4 IN (12 MM X 100 MM), 6 STRIPS/ENVELOPE: Brand: 3M™ STERI-STRIP™

## (undated) DEVICE — PLUMEPEN PRO 10FT

## (undated) DEVICE — 3M™ TEGADERM™ TRANSPARENT FILM DRESSING FRAME STYLE, 1626W, 4 IN X 4-3/4 IN (10 CM X 12 CM), 50/CT 4CT/CASE: Brand: 3M™ TEGADERM™

## (undated) DEVICE — SYRINGE 10ML LL CONTROL TOP

## (undated) DEVICE — ADHESIVE SKN CLSR HISTOACRYL FLEX 0.5ML LF

## (undated) DEVICE — PACK GENERAL LF